# Patient Record
Sex: MALE | Race: WHITE | NOT HISPANIC OR LATINO | Employment: UNEMPLOYED | URBAN - METROPOLITAN AREA
[De-identification: names, ages, dates, MRNs, and addresses within clinical notes are randomized per-mention and may not be internally consistent; named-entity substitution may affect disease eponyms.]

---

## 2021-10-08 ENCOUNTER — HOSPITAL ENCOUNTER (EMERGENCY)
Facility: HOSPITAL | Age: 36
Discharge: HOME | End: 2021-10-08
Attending: EMERGENCY MEDICINE | Admitting: EMERGENCY MEDICINE
Payer: MEDICARE

## 2021-10-08 VITALS
BODY MASS INDEX: 26.32 KG/M2 | TEMPERATURE: 97.8 F | HEART RATE: 88 BPM | DIASTOLIC BLOOD PRESSURE: 88 MMHG | HEIGHT: 71 IN | RESPIRATION RATE: 18 BRPM | OXYGEN SATURATION: 96 % | WEIGHT: 188 LBS | SYSTOLIC BLOOD PRESSURE: 137 MMHG

## 2021-10-08 DIAGNOSIS — L23.7 POISON IVY DERMATITIS: Primary | ICD-10-CM

## 2021-10-08 PROCEDURE — 99282 EMERGENCY DEPT VISIT SF MDM: CPT

## 2021-10-08 PROCEDURE — 99284 EMERGENCY DEPT VISIT MOD MDM: CPT | Performed by: EMERGENCY MEDICINE

## 2021-10-08 RX ORDER — PREDNISONE 10 MG/1
TABLET ORAL
Qty: 35 TABLET | Refills: 0 | Status: SHIPPED | OUTPATIENT
Start: 2021-10-08 | End: 2021-10-23

## 2021-10-08 RX ORDER — DIPHENHYDRAMINE HCL 25 MG
50 TABLET ORAL ONCE
Status: COMPLETED | OUTPATIENT
Start: 2021-10-08 | End: 2021-10-08

## 2021-10-08 RX ORDER — PREDNISONE 20 MG/1
60 TABLET ORAL ONCE
Status: COMPLETED | OUTPATIENT
Start: 2021-10-08 | End: 2021-10-08

## 2021-10-08 RX ORDER — DIPHENHYDRAMINE HCL 25 MG
50 TABLET ORAL EVERY 6 HOURS PRN
Qty: 20 TABLET | Refills: 0 | Status: SHIPPED | OUTPATIENT
Start: 2021-10-08 | End: 2021-12-13 | Stop reason: ALTCHOICE

## 2021-10-08 RX ADMIN — DIPHENHYDRAMINE HYDROCHLORIDE 50 MG: 25 TABLET ORAL at 20:37

## 2021-10-08 RX ADMIN — PREDNISONE 60 MG: 20 TABLET ORAL at 20:36

## 2021-11-03 ENCOUNTER — OFFICE VISIT (OUTPATIENT)
Dept: FAMILY MEDICINE CLINIC | Facility: CLINIC | Age: 36
End: 2021-11-03
Payer: MEDICARE

## 2021-11-03 VITALS
DIASTOLIC BLOOD PRESSURE: 98 MMHG | BODY MASS INDEX: 26.94 KG/M2 | SYSTOLIC BLOOD PRESSURE: 158 MMHG | RESPIRATION RATE: 18 BRPM | HEART RATE: 110 BPM | HEIGHT: 71 IN | WEIGHT: 192.4 LBS | TEMPERATURE: 96 F | OXYGEN SATURATION: 97 %

## 2021-11-03 DIAGNOSIS — F19.90 DRUG USE: Primary | ICD-10-CM

## 2021-11-03 PROBLEM — F11.90 OPIOID USE: Status: ACTIVE | Noted: 2021-11-03

## 2021-11-03 PROCEDURE — T1015 CLINIC SERVICE: HCPCS | Performed by: FAMILY MEDICINE

## 2021-11-03 RX ORDER — BUPRENORPHINE AND NALOXONE 8; 2 MG/1; MG/1
FILM, SOLUBLE BUCCAL; SUBLINGUAL
COMMUNITY
Start: 2021-09-07 | End: 2021-11-05 | Stop reason: SDUPTHER

## 2021-11-03 RX ORDER — MIRTAZAPINE 45 MG/1
TABLET, FILM COATED ORAL
COMMUNITY
Start: 2021-10-14 | End: 2021-11-03 | Stop reason: SDUPTHER

## 2021-11-03 RX ORDER — BUPRENORPHINE HYDROCHLORIDE AND NALOXONE HYDROCHLORIDE DIHYDRATE 2; .5 MG/1; MG/1
TABLET SUBLINGUAL
COMMUNITY
Start: 2021-10-11 | End: 2021-11-05 | Stop reason: ALTCHOICE

## 2021-11-03 RX ORDER — MIRTAZAPINE 45 MG/1
45 TABLET, FILM COATED ORAL
Qty: 30 TABLET | Refills: 0 | Status: SHIPPED | OUTPATIENT
Start: 2021-11-03 | End: 2021-11-05 | Stop reason: ALTCHOICE

## 2021-11-03 RX ORDER — LORAZEPAM 1 MG/1
TABLET ORAL
COMMUNITY
Start: 2021-10-04 | End: 2021-11-05 | Stop reason: SDUPTHER

## 2021-11-03 RX ORDER — NALOXONE HYDROCHLORIDE 4 MG/.1ML
SPRAY NASAL
COMMUNITY
Start: 2021-10-11 | End: 2021-11-05 | Stop reason: SDUPTHER

## 2021-11-04 DIAGNOSIS — F19.90 DRUG USE: Primary | ICD-10-CM

## 2021-11-04 PROCEDURE — 80307 DRUG TEST PRSMV CHEM ANLYZR: CPT | Performed by: FAMILY MEDICINE

## 2021-11-05 ENCOUNTER — OFFICE VISIT (OUTPATIENT)
Dept: FAMILY MEDICINE CLINIC | Facility: CLINIC | Age: 36
End: 2021-11-05
Payer: MEDICARE

## 2021-11-05 VITALS
TEMPERATURE: 97.9 F | HEART RATE: 106 BPM | WEIGHT: 193 LBS | SYSTOLIC BLOOD PRESSURE: 140 MMHG | HEIGHT: 71 IN | DIASTOLIC BLOOD PRESSURE: 92 MMHG | OXYGEN SATURATION: 99 % | BODY MASS INDEX: 27.02 KG/M2 | RESPIRATION RATE: 18 BRPM

## 2021-11-05 DIAGNOSIS — F41.9 ANXIETY: ICD-10-CM

## 2021-11-05 DIAGNOSIS — F11.24 OPIOID DEPENDENCE WITH OPIOID-INDUCED MOOD DISORDER (HCC): Primary | ICD-10-CM

## 2021-11-05 DIAGNOSIS — Z13.220 LIPID SCREENING: ICD-10-CM

## 2021-11-05 DIAGNOSIS — Z11.4 SCREENING FOR HIV WITHOUT PRESENCE OF RISK FACTORS: ICD-10-CM

## 2021-11-05 DIAGNOSIS — Z11.3 ROUTINE SCREENING FOR STI (SEXUALLY TRANSMITTED INFECTION): ICD-10-CM

## 2021-11-05 DIAGNOSIS — Z11.59 ENCOUNTER FOR HEPATITIS C SCREENING TEST FOR LOW RISK PATIENT: ICD-10-CM

## 2021-11-05 PROCEDURE — T1015 CLINIC SERVICE: HCPCS | Performed by: FAMILY MEDICINE

## 2021-11-05 RX ORDER — LORAZEPAM 1 MG/1
1 TABLET ORAL 2 TIMES DAILY
Qty: 10 TABLET | Refills: 0 | Status: SHIPPED | OUTPATIENT
Start: 2021-11-05 | End: 2021-11-12 | Stop reason: ALTCHOICE

## 2021-11-05 RX ORDER — NALOXONE HYDROCHLORIDE 4 MG/.1ML
SPRAY NASAL
Qty: 1 EACH | Refills: 2 | Status: SHIPPED | OUTPATIENT
Start: 2021-11-05 | End: 2022-06-16

## 2021-11-05 RX ORDER — BUPRENORPHINE AND NALOXONE 8; 2 MG/1; MG/1
16 FILM, SOLUBLE BUCCAL; SUBLINGUAL DAILY
Qty: 14 FILM | Refills: 0 | Status: SHIPPED | OUTPATIENT
Start: 2021-11-05 | End: 2021-11-12 | Stop reason: SDUPTHER

## 2021-11-05 RX ORDER — PAROXETINE HYDROCHLORIDE 20 MG/1
20 TABLET, FILM COATED ORAL DAILY
Qty: 30 TABLET | Refills: 0 | Status: SHIPPED | OUTPATIENT
Start: 2021-11-05 | End: 2021-11-29 | Stop reason: SDUPTHER

## 2021-11-06 LAB — FENTANYL+NORFENTANYL PNL UR: NEGATIVE PG/ML

## 2021-11-10 LAB
BUPRENORPHINE UR CFM-MCNC: 664 NG/ML
BUPRENORPHINE UR QL CFM: NORMAL NG/ML
BUPRENORPHINE UR QL CFM: POSITIVE
BUPRENORPHINE+NOR UR QL: POSITIVE
NORBUPRENORPHINE UR CFM-MCNC: >1000 NG/ML
NORBUPRENORPHINE UR QL CFM: POSITIVE

## 2021-11-12 ENCOUNTER — OFFICE VISIT (OUTPATIENT)
Dept: FAMILY MEDICINE CLINIC | Facility: CLINIC | Age: 36
End: 2021-11-12
Payer: MEDICARE

## 2021-11-12 VITALS
HEIGHT: 71 IN | DIASTOLIC BLOOD PRESSURE: 82 MMHG | OXYGEN SATURATION: 98 % | WEIGHT: 192 LBS | HEART RATE: 100 BPM | BODY MASS INDEX: 26.88 KG/M2 | SYSTOLIC BLOOD PRESSURE: 116 MMHG | RESPIRATION RATE: 18 BRPM | TEMPERATURE: 98.2 F

## 2021-11-12 DIAGNOSIS — F41.9 ANXIETY: ICD-10-CM

## 2021-11-12 DIAGNOSIS — F11.24 OPIOID DEPENDENCE WITH OPIOID-INDUCED MOOD DISORDER (HCC): Primary | ICD-10-CM

## 2021-11-12 DIAGNOSIS — Z11.3 SCREENING EXAMINATION FOR STD (SEXUALLY TRANSMITTED DISEASE): ICD-10-CM

## 2021-11-12 PROCEDURE — T1015 CLINIC SERVICE: HCPCS | Performed by: FAMILY MEDICINE

## 2021-11-12 PROCEDURE — 80307 DRUG TEST PRSMV CHEM ANLYZR: CPT | Performed by: FAMILY MEDICINE

## 2021-11-12 RX ORDER — CLONAZEPAM 1 MG/1
1 TABLET, ORALLY DISINTEGRATING ORAL 2 TIMES DAILY
Qty: 28 TABLET | Refills: 0 | Status: SHIPPED | OUTPATIENT
Start: 2021-11-12 | End: 2021-11-29 | Stop reason: SDUPTHER

## 2021-11-12 RX ORDER — BUPRENORPHINE AND NALOXONE 8; 2 MG/1; MG/1
16 FILM, SOLUBLE BUCCAL; SUBLINGUAL DAILY
Qty: 60 FILM | Refills: 0 | Status: SHIPPED | OUTPATIENT
Start: 2021-11-12 | End: 2021-12-13 | Stop reason: SDUPTHER

## 2021-11-15 ENCOUNTER — TELEPHONE (OUTPATIENT)
Dept: LAB | Facility: HOSPITAL | Age: 36
End: 2021-11-15

## 2021-11-15 ENCOUNTER — TELEPHONE (OUTPATIENT)
Dept: FAMILY MEDICINE CLINIC | Facility: CLINIC | Age: 36
End: 2021-11-15

## 2021-11-15 LAB
AMPHETAMINES UR QL SCN: NEGATIVE NG/ML
BARBITURATES UR QL SCN: NEGATIVE NG/ML
BENZODIAZ UR QL: POSITIVE
BZE UR QL: NEGATIVE NG/ML
CANNABINOIDS UR QL SCN: POSITIVE
METHADONE UR QL SCN: NEGATIVE NG/ML
OPIATES UR QL: NEGATIVE NG/ML
PCP UR QL: NEGATIVE NG/ML
PROPOXYPH UR QL SCN: NEGATIVE NG/ML

## 2021-11-18 LAB
AMPHETAMINES UR QL SCN: NEGATIVE NG/ML
BARBITURATES UR QL SCN: NEGATIVE NG/ML
BENZODIAZ UR QL: POSITIVE
BUPRENORPHINE UR CFM-MCNC: 62 NG/ML
BUPRENORPHINE UR QL CFM: NORMAL NG/ML
BUPRENORPHINE UR QL CFM: POSITIVE
BUPRENORPHINE+NOR UR QL: POSITIVE
BZE UR QL: NEGATIVE NG/ML
CANNABINOIDS UR QL SCN: POSITIVE
METHADONE UR QL SCN: NEGATIVE NG/ML
NORBUPRENORPHINE UR CFM-MCNC: 330 NG/ML
NORBUPRENORPHINE UR QL CFM: POSITIVE
OPIATES UR QL: NEGATIVE NG/ML
PCP UR QL: NEGATIVE NG/ML
PROPOXYPH UR QL SCN: NEGATIVE NG/ML

## 2021-11-20 LAB — FENTANYL+NORFENTANYL PNL UR: NEGATIVE PG/ML

## 2021-11-29 ENCOUNTER — OFFICE VISIT (OUTPATIENT)
Dept: FAMILY MEDICINE CLINIC | Facility: CLINIC | Age: 36
End: 2021-11-29
Payer: MEDICARE

## 2021-11-29 VITALS
RESPIRATION RATE: 18 BRPM | WEIGHT: 201 LBS | HEART RATE: 110 BPM | BODY MASS INDEX: 28.14 KG/M2 | DIASTOLIC BLOOD PRESSURE: 70 MMHG | SYSTOLIC BLOOD PRESSURE: 130 MMHG | HEIGHT: 71 IN | OXYGEN SATURATION: 97 % | TEMPERATURE: 97.6 F

## 2021-11-29 DIAGNOSIS — F41.9 ANXIETY: Primary | ICD-10-CM

## 2021-11-29 PROCEDURE — T1015 CLINIC SERVICE: HCPCS | Performed by: FAMILY MEDICINE

## 2021-11-29 RX ORDER — PAROXETINE 30 MG/1
30 TABLET, FILM COATED ORAL DAILY
Qty: 30 TABLET | Refills: 0 | Status: SHIPPED | OUTPATIENT
Start: 2021-11-29 | End: 2021-12-13 | Stop reason: SDUPTHER

## 2021-11-29 RX ORDER — CLONAZEPAM 1 MG/1
TABLET ORAL
Qty: 90 TABLET | Refills: 0 | Status: SHIPPED | OUTPATIENT
Start: 2021-11-29 | End: 2021-12-23 | Stop reason: SDUPTHER

## 2021-11-29 RX ORDER — CLONAZEPAM 1 MG/1
TABLET, ORALLY DISINTEGRATING ORAL
Qty: 90 TABLET | Refills: 0 | Status: SHIPPED | OUTPATIENT
Start: 2021-11-29 | End: 2021-11-29 | Stop reason: CLARIF

## 2021-12-07 ENCOUNTER — TELEPHONE (OUTPATIENT)
Dept: FAMILY MEDICINE CLINIC | Facility: CLINIC | Age: 36
End: 2021-12-07

## 2021-12-13 ENCOUNTER — OFFICE VISIT (OUTPATIENT)
Dept: FAMILY MEDICINE CLINIC | Facility: CLINIC | Age: 36
End: 2021-12-13
Payer: MEDICARE

## 2021-12-13 VITALS
SYSTOLIC BLOOD PRESSURE: 134 MMHG | WEIGHT: 204 LBS | BODY MASS INDEX: 28.56 KG/M2 | HEIGHT: 71 IN | OXYGEN SATURATION: 98 % | RESPIRATION RATE: 18 BRPM | TEMPERATURE: 96.9 F | DIASTOLIC BLOOD PRESSURE: 86 MMHG | HEART RATE: 86 BPM

## 2021-12-13 DIAGNOSIS — F41.9 ANXIETY: ICD-10-CM

## 2021-12-13 DIAGNOSIS — F19.982 DRUG-INDUCED INSOMNIA (HCC): ICD-10-CM

## 2021-12-13 DIAGNOSIS — F11.24 OPIOID DEPENDENCE WITH OPIOID-INDUCED MOOD DISORDER (HCC): Primary | ICD-10-CM

## 2021-12-13 PROCEDURE — 80307 DRUG TEST PRSMV CHEM ANLYZR: CPT | Performed by: FAMILY MEDICINE

## 2021-12-13 PROCEDURE — T1015 CLINIC SERVICE: HCPCS | Performed by: FAMILY MEDICINE

## 2021-12-13 RX ORDER — BUPRENORPHINE AND NALOXONE 8; 2 MG/1; MG/1
16 FILM, SOLUBLE BUCCAL; SUBLINGUAL DAILY
Qty: 60 FILM | Refills: 0 | Status: SHIPPED | OUTPATIENT
Start: 2021-12-13 | End: 2022-01-11 | Stop reason: SDUPTHER

## 2021-12-13 RX ORDER — MIRTAZAPINE 45 MG/1
45 TABLET, FILM COATED ORAL
COMMUNITY
End: 2021-12-13 | Stop reason: SDUPTHER

## 2021-12-13 RX ORDER — MIRTAZAPINE 30 MG/1
30 TABLET, FILM COATED ORAL
Qty: 30 TABLET | Refills: 2 | Status: SHIPPED | OUTPATIENT
Start: 2021-12-13 | End: 2022-01-12 | Stop reason: SDUPTHER

## 2021-12-13 RX ORDER — PAROXETINE 30 MG/1
30 TABLET, FILM COATED ORAL DAILY
Qty: 30 TABLET | Refills: 2 | Status: SHIPPED | OUTPATIENT
Start: 2021-12-13 | End: 2022-01-12 | Stop reason: SDUPTHER

## 2021-12-14 LAB — FENTANYL+NORFENTANYL PNL UR: NEGATIVE PG/ML

## 2021-12-17 LAB
AMPHETAMINES UR QL SCN: NEGATIVE NG/ML
BARBITURATES UR QL SCN: NEGATIVE NG/ML
BENZODIAZ UR QL: NEGATIVE NG/ML
BUPRENORPHINE UR CFM-MCNC: 62 NG/ML
BUPRENORPHINE UR QL CFM: NORMAL NG/ML
BUPRENORPHINE UR QL CFM: POSITIVE
BUPRENORPHINE+NOR UR QL: POSITIVE
BZE UR QL: NEGATIVE NG/ML
CANNABINOIDS UR QL SCN: POSITIVE
METHADONE UR QL SCN: NEGATIVE NG/ML
NORBUPRENORPHINE UR CFM-MCNC: 490 NG/ML
NORBUPRENORPHINE UR QL CFM: POSITIVE
OPIATES UR QL: NEGATIVE NG/ML
PCP UR QL: NEGATIVE NG/ML
PROPOXYPH UR QL SCN: NEGATIVE NG/ML

## 2021-12-20 ENCOUNTER — TELEPHONE (OUTPATIENT)
Dept: FAMILY MEDICINE CLINIC | Facility: CLINIC | Age: 36
End: 2021-12-20

## 2021-12-23 DIAGNOSIS — F41.9 ANXIETY: ICD-10-CM

## 2021-12-23 RX ORDER — CLONAZEPAM 1 MG/1
TABLET ORAL
Qty: 90 TABLET | Refills: 0 | Status: SHIPPED | OUTPATIENT
Start: 2021-12-28 | End: 2022-01-12 | Stop reason: SDUPTHER

## 2022-01-11 ENCOUNTER — TELEPHONE (OUTPATIENT)
Dept: OTHER | Facility: OTHER | Age: 37
End: 2022-01-11

## 2022-01-11 DIAGNOSIS — F11.24 OPIOID DEPENDENCE WITH OPIOID-INDUCED MOOD DISORDER (HCC): ICD-10-CM

## 2022-01-11 RX ORDER — BUPRENORPHINE AND NALOXONE 8; 2 MG/1; MG/1
16 FILM, SOLUBLE BUCCAL; SUBLINGUAL DAILY
Qty: 30 FILM | Refills: 0 | Status: SHIPPED | OUTPATIENT
Start: 2022-01-11 | End: 2022-01-12 | Stop reason: SDUPTHER

## 2022-01-12 DIAGNOSIS — F11.24 OPIOID DEPENDENCE WITH OPIOID-INDUCED MOOD DISORDER (HCC): ICD-10-CM

## 2022-01-12 DIAGNOSIS — F19.982 DRUG-INDUCED INSOMNIA (HCC): ICD-10-CM

## 2022-01-12 DIAGNOSIS — F41.9 ANXIETY: ICD-10-CM

## 2022-01-12 RX ORDER — MIRTAZAPINE 30 MG/1
30 TABLET, FILM COATED ORAL
Qty: 30 TABLET | Refills: 0 | Status: SHIPPED | OUTPATIENT
Start: 2022-01-12 | End: 2022-05-04 | Stop reason: SDUPTHER

## 2022-01-12 RX ORDER — CLONAZEPAM 1 MG/1
TABLET ORAL
Qty: 90 TABLET | Refills: 0 | Status: SHIPPED | OUTPATIENT
Start: 2022-01-12 | End: 2022-04-27 | Stop reason: ALTCHOICE

## 2022-01-12 RX ORDER — PAROXETINE 30 MG/1
30 TABLET, FILM COATED ORAL DAILY
Qty: 30 TABLET | Refills: 0 | Status: SHIPPED | OUTPATIENT
Start: 2022-01-12 | End: 2022-02-09

## 2022-01-12 RX ORDER — BUPRENORPHINE AND NALOXONE 8; 2 MG/1; MG/1
16 FILM, SOLUBLE BUCCAL; SUBLINGUAL DAILY
Qty: 30 FILM | Refills: 0 | Status: SHIPPED | OUTPATIENT
Start: 2022-01-12 | End: 2022-01-27

## 2022-02-05 DIAGNOSIS — F41.9 ANXIETY: ICD-10-CM

## 2022-02-05 NOTE — TELEPHONE ENCOUNTER
Patient called from [place] and is out of his medications  He also wanted Suboxone also but I didn't see it on his list  The patient requested a call back

## 2022-02-07 RX ORDER — CLONAZEPAM 1 MG/1
TABLET ORAL
Qty: 90 TABLET | Refills: 0 | OUTPATIENT
Start: 2022-02-07

## 2022-02-08 NOTE — TELEPHONE ENCOUNTER
Patient has not been seen in nearly 2 months and is an MAT patient  He needs to come in for an appointment for controlled substances or transfer care to another provider if he is going to be in [facility] for an extended period of time

## 2022-03-29 ENCOUNTER — TELEPHONE (OUTPATIENT)
Dept: OTHER | Facility: OTHER | Age: 37
End: 2022-03-29

## 2022-03-29 NOTE — TELEPHONE ENCOUNTER
Pt stated needs to make an appointment for the middle of April stated needs to get an injection of Vivitrol please followup with patient

## 2022-04-20 ENCOUNTER — OFFICE VISIT (OUTPATIENT)
Dept: FAMILY MEDICINE CLINIC | Facility: CLINIC | Age: 37
End: 2022-04-20
Payer: COMMERCIAL

## 2022-04-20 VITALS
DIASTOLIC BLOOD PRESSURE: 88 MMHG | HEART RATE: 90 BPM | BODY MASS INDEX: 30.52 KG/M2 | HEIGHT: 71 IN | TEMPERATURE: 97.5 F | SYSTOLIC BLOOD PRESSURE: 138 MMHG | WEIGHT: 218 LBS | RESPIRATION RATE: 19 BRPM | OXYGEN SATURATION: 97 %

## 2022-04-20 DIAGNOSIS — F11.24 OPIOID DEPENDENCE WITH OPIOID-INDUCED MOOD DISORDER (HCC): Primary | ICD-10-CM

## 2022-04-20 DIAGNOSIS — F41.9 ANXIETY: ICD-10-CM

## 2022-04-20 DIAGNOSIS — Z71.6 ENCOUNTER FOR TOBACCO USE CESSATION COUNSELING: ICD-10-CM

## 2022-04-20 DIAGNOSIS — R56.9 SEIZURE (HCC): ICD-10-CM

## 2022-04-20 PROCEDURE — T1015 CLINIC SERVICE: HCPCS | Performed by: FAMILY MEDICINE

## 2022-04-20 RX ORDER — CHLORDIAZEPOXIDE HYDROCHLORIDE 25 MG/1
CAPSULE, GELATIN COATED ORAL
COMMUNITY
Start: 2022-01-31 | End: 2022-04-27 | Stop reason: ALTCHOICE

## 2022-04-20 RX ORDER — NALOXONE HYDROCHLORIDE 4 MG/.1ML
SPRAY NASAL
Qty: 1 EACH | Refills: 1 | Status: SHIPPED | OUTPATIENT
Start: 2022-04-20 | End: 2022-06-16

## 2022-04-20 RX ORDER — CLONIDINE HYDROCHLORIDE 0.1 MG/1
TABLET ORAL
COMMUNITY
Start: 2022-02-07 | End: 2022-05-04 | Stop reason: SDUPTHER

## 2022-04-20 RX ORDER — BUPRENORPHINE AND NALOXONE 8; 2 MG/1; MG/1
8 FILM, SOLUBLE BUCCAL; SUBLINGUAL 2 TIMES DAILY
Qty: 14 EACH | Refills: 0 | Status: SHIPPED | OUTPATIENT
Start: 2022-04-20 | End: 2022-04-27 | Stop reason: SDUPTHER

## 2022-04-20 NOTE — PROGRESS NOTES
FAMILY MEDICINE OFFICE VISIT  [facility]      NAME: Theodora Interiano  AGE: 31 y o  SEX: male    DATE OF ENCOUNTER: 4/20/2022    Assessment and Plan     Patient is 30 yo M with PMH of opioid use disorder and anxiety presented to office for subaxone refill  Lost follow up for past 4 months  Denies any illicit drug use  Also complaints of increased anxiety with recent stress in life  Reports of an episode of seizure in January while he is in [place]  Will review labs and reinitiate the therapy  Advised the patient to follow up every week and the patient is in agreement with the plan  Referral for neurology in place  Patient will visit ER in case of emergency  1  Opioid dependence with opioid-induced mood disorder ([facility] [address] )  -     Toxicology screen, urine  -     FENTANYL, URINE  -     Suboxone    2  Seizure [facility])  -     Ambulatory Referral to Neurology; Future    3  Anxiety        -     Chlordiazepoxide 25mg    4  Encounter for tobacco use cessation counseling  -     nicotine polacrilex (NICORETTE) 2 mg gum; Chew 1 each (2 mg total) as needed for smoking cessation         Chief Complaint     Chief Complaint   Patient presents with    Medication Refill       History of Present Illness     Patient came to office for suboxone refill  Was on suboxone films 8mg x 2 daily  Patient last follow up because of family situation  Last seen in December, 2021  Denies drug use during this time  Reports of an episode of sizure in January when he is in [place]  Did not loose consciousness during the seizure, no post ictal state  Currently not on seizure medication  Reports of recent use of oxycodone for hip pain  Patient is requesting injectable form of subaxone in future  Medication Refill  Pertinent negatives include no abdominal pain, chest pain, chills, fever, headaches, nausea or vomiting         The following portions of the patient's history were reviewed and updated as appropriate: allergies, current medications, past family history, past medical history, past social history, past surgical history and problem list     Review of Systems     Review of Systems   Constitutional: Negative for appetite change, chills and fever  HENT: Negative for facial swelling and rhinorrhea  Eyes: Negative for visual disturbance  Respiratory: Negative for choking, chest tightness and shortness of breath  Cardiovascular: Negative for chest pain  Gastrointestinal: Negative for abdominal pain, constipation, diarrhea, nausea and vomiting  Neurological: Negative for dizziness, tremors, syncope and headaches  Psychiatric/Behavioral: Positive for sleep disturbance  Negative for suicidal ideas  All other systems reviewed and are negative  Active Problem List     Patient Active Problem List   Diagnosis    Opioid use    Anxiety    Encounter for tobacco use cessation counseling    Opioid dependence with opioid-induced mood disorder ([facility] [address] )    Seizure (HCC)       Objective     /88 (BP Location: Left arm, Patient Position: Sitting, Cuff Size: Standard)   Pulse 90   Temp 97 5 °F (36 4 °C) (Axillary)   Resp 19   Ht 5' 11" (1 803 m)   Wt 98 9 kg (218 lb)   SpO2 97%   BMI 30 40 kg/m²     Physical Exam  Vitals and nursing note reviewed  Exam conducted with a chaperone present  Constitutional:       General: He is not in acute distress  Appearance: Normal appearance  HENT:      Head: Normocephalic and atraumatic  Right Ear: External ear normal       Left Ear: External ear normal       Nose: No congestion or rhinorrhea  Mouth/Throat:      Mouth: Mucous membranes are moist       Pharynx: Oropharynx is clear  Eyes:      Conjunctiva/sclera: Conjunctivae normal       Pupils: Pupils are equal, round, and reactive to light  Cardiovascular:      Rate and Rhythm: Normal rate and regular rhythm  Pulses: Normal pulses  Heart sounds: Normal heart sounds  No murmur heard        Pulmonary: Effort: Pulmonary effort is normal       Breath sounds: Normal breath sounds  No wheezing  Abdominal:      General: Bowel sounds are normal       Palpations: Abdomen is soft  Tenderness: There is no abdominal tenderness  Musculoskeletal:      Cervical back: Neck supple  Right lower leg: No edema  Left lower leg: No edema  Skin:     General: Skin is warm and dry  Capillary Refill: Capillary refill takes less than 2 seconds  Neurological:      General: No focal deficit present  Mental Status: He is alert and oriented to person, place, and time  Mental status is at baseline           Pertinent Laboratory/Diagnostic Studies:          Current Medications     Current Outpatient Medications:     clonazePAM (KlonoPIN) 1 mg tablet, Take 1 tab po qAM and 2 tabs po qPM, Disp: 90 tablet, Rfl: 0    cloNIDine (CATAPRES) 0 1 mg tablet, TAKE 1 TABLET BY MOUTH TWICE DAILY FOR 54 DAYS, Disp: , Rfl:     mirtazapine (REMERON) 30 mg tablet, Take 1 tablet (30 mg total) by mouth daily at bedtime, Disp: 30 tablet, Rfl: 0    Narcan 4 MG/0 1ML nasal spray, Use as directed for opiate overdose, Disp: 1 each, Rfl: 2    PARoxetine (PAXIL) 30 mg tablet, TAKE 1 TABLET(30 MG) BY MOUTH DAILY, Disp: 30 tablet, Rfl: 2    chlordiazePOXIDE (LIBRIUM) 25 mg capsule, , Disp: , Rfl:     nicotine polacrilex (NICORETTE) 2 mg gum, Chew 1 each (2 mg total) as needed for smoking cessation, Disp: 50 each, Rfl: 0    Health Maintenance     Health Maintenance   Topic Date Due    Hepatitis C Screening  Never done    COVID-19 Vaccine (1) Never done    Pneumococcal Vaccine: Pediatrics (0 to 5 Years) and At-Risk Patients (6 to 59 Years) (1 of 2 - PPSV23) Never done    HIV Screening  Never done    Annual Physical  Never done    Influenza Vaccine (1) Never done    Depression Screening  11/03/2022    Depression Follow-up Plan  11/03/2022    BMI: Followup Plan  04/20/2023    BMI: Adult  04/20/2023    DTaP,Tdap,and Td Vaccines (2 - Td or Tdap) 08/10/2031    HIB Vaccine  Aged Out    Hepatitis B Vaccine  Aged Out    IPV Vaccine  Aged Out    Hepatitis A Vaccine  Aged Out    Meningococcal ACWY Vaccine  Aged Out    HPV Vaccine  Aged [employer] History   Administered Date(s) Administered    Tdap 08/10/2021     BMI Counseling: Body mass index is 30 4 kg/m²  The BMI is above normal  Nutrition recommendations include decreasing portion sizes, encouraging healthy choices of fruits and vegetables, decreasing fast food intake, consuming healthier snacks, limiting drinks that contain sugar and reducing intake of cholesterol  Exercise recommendations include moderate physical activity 150 minutes/week, exercising 3-5 times per week and strength training exercises  No pharmacotherapy was ordered  Rationale for BMI follow-up plan is due to patient being overweight or obese  Tobacco Cessation Counseling: Tobacco cessation counseling was provided  The patient is sincerely urged to quit consumption of tobacco  He is ready to quit tobacco  Medication options and side effects of medication discussed  Patient agreed to medication  Nicotine gum was prescribed          Boy Nelson MD   Family Medicine  PGY-1  4/20/2022 1:16 PM

## 2022-04-20 NOTE — TELEPHONE ENCOUNTER
[place] called in requesting the on call to be paged due to the pt's neighbor coming into the pharmacy to report pt is overdosing  She says she saw or heard something and wanted to alert the pharmacy  On call paged via TC

## 2022-04-21 ENCOUNTER — TELEPHONE (OUTPATIENT)
Dept: FAMILY MEDICINE CLINIC | Facility: CLINIC | Age: 37
End: 2022-04-21

## 2022-04-21 NOTE — TELEPHONE ENCOUNTER
Tried to reach patient multiple times yesterday and today  Voicemail not set up  Patient started taking suboxone again yesterday  Was lost to follow up for 4-5 months  There was also a complaint of patient overdosing per on call [employer]  Patient is following up with office next week for MAT therapy  Will continue to reach out and advice against drug use if true  This can also lead to significant withdrawal symptoms

## 2022-04-21 NOTE — TELEPHONE ENCOUNTER
PATIENT PRESENTED HIMSELF TO THE OFFICE TODAY AT 2:40 P  M   LOOKING TO HAVE DR [address]  APPARENTLY YESTERDAY SOMEONE REPORTED TO HIS PHARMACY THAT HE WAS OVERDOSING AT HIS HOME SO THE PHARMACY WONT GIVE HIM HIS MEDICATION UNTIL THEY HEAR FROM THE PRESCRIBING PHYSICIAN    SEE NOTE PLACED BY DR GAYLE ON 4/20/22

## 2022-04-21 NOTE — TELEPHONE ENCOUNTER
Patient is calling back and is at the pharmacy  He said that anyone from the office can call and authorize them to release his medicaiton  He would greatly appreciate a call back asap     He is unable to  his medication

## 2022-04-27 ENCOUNTER — OFFICE VISIT (OUTPATIENT)
Dept: FAMILY MEDICINE CLINIC | Facility: CLINIC | Age: 37
End: 2022-04-27
Payer: COMMERCIAL

## 2022-04-27 VITALS
WEIGHT: 220.2 LBS | DIASTOLIC BLOOD PRESSURE: 110 MMHG | HEIGHT: 71 IN | OXYGEN SATURATION: 99 % | SYSTOLIC BLOOD PRESSURE: 176 MMHG | HEART RATE: 107 BPM | TEMPERATURE: 97.2 F | BODY MASS INDEX: 30.83 KG/M2 | RESPIRATION RATE: 18 BRPM

## 2022-04-27 DIAGNOSIS — F41.9 ANXIETY: ICD-10-CM

## 2022-04-27 DIAGNOSIS — Z71.6 ENCOUNTER FOR TOBACCO USE CESSATION COUNSELING: ICD-10-CM

## 2022-04-27 DIAGNOSIS — F13.239 BENZODIAZEPINE WITHDRAWAL WITH COMPLICATION (HCC): ICD-10-CM

## 2022-04-27 DIAGNOSIS — F11.24 OPIOID DEPENDENCE WITH OPIOID-INDUCED MOOD DISORDER (HCC): Primary | ICD-10-CM

## 2022-04-27 PROCEDURE — T1015 CLINIC SERVICE: HCPCS | Performed by: FAMILY MEDICINE

## 2022-04-27 PROCEDURE — 80307 DRUG TEST PRSMV CHEM ANLYZR: CPT

## 2022-04-27 RX ORDER — HYDROXYZINE HYDROCHLORIDE 25 MG/1
25 TABLET, FILM COATED ORAL EVERY 8 HOURS PRN
Qty: 21 TABLET | Refills: 0 | Status: SHIPPED | OUTPATIENT
Start: 2022-04-27 | End: 2022-06-16

## 2022-04-27 RX ORDER — BUPRENORPHINE AND NALOXONE 8; 2 MG/1; MG/1
8 FILM, SOLUBLE BUCCAL; SUBLINGUAL 2 TIMES DAILY
Qty: 14 EACH | Refills: 0 | Status: SHIPPED | OUTPATIENT
Start: 2022-04-27 | End: 2022-05-04 | Stop reason: DRUGHIGH

## 2022-04-27 RX ORDER — CHLORDIAZEPOXIDE HYDROCHLORIDE 5 MG/1
5 CAPSULE, GELATIN COATED ORAL 2 TIMES DAILY PRN
Qty: 14 CAPSULE | Refills: 0 | Status: SHIPPED | OUTPATIENT
Start: 2022-04-27 | End: 2022-04-28 | Stop reason: SDUPTHER

## 2022-04-27 RX ORDER — PAROXETINE 30 MG/1
30 TABLET, FILM COATED ORAL EVERY MORNING
Qty: 30 TABLET | Refills: 2 | Status: SHIPPED | OUTPATIENT
Start: 2022-04-27 | End: 2022-05-18 | Stop reason: ALTCHOICE

## 2022-04-27 NOTE — PROGRESS NOTES
FAMILY MEDICINE OFFICE VISIT  [facility]      NAME: Omid Fagan  AGE: 31 y o  SEX: male    DATE OF ENCOUNTER: 04/28//2022     Assessment and Plan     Patient is 34yo M with opioid use disorder came to office for follow up visit  His Blood pressure was high during the visit 174/110 mmHg  Repeat blood pressure 160/100 mm hg  Possible benzo withdrawal, considering the xanax use last week  COWS score 8 with mild withdrawal    Will come back in 1 week with lab work  Advised the patient to go to nearby emergency room in case of persistent headache, shortness of breath, chest pain, anxiety attacks  1  Opioid dependence with opioid-induced mood disorder ([facility] [address] )  -     Toxicology screen, urine  -     FENTANYL, URINE  -     Suboxone  -     buprenorphine-naloxone (Suboxone) 8-2 mg; Place 1 Film (8 mg total) under the tongue 2 (two) times a day    2  Anxiety  Comments:  stable  cont Paxil to 30mg  Discontinue clonazepam   PDMP reviewed and showed no concern  Orders:  -     PARoxetine (PAXIL) 30 mg tablet; Take 1 tablet (30 mg total) by mouth every morning  -     hydrOXYzine HCL (ATARAX) 25 mg tablet; Take 1 tablet (25 mg total) by mouth every 8 (eight) hours as needed for anxiety    3  Benzodiazepine withdrawal with complication ([facility] )    4  Encounter for tobacco use cessation counseling          Chief Complaint     Chief Complaint   Patient presents with    Follow-up       History of Present Illness     Patient is a 29-year-old male came to office for follow-up visit for opioid use disorder  Reinitiated Suboxone 8mg x 2  Patient supposed to come with labs today, but he did not  Reports use of off-street Fentanyl with Xanax  Last use 1week ago on last Tuesday  Reports of anxiety attack 4 days ago while playing basket ball  Became light headed, had trouble breathing and fell on to the ground  Did not hurt his head  Denies palpitations during that time     Patient reports of using clonidine previously, stopped using now  Denies yawning, GI upset  The following portions of the patient's history were reviewed and updated as appropriate: allergies, current medications, past family history, past medical history, past social history, past surgical history and problem list     Review of Systems     Review of Systems   Constitutional: Positive for diaphoresis  Negative for activity change, chills and fever  HENT: Negative for congestion, ear discharge, ear pain, rhinorrhea, trouble swallowing and voice change  Eyes: Negative for visual disturbance  Respiratory: Negative for cough, chest tightness, shortness of breath and wheezing  Cardiovascular: Negative for chest pain, palpitations and leg swelling  Gastrointestinal: Negative for abdominal pain, constipation, diarrhea, nausea and vomiting  Genitourinary: Negative for difficulty urinating  Skin: Negative for color change  Neurological: Negative for dizziness, light-headedness and headaches  Psychiatric/Behavioral: Negative for agitation and confusion  All other systems reviewed and are negative  Active Problem List     Patient Active Problem List   Diagnosis    Opioid use    Anxiety    Encounter for tobacco use cessation counseling    Opioid dependence with opioid-induced mood disorder (HCC)    Seizure (HCC)       Objective     BP (!) 176/110 (BP Location: Left arm, Patient Position: Sitting, Cuff Size: Adult)   Pulse (!) 107   Temp (!) 97 2 °F (36 2 °C) (Tympanic)   Resp 18   Ht 5' 11" (1 803 m)   Wt 99 9 kg (220 lb 3 2 oz)   SpO2 99%   BMI 30 71 kg/m²     Physical Exam  Vitals and nursing note reviewed  Exam conducted with a chaperone present  Constitutional:       General: He is not in acute distress  Appearance: Normal appearance  HENT:      Head: Normocephalic and atraumatic  Right Ear: External ear normal       Left Ear: External ear normal       Nose: No congestion or rhinorrhea  Mouth/Throat:      Mouth: Mucous membranes are moist       Pharynx: Oropharynx is clear  Eyes:      Conjunctiva/sclera: Conjunctivae normal       Pupils: Pupils are equal, round, and reactive to light  Cardiovascular:      Rate and Rhythm: Regular rhythm  Tachycardia present  Pulses: Normal pulses  Heart sounds: Normal heart sounds  No murmur heard  Pulmonary:      Effort: Pulmonary effort is normal       Breath sounds: Normal breath sounds  No wheezing  Abdominal:      General: Bowel sounds are normal       Palpations: Abdomen is soft  Tenderness: There is no abdominal tenderness  Musculoskeletal:      Cervical back: Neck supple  Right lower leg: No edema  Left lower leg: No edema  Skin:     General: Skin is warm and dry  Capillary Refill: Capillary refill takes less than 2 seconds  Neurological:      General: No focal deficit present  Mental Status: He is alert and oriented to person, place, and time  Pertinent Laboratory/Diagnostic Studies:            Current Medications     Current Outpatient Medications:     buprenorphine-naloxone (Suboxone) 8-2 mg, Place 1 Film (8 mg total) under the tongue 2 (two) times a day, Disp: 14 each, Rfl: 0    cloNIDine (CATAPRES) 0 1 mg tablet, TAKE 1 TABLET BY MOUTH TWICE DAILY FOR 54 DAYS, Disp: , Rfl:     mirtazapine (REMERON) 30 mg tablet, Take 1 tablet (30 mg total) by mouth daily at bedtime, Disp: 30 tablet, Rfl: 0    naloxone (NARCAN) 4 mg/0 1 mL nasal spray, Administer 1 spray into a nostril   If no response after 2-3 minutes, give another dose in the other nostril using a new spray , Disp: 1 each, Rfl: 1    PARoxetine (PAXIL) 30 mg tablet, Take 1 tablet (30 mg total) by mouth every morning, Disp: 30 tablet, Rfl: 2    chlordiazePOXIDE (LIBRIUM) 10 mg capsule, Take 1 capsule (10 mg total) by mouth 2 (two) times a day as needed for anxiety or withdrawal for up to 7 days, Disp: 14 capsule, Rfl: 0    hydrOXYzine HCL (ATARAX) 25 mg tablet, Take 1 tablet (25 mg total) by mouth every 8 (eight) hours as needed for anxiety, Disp: 21 tablet, Rfl: 0    Narcan 4 MG/0 1ML nasal spray, Use as directed for opiate overdose (Patient not taking: Reported on 4/27/2022 ), Disp: 1 each, Rfl: 2    nicotine polacrilex (NICORETTE) 2 mg gum, Chew 1 each (2 mg total) as needed for smoking cessation (Patient not taking: Reported on 4/27/2022 ), Disp: 50 each, Rfl: 0    Health Maintenance     Health Maintenance   Topic Date Due    Hepatitis C Screening  Never done    COVID-19 Vaccine (1) Never done    HIV Screening  Never done    Annual Physical  Never done    Pneumococcal Vaccine: Pediatrics (0 to 5 Years) and At-Risk Patients (6 to 59 Years) (1 of 2 - PPSV23) 04/27/2023 (Originally 8/22/1991)    Influenza Vaccine (Season Ended) 09/01/2022    Depression Screening  11/03/2022    Depression Follow-up Plan  11/03/2022    BMI: Followup Plan  04/20/2023    BMI: Adult  04/27/2023    DTaP,Tdap,and Td Vaccines (2 - Td or Tdap) 08/10/2031    HIB Vaccine  Aged Out    Hepatitis B Vaccine  Aged Out    IPV Vaccine  Aged Out    Hepatitis A Vaccine  Aged Out    Meningococcal ACWY Vaccine  Aged Out    HPV Vaccine  Aged [employer] History   Administered Date(s) Administered    Tdap 08/10/2021           Mala Jessica MD   Family Medicine  PGY-1  5/2/2022 7:36 PM

## 2022-04-28 DIAGNOSIS — F13.239 BENZODIAZEPINE WITHDRAWAL WITH COMPLICATION (HCC): ICD-10-CM

## 2022-04-28 LAB — FENTANYL+NORFENTANYL PNL UR: NEGATIVE PG/ML

## 2022-04-28 RX ORDER — CHLORDIAZEPOXIDE HYDROCHLORIDE 10 MG/1
10 CAPSULE, GELATIN COATED ORAL 2 TIMES DAILY PRN
Qty: 14 CAPSULE | Refills: 0 | Status: SHIPPED | OUTPATIENT
Start: 2022-04-28 | End: 2022-05-04 | Stop reason: ALTCHOICE

## 2022-05-04 ENCOUNTER — OFFICE VISIT (OUTPATIENT)
Dept: FAMILY MEDICINE CLINIC | Facility: CLINIC | Age: 37
End: 2022-05-04
Payer: COMMERCIAL

## 2022-05-04 VITALS
OXYGEN SATURATION: 98 % | BODY MASS INDEX: 31.75 KG/M2 | RESPIRATION RATE: 16 BRPM | WEIGHT: 221.8 LBS | HEART RATE: 136 BPM | DIASTOLIC BLOOD PRESSURE: 68 MMHG | TEMPERATURE: 96.3 F | SYSTOLIC BLOOD PRESSURE: 138 MMHG | HEIGHT: 70 IN

## 2022-05-04 DIAGNOSIS — F11.24 OPIOID DEPENDENCE WITH OPIOID-INDUCED MOOD DISORDER (HCC): ICD-10-CM

## 2022-05-04 DIAGNOSIS — R07.9 CHEST PAIN, UNSPECIFIED TYPE: Primary | ICD-10-CM

## 2022-05-04 DIAGNOSIS — F41.9 ANXIETY: ICD-10-CM

## 2022-05-04 DIAGNOSIS — F13.230 BENZODIAZEPINE WITHDRAWAL WITHOUT COMPLICATION (HCC): ICD-10-CM

## 2022-05-04 DIAGNOSIS — F19.982 DRUG-INDUCED INSOMNIA (HCC): ICD-10-CM

## 2022-05-04 DIAGNOSIS — Z71.6 ENCOUNTER FOR TOBACCO USE CESSATION COUNSELING: ICD-10-CM

## 2022-05-04 PROCEDURE — T1015 CLINIC SERVICE: HCPCS | Performed by: FAMILY MEDICINE

## 2022-05-04 PROCEDURE — 80307 DRUG TEST PRSMV CHEM ANLYZR: CPT

## 2022-05-04 PROCEDURE — 93005 ELECTROCARDIOGRAM TRACING: CPT | Performed by: FAMILY MEDICINE

## 2022-05-04 RX ORDER — ONDANSETRON 4 MG/1
4 TABLET, FILM COATED ORAL EVERY 8 HOURS PRN
Qty: 20 TABLET | Refills: 0 | Status: SHIPPED | OUTPATIENT
Start: 2022-05-04 | End: 2022-06-16

## 2022-05-04 RX ORDER — MIRTAZAPINE 30 MG/1
30 TABLET, FILM COATED ORAL
Qty: 30 TABLET | Refills: 0 | Status: SHIPPED | OUTPATIENT
Start: 2022-05-04

## 2022-05-04 RX ORDER — LOPERAMIDE HYDROCHLORIDE 2 MG/1
2 CAPSULE ORAL 4 TIMES DAILY PRN
Qty: 30 CAPSULE | Refills: 0 | Status: SHIPPED | OUTPATIENT
Start: 2022-05-04 | End: 2022-06-16

## 2022-05-04 RX ORDER — CLONAZEPAM 0.5 MG/1
0.5 TABLET, ORALLY DISINTEGRATING ORAL 2 TIMES DAILY
Qty: 14 TABLET | Refills: 0 | Status: SHIPPED | OUTPATIENT
Start: 2022-05-04 | End: 2022-05-11 | Stop reason: SDUPTHER

## 2022-05-04 RX ORDER — CLONIDINE HYDROCHLORIDE 0.1 MG/1
0.1 TABLET ORAL EVERY 12 HOURS SCHEDULED
Qty: 28 TABLET | Refills: 0 | Status: SHIPPED | OUTPATIENT
Start: 2022-05-04 | End: 2022-06-16

## 2022-05-04 RX ORDER — BUPRENORPHINE AND NALOXONE 4; 1 MG/1; MG/1
4 FILM, SOLUBLE BUCCAL; SUBLINGUAL 3 TIMES DAILY
Qty: 21 FILM | Refills: 0 | Status: SHIPPED | OUTPATIENT
Start: 2022-05-04 | End: 2022-05-16

## 2022-05-04 NOTE — PROGRESS NOTES
FAMILY MEDICINE OFFICE VISIT  [facility]      NAME: Kody Garcia  AGE: 31 y o  SEX: male    DATE OF ENCOUNTER: 5/4/2022    Assessment and Plan     Patient is 30 yo M with Opioid use disorder, benzodiazepine withdrawal, insomnia came for follow up visit  He is currently on suboxone 8 mg twice a day  Will decreased Suboxone to 4 mg 3 times a day  Discussed the lab reports with patient  Urine toxicology screen is pending  Follow up in 1 week  1  Chest pain, unspecified type  Comments:  POC EKG shows sinus tachy cardia, no evidence of ischemia  Orders:  -     POCT ECG    2  Opioid dependence with opioid-induced mood disorder ([facility] [address] )  -     Toxicology screen, urine  -     buprenorphine-naloxone (Suboxone) 4-1 MG; Place 1 Film (4 mg total) under the tongue 3 (three) times a day for 7 days    3  Anxiety  -     cloNIDine (CATAPRES) 0 1 mg tablet; Take 1 tablet (0 1 mg total) by mouth every 12 (twelve) hours  -     clonazePAM (KlonoPIN) 0 5 MG disintegrating tablet; Take 1 tablet (0 5 mg total) by mouth 2 (two) times a day    4  Drug-induced insomnia (HCC)  -     mirtazapine (REMERON) 30 mg tablet; Take 1 tablet (30 mg total) by mouth daily at bedtime    5  Benzodiazepine withdrawal without complication (HCC)  -     ondansetron (ZOFRAN) 4 mg tablet; Take 1 tablet (4 mg total) by mouth every 8 (eight) hours as needed for nausea or vomiting  -     loperamide (IMODIUM) 2 mg capsule; Take 1 capsule (2 mg total) by mouth 4 (four) times a day as needed for diarrhea  -     Toxicology screen, urine    6  Encounter for tobacco use cessation counseling  -     nicotine polacrilex (NICORETTE) 4 mg gum;  Chew 1 each (4 mg total) as needed for smoking cessation      Chief Complaint     Chief Complaint   Patient presents with    Follow-up     MAT therapy    Anxiety     would like to discuss switching medication       History of Present Illness     Patient is 30 yo M with opioid use disorder came to office for follow up visit  Complaints of substernal chest pain, SOB, palpitations, diaphoretic  Associated with nausea, vomiting  Reports of Diarrhea with suboxone  Patient feels that suboxoe is not helping him with withdrawal symptoms  Also reports feeling depressed and sleeping all day on couch  Denies suicidal ideation  Chest Pain   This is a new problem  The current episode started today  The onset quality is sudden  The problem occurs intermittently  The pain is present in the substernal region  The pain is at a severity of 6/10  The pain is moderate  The quality of the pain is described as pressure  The pain does not radiate  Associated symptoms include diaphoresis, nausea, palpitations, shortness of breath and vomiting  Pertinent negatives include no dizziness, headaches or irregular heartbeat  The following portions of the patient's history were reviewed and updated as appropriate: allergies, current medications, past family history, past medical history, past social history, past surgical history and problem list     Review of Systems     Review of Systems   Constitutional: Positive for diaphoresis  Respiratory: Positive for shortness of breath  Cardiovascular: Positive for chest pain ( center of chest) and palpitations  Gastrointestinal: Positive for nausea and vomiting  Genitourinary: Negative for difficulty urinating  Neurological: Negative for dizziness and headaches  Psychiatric/Behavioral: Positive for agitation and sleep disturbance ( sleeps all day, not sleeping at night )  Negative for confusion  All other systems reviewed and are negative        Active Problem List     Patient Active Problem List   Diagnosis    Opioid use    Anxiety    Encounter for tobacco use cessation counseling    Opioid dependence with opioid-induced mood disorder (HCC)    Seizure (HCC)       Objective     /68   Pulse (!) 136   Temp (!) 96 3 °F (35 7 °C)   Resp 16   Ht 5' 10" (1 778 m)   Wt 101 kg (221 lb 12 8 oz)   SpO2 98%   BMI 31 82 kg/m²     Physical Exam  Vitals and nursing note reviewed  Exam conducted with a chaperone present  Constitutional:       General: He is not in acute distress  Appearance: He is ill-appearing  HENT:      Head: Normocephalic and atraumatic  Nose: No congestion or rhinorrhea  Mouth/Throat:      Mouth: Mucous membranes are moist       Pharynx: Oropharynx is clear  Eyes:      Conjunctiva/sclera: Conjunctivae normal       Pupils: Pupils are equal, round, and reactive to light  Cardiovascular:      Rate and Rhythm: Regular rhythm  Tachycardia present  Pulses: Normal pulses  Heart sounds: Normal heart sounds  No murmur heard  Pulmonary:      Breath sounds: Normal breath sounds  No wheezing  Abdominal:      General: Bowel sounds are normal       Palpations: Abdomen is soft  Tenderness: There is no abdominal tenderness  Musculoskeletal:      Cervical back: Neck supple  Right lower leg: No edema  Left lower leg: No edema  Skin:     General: Skin is dry  Capillary Refill: Capillary refill takes less than 2 seconds  Comments: clammy   Neurological:      Mental Status: He is alert and oriented to person, place, and time  Comments: Anxious,          Pertinent Laboratory/Diagnostic Studies:        Fentanyl Urine - negative on 04/27/2022    Current Medications     Current Outpatient Medications:     cloNIDine (CATAPRES) 0 1 mg tablet, Take 1 tablet (0 1 mg total) by mouth every 12 (twelve) hours, Disp: 28 tablet, Rfl: 0    hydrOXYzine HCL (ATARAX) 25 mg tablet, Take 1 tablet (25 mg total) by mouth every 8 (eight) hours as needed for anxiety, Disp: 21 tablet, Rfl: 0    mirtazapine (REMERON) 30 mg tablet, Take 1 tablet (30 mg total) by mouth daily at bedtime, Disp: 30 tablet, Rfl: 0    naloxone (NARCAN) 4 mg/0 1 mL nasal spray, Administer 1 spray into a nostril   If no response after 2-3 minutes, give another dose in the other nostril using a new spray , Disp: 1 each, Rfl: 1    PARoxetine (PAXIL) 30 mg tablet, Take 1 tablet (30 mg total) by mouth every morning, Disp: 30 tablet, Rfl: 2    buprenorphine-naloxone (Suboxone) 4-1 MG, Place 1 Film (4 mg total) under the tongue 3 (three) times a day for 7 days, Disp: 21 Film, Rfl: 0    clonazePAM (KlonoPIN) 0 5 MG disintegrating tablet, Take 1 tablet (0 5 mg total) by mouth 2 (two) times a day, Disp: 14 tablet, Rfl: 0    loperamide (IMODIUM) 2 mg capsule, Take 1 capsule (2 mg total) by mouth 4 (four) times a day as needed for diarrhea, Disp: 30 capsule, Rfl: 0    Narcan 4 MG/0 1ML nasal spray, Use as directed for opiate overdose (Patient not taking: Reported on 4/27/2022 ), Disp: 1 each, Rfl: 2    nicotine polacrilex (NICORETTE) 4 mg gum, Chew 1 each (4 mg total) as needed for smoking cessation, Disp: 100 each, Rfl: 0    ondansetron (ZOFRAN) 4 mg tablet, Take 1 tablet (4 mg total) by mouth every 8 (eight) hours as needed for nausea or vomiting, Disp: 20 tablet, Rfl: 0    Health Maintenance     Health Maintenance   Topic Date Due    Hepatitis C Screening  Never done    COVID-19 Vaccine (1) Never done    HIV Screening  Never done    Annual Physical  Never done    Depression Screening  11/03/2022    Pneumococcal Vaccine: Pediatrics (0 to 5 Years) and At-Risk Patients (6 to 59 Years) (1 of 2 - PPSV23) 04/27/2023 (Originally 8/22/1993)    Influenza Vaccine (Season Ended) 09/01/2022    Depression Follow-up Plan  11/03/2022    BMI: Followup Plan  04/20/2023    BMI: Adult  05/04/2023    DTaP,Tdap,and Td Vaccines (2 - Td or Tdap) 08/10/2031    HIB Vaccine  Aged Out    Hepatitis B Vaccine  Aged Out    IPV Vaccine  Aged Out    Hepatitis A Vaccine  Aged Out    Meningococcal ACWY Vaccine  Aged Out    HPV Vaccine  Aged [employer] History   Administered Date(s) Administered    Tdap 08/10/2021       Depression Screening and Follow-up Plan: Depression likely due to other medical condition  Will treat underlying condition  Tobacco Cessation Counseling: Tobacco cessation counseling was provided  The patient is sincerely urged to quit consumption of tobacco  He is ready to quit tobacco  Medication options discussed  Patient agreed to medication  Nicotine gum was prescribed          Sowmya Miles MD   Family Medicine  PGY-1  5/4/2022 12:29 PM

## 2022-05-05 LAB
AMPHETAMINES UR QL SCN: NEGATIVE NG/ML
BARBITURATES UR QL SCN: NEGATIVE NG/ML
BENZODIAZ UR QL: NEGATIVE NG/ML
BZE UR QL: NEGATIVE NG/ML
CANNABINOIDS UR QL SCN: POSITIVE
METHADONE UR QL SCN: NEGATIVE NG/ML
OPIATES UR QL: NEGATIVE NG/ML
PCP UR QL: NEGATIVE NG/ML
PROPOXYPH UR QL SCN: NEGATIVE NG/ML

## 2022-05-07 LAB
BUPRENORPHINE UR CFM-MCNC: 122 NG/ML
BUPRENORPHINE UR QL CFM: NORMAL NG/ML
BUPRENORPHINE UR QL CFM: POSITIVE
BUPRENORPHINE+NOR UR QL: POSITIVE
NORBUPRENORPHINE UR CFM-MCNC: 438 NG/ML
NORBUPRENORPHINE UR QL CFM: POSITIVE

## 2022-05-11 DIAGNOSIS — F41.9 ANXIETY: ICD-10-CM

## 2022-05-11 NOTE — TELEPHONE ENCOUNTER
Pt wants to go back to 1mg in the morning and 2 at night  He can't make today's appt due to his work schedule, but he needs his medication  He would like a call back to reschedule his appt

## 2022-05-12 DIAGNOSIS — F11.24 OPIOID DEPENDENCE WITH OPIOID-INDUCED MOOD DISORDER (HCC): ICD-10-CM

## 2022-05-12 LAB
AMPHETAMINES UR QL SCN: NEGATIVE NG/ML
BARBITURATES UR QL SCN: NEGATIVE NG/ML
BENZODIAZ UR QL: NEGATIVE
BZE UR QL: NEGATIVE NG/ML
CANNABINOIDS UR QL SCN: POSITIVE
METHADONE UR QL SCN: NEGATIVE NG/ML
OPIATES UR QL: NEGATIVE NG/ML
PCP UR QL: NEGATIVE NG/ML
PROPOXYPH UR QL SCN: NEGATIVE NG/ML

## 2022-05-12 RX ORDER — CLONAZEPAM 0.5 MG/1
0.5 TABLET, ORALLY DISINTEGRATING ORAL 2 TIMES DAILY
Qty: 14 TABLET | Refills: 0 | Status: SHIPPED | OUTPATIENT
Start: 2022-05-12 | End: 2022-05-18 | Stop reason: SDUPTHER

## 2022-05-16 ENCOUNTER — TELEPHONE (OUTPATIENT)
Dept: FAMILY MEDICINE CLINIC | Facility: CLINIC | Age: 37
End: 2022-05-16

## 2022-05-16 RX ORDER — BUPRENORPHINE AND NALOXONE 4; 1 MG/1; MG/1
FILM, SOLUBLE BUCCAL; SUBLINGUAL
Qty: 21 FILM | Refills: 0 | Status: SHIPPED | OUTPATIENT
Start: 2022-05-16 | End: 2022-05-18 | Stop reason: DRUGHIGH

## 2022-05-16 NOTE — TELEPHONE ENCOUNTER
PATIENT STOPPED IN OFFICE ASKING IF HIS SUBOXONE CAN BE FILLED UNTIL HE GETS IN HERE Wednesday AT 8:30A  M

## 2022-05-18 ENCOUNTER — DOCUMENTATION (OUTPATIENT)
Dept: FAMILY MEDICINE CLINIC | Facility: CLINIC | Age: 37
End: 2022-05-18

## 2022-05-18 ENCOUNTER — OFFICE VISIT (OUTPATIENT)
Dept: FAMILY MEDICINE CLINIC | Facility: CLINIC | Age: 37
End: 2022-05-18
Payer: COMMERCIAL

## 2022-05-18 VITALS
TEMPERATURE: 97.8 F | OXYGEN SATURATION: 97 % | HEART RATE: 97 BPM | WEIGHT: 229 LBS | BODY MASS INDEX: 32.78 KG/M2 | DIASTOLIC BLOOD PRESSURE: 90 MMHG | SYSTOLIC BLOOD PRESSURE: 180 MMHG | HEIGHT: 70 IN | RESPIRATION RATE: 19 BRPM

## 2022-05-18 DIAGNOSIS — F41.9 ANXIETY: ICD-10-CM

## 2022-05-18 DIAGNOSIS — F11.24 OPIOID DEPENDENCE WITH OPIOID-INDUCED MOOD DISORDER (HCC): Primary | ICD-10-CM

## 2022-05-18 PROCEDURE — 80307 DRUG TEST PRSMV CHEM ANLYZR: CPT | Performed by: STUDENT IN AN ORGANIZED HEALTH CARE EDUCATION/TRAINING PROGRAM

## 2022-05-18 PROCEDURE — T1015 CLINIC SERVICE: HCPCS | Performed by: FAMILY MEDICINE

## 2022-05-18 RX ORDER — CLONAZEPAM 2 MG/1
TABLET ORAL
COMMUNITY
Start: 2022-04-14 | End: 2022-05-18 | Stop reason: ALTCHOICE

## 2022-05-18 RX ORDER — BUPRENORPHINE AND NALOXONE 8; 2 MG/1; MG/1
FILM, SOLUBLE BUCCAL; SUBLINGUAL
Qty: 21 FILM | Refills: 0 | Status: SHIPPED | OUTPATIENT
Start: 2022-05-18 | End: 2022-06-01 | Stop reason: SDUPTHER

## 2022-05-18 RX ORDER — CLONAZEPAM 0.5 MG/1
0.5 TABLET, ORALLY DISINTEGRATING ORAL 2 TIMES DAILY
Qty: 14 TABLET | Refills: 0 | Status: SHIPPED | OUTPATIENT
Start: 2022-05-18 | End: 2022-05-26 | Stop reason: SDUPTHER

## 2022-05-18 RX ORDER — ESCITALOPRAM OXALATE 10 MG/1
10 TABLET ORAL DAILY
Qty: 30 TABLET | Refills: 2 | Status: SHIPPED | OUTPATIENT
Start: 2022-05-18 | End: 2022-06-16

## 2022-05-18 NOTE — PROGRESS NOTES
Assessment/Plan/Follow up information       Diagnosis ICD-10-CM Associated Orders   1  Opioid dependence with opioid-induced mood disorder (HCC)  F11 24 buprenorphine-naloxone (Suboxone) 8-2 mg   2  Anxiety  F41 9 escitalopram (Lexapro) 10 mg tablet     clonazePAM (KlonoPIN) 0 5 MG disintegrating tablet      Patient was seen examined alongside attending physician  We had a lengthy discussion with patient regarding multiple negative urine screens for benzodiazepines  Advised if his next drug screen is negative for benzos we will have to discontinue no longer refill as benzodiazepine script  Additionally we will switch him to 8/2 mg of Suboxone as he had difficulty sustain the 4 mg scripts  Additionally as his anxiety seems increased he is willing to switch Paxil to Lexapro to see if he gets better symptomatic control  Patient will follow-up in 2 weeks      Patient in agreement with the plan, all questions and concerns were answered/addressed  Advised to contact me or the office with any concerns or questions  In the event of an emergency, and unable to contact a provider they are to go to the emergency room  Subjective    HPI:  This is a 79-year-old male presents the office today for medication check  He is currently on 0 5 mg of Klonopin b i d  As well as 4 mg Suboxone q i d   Patient states he is doing [employer]  However feels like his dosing is not adequate  He continues to endorse severe anxiety and is requesting uptitration of his Klonopin  In regards to Suboxone states he feels okay however the 4 mg films are very hard to ascertain secondary pharmacy shortage is and he is requesting if he can be given 8 mg films that he will cut in half  Additionally states he is unable to make weekly appointments and wants to know if we can extend appointments to monthly      Of note:  Appears last couple of drug screens were negative for Klonopin/benzos and positive for Suboxone with appropriate confirmation      Review of Systems   Constitutional: Negative for activity change, appetite change, chills, fatigue and fever  HENT: Negative for congestion, dental problem, drooling, ear discharge, ear pain, facial swelling, postnasal drip, rhinorrhea and sinus pain  Eyes: Negative for photophobia, pain, discharge and itching  Respiratory: Negative for apnea, cough, chest tightness and shortness of breath  Cardiovascular: Negative for chest pain and leg swelling  Gastrointestinal: Negative for abdominal distention, abdominal pain, anal bleeding, constipation, diarrhea and nausea  Endocrine: Negative for cold intolerance, heat intolerance and polydipsia  Genitourinary: Negative for difficulty urinating  Musculoskeletal: Negative for arthralgias, gait problem, joint swelling and myalgias  Skin: Negative for color change and pallor  Allergic/Immunologic: Negative for immunocompromised state  Neurological: Negative for dizziness, seizures, facial asymmetry, weakness, light-headedness, numbness and headaches  Psychiatric/Behavioral: Positive for agitation  Negative for behavioral problems, confusion, decreased concentration and dysphoric mood  All other systems reviewed and are negative  Objective    Vitals:    05/18/22 0800   BP: (!) 180/90   Pulse: 97   Resp: 19   Temp: 97 8 °F (36 6 °C)   SpO2: 97%         Physical Exam  Constitutional:       Appearance: He is well-developed  HENT:      Head: Normocephalic  Eyes:      Pupils: Pupils are equal, round, and reactive to light  Cardiovascular:      Rate and Rhythm: Normal rate and regular rhythm  Pulmonary:      Effort: Pulmonary effort is normal       Breath sounds: Normal breath sounds  Abdominal:      General: Bowel sounds are normal       Palpations: Abdomen is soft  Musculoskeletal:         General: Normal range of motion  Cervical back: Normal range of motion and neck supple  Skin:     General: Skin is warm  Psychiatric:         Attention and Perception: Attention and perception normal          Mood and Affect: Affect normal  Mood is anxious  Speech: Speech normal             Portions of the record may have been created with voice recognition software  Occasional wrong word or "sound a like" substitutions may have occurred due to the inherent limitations of voice recognition software  Read the chart carefully and recognize, using context, where substitutions have occurred  Contact me with any questions         Bar Kang MD 05/18/22

## 2022-05-18 NOTE — PROGRESS NOTES
PER DR Gloria Richardson PATIENT NEEDS TO CONTINUE HIS PAXIL UNTIL 5/28/22 AT WHICH TIME WE CAN SWITCH HIM TO THE ESCITALOPRAM 10 MG TAB    THE INSURANCE NOW NEEDS 30 DAYS IN BETWEEN DIFFERENT MEDS IN THE SAME CLASS

## 2022-05-26 ENCOUNTER — TELEPHONE (OUTPATIENT)
Dept: OTHER | Facility: OTHER | Age: 37
End: 2022-05-26

## 2022-05-26 DIAGNOSIS — F41.9 ANXIETY: ICD-10-CM

## 2022-05-26 RX ORDER — CLONAZEPAM 0.5 MG/1
0.5 TABLET, ORALLY DISINTEGRATING ORAL 2 TIMES DAILY
Qty: 14 TABLET | Refills: 0 | Status: SHIPPED | OUTPATIENT
Start: 2022-05-26 | End: 2022-06-01 | Stop reason: ALTCHOICE

## 2022-05-26 NOTE — TELEPHONE ENCOUNTER
Will refill for 1 week but will have to discuss the dose change at his upcoming appointment  Please advise him to take as prescribed

## 2022-05-26 NOTE — TELEPHONE ENCOUNTER
Pt states he needs a new Rx of Clonazepam sent to the pharm  Pt is completely out  Also, pt states he needs 1 mg 2x a day, the  5mg is not enough

## 2022-06-01 ENCOUNTER — OFFICE VISIT (OUTPATIENT)
Dept: FAMILY MEDICINE CLINIC | Facility: CLINIC | Age: 37
End: 2022-06-01
Payer: COMMERCIAL

## 2022-06-01 VITALS
HEART RATE: 84 BPM | SYSTOLIC BLOOD PRESSURE: 118 MMHG | DIASTOLIC BLOOD PRESSURE: 76 MMHG | TEMPERATURE: 97.6 F | WEIGHT: 235.6 LBS | OXYGEN SATURATION: 97 % | RESPIRATION RATE: 16 BRPM | HEIGHT: 70 IN | BODY MASS INDEX: 33.73 KG/M2

## 2022-06-01 DIAGNOSIS — F41.9 ANXIETY: ICD-10-CM

## 2022-06-01 DIAGNOSIS — Z91.89 AT HIGH RISK FOR CARDIOVASCULAR DISEASE: ICD-10-CM

## 2022-06-01 DIAGNOSIS — F11.24 OPIOID DEPENDENCE WITH OPIOID-INDUCED MOOD DISORDER (HCC): ICD-10-CM

## 2022-06-01 DIAGNOSIS — R05.9 COUGH: ICD-10-CM

## 2022-06-01 DIAGNOSIS — Z87.891 SMOKING HISTORY: ICD-10-CM

## 2022-06-01 DIAGNOSIS — F11.90 OPIOID USE: Primary | ICD-10-CM

## 2022-06-01 PROCEDURE — 80307 DRUG TEST PRSMV CHEM ANLYZR: CPT

## 2022-06-01 PROCEDURE — T1015 CLINIC SERVICE: HCPCS | Performed by: FAMILY MEDICINE

## 2022-06-01 RX ORDER — CLONAZEPAM 0.5 MG/1
0.5 TABLET ORAL 2 TIMES DAILY
Qty: 60 TABLET | Refills: 0 | Status: SHIPPED | OUTPATIENT
Start: 2022-06-01 | End: 2022-06-16

## 2022-06-01 RX ORDER — CLONAZEPAM 2 MG/1
TABLET ORAL
COMMUNITY
Start: 2022-05-25 | End: 2022-06-01 | Stop reason: ALTCHOICE

## 2022-06-01 RX ORDER — BUPRENORPHINE AND NALOXONE 8; 2 MG/1; MG/1
FILM, SOLUBLE BUCCAL; SUBLINGUAL
Qty: 45 FILM | Refills: 0 | Status: SHIPPED | OUTPATIENT
Start: 2022-06-01 | End: 2022-06-16

## 2022-06-01 NOTE — PROGRESS NOTES
Assessment/Plan:  Patient came to the office today for suboxone f/u   Script for suboxone sent for 4 wks and will be seen in 4 wks at the office  Also today he is c/o productive cough with positive chest exam at bedside  CXR and CBC sent  F/u with results and manage accordingly  No problem-specific Assessment & Plan notes found for this encounter  Diagnoses and all orders for this visit:    Opioid use  Comments:  stable  per plan  f/u to recheck in 4 wks  suboxone for 4 wks sent at same dose 8 & 4 mg    Orders:  -     Suboxone  -     Toxicology screen, urine    Anxiety  Comments:  re asses with next visit  per GAD7 today; severe anxiety  Orders:  -     clonazePAM (KlonoPIN) 0 5 mg tablet; Take 1 tablet (0 5 mg total) by mouth 2 (two) times a day    Cough  Comments:  new onset  productive  active smoker  f/u with cxr and cbc  Orders:  -     Comprehensive metabolic panel; Future  -     Lipid panel; Future  -     TSH, 3rd generation with Free T4 reflex; Future  -     RPR; Future  -     Chlamydia/GC amplified DNA by PCR; Future  -     XR chest pa & lateral; Future  -     CBC and differential; Future    Smoking history  Comments:  trying to quite  discuss during next visit  Orders:  -     Comprehensive metabolic panel; Future  -     Lipid panel; Future  -     TSH, 3rd generation with Free T4 reflex; Future  -     RPR; Future  -     Chlamydia/GC amplified DNA by PCR; Future  -     XR chest pa & lateral; Future    At high risk for cardiovascular disease  Comments:  sedentary lifestyle,smoking and opioid use disorder  f/u during annual on next visit  Orders:  -     Comprehensive metabolic panel; Future  -     Lipid panel; Future  -     TSH, 3rd generation with Free T4 reflex; Future  -     RPR; Future  -     Chlamydia/GC amplified DNA by PCR; Future  -     XR chest pa & lateral; Future    Opioid dependence with opioid-induced mood disorder (HCC)  Comments:  stable  per plan  f/u to recheck in 4 wks  suboxone for 4 wks sent at same dose 8 & 4 mg  Orders:  -     Toxicology screen, urine  -     Suboxone  -     buprenorphine-naloxone (Suboxone) 8-2 mg; 1 SL film in the morning and 1/2 SL film in the evening (total daily dose 12mg buprenorphine)    Other orders  -     Discontinue: clonazePAM (KlonoPIN) 2 mg tablet          PHQ-2/9 Depression Screening              Subjective:      Patient ID: Mickey Gonzales is a 31 y o  male  30 Y/O M with a PMH of opioid use disorder and anxiety currently on soboxone presented to the office for f/u and to checkup  He was seen biweekly for the past 2 months  Patient have been compliant with his suboxone/clonazwepam management  No contract violations  UDS consistent with meds use only  PDMP reviewed today    Patient continues to complain of anxiety, per GAYATRI-7 he scored severe anxiety 17-17   Patient was started on Lexapro 2 weeks ago  To be checked up during the next visit and to modify management accordingly    He is complaining of productive cough that started few days ago, productive of phlegm to mucoid sputum, intermittent, exacerbated with exercise and improves after coughing   No associated fever, chills, sweating, palpitations, SOB, chest pain or tightness  He continues to smoke 1 pack of sigerretes a day and works in a [address] which puts him at a high risk for COPD   Anxiety  Presents for follow-up visit  Symptoms include confusion, decreased concentration, excessive worry, irritability, muscle tension and nervous/anxious behavior  Patient reports no chest pain, depressed mood, dizziness, dry mouth, feeling of choking, hyperventilation, insomnia, palpitations, panic, restlessness, shortness of breath or suicidal ideas  Symptoms occur most days  The severity of symptoms is moderate  The quality of sleep is fair  Nighttime awakenings: occasional      There is no history of asthma  Compliance with medications is %  Cough  This is a new problem   The current episode started in the past 7 days  The problem has been unchanged  The cough is productive of sputum  Pertinent negatives include no chest pain, chills, fever, headaches, postnasal drip, rhinorrhea, shortness of breath or wheezing  The symptoms are aggravated by exercise and dust  Risk factors for lung disease include occupational exposure and smoking/tobacco exposure  He has tried nothing for the symptoms  His past medical history is significant for COPD  There is no history of asthma or bronchiectasis  The following portions of the patient's history were reviewed and updated as appropriate: allergies, past medical history and problem list     Review of Systems   Constitutional: Positive for irritability  Negative for activity change, appetite change, chills, diaphoresis, fatigue and fever  HENT: Negative for congestion, postnasal drip and rhinorrhea  Respiratory: Positive for cough  Negative for apnea, choking, chest tightness, shortness of breath and wheezing  Cardiovascular: Negative for chest pain, palpitations and leg swelling  Endocrine: Negative for polydipsia and polyuria  Genitourinary: Negative for dysuria and flank pain  Neurological: Negative for dizziness and headaches  Psychiatric/Behavioral: Positive for confusion and decreased concentration  Negative for suicidal ideas  The patient is nervous/anxious  The patient does not have insomnia  Objective:    /76   Pulse 84   Temp 97 6 °F (36 4 °C)   Resp 16   Ht 5' 10" (1 778 m)   Wt 107 kg (235 lb 9 6 oz)   SpO2 97%   BMI 33 81 kg/m²      Physical Exam  Constitutional:       General: He is not in acute distress  Appearance: Normal appearance  He is not toxic-appearing  HENT:      Head: Normocephalic and atraumatic  Nose: Nose normal  No congestion or rhinorrhea  Cardiovascular:      Rate and Rhythm: Normal rate and regular rhythm  Pulses: Normal pulses             Radial pulses are 2+ on the right side and 2+ on the left side  Heart sounds: Normal heart sounds, S1 normal and S2 normal  No murmur heard  Pulmonary:      Effort: Pulmonary effort is normal  No tachypnea, accessory muscle usage or prolonged expiration  Breath sounds: Normal air entry  No stridor, decreased air movement or transmitted upper airway sounds  Examination of the right-middle field reveals wheezing  Examination of the right-lower field reveals wheezing  Examination of the left-lower field reveals wheezing  Wheezing and rales (diffuse) present  No decreased breath sounds  Musculoskeletal:         General: Normal range of motion  Cervical back: Normal range of motion  Right lower leg: No edema  Left lower leg: No edema  Skin:     General: Skin is warm  Capillary Refill: Capillary refill takes less than 2 seconds  Neurological:      General: No focal deficit present  Mental Status: He is alert and oriented to person, place, and time  Mental status is at baseline

## 2022-06-08 LAB
AMPHETAMINES UR QL SCN: NEGATIVE NG/ML
BARBITURATES UR QL SCN: NEGATIVE NG/ML
BENZODIAZ UR QL: POSITIVE
BUPRENORPHINE UR CFM-MCNC: 40 NG/ML
BUPRENORPHINE UR QL CFM: NORMAL NG/ML
BUPRENORPHINE UR QL CFM: POSITIVE
BUPRENORPHINE+NOR UR QL: POSITIVE
BZE UR QL: NEGATIVE NG/ML
CANNABINOIDS UR QL SCN: POSITIVE
METHADONE UR QL SCN: NEGATIVE NG/ML
NORBUPRENORPHINE UR CFM-MCNC: 76 NG/ML
NORBUPRENORPHINE UR QL CFM: POSITIVE
OPIATES UR QL: NEGATIVE NG/ML
PCP UR QL: NEGATIVE NG/ML
PROPOXYPH UR QL SCN: NEGATIVE NG/ML

## 2022-06-13 ENCOUNTER — HOSPITAL ENCOUNTER (INPATIENT)
Facility: HOSPITAL | Age: 37
LOS: 3 days | Discharge: HOME | DRG: 897 | End: 2022-06-16
Attending: EMERGENCY MEDICINE | Admitting: EMERGENCY MEDICINE
Payer: COMMERCIAL

## 2022-06-13 ENCOUNTER — HOSPITAL ENCOUNTER (EMERGENCY)
Facility: HOSPITAL | Age: 37
Discharge: ANOTHER HOSPITAL | End: 2022-06-13
Attending: EMERGENCY MEDICINE
Payer: COMMERCIAL

## 2022-06-13 VITALS
SYSTOLIC BLOOD PRESSURE: 156 MMHG | TEMPERATURE: 97.2 F | DIASTOLIC BLOOD PRESSURE: 106 MMHG | RESPIRATION RATE: 16 BRPM | WEIGHT: 228.4 LBS | OXYGEN SATURATION: 97 % | HEART RATE: 85 BPM | BODY MASS INDEX: 32.77 KG/M2

## 2022-06-13 DIAGNOSIS — F11.24 OPIOID DEPENDENCE WITH OPIOID-INDUCED MOOD DISORDER (HCC): ICD-10-CM

## 2022-06-13 DIAGNOSIS — F43.21 GRIEF: ICD-10-CM

## 2022-06-13 DIAGNOSIS — F11.20 OPIOID USE DISORDER, MODERATE, ON MAINTENANCE THERAPY, DEPENDENCE (HCC): ICD-10-CM

## 2022-06-13 DIAGNOSIS — F10.20 ALCOHOL USE DISORDER, SEVERE, DEPENDENCE (HCC): Primary | ICD-10-CM

## 2022-06-13 DIAGNOSIS — R10.9 ABDOMINAL PAIN: ICD-10-CM

## 2022-06-13 DIAGNOSIS — F10.239 ALCOHOL WITHDRAWAL (HCC): ICD-10-CM

## 2022-06-13 DIAGNOSIS — F10.239 ALCOHOL WITHDRAWAL (HCC): Primary | ICD-10-CM

## 2022-06-13 PROBLEM — R10.13 EPIGASTRIC PAIN: Status: ACTIVE | Noted: 2022-06-13

## 2022-06-13 PROBLEM — F10.939 ALCOHOL WITHDRAWAL SYNDROME WITH COMPLICATION (HCC): Status: ACTIVE | Noted: 2022-06-13

## 2022-06-13 PROBLEM — R10.13 EPIGASTRIC PAIN: Status: RESOLVED | Noted: 2022-06-13 | Resolved: 2022-06-13

## 2022-06-13 PROBLEM — F17.200 TOBACCO USE DISORDER: Status: ACTIVE | Noted: 2022-06-13

## 2022-06-13 PROBLEM — F43.10 PTSD (POST-TRAUMATIC STRESS DISORDER): Status: ACTIVE | Noted: 2022-06-13

## 2022-06-13 PROBLEM — R07.89 MID STERNAL CHEST PAIN: Status: ACTIVE | Noted: 2022-06-13

## 2022-06-13 PROBLEM — R74.01 TRANSAMINITIS: Status: ACTIVE | Noted: 2022-06-13

## 2022-06-13 LAB
ALBUMIN SERPL BCP-MCNC: 4.3 G/DL (ref 3.5–5)
ALP SERPL-CCNC: 187 U/L (ref 46–116)
ALT SERPL W P-5'-P-CCNC: 124 U/L (ref 12–78)
AMPHETAMINES SERPL QL SCN: NEGATIVE
ANION GAP SERPL CALCULATED.3IONS-SCNC: 12 MMOL/L (ref 4–13)
AST SERPL W P-5'-P-CCNC: 164 U/L (ref 5–45)
BARBITURATES UR QL: POSITIVE
BASOPHILS # BLD AUTO: 0.06 THOUSANDS/ΜL (ref 0–0.1)
BASOPHILS NFR BLD AUTO: 1 % (ref 0–1)
BENZODIAZ UR QL: NEGATIVE
BILIRUB SERPL-MCNC: 1 MG/DL (ref 0.2–1)
BUN SERPL-MCNC: 18 MG/DL (ref 5–25)
CALCIUM SERPL-MCNC: 8.4 MG/DL (ref 8.3–10.1)
CARDIAC TROPONIN I PNL SERPL HS: <2 NG/L
CHLORIDE SERPL-SCNC: 100 MMOL/L (ref 100–108)
CO2 SERPL-SCNC: 26 MMOL/L (ref 21–32)
COCAINE UR QL: NEGATIVE
CREAT SERPL-MCNC: 0.95 MG/DL (ref 0.6–1.3)
EOSINOPHIL # BLD AUTO: 0.01 THOUSAND/ΜL (ref 0–0.61)
EOSINOPHIL NFR BLD AUTO: 0 % (ref 0–6)
ERYTHROCYTE [DISTWIDTH] IN BLOOD BY AUTOMATED COUNT: 12.8 % (ref 11.6–15.1)
ETHANOL SERPL-MCNC: 128 MG/DL (ref 0–3)
GFR SERPL CREATININE-BSD FRML MDRD: 104 ML/MIN/1.73SQ M
GLUCOSE SERPL-MCNC: 111 MG/DL (ref 65–140)
HCT VFR BLD AUTO: 47.7 % (ref 36.5–49.3)
HGB BLD-MCNC: 17 G/DL (ref 12–17)
IMM GRANULOCYTES # BLD AUTO: 0.02 THOUSAND/UL (ref 0–0.2)
IMM GRANULOCYTES NFR BLD AUTO: 0 % (ref 0–2)
LIPASE SERPL-CCNC: 80 U/L (ref 73–393)
LYMPHOCYTES # BLD AUTO: 1.53 THOUSANDS/ΜL (ref 0.6–4.47)
LYMPHOCYTES NFR BLD AUTO: 20 % (ref 14–44)
MAGNESIUM SERPL-MCNC: 2.1 MG/DL (ref 1.6–2.6)
MCH RBC QN AUTO: 29.8 PG (ref 26.8–34.3)
MCHC RBC AUTO-ENTMCNC: 35.6 G/DL (ref 31.4–37.4)
MCV RBC AUTO: 84 FL (ref 82–98)
METHADONE UR QL: NEGATIVE
MONOCYTES # BLD AUTO: 0.95 THOUSAND/ΜL (ref 0.17–1.22)
MONOCYTES NFR BLD AUTO: 13 % (ref 4–12)
NEUTROPHILS # BLD AUTO: 4.96 THOUSANDS/ΜL (ref 1.85–7.62)
NEUTS SEG NFR BLD AUTO: 66 % (ref 43–75)
NRBC BLD AUTO-RTO: 0 /100 WBCS
OPIATES UR QL SCN: POSITIVE
OXYCODONE+OXYMORPHONE UR QL SCN: NEGATIVE
PCP UR QL: NEGATIVE
PHOSPHATE SERPL-MCNC: 3.5 MG/DL (ref 2.7–4.5)
PLATELET # BLD AUTO: 278 THOUSANDS/UL (ref 149–390)
PMV BLD AUTO: 11.3 FL (ref 8.9–12.7)
POTASSIUM SERPL-SCNC: 3.8 MMOL/L (ref 3.5–5.3)
PROT SERPL-MCNC: 7.6 G/DL (ref 6.4–8.2)
RBC # BLD AUTO: 5.7 MILLION/UL (ref 3.88–5.62)
SODIUM SERPL-SCNC: 138 MMOL/L (ref 136–145)
THC UR QL: POSITIVE
WBC # BLD AUTO: 7.53 THOUSAND/UL (ref 4.31–10.16)

## 2022-06-13 PROCEDURE — 99285 EMERGENCY DEPT VISIT HI MDM: CPT | Performed by: EMERGENCY MEDICINE

## 2022-06-13 PROCEDURE — HZ2ZZZZ DETOXIFICATION SERVICES FOR SUBSTANCE ABUSE TREATMENT: ICD-10-PCS | Performed by: EMERGENCY MEDICINE

## 2022-06-13 PROCEDURE — 96375 TX/PRO/DX INJ NEW DRUG ADDON: CPT

## 2022-06-13 PROCEDURE — 82077 ASSAY SPEC XCP UR&BREATH IA: CPT | Performed by: EMERGENCY MEDICINE

## 2022-06-13 PROCEDURE — 85025 COMPLETE CBC W/AUTO DIFF WBC: CPT | Performed by: EMERGENCY MEDICINE

## 2022-06-13 PROCEDURE — 99223 1ST HOSP IP/OBS HIGH 75: CPT | Performed by: NURSE PRACTITIONER

## 2022-06-13 PROCEDURE — 99285 EMERGENCY DEPT VISIT HI MDM: CPT

## 2022-06-13 PROCEDURE — 96376 TX/PRO/DX INJ SAME DRUG ADON: CPT

## 2022-06-13 PROCEDURE — 80307 DRUG TEST PRSMV CHEM ANLYZR: CPT | Performed by: EMERGENCY MEDICINE

## 2022-06-13 PROCEDURE — 83690 ASSAY OF LIPASE: CPT | Performed by: EMERGENCY MEDICINE

## 2022-06-13 PROCEDURE — 84100 ASSAY OF PHOSPHORUS: CPT | Performed by: EMERGENCY MEDICINE

## 2022-06-13 PROCEDURE — 96361 HYDRATE IV INFUSION ADD-ON: CPT

## 2022-06-13 PROCEDURE — 36415 COLL VENOUS BLD VENIPUNCTURE: CPT | Performed by: EMERGENCY MEDICINE

## 2022-06-13 PROCEDURE — 83735 ASSAY OF MAGNESIUM: CPT | Performed by: EMERGENCY MEDICINE

## 2022-06-13 PROCEDURE — 84484 ASSAY OF TROPONIN QUANT: CPT | Performed by: NURSE PRACTITIONER

## 2022-06-13 PROCEDURE — 80053 COMPREHEN METABOLIC PANEL: CPT | Performed by: EMERGENCY MEDICINE

## 2022-06-13 PROCEDURE — 93010 ELECTROCARDIOGRAM REPORT: CPT | Performed by: NURSE PRACTITIONER

## 2022-06-13 PROCEDURE — 93005 ELECTROCARDIOGRAM TRACING: CPT

## 2022-06-13 PROCEDURE — 96374 THER/PROPH/DIAG INJ IV PUSH: CPT

## 2022-06-13 RX ORDER — LANOLIN ALCOHOL/MO/W.PET/CERES
100 CREAM (GRAM) TOPICAL DAILY
Status: CANCELLED | OUTPATIENT
Start: 2022-06-14

## 2022-06-13 RX ORDER — LORAZEPAM 2 MG/ML
2 INJECTION INTRAMUSCULAR ONCE
Status: COMPLETED | OUTPATIENT
Start: 2022-06-13 | End: 2022-06-13

## 2022-06-13 RX ORDER — PHENOBARBITAL SODIUM 130 MG/ML
130 INJECTION INTRAMUSCULAR ONCE
Status: COMPLETED | OUTPATIENT
Start: 2022-06-13 | End: 2022-06-13

## 2022-06-13 RX ORDER — ESCITALOPRAM OXALATE 10 MG/1
10 TABLET ORAL DAILY
Status: DISCONTINUED | OUTPATIENT
Start: 2022-06-14 | End: 2022-06-16 | Stop reason: HOSPADM

## 2022-06-13 RX ORDER — ONDANSETRON 2 MG/ML
4 INJECTION INTRAMUSCULAR; INTRAVENOUS ONCE
Status: COMPLETED | OUTPATIENT
Start: 2022-06-13 | End: 2022-06-13

## 2022-06-13 RX ORDER — HYDROMORPHONE HCL/PF 1 MG/ML
1 SYRINGE (ML) INJECTION ONCE
Status: COMPLETED | OUTPATIENT
Start: 2022-06-13 | End: 2022-06-13

## 2022-06-13 RX ORDER — SODIUM CHLORIDE 9 MG/ML
100 INJECTION, SOLUTION INTRAVENOUS CONTINUOUS
Status: CANCELLED | OUTPATIENT
Start: 2022-06-13

## 2022-06-13 RX ORDER — FOLIC ACID 1 MG/1
1 TABLET ORAL DAILY
Status: CANCELLED | OUTPATIENT
Start: 2022-06-14

## 2022-06-13 RX ORDER — BUPRENORPHINE AND NALOXONE 8; 2 MG/1; MG/1
8 FILM, SOLUBLE BUCCAL; SUBLINGUAL DAILY
Status: CANCELLED | OUTPATIENT
Start: 2022-06-14

## 2022-06-13 RX ORDER — ENOXAPARIN SODIUM 100 MG/ML
40 INJECTION SUBCUTANEOUS DAILY
Status: DISCONTINUED | OUTPATIENT
Start: 2022-06-14 | End: 2022-06-16 | Stop reason: HOSPADM

## 2022-06-13 RX ORDER — ACETAMINOPHEN 325 MG/1
650 TABLET ORAL EVERY 6 HOURS PRN
Status: DISCONTINUED | OUTPATIENT
Start: 2022-06-13 | End: 2022-06-16 | Stop reason: HOSPADM

## 2022-06-13 RX ORDER — MIRTAZAPINE 30 MG/1
30 TABLET, FILM COATED ORAL
Status: DISCONTINUED | OUTPATIENT
Start: 2022-06-13 | End: 2022-06-16 | Stop reason: HOSPADM

## 2022-06-13 RX ORDER — ACETAMINOPHEN 325 MG/1
650 TABLET ORAL EVERY 6 HOURS PRN
Status: CANCELLED | OUTPATIENT
Start: 2022-06-13

## 2022-06-13 RX ORDER — FOLIC ACID 1 MG/1
1 TABLET ORAL DAILY
Status: DISCONTINUED | OUTPATIENT
Start: 2022-06-14 | End: 2022-06-16 | Stop reason: HOSPADM

## 2022-06-13 RX ORDER — LANOLIN ALCOHOL/MO/W.PET/CERES
100 CREAM (GRAM) TOPICAL DAILY
Status: DISCONTINUED | OUTPATIENT
Start: 2022-06-14 | End: 2022-06-14

## 2022-06-13 RX ORDER — ENOXAPARIN SODIUM 100 MG/ML
40 INJECTION SUBCUTANEOUS DAILY
Status: CANCELLED | OUTPATIENT
Start: 2022-06-14

## 2022-06-13 RX ORDER — BUPRENORPHINE AND NALOXONE 2; .5 MG/1; MG/1
4 FILM, SOLUBLE BUCCAL; SUBLINGUAL
Status: CANCELLED | OUTPATIENT
Start: 2022-06-14

## 2022-06-13 RX ORDER — DIAZEPAM 5 MG/ML
10 INJECTION, SOLUTION INTRAMUSCULAR; INTRAVENOUS ONCE
Status: DISCONTINUED | OUTPATIENT
Start: 2022-06-13 | End: 2022-06-14

## 2022-06-13 RX ORDER — SODIUM CHLORIDE 9 MG/ML
100 INJECTION, SOLUTION INTRAVENOUS CONTINUOUS
Status: DISCONTINUED | OUTPATIENT
Start: 2022-06-13 | End: 2022-06-14

## 2022-06-13 RX ADMIN — SODIUM CHLORIDE 1000 ML: 0.9 INJECTION, SOLUTION INTRAVENOUS at 16:04

## 2022-06-13 RX ADMIN — ACETAMINOPHEN 650 MG: 325 TABLET ORAL at 22:29

## 2022-06-13 RX ADMIN — PHENOBARBITAL SODIUM 130 MG: 130 INJECTION INTRAMUSCULAR; INTRAVENOUS at 23:52

## 2022-06-13 RX ADMIN — ONDANSETRON 4 MG: 2 INJECTION INTRAMUSCULAR; INTRAVENOUS at 19:38

## 2022-06-13 RX ADMIN — HYDROMORPHONE HYDROCHLORIDE 1 MG: 1 INJECTION, SOLUTION INTRAMUSCULAR; INTRAVENOUS; SUBCUTANEOUS at 17:28

## 2022-06-13 RX ADMIN — LORAZEPAM 2 MG: 2 INJECTION INTRAMUSCULAR; INTRAVENOUS at 18:49

## 2022-06-13 RX ADMIN — ONDANSETRON HYDROCHLORIDE 4 MG: 2 INJECTION, SOLUTION INTRAVENOUS at 20:53

## 2022-06-13 RX ADMIN — PHENOBARBITAL SODIUM 130 MG: 130 INJECTION INTRAMUSCULAR; INTRAVENOUS at 23:10

## 2022-06-13 RX ADMIN — PHENOBARBITAL SODIUM 130 MG: 130 INJECTION INTRAMUSCULAR; INTRAVENOUS at 16:01

## 2022-06-13 RX ADMIN — ONDANSETRON 4 MG: 2 INJECTION INTRAMUSCULAR; INTRAVENOUS at 16:00

## 2022-06-13 RX ADMIN — HYDROMORPHONE HYDROCHLORIDE 1 MG: 1 INJECTION, SOLUTION INTRAMUSCULAR; INTRAVENOUS; SUBCUTANEOUS at 20:37

## 2022-06-13 RX ADMIN — LORAZEPAM 2 MG: 2 INJECTION, SOLUTION INTRAMUSCULAR; INTRAVENOUS at 20:35

## 2022-06-13 RX ADMIN — MIRTAZAPINE 30 MG: 30 TABLET, FILM COATED ORAL at 23:10

## 2022-06-13 RX ADMIN — Medication 650 MG: at 22:24

## 2022-06-13 RX ADMIN — SODIUM CHLORIDE 100 ML/HR: 0.9 INJECTION, SOLUTION INTRAVENOUS at 22:24

## 2022-06-13 RX ADMIN — SODIUM CHLORIDE 1000 ML: 0.9 INJECTION, SOLUTION INTRAVENOUS at 20:39

## 2022-06-13 RX ADMIN — MULTIPLE VITAMINS W/ MINERALS TAB 1 TABLET: TAB at 20:35

## 2022-06-13 RX ADMIN — PHENOBARBITAL SODIUM 130 MG: 130 INJECTION INTRAMUSCULAR; INTRAVENOUS at 16:41

## 2022-06-13 NOTE — ED PROVIDER NOTES
EMERGENCY DEPARTMENT ENCOUNTER NOTE    This note has been generated using a voice recognition software  There may be typographic, grammatic, or word substitution errors that have escaped editorial review  Emergency Department Note- Diego Cox 31 y o  male MRN: [id number]    Unit/Bed#: RM10 Encounter: [id number]  ? CHIEF COMPLAINT  Chief Complaint   Patient presents with    Withdrawal - Alcohol     Patient reports alcohol use since release from detox in 2021 aprx 40 shots a day  "I drink until I pass out, wake up and drink until I pass out again" Started to cut back 5 days ago and had seizure yesterday  Patient states he wants to go back to rehab for detox  C/o chest pain/nausea/abdominal pain  HPI  Diego Cox is a 31 y o  male with PMH of depression, prior alcohol use, opioid dependence in remission on suboxone, presenting with alcohol use relapse and alcohol withdrawal   Patient reports that his mother  3 weeks ago and since then, he has been drinking heavily, more than half a gal of whiskey per day  Last drink was 9:00 a m  Last night  He did have a seizure yesterday  Today, he feels very shaky, has had difficulty with nausea and upper abdominal pain  No blood in emesis  He feels shaky and sweaty  He has not had any hallucinations  No chest pain or shortness of breath  He is interested in rehab  REVIEW OF SYSTEMS    Constitutional: denies fevers, chills  Feels very shaky  Visual/Eyes:  Reports having trouble with vision, feeling very dizzy this morning  HENT: no rhinorrhea, no sore throat  Cardiac: no chest pain  Respiratory: no shortness of breath, no cough  GI:  Nausea, vomiting, abdominal pain, no diarrhea  Heme/Onc: no easy bruising  Endocrine: no diabetes  Neuro: no focal weakness or numbness  Denies headache      Ten systems reviewed and negative unless otherwise noted in HPI and above    PAST MEDICAL HISTORY  Past Medical History:   Diagnosis Date    Chronic pain     Depression     Pelvic fracture (HCC)     PTSD (post-traumatic stress disorder)     Substance abuse ([facility] [address] )        SURGICAL HISTORY  Past Surgical History:   Procedure Laterality Date    BONY PELVIS SURGERY         FAMILY HISTORY  History reviewed  No pertinent family history  CURRENT MEDICATIONS  No current facility-administered medications on file prior to encounter  Current Outpatient Medications on File Prior to Encounter   Medication Sig    buprenorphine-naloxone (Suboxone) 8-2 mg 1 SL film in the morning and 1/2 SL film in the evening (total daily dose 12mg buprenorphine)    clonazePAM (KlonoPIN) 0 5 mg tablet Take 1 tablet (0 5 mg total) by mouth 2 (two) times a day (Patient not taking: Reported on 6/13/2022)    cloNIDine (CATAPRES) 0 1 mg tablet Take 1 tablet (0 1 mg total) by mouth every 12 (twelve) hours (Patient not taking: Reported on 6/13/2022)    escitalopram (Lexapro) 10 mg tablet Take 1 tablet (10 mg total) by mouth in the morning  (Patient not taking: Reported on 6/13/2022)    hydrOXYzine HCL (ATARAX) 25 mg tablet Take 1 tablet (25 mg total) by mouth every 8 (eight) hours as needed for anxiety (Patient not taking: Reported on 6/13/2022)    loperamide (IMODIUM) 2 mg capsule Take 1 capsule (2 mg total) by mouth 4 (four) times a day as needed for diarrhea (Patient not taking: Reported on 6/13/2022)    mirtazapine (REMERON) 30 mg tablet Take 1 tablet (30 mg total) by mouth daily at bedtime (Patient not taking: Reported on 6/13/2022)    naloxone (NARCAN) 4 mg/0 1 mL nasal spray Administer 1 spray into a nostril  If no response after 2-3 minutes, give another dose in the other nostril using a new spray   (Patient not taking: Reported on 6/13/2022)    Narcan 4 MG/0 1ML nasal spray Use as directed for opiate overdose (Patient not taking: No sig reported)    nicotine polacrilex (NICORETTE) 4 mg gum Chew 1 each (4 mg total) as needed for smoking cessation (Patient not taking: Reported on 6/13/2022)    ondansetron (ZOFRAN) 4 mg tablet Take 1 tablet (4 mg total) by mouth every 8 (eight) hours as needed for nausea or vomiting (Patient not taking: Reported on 6/13/2022)       ALLERGIES  Allergies   Allergen Reactions    Penicillin V Other (See Comments)     unknown       SOCIAL HISTORY  Social History     Socioeconomic History    Marital status: Single     Spouse name: None    Number of children: None    Years of education: None    Highest education level: None   Occupational History    None   Tobacco Use    Smoking status: Current Every Day Smoker     Packs/day: 1 00     Types: Cigarettes    Smokeless tobacco: Never Used   Vaping Use    Vaping Use: Every day   Substance and Sexual Activity    Alcohol use: Yes     Comment: 40 shots a day    Drug use: Not Currently     Types: Methamphetamines, Marijuana     Comment: Xavi Tapia current use    Sexual activity: Not Currently   Other Topics Concern    None   Social History Narrative    None     Social Determinants of Health     Financial Resource Strain: Not on file   Food Insecurity: Not on file   Transportation Needs: Not on file   Physical Activity: Not on file   Stress: Not on file   Social Connections: Not on file   Intimate Partner Violence: Not on file   Housing Stability: Not on file       PHYSICAL EXAM    BP (!) 176/111 (BP Location: Right arm)   Pulse 93   Temp (!) 97 2 °F (36 2 °C) (Temporal)   Resp 20   Wt 104 kg (228 lb 6 3 oz)   SpO2 97%   BMI 32 77 kg/m²   Vital signs and nursing notes reviewed    CONSTITUTIONAL: male appearing stated age resting in bed, tremors, diaphoretic  HEENT: atraumatic, normocephalic  Sclera anicteric, conjunctiva are not injected  Moist oral mucosa  CARDIOVASCULAR/CHEST: RRR, no M/R/G  2+ radial pulses  PULMONARY:  Mildly tachypneic, Breath sounds are equal and clear to auscultation  ABDOMEN: non-distended  BS present, normoactive  Tender to palpation upper abdomen    No rebound or guarding  Negative Mccoy's  No tenderness of McBurney's point  MSK: moves all extremities, no deformities, no peripheral edema, no calf asymmetry  NEURO: Awake, alert, and oriented x 3  Face symmetric  Moves all extremities spontaneously  Tremulous  No focal neurologic deficits  SKIN: Warm, appears well-perfused  MENTAL STATUS:  Tremulous, tearful      ? LABS AND TESTS    Results Reviewed     Procedure Component Value Units Date/Time    Rapid drug screen, urine [[id number]]  (Abnormal) Collected: 06/13/22 1829    Lab Status: Final result Specimen: Urine, Clean Catch Updated: 06/13/22 1934     Amph/Meth UR Negative     Barbiturate Ur Positive     Benzodiazepine Urine Negative     Cocaine Urine Negative     Methadone Urine Negative     Opiate Urine Positive     PCP Ur Negative     THC Urine Positive     Oxycodone Urine Negative    Narrative:      Presumptive report  If requested, specimen will be sent to reference lab for confirmation  FOR MEDICAL PURPOSES ONLY  IF CONFIRMATION NEEDED PLEASE CONTACT THE LAB WITHIN 5 DAYS      Drug Screen Cutoff Levels:  AMPHETAMINE/METHAMPHETAMINES  1000 ng/mL  BARBITURATES     200 ng/mL  BENZODIAZEPINES     200 ng/mL  COCAINE      300 ng/mL  METHADONE      300 ng/mL  OPIATES      300 ng/mL  PHENCYCLIDINE     25 ng/mL  THC       50 ng/mL  OXYCODONE      100 ng/mL    Comprehensive metabolic panel [[id number]]  (Abnormal) Collected: 06/13/22 1559    Lab Status: Final result Specimen: Blood from Arm, Right Updated: 06/13/22 1625     Sodium 138 mmol/L      Potassium 3 8 mmol/L      Chloride 100 mmol/L      CO2 26 mmol/L      ANION GAP 12 mmol/L      BUN 18 mg/dL      Creatinine 0 95 mg/dL      Glucose 111 mg/dL      Calcium 8 4 mg/dL       U/L       U/L      Alkaline Phosphatase 187 U/L      Total Protein 7 6 g/dL      Albumin 4 3 g/dL      Total Bilirubin 1 00 mg/dL      eGFR 104 ml/min/1 73sq m     Narrative:      [place] guidelines for Chronic Kidney Disease (CKD):     Stage 1 with normal or high GFR (GFR > 90 mL/min/1 73 square meters)    Stage 2 Mild CKD (GFR = 60-89 mL/min/1 73 square meters)    Stage 3A Moderate CKD (GFR = 45-59 mL/min/1 73 square meters)    Stage 3B Moderate CKD (GFR = 30-44 mL/min/1 73 square meters)    Stage 4 Severe CKD (GFR = 15-29 mL/min/1 73 square meters)    Stage 5 End Stage CKD (GFR <15 mL/min/1 73 square meters)  Note: GFR calculation is accurate only with a steady state creatinine    Magnesium [[id number]]  (Normal) Collected: 06/13/22 1559    Lab Status: Final result Specimen: Blood from Arm, Right Updated: 06/13/22 1625     Magnesium 2 1 mg/dL     Phosphorus [[id number]]  (Normal) Collected: 06/13/22 1559    Lab Status: Final result Specimen: Blood from Arm, Right Updated: 06/13/22 1625     Phosphorus 3 5 mg/dL     Lipase [[id number]]  (Normal) Collected: 06/13/22 1559    Lab Status: Final result Specimen: Blood from Arm, Right Updated: 06/13/22 1625     Lipase 80 u/L     Ethanol [[id number]]  (Abnormal) Collected: 06/13/22 1559    Lab Status: Final result Specimen: Blood from Arm, Right Updated: 06/13/22 1621     Ethanol Lvl 128 mg/dL     CBC and differential [[id number]]  (Abnormal) Collected: 06/13/22 1559    Lab Status: Final result Specimen: Blood from Arm, Right Updated: 06/13/22 1608     WBC 7 53 Thousand/uL      RBC 5 70 Million/uL      Hemoglobin 17 0 g/dL      Hematocrit 47 7 %      MCV 84 fL      MCH 29 8 pg      MCHC 35 6 g/dL      RDW 12 8 %      MPV 11 3 fL      Platelets 508 Thousands/uL      nRBC 0 /100 WBCs      Neutrophils Relative 66 %      Immat GRANS % 0 %      Lymphocytes Relative 20 %      Monocytes Relative 13 %      Eosinophils Relative 0 %      Basophils Relative 1 %      Neutrophils Absolute 4 96 Thousands/µL      Immature Grans Absolute 0 02 Thousand/uL      Lymphocytes Absolute 1 53 Thousands/µL      Monocytes Absolute 0 95 Thousand/µL      Eosinophils Absolute 0 01 Thousand/µL Basophils Absolute 0 06 Thousands/µL           No orders to display       ED COURSE & MEDICAL DECISION MAKING  ECG 12 Lead Documentation Only    Date/Time: 6/13/2022 3:40 PM  Performed by: Ander Jessica MD  Authorized by: Ander Jessica MD     Comments:      Normal sinus rhythm, VR 91, , QRS 86, , normal axis, no ST/T wave changes to suggest ischemia, no STEMI  Medications   sodium chloride 0 9 % bolus 1,000 mL (0 mL Intravenous Stopped 6/13/22 1704)   PHENobarbital 130 mg/mL injection 130 mg (130 mg Intravenous Given 6/13/22 1601)   ondansetron (ZOFRAN) injection 4 mg (4 mg Intravenous Given 6/13/22 1600)   PHENobarbital 130 mg/mL injection 130 mg (130 mg Intravenous Given 6/13/22 1641)   HYDROmorphone (DILAUDID) injection 1 mg (1 mg Intravenous Given 6/13/22 1728)   LORazepam (ATIVAN) injection 2 mg (2 mg Intravenous Given 6/13/22 1849)   ondansetron (ZOFRAN) injection 4 mg (4 mg Intravenous Given 6/13/22 1938)   LORazepam (ATIVAN) injection 2 mg (2 mg Intravenous Given 6/13/22 2035)   sodium chloride 0 9 % bolus 1,000 mL (1,000 mL Intravenous New Bag 6/13/22 2039)   multivitamin-minerals (CENTRUM) tablet 1 tablet (1 tablet Oral Given 6/13/22 2035)   HYDROmorphone (DILAUDID) injection 1 mg (1 mg Intravenous Given 6/13/22 2037)   ondansetron (ZOFRAN) injection 4 mg (4 mg Intravenous Given 6/13/22 213)     79-year-old male presenting with alcohol withdrawal in setting of alcohol use relapse, as well as with abdominal pain  Vital signs reviewed, afebrile, hypertensive, not tachycardic or hypoxic  Concerning alcohol withdrawal complicated by seizure yesterday, patient will require inpatient detox  Concerning abdominal pain, differential diagnosis includes withdrawal, gastritis, pancreatitis, peptic ulcer, alcoholic hepatitis, versus another etiology of symptoms  Labs obtained  Will obtain EKG  Patient is in agreement with being admitted to [address] detox unit      Brooke Elaine is kindly accepting patient for admission  Labs are as above  Patient has intermittent abdominal discomfort and nausea  He is responding to treatments as above  Patient transferred to detox unit  He remains hypertensive, likely due to withdrawal   No headache, no chest pain, no shortness of breath to suggest hypertensive emergency  MDM  Number of Diagnoses or Management Options  Abdominal pain: new and requires workup  Alcohol withdrawal ([facility] ): new and requires workup  Grief: new and does not require workup     Amount and/or Complexity of Data Reviewed  Clinical lab tests: ordered and reviewed  Tests in the medicine section of CPT®: ordered and reviewed  Review and summarize past medical records: yes  Discuss the patient with other providers: yes (Medical toxicology)  Independent visualization of images, tracings, or specimens: yes    Risk of Complications, Morbidity, and/or Mortality  Presenting problems: high  Diagnostic procedures: high  Management options: high    Patient Progress  Patient progress: improved      CLINICAL IMPRESSION  Final diagnoses:   Alcohol withdrawal ([facility] )   Abdominal pain   Grief       DISPOSITION  Time reflects when diagnosis was documented in both MDM as applicable and the Disposition within this note     Time User Action Codes Description Comment    6/13/2022  3:56 PM Reynaldo Dean Add [F10 239] Alcohol withdrawal ([facility] )     6/13/2022  3:56 PM Shari Part [R10 9] Abdominal pain     6/13/2022  3:56 PM Reynaldo Slater Handsome Grief       ED Disposition     ED Disposition   Transfer to Another Facility-In Network    Condition   --    Date/Time   Mon Jun 13, 2022  3:56 PM    Comment   Akhil Alvarez should be transferred out to [facility] SANKET SINGH MD Documentation    Suman Ulloa Most Recent Value   Patient Condition The patient has been stabilized such that within reasonable medical probability, no material deterioration of the patient condition or the condition of the unborn child(deana) is likely to result from the transfer   Reason for Transfer Level of Care needed not available at this facility   Benefits of Transfer Specialized equipment and/or services available at the receiving facility (Include comment)________________________   Risks of Transfer Potential for delay in receiving treatment, Potential deterioration of medical condition, Loss of IV, Increased discomfort during transfer   Accepting Physician Dr Jorge Salas Name [facility] MD Dr Laurel Cabot      RN Documentation    [address] [facility] [address] Name, [address] [place] [address] Room 514   Report Given to [facility] RN      Follow-up Information    None             Tasha Roger MD  06/15/22 5299

## 2022-06-13 NOTE — EMTALA/ACUTE CARE TRANSFER
[facility] EMERGENCY DEPARTMENT  [address] [place] [id number]  Dept: [phone]      EMTALA TRANSFER CONSENT    NAME Isaías Moreno                                         1985                              MRN [id number]    I have been informed of my rights regarding examination, treatment, and transfer   by Dr Ander Jessica MD    Benefits: Specialized equipment and/or services available at the receiving facility (Include comment)_____ inpatient detox ______    Risks: Potential for delay in receiving treatment, Potential deterioration of medical condition, Loss of IV, Increased discomfort during transfer      Consent for Transfer:  I acknowledge that my medical condition has been evaluated and explained to me by the emergency department physician or other qualified medical person and/or my attending physician, who has recommended that I be transferred to the service of  Accepting Physician: Dr Erwin Phlegm at [address], [address] : [place]  The above potential benefits of such transfer, the potential risks associated with such transfer, and the probable risks of not being transferred have been explained to me, and I fully understand them  The doctor has explained that, in my case, the benefits of transfer outweigh the risks  I agree to be transferred  I authorize the performance of emergency medical procedures and treatments upon me in both transit and upon arrival at the receiving facility  Additionally, I authorize the release of any and all medical records to the receiving facility and request they be transported with me, if possible  I understand that the safest mode of transportation during a medical emergency is an ambulance and that the Hospital advocates the use of this mode of transport   Risks of traveling to the receiving facility by car, including absence of medical control, life sustaining equipment, such as oxygen, and medical personnel has been explained to me and I fully understand them  (SIM CORRECT BOX BELOW)  [  ]  I consent to the stated transfer and to be transported by ambulance/helicopter  [  ]  I consent to the stated transfer, but refuse transportation by ambulance and accept full responsibility for my transportation by car  I understand the risks of non-ambulance transfers and I exonerate the Hospital and its staff from any deterioration in my condition that results from this refusal     X___________________________________________    DATE  22  TIME________  Signature of patient or legally responsible individual signing on patient behalf           RELATIONSHIP TO PATIENT_________________________          Provider Certification    NAME [place]                                         1985                              MRN [id number]    A medical screening exam was performed on the above named patient  Based on the examination:    Condition Necessitating Transfer The primary encounter diagnosis was Alcohol withdrawal ([facility] [address] )  Diagnoses of Abdominal pain and Grief were also pertinent to this visit      Patient Condition: The patient has been stabilized such that within reasonable medical probability, no material deterioration of the patient condition or the condition of the unborn child(deana) is likely to result from the transfer    Reason for Transfer: Level of Care needed not available at this facility    Transfer Requirements: [address]   · Space available and qualified personnel available for treatment as acknowledged by    · Agreed to accept transfer and to provide appropriate medical treatment as acknowledged by       Dr Hermila Cordova  · Appropriate medical records of the examination and treatment of the patient are provided at the time of transfer   [address] _______  · Transfer will be performed by qualified personnel from    and appropriate transfer equipment as required, including the use of necessary and appropriate life support measures  Provider Certification: I have examined the patient and explained the following risks and benefits of being transferred/refusing transfer to the patient/family:         Based on these reasonable risks and benefits to the patient and/or the unborn child(deana), and based upon the information available at the time of the patients examination, I certify that the medical benefits reasonably to be expected from the provision of appropriate medical treatments at another medical facility outweigh the increasing risks, if any, to the individuals medical condition, and in the case of labor to the unborn child, from effecting the transfer      X____________________________________________ DATE 06/13/22        TIME_______      ORIGINAL - SEND TO MEDICAL RECORDS   COPY - SEND WITH PATIENT DURING TRANSFER

## 2022-06-14 ENCOUNTER — APPOINTMENT (INPATIENT)
Dept: CT IMAGING | Facility: HOSPITAL | Age: 37
DRG: 897 | End: 2022-06-14
Payer: COMMERCIAL

## 2022-06-14 PROBLEM — R26.81 UNSTEADY GAIT: Status: ACTIVE | Noted: 2022-06-14

## 2022-06-14 PROBLEM — F13.20 BENZODIAZEPINE DEPENDENCE (HCC): Status: ACTIVE | Noted: 2022-06-14

## 2022-06-14 LAB
ALBUMIN SERPL BCP-MCNC: 4 G/DL (ref 3–5.2)
ALP SERPL-CCNC: 152 U/L (ref 43–122)
ALT SERPL W P-5'-P-CCNC: 117 U/L
ANION GAP SERPL CALCULATED.3IONS-SCNC: 8 MMOL/L (ref 5–14)
AST SERPL W P-5'-P-CCNC: 237 U/L (ref 17–59)
ATRIAL RATE: 91 BPM
BILIRUB SERPL-MCNC: 1.23 MG/DL
BUN SERPL-MCNC: 21 MG/DL (ref 5–25)
CALCIUM SERPL-MCNC: 7.9 MG/DL (ref 8.4–10.2)
CHLORIDE SERPL-SCNC: 102 MMOL/L (ref 97–108)
CO2 SERPL-SCNC: 24 MMOL/L (ref 22–30)
CREAT SERPL-MCNC: 0.91 MG/DL (ref 0.7–1.5)
ERYTHROCYTE [DISTWIDTH] IN BLOOD BY AUTOMATED COUNT: 12.7 % (ref 11.6–15.1)
GFR SERPL CREATININE-BSD FRML MDRD: 109 ML/MIN/1.73SQ M
GLUCOSE SERPL-MCNC: 91 MG/DL (ref 70–99)
HAV IGM SER QL: NORMAL
HBV CORE IGM SER QL: NORMAL
HBV SURFACE AG SER QL: NORMAL
HCT VFR BLD AUTO: 45.8 % (ref 36.5–49.3)
HCV AB SER QL: NORMAL
HGB BLD-MCNC: 15.8 G/DL (ref 12–17)
HIV 1+2 AB+HIV1 P24 AG SERPL QL IA: NORMAL
HIV1 P24 AG SER QL: NORMAL
MAGNESIUM SERPL-MCNC: 2 MG/DL (ref 1.6–2.3)
MCH RBC QN AUTO: 30.6 PG (ref 26.8–34.3)
MCHC RBC AUTO-ENTMCNC: 34.5 G/DL (ref 31.4–37.4)
MCV RBC AUTO: 89 FL (ref 82–98)
P AXIS: 53 DEGREES
PLATELET # BLD AUTO: 207 THOUSANDS/UL (ref 149–390)
PMV BLD AUTO: 11.7 FL (ref 8.9–12.7)
POTASSIUM SERPL-SCNC: 3.8 MMOL/L (ref 3.6–5)
PR INTERVAL: 138 MS
PROT SERPL-MCNC: 6.4 G/DL (ref 5.9–8.4)
QRS AXIS: 41 DEGREES
QRSD INTERVAL: 86 MS
QT INTERVAL: 370 MS
QTC INTERVAL: 455 MS
RBC # BLD AUTO: 5.17 MILLION/UL (ref 3.88–5.62)
SODIUM SERPL-SCNC: 134 MMOL/L (ref 137–147)
T WAVE AXIS: 39 DEGREES
VENTRICULAR RATE: 91 BPM
WBC # BLD AUTO: 8.38 THOUSAND/UL (ref 4.31–10.16)

## 2022-06-14 PROCEDURE — 80053 COMPREHEN METABOLIC PANEL: CPT | Performed by: NURSE PRACTITIONER

## 2022-06-14 PROCEDURE — 85027 COMPLETE CBC AUTOMATED: CPT | Performed by: NURSE PRACTITIONER

## 2022-06-14 PROCEDURE — G1004 CDSM NDSC: HCPCS

## 2022-06-14 PROCEDURE — 70450 CT HEAD/BRAIN W/O DYE: CPT

## 2022-06-14 PROCEDURE — 80074 ACUTE HEPATITIS PANEL: CPT | Performed by: NURSE PRACTITIONER

## 2022-06-14 PROCEDURE — 93010 ELECTROCARDIOGRAM REPORT: CPT | Performed by: INTERNAL MEDICINE

## 2022-06-14 PROCEDURE — 87806 HIV AG W/HIV1&2 ANTB W/OPTIC: CPT | Performed by: NURSE PRACTITIONER

## 2022-06-14 PROCEDURE — 83735 ASSAY OF MAGNESIUM: CPT | Performed by: NURSE PRACTITIONER

## 2022-06-14 PROCEDURE — 99232 SBSQ HOSP IP/OBS MODERATE 35: CPT | Performed by: EMERGENCY MEDICINE

## 2022-06-14 RX ORDER — PHENOBARBITAL SODIUM 130 MG/ML
130 INJECTION INTRAMUSCULAR ONCE
Status: COMPLETED | OUTPATIENT
Start: 2022-06-14 | End: 2022-06-14

## 2022-06-14 RX ORDER — LOPERAMIDE HYDROCHLORIDE 2 MG/1
4 CAPSULE ORAL 4 TIMES DAILY PRN
Status: DISCONTINUED | OUTPATIENT
Start: 2022-06-14 | End: 2022-06-16 | Stop reason: HOSPADM

## 2022-06-14 RX ORDER — CLONAZEPAM 0.5 MG/1
0.5 TABLET ORAL 2 TIMES DAILY
Status: DISCONTINUED | OUTPATIENT
Start: 2022-06-14 | End: 2022-06-15

## 2022-06-14 RX ORDER — BUPRENORPHINE AND NALOXONE 2; .5 MG/1; MG/1
4 FILM, SOLUBLE BUCCAL; SUBLINGUAL
Status: DISCONTINUED | OUTPATIENT
Start: 2022-06-14 | End: 2022-06-16 | Stop reason: HOSPADM

## 2022-06-14 RX ORDER — BUPRENORPHINE AND NALOXONE 8; 2 MG/1; MG/1
8 FILM, SOLUBLE BUCCAL; SUBLINGUAL DAILY
Status: DISCONTINUED | OUTPATIENT
Start: 2022-06-14 | End: 2022-06-16 | Stop reason: HOSPADM

## 2022-06-14 RX ORDER — PHENOBARBITAL SODIUM 65 MG/ML
65 INJECTION INTRAMUSCULAR ONCE
Status: COMPLETED | OUTPATIENT
Start: 2022-06-14 | End: 2022-06-14

## 2022-06-14 RX ADMIN — ESCITALOPRAM OXALATE 10 MG: 10 TABLET ORAL at 08:11

## 2022-06-14 RX ADMIN — FOLIC ACID 1 MG: 1 TABLET ORAL at 08:11

## 2022-06-14 RX ADMIN — Medication 650 MG: at 10:57

## 2022-06-14 RX ADMIN — BUPRENORPHINE AND NALOXONE 8 MG: 8; 2 FILM BUCCAL; SUBLINGUAL at 08:11

## 2022-06-14 RX ADMIN — ENOXAPARIN SODIUM 40 MG: 100 INJECTION SUBCUTANEOUS at 08:11

## 2022-06-14 RX ADMIN — THIAMINE HYDROCHLORIDE 500 MG: 100 INJECTION, SOLUTION INTRAMUSCULAR; INTRAVENOUS at 18:11

## 2022-06-14 RX ADMIN — PHENOBARBITAL SODIUM 130 MG: 130 INJECTION INTRAMUSCULAR; INTRAVENOUS at 16:02

## 2022-06-14 RX ADMIN — CLONAZEPAM 0.5 MG: 0.5 TABLET ORAL at 23:17

## 2022-06-14 RX ADMIN — THIAMINE HCL TAB 100 MG 100 MG: 100 TAB at 08:11

## 2022-06-14 RX ADMIN — LOPERAMIDE HYDROCHLORIDE 4 MG: 2 CAPSULE ORAL at 16:02

## 2022-06-14 RX ADMIN — SODIUM CHLORIDE 100 ML/HR: 0.9 INJECTION, SOLUTION INTRAVENOUS at 18:13

## 2022-06-14 RX ADMIN — PHENOBARBITAL SODIUM 130 MG: 130 INJECTION INTRAMUSCULAR; INTRAVENOUS at 00:30

## 2022-06-14 RX ADMIN — PHENOBARBITAL SODIUM 130 MG: 130 INJECTION INTRAMUSCULAR; INTRAVENOUS at 07:44

## 2022-06-14 RX ADMIN — SODIUM CHLORIDE 100 ML/HR: 0.9 INJECTION, SOLUTION INTRAVENOUS at 07:46

## 2022-06-14 RX ADMIN — BUPRENORPHINE AND NALOXONE 4 MG: 2; .5 FILM BUCCAL; SUBLINGUAL at 16:39

## 2022-06-14 RX ADMIN — MULTIPLE VITAMINS W/ MINERALS TAB 1 TABLET: TAB ORAL at 08:11

## 2022-06-14 RX ADMIN — PHENOBARBITAL SODIUM 65 MG: 65 INJECTION INTRAMUSCULAR; INTRAVENOUS at 03:29

## 2022-06-14 RX ADMIN — MIRTAZAPINE 30 MG: 30 TABLET, FILM COATED ORAL at 22:22

## 2022-06-14 NOTE — NURSING NOTE
Patient educated multiple times on calling for assistance when needing to get OOB  Patient continues to not call appropriately and is turning his bed alarm off, ambulating without assistance to the bathroom  PACHAMP aware

## 2022-06-14 NOTE — PLAN OF CARE
Problem: Potential for Falls  Goal: Patient will remain free of falls  Description: INTERVENTIONS:  - Educate patient/family on patient safety including physical limitations  - Instruct patient to call for assistance with activity   - Consult OT/PT to assist with strengthening/mobility   - Keep Call bell within reach  - Keep bed low and locked with side rails adjusted as appropriate  - Keep care items and personal belongings within reach  - Initiate and maintain comfort rounds  - Make Fall Risk Sign visible to staff  - Offer Toileting every 2 Hours, in advance of need  - Apply yellow socks and bracelet for high fall risk patients  - Consider moving patient to room near nurses station  Outcome: Progressing     Problem: SUBSTANCE USE/ABUSE  Goal: By discharge, will develop insight into their chemical dependency and sustain motivation to continue in recovery  Description: INTERVENTIONS:  - Attends all daily group sessions and scheduled AA groups  - Actively practices coping skills through participation in the therapeutic community and adherence to program rules  - Reviews and completes assignments from individual treatment plan  - Assist patient development of understanding of their personal cycle of addiction and relapse triggers  Outcome: Progressing  Goal: By discharge, patient will have ongoing treatment plan addressing chemical dependency  Description: INTERVENTIONS:  - Assist patient with resources and/or appointments for ongoing recovery based living  Outcome: Progressing

## 2022-06-14 NOTE — PROCEDURES
EKG  Date/Time: 6/13/22 10:56PM  Rate: 96 b p m   FL interval: 136 ms   QRS: 86 ms  QT/QTc: 384/472 ms   Normal sinus rhythm, Normal ECG  Comparison to 5/4/22 ECG

## 2022-06-14 NOTE — H&P
HISTORY & PHYSICAL EXAM  DEPARTMENT OF MEDICAL TOXICOLOGY  LEVEL 4 MEDICAL DETOX UNIT  Claire Cortez 31 y o  male MRN: [id number]  Unit/Bed#: 5T DETOX 514-01 Encounter: [id number]      Reason for Admission/Principal Problem: Ethanol withdrawal, Ethanol use disorder  Admitting Provider: KHANG Chavez  Attending Provider: Hussein Casanova DO   6/13/2022 10:02 PM        * Alcohol withdrawal syndrome with complication ([facility] [address] )  Assessment & Plan  · Currently exhibiting symptoms of alcohol withdrawal including tremor, nausea, vomiting, anxiety, diaphoresis, hypertension  · Upon admission Ethanol 128 mg/dL   · Last reported consumption 6/12/2022 evening   · Initiate SEWS protocol for tx of alcohol withdrawal with phenobarbital, received 260mg in ED PTA   · Continuous telemetry and pulse oximetry    Alcohol use disorder, severe, dependence (HCC)  Assessment & Plan  · Longstanding history of alcohol withdrawal   · Pertinent hx of alcohol withdrawal seizure  · Average consumption of half gallon whiskey daily   · Initiate IVFs, thiamine, folic acid, MV supplementation   · Consult case management for dispo planning     Seizure [facility])  Assessment & Plan  · Reports seizure yesterday witnessed by neighbor with head strike  · Admits to history of seizure due to ETOH withdrawal, last reported seizure in January     · Has been evaluated by neurology, not placed on AEDs  · Initiate seizure precautions   · NC/AT on exam, no focal deficits, AAOx3, will obtain CT head     Opioid use disorder, moderate, on maintenance therapy, dependence (HCC)  Assessment & Plan  · History of opioid use disorder and polysubstance use   · Denies hx of IVDU  · Currently on MAT buprenorphine/naloxone 12mg daily, PDMP verified    · Will continue buprenorphine 12/3mg daily   · Obtain HIV and acute hepatitis panel     PTSD (post-traumatic stress disorder)  Assessment & Plan  · Pertinent hx of PTSD and depression  · On home regimen of Lexapro 10mg QD, mirtazepine 30mg QHS, and Klonipin 0 5mg BID   · Hold klonipin and continue lexapro and mirtazepine   · Encourage follow up with PCP and psychiatry     Benzodiazepine dependence ([facility] [address] )  Assessment & Plan  · On klonipin 0 5mg BID for management of anxiety   · Per review of EHR has hx of taking more than prescribed  Has been recommended to discontinue due to coadministration with Suboxone   · Will hold klonipin   · Encourage follow up with outpatient PCP and psychiatry     Transaminitis  Assessment & Plan  · Elevated AST, ALT, alk phos appreciated on CMP   · Likely 2/2 chronic ETOH consumption   · Initiate IVF  · Repeat CMP in AM and monitor LFTs     Tobacco use disorder  Assessment & Plan  · Daily tobacco smoker   · Offered NRT   · Encourage cessation          VTE Prophylaxis: Enoxaparin (Lovenox)  / sequential compression device   Code Status: level 1 discussed with pt       Anticipated Length of Stay:  Patient will be admitted on an Inpatient basis with an anticipated length of stay of  2  midnights  Justification for Hospital Stay: medical management of alcohol withdrawal     For any questions or concerns, please Tiger Text the advanced practitioner in the role of [facility]-DETOX-AP On Call      This patient qualifies for Level IV medically managed intensive inpatient services under the criteria set by the American Society of Addiction Medicine, including dimensions 1-3  The patient is in withdrawal (or is intoxicated with high risk of withdrawal), with severe and unstable medical and/or psychiatric (dual diagnosis) problems, requiring requires 24-hour medical and nursing care and the full resources of a [address] patient to medical detox unit and continue supportive care and stabilization of acute ethanol withdrawal per medical toxicology/detox treatment pathway   Monitor ethanol withdrawal severity via the Severity of Ethanol Withdrawal Scale (SEWS) Q4 hours and then hourly if/when SEWS > 6  Treat withdrawal per pathway and reassess Q30-60 minutes  Mild SEWS Score 1-6  Administer medications* (IV or PO; PO preferred):   If initial SEWS score: diazepam 10mg PO/IV x 1 AND phenobarbital 65 mg PO/IV x 1   If repeat SEWS score 1-6: phenobarbital 65 mg PO/IV q1 hour x 5 doses maximum   Reassessment:    SEWS q1 hour after each dose until SEWS 0 x 2 hours   VS q1 hours (until SEWS 0, then q4 hours)   Notify provider for bedside evaluation if 5-dose maximum is reached, RASS of -3 to -5, or SEWS score escalates to moderate or severe  Moderate SEWS Score 7-12  Administer medications* (IV):   If initial SEWS score: diazepam 10mg IV x 1 AND phenobarbital 260 mg IV x 1   If repeat SEWS score 7-12 or score escalated from mild: phenobarbital 130 mg IV q30 minutes x 5 doses maximum   Reassessment:   SEWS q30 minutes after each dose until SEWS < 7 (then hourly until SEWS 0 x 2 hours)   VS q30 minutes until SEWS < 7 (then hourly until SEWS 0, then q4 hours)   Notify provider for bedside evaluation if 5-dose maximum is reached, RASS of -3 to -5, or SEWS score escalates to severe  Severe SEWS Score ? 13  Administer medications* (IV):   If initial SEWS score: Diazepam 10 mg IV x 1 AND phenobarbital 650 mg IV piggyback x 1 over 15-30 minutes   If repeat SEWS score ? 13 or score escalated from mild or moderate: phenobarbital 130 mg IV q30 minutes x 5 doses maximum   Reassessment:   SEWS q30 minutes after each dose until SEWS < 7 (then hourly until SEWS 0 x 2 hours)    VS q30 minutes until SEWS < 7 (then hourly until SEWS 0, then q4 hours)   Notify provider for bedside evaluation if 5-dose maximum is reached or RASS of -3 to -5   *Hold medications and notify provider if CNS depression, respirations < 10/min, or RASS of -3 to -5           Medications to be administered adjunctively if more than 2 grams of phenobarbital is needed for stabilization of withdrawal; require attending approval     Dexmedetomidine infusion 0 1-1mcg/kg/hr IV infusion, titratable to reduced agitation (Goal: RASS -2)   Ketamine   o Acute agitated delirium: 1-2 mg/kg IV or 4-5 mg/kg IM  o Refractory withdrawal: 0 1-1mg/kg/hr IV infusion, titratable to reduced agitation (Goal: RASS -2)    Further evaluation, screening and treatment:  Evaluate complete metabolic panel, transaminases, INR, and lipase  Assess hepatic ultrasound for any sign of alcoholic liver disease or cirrhosis, and ultimately refer for further hepatic evaluation and care as/if indicated  Additional medications for ethanol associated malnutrition: Thiamine 100 mg IV daily, increase to 500 mg TID for signs/symptoms of Wernicke's Encephalopathy or Wernicke Korsakoff Syndrome   Folic acid 1 mg IV daily   Multivitamin PO daily      Will offer first monthly injection of Naltrexone 380 mg IM, once patient is stabilized, as it has been shown to assist in decreasing cravings for ethanol  Evaluate and treat for coexisting substance use, such as opioids and nicotine  Discuss risk factors for infectious disease, such as history of intravenous drug abuse, and offer hepatitis and HIV screening if indicated  Case management consultation to assist with coordination of subsequent treatment after discharge  Hx and PE limited by: not limited, patient alert and cooperative     HPI: Omid Fagan is a 31y o  year old male with PMH of depression, PTSD, ETOH use disorder, opioid use disorder on MAT, and tobacco use disorder who initially presented to [place] ED requesting assistance with alcohol detoxification  He reports he was recently in a detox in December 2021 but continued to drink alcohol  Since the loss of his mother 3 weeks ago his consumption has increased substantially to half a gallon of whiskey daily    He also noticed he began to isolate from family, peers, and began to take his lexapro and mirtazepine less often  He admits to avolition but denies any SI/HI  He reports yesterday evening he had a seizure that was witnessed by his neighbor who reports he struck his head against the ground  These collective events have caused him much fear and motivated him to seek help in returning to detox  He has a pertinent history of poly substance use and opioid use disorder on MAT and is currently taking Suboxone 12/3mg daily total   He reports a period of time between December and April where he was lost to care due to family issues  Preferred alcoholic beverage(s): whiskey  Quantity and frequency of alcohol intake: half gallon daily   Use of any ethanol substitutes (toxic alcohols): no  Date/Time of last alcohol intake: 6/12/22 evening   Current signs and symptoms of ethanol withdrawal: anxiety, tremor, diaphoresis, nausea, vomiting and headache    SEWS Total Score: 10 (6/14/2022 12:20 AM)      Ethanol Withdrawal History  Previous ethanol withdrawal? yes  Prior inpatient treatment for ethanol withdrawal? no  Prior outpatient treatment for ethanol withdrawal? no  History of seizures with prior ethanol withdrawal? yes  Prior treatment with naltrexone (Vivitrol)? no  Current treatment with naltrexone (Vivitrol)? no  Other current treatment for ethanol use disorder? no  Co-existing substance use? yes    Review of PDMP: no     Social History     Substance and Sexual Activity   Alcohol Use Yes    Comment: 40 shots a day     Social History     Substance and Sexual Activity   Drug Use Not Currently    Types: Methamphetamines, Marijuana    Comment: Maggie Das current use     Social History     Tobacco Use   Smoking Status Current Every Day Smoker    Packs/day: 1 00    Types: Cigarettes   Smokeless Tobacco Never Used       Review of Systems   Constitutional: Positive for diaphoresis  Negative for chills and fever  Eyes: Negative for visual disturbance     Respiratory: Negative for cough, chest tightness, shortness of breath and wheezing  Cardiovascular: Positive for chest pain (sternal)  Negative for palpitations and leg swelling  Gastrointestinal: Positive for nausea and vomiting  Negative for abdominal distention, abdominal pain, constipation and diarrhea  Musculoskeletal: Negative for myalgias  Neurological: Positive for tremors and headaches  Negative for weakness and light-headedness  Psychiatric/Behavioral: Negative for hallucinations  The patient is nervous/anxious  Historical Information   Past Medical History:   Diagnosis Date    Chronic pain     Depression     Pelvic fracture (HCC)     PTSD (post-traumatic stress disorder)     Substance abuse ([facility] [address] )      Past Surgical History:   Procedure Laterality Date    BONY PELVIS SURGERY       No family history on file  Social History   Marital Status: Single   Occupation: [employer]  Patient Pre-hospital Living Situation: lives in apartment   Patient Pre-hospital Level of Mobility: independent  Patient Pre-hospital Diet Restrictions: none    Allergies   Allergen Reactions    Penicillin V Other (See Comments)     unknown       Prior to Admission medications    Medication Sig Start Date End Date Taking? Authorizing Provider   buprenorphine-naloxone (Suboxone) 8-2 mg 1 SL film in the morning and 1/2 SL film in the evening (total daily dose 12mg buprenorphine) 6/1/22  Yes Shakila Quinn DO   clonazePAM (KlonoPIN) 0 5 mg tablet Take 1 tablet (0 5 mg total) by mouth 2 (two) times a day 6/1/22  Yes Shakila Quinn DO   cloNIDine (CATAPRES) 0 1 mg tablet Take 1 tablet (0 1 mg total) by mouth every 12 (twelve) hours 5/4/22  Yes Sowmya Miles MD   mirtazapine (REMERON) 30 mg tablet Take 1 tablet (30 mg total) by mouth daily at bedtime 5/4/22  Yes Sowmya Miles MD   escitalopram (Lexapro) 10 mg tablet Take 1 tablet (10 mg total) by mouth in the morning    Patient not taking: No sig reported 5/18/22   Shakila Quinn DO   hydrOXYzine HCL (ATARAX) 25 mg tablet Take 1 tablet (25 mg total) by mouth every 8 (eight) hours as needed for anxiety  Patient not taking: No sig reported 4/27/22   Case Antony DO   loperamide (IMODIUM) 2 mg capsule Take 1 capsule (2 mg total) by mouth 4 (four) times a day as needed for diarrhea  Patient not taking: No sig reported 5/4/22   Lori Mcdonald MD   naloxone (NARCAN) 4 mg/0 1 mL nasal spray Administer 1 spray into a nostril  If no response after 2-3 minutes, give another dose in the other nostril using a new spray    Patient not taking: No sig reported 4/20/22   Case Antony DO   Narcan 4 MG/0 1ML nasal spray Use as directed for opiate overdose  Patient not taking: No sig reported 11/5/21   Case Antony DO   nicotine polacrilex (NICORETTE) 4 mg gum Chew 1 each (4 mg total) as needed for smoking cessation  Patient not taking: No sig reported 5/4/22   Lori Mcdonald MD   ondansetron (ZOFRAN) 4 mg tablet Take 1 tablet (4 mg total) by mouth every 8 (eight) hours as needed for nausea or vomiting  Patient not taking: No sig reported 5/4/22   Lori Mcdonald MD       Current Facility-Administered Medications   Medication Dose Route Frequency    acetaminophen (TYLENOL) tablet 650 mg  650 mg Oral Q6H PRN    diazepam (VALIUM) injection 10 mg  10 mg Intravenous Once    enoxaparin (LOVENOX) subcutaneous injection 40 mg  40 mg Subcutaneous Daily    escitalopram (LEXAPRO) tablet 10 mg  10 mg Oral Daily    folic acid (FOLVITE) tablet 1 mg  1 mg Oral Daily    mirtazapine (REMERON) tablet 30 mg  30 mg Oral HS    multivitamin-minerals (CENTRUM) tablet 1 tablet  1 tablet Oral Daily    sodium chloride 0 9 % infusion  100 mL/hr Intravenous Continuous    thiamine tablet 100 mg  100 mg Oral Daily       Continuous Infusions:sodium chloride, 100 mL/hr, Last Rate: 100 mL/hr (06/13/22 2224)             Objective     No intake or output data in the 24 hours ending 06/14/22 0125    Invasive Devices: Peripheral IV 06/13/22 Right Antecubital (Active)   Site Assessment WDL; Clean;Dry; Intact 06/13/22 1540   Dressing Type Transparent 06/13/22 1540   Line Status Blood return noted 06/13/22 1540   Dressing Status Clean;Dry; Intact 06/13/22 1540       Vitals   Vitals:    06/13/22 2215 06/13/22 2300 06/13/22 2340 06/14/22 0020   BP: (!) 172/115 (!) 191/117 165/95 157/85   TempSrc: Temporal Temporal  Temporal   Pulse: 94 87 84 94   Resp: 18 18 18 18   Patient Position - Orthostatic VS: Lying Lying Lying Lying   Temp: 98 8 °F (37 1 °C) 98 2 °F (36 8 °C)  99 °F (37 2 °C)       Physical Exam  Vitals reviewed  Constitutional:       Appearance: He is diaphoretic  He is not toxic-appearing  HENT:      Head: Normocephalic and atraumatic  Nose: Nose normal       Mouth/Throat:      Mouth: Mucous membranes are moist       Pharynx: Oropharynx is clear  Eyes:      Extraocular Movements: Extraocular movements intact  Right eye: Nystagmus present  Left eye: Nystagmus present  Conjunctiva/sclera: Conjunctivae normal       Pupils: Pupils are equal, round, and reactive to light  Cardiovascular:      Rate and Rhythm: Normal rate and regular rhythm  Pulses: Normal pulses  Heart sounds: Normal heart sounds  Pulmonary:      Effort: Pulmonary effort is normal  No respiratory distress  Breath sounds: Normal breath sounds  Chest:      Chest wall: Tenderness (mid sternal ) present  Abdominal:      General: Abdomen is flat  Bowel sounds are normal  There is no distension  Palpations: Abdomen is soft  Musculoskeletal:         General: No swelling  Normal range of motion  Cervical back: Normal range of motion  Right lower leg: No edema  Left lower leg: No edema  Skin:     General: Skin is warm  Neurological:      General: No focal deficit present  Mental Status: He is alert and oriented to person, place, and time  Motor: Tremor (BL upper extremity ) present  Coordination: Coordination is intact  Finger-Nose-Finger Test and Heel to Lenward Brando Test normal    Psychiatric:         Mood and Affect: Mood is anxious  Affect is tearful  Behavior: Behavior is cooperative  Data:    EKG, Pathology, and Other Studies: I have personally reviewed pertinent reports      EK NSR     Lab Results:  CBC ETOH     Lab Results   Component Value Date    WBC 7 53 2022    RBC 5 70 (H) 2022    HGB 17 0 2022    HCT 47 7 2022    MCV 84 2022    MCH 29 8 2022    MCHC 35 6 2022    RDW 12 8 2022     2022    MPV 11 3 2022      No results found for: LACTICACID   CMP UA         Component Value Date/Time    K 3 8 2022 1559     2022 1559    CO2 26 2022 1559    BUN 18 2022 1559    CREATININE 0 95 2022 1559         Component Value Date/Time    CALCIUM 8 4 2022 1559    ALKPHOS 187 (H) 2022 1559     (H) 2022 1559     (H) 2022 1559      No results found for: CLARITYU, COLORU, SPECGRAV, PHUR, GLUCOSEU, KETONESU, BLOODU, PROTEIN UA, NITRITE, BILIRUBINUR, UROBILINOGEN, LEUKOCYTESUR, WBCUA, RBCUA, HYALINE, BACTERIA, EPIS     Liver Function Test: ASA     Lab Results   Component Value Date    TBILI 1 00 2022    ALKPHOS 187 (H) 2022     (H) 2022     (H) 2022    TP 7 6 2022    ALB 4 3 2022      No results found for: SALICYLATE   Troponin APAP     No results found for: TROPONINI   No results found for: ACTMNPHEN   VBG HCG     No results found for: PHVEN, MON4DHZ, PO2VEN, NNI2TUZ, BEVEN, L1VBBLTLD, P8ZXHCU   No results found for: HCGQUANT   ABG Urine Drug Screen     No results found for: PHART, PVQ8LMG, PO2ART, INW6GQV, BEART, E1SXHDIBE, O2HGB, SOURC, MALLY, VTAC, ACRATE, INSPIREDAIR, PEEP   Lab Results   Component Value Date    AMPMETHUR Negative 2022    BARBTUR Positive (A) 2022    BDZUR Negative 06/13/2022    COCAINEUR Negative 06/13/2022    METHADONEUR Negative 06/13/2022    OPIATEUR Positive (A) 06/13/2022    PCPUR Negative 06/13/2022    THCUR Positive (A) 06/13/2022    OXYCODONEUR Negative 06/13/2022      Lactate INR     No results found for: LACTICACID   No results found for: INR   PTT Protime     No results found for: PTT     No results found for: PROTIME           Imaging Studies: I have personally reviewed pertinent reports  Counseling / Coordination of Care  Total floor / unit time spent today 35 minutes  Greater than 50% of total time was spent with the patient and / or family counseling and / or coordination of care  Minutes of critical care time NA  -Critical care time was exclusive of separately billable procedures and teaching time    -Critical care was necessary to treat or prevent imminent or life-threatening deterioration of the following condition: CNS failure/compromise, toxidrome (ethanol withdrawal),     -Critical care time was spent personally by me on the following activities as well as the above as per the course and rest of chart: obtaining history from patient/surrogate, development of a treatment plan, discussions with referring provider(s), evaluation of patient's response to the treatment, examination of the patient, performing treatments and interventions, re-evaluation of the patient's condition, review of old charts, ordering/interpreting laboratory studies, ordering/interpreting of radiographic studies  ** Please Note: This note has been constructed using a voice recognition system   **

## 2022-06-14 NOTE — ASSESSMENT & PLAN NOTE
· Pertinent hx of PTSD and depression  · On home regimen of Lexapro 10mg QD, mirtazepine 30mg QHS, and Klonipin 0 5mg BID   · Hold klonipin and continue lexapro and mirtazepine   · Encourage follow up with PCP and psychiatry

## 2022-06-14 NOTE — NURSING NOTE
Patient noted to have his wallet with cash in his pocket, states he has "about $2600 dollars"  Declining to have it counted or stored with security at this time

## 2022-06-14 NOTE — ASSESSMENT & PLAN NOTE
· Reports seizure yesterday witnessed by neighbor with head strike  · Admits to history of seizure due to ETOH withdrawal, last reported seizure in January     · Has been evaluated by neurology, not placed on AEDs  · Initiate seizure precautions   · NC/AT on exam, no focal deficits, AAOx3  · CT head no acute intracranial abnormality

## 2022-06-14 NOTE — ASSESSMENT & PLAN NOTE
· Elevated AST, ALT, alk phos appreciated on CMP   · Likely 2/2 chronic ETOH consumption   · Initiate IVF  · ALT and alk phos improved today  AST slightly elevated, potentially related to muscle breakdown from recent seizure  Creatinine is stable    · CMP improving  · Continue to monitor

## 2022-06-14 NOTE — ASSESSMENT & PLAN NOTE
· History of opioid use disorder and polysubstance use   · Denies hx of IVDU  · Currently on MAT buprenorphine/naloxone 12mg daily, PDMP verified    · Will continue buprenorphine 12/3mg daily   · Follow-up HIV and acute hepatitis panel:   GABRIELA

## 2022-06-14 NOTE — PROGRESS NOTES
PROGRESS NOTE  DEPARTMENT OF MEDICAL TOXICOLOGY  LEVEL 4 MEDICAL DETOX UNIT  Nga Holly 31 y o  male MRN: [id number]  Unit/Bed#: 5T DETOX 674-42 Encounter: [id number]      Reason for Admission/Principal Problem: ethanol withdrawal seizure   Rounding Provider: Derrill Curling, DO  Attending Provider: Derrill Curling, DO   6/13/2022 10:02 PM           Unsteady gait  Assessment & Plan  Likely related to alcohol withdrawal that started at home  Also could have neuropathy related to chronic alcohol use  Wernicke's less likely as he is not encephalopathic  Will initiate fall precautions with bed alarm and consult physical therapy  Benzodiazepine dependence ([facility] )  Assessment & Plan  · On klonipin 0 5mg BID for management of anxiety   · Per review of EHR has hx of taking more than prescribed  Has been recommended to discontinue due to coadministration with Suboxone   · Will hold klonipin   · Encourage follow up with outpatient PCP and psychiatry     Transaminitis  Assessment & Plan  · Elevated AST, ALT, alk phos appreciated on CMP   · Likely 2/2 chronic ETOH consumption   · Initiate IVF  · ALT and alk phos improved today  AST slightly elevated, potentially related to muscle breakdown from recent seizure  Creatinine is stable    · Repeat CMP in AM    Tobacco use disorder  Assessment & Plan  · Daily tobacco smoker   · Offered NRT   · Encourage cessation     PTSD (post-traumatic stress disorder)  Assessment & Plan  · Pertinent hx of PTSD and depression  · On home regimen of Lexapro 10mg QD, mirtazepine 30mg QHS, and Klonipin 0 5mg BID   · Hold klonipin and continue lexapro and mirtazepine   · Encourage follow up with PCP and psychiatry     Alcohol use disorder, severe, dependence ([facility] )  Assessment & Plan  · Longstanding history of alcohol withdrawal   · Pertinent hx of alcohol withdrawal seizure  · Average consumption of half gallon whiskey daily   · Initiate IVFs, thiamine, folic acid, MV supplementation · Consult case management for dispo planning     Opioid use disorder, moderate, on maintenance therapy, dependence (HCC)  Assessment & Plan  · History of opioid use disorder and polysubstance use   · Denies hx of IVDU  · Currently on MAT buprenorphine/naloxone 12mg daily, PDMP verified    · Will continue buprenorphine 12/3mg daily   · Follow-up HIV and acute hepatitis panel     Seizure [facility])  Assessment & Plan  · Reports seizure yesterday witnessed by neighbor with head strike  · Admits to history of seizure due to ETOH withdrawal, last reported seizure in January  · Has been evaluated by neurology, not placed on AEDs  · Initiate seizure precautions   · NC/AT on exam, no focal deficits, AAOx3  · Follow-up CT head regarding reported head strike    * Alcohol withdrawal syndrome with complication ([facility] )  Assessment & Plan  · Continue SEWS protocol for tx of alcohol withdrawal with phenobarbital, received 260mg in ED PTA   · Continuous telemetry and pulse oximetry  · Additional dose of phenobarbital 650 mg IV now  · Counseled patient regarding his benzodiazepine use  He does feel that he needs it for anxiety and is prescribed by his family doctor  However, it is being held at this point while being treated with phenobarbital and he understands this  VTE Pharmacologic Prophylaxis:   Pharmacologic: Enoxaparin (Lovenox)  Mechanical VTE Prophylaxis in Place: yes    Code Status: Level 1 - Full Code    Patient Centered Rounds: I have performed bedside rounds with nursing staff today  Time Spent for Care: 45 minutes  More than 50% of total time spent on counseling and coordination of care as described above      Minutes of critical care time 39  -Critical care time was exclusive of separately billable procedures and teaching time    -Critical care was necessary to treat or prevent imminent or life-threatening deterioration of the following condition: toxidrome, withdrawal, CNS failure/compromise and hepatic failure  -Critical care time was spent personally by me on the following activities as well as the above as per the course and rest of chart: obtaining history from patient/surrogate, review of old charts, development of a treatment plan, discussions with referring provider(s) and/or consultants, examination of the patient, performing treatments and interventions, evaluation of patient's response to the treatment, re-evaluation of the patient's condition, ordering/interpreting laboratory studies, ordering/interpreting of radiographic studies  Current Length of Stay: 1 day(s)    Current Patient Status: Inpatient     Certification Statement: The patient will continue to require additional inpatient hospital stay due to Continued stabilization of severe ethanol withdrawal with ambulatory dysfunction  Discharge Plan:  Pending physical therapy, case management and continued stabilization withdrawal process  Likely to be in the hospital 2 more nights  Subjective:   Patient feels anxious and sweaty with continued withdrawal symptoms      Objective:     Clinical Opiate Withdrawal Scale  Pulse: 82    SEWS Total Score: 11 (6/14/2022 11:00 AM)        Last 24 Hours Medication List:   Current Facility-Administered Medications   Medication Dose Route Frequency Provider Last Rate    acetaminophen  650 mg Oral Q6H PRN Jennifer [place], CRNP      buprenorphine-naloxone  4 mg Sublingual After [employer], CRNP      buprenorphine-naloxone  8 mg Sublingual Daily Jennifer [place], CRNP      diazepam  10 mg Intravenous Once [employer] Nappe, DO      enoxaparin  40 mg Subcutaneous Daily Jennifer [place], CRNP      escitalopram  10 mg Oral Daily Jennifer [place], CRNP      folic acid  1 mg Oral Daily Jennifer [place], CRNP      loperamide  4 mg Oral 4x Daily PRN Romelia Ferreira PA-C      mirtazapine  30 mg Oral HS Rhea Rutledge, CRNP      multivitamin-minerals  1 tablet Oral Daily Stefany [place], CRNP      sodium chloride  100 mL/hr Intravenous Continuous Stefany [place], CRNP 100 mL/hr (22 0746)    thiamine  100 mg Oral Daily KHANG Stevens           Vitals:   Temp (24hrs), Av 2 °F (36 8 °C), Min:97 2 °F (36 2 °C), Max:99 °F (37 2 °C)    Temp:  [97 2 °F (36 2 °C)-99 °F (37 2 °C)] 97 9 °F (36 6 °C)  HR:  [73-94] 82  Resp:  [12-22] 18  BP: (134-191)/() 161/101  SpO2:  [92 %-98 %] 98 %  Body mass index is 31 55 kg/m²  Input and Output Summary (last 24 hours): Intake/Output Summary (Last 24 hours) at 2022 1230  Last data filed at 2022 1100  Gross per 24 hour   Intake 240 ml   Output 450 ml   Net -210 ml       Physical Exam:   Physical Exam  Vitals and nursing note reviewed  HENT:      Head: Normocephalic and atraumatic  Nose: Nose normal       Mouth/Throat:      Mouth: Mucous membranes are moist    Eyes:      Extraocular Movements: Extraocular movements intact  Pupils: Pupils are equal, round, and reactive to light  Cardiovascular:      Rate and Rhythm: Normal rate  Pulses: Normal pulses  Pulmonary:      Effort: Pulmonary effort is normal    Abdominal:      General: Abdomen is flat  Tenderness: There is no abdominal tenderness  Musculoskeletal:         General: Normal range of motion  Cervical back: Normal range of motion  Skin:     General: Skin is warm and dry  Neurological:      General: No focal deficit present  Mental Status: He is alert  Motor: Tremor present  No abnormal muscle tone  Gait: Gait abnormal    Psychiatric:         Attention and Perception: Attention normal          Mood and Affect: Mood is anxious           Speech: Speech normal          Cognition and Memory: Cognition normal          Additional Data:     Labs:   Results from last 7 days   Lab Units 22  0454 22  1559   WBC Thousand/uL 8 38 7 53   HEMOGLOBIN g/dL 15 8 17 0   HEMATOCRIT % 45 8 47 7   PLATELETS Thousands/uL 207 278   NEUTROS PCT %  --  66   LYMPHS PCT %  --  20   MONOS PCT %  --  13*   EOS PCT %  --  0      Results from last 7 days   Lab Units 06/14/22  0454   SODIUM mmol/L 134*   POTASSIUM mmol/L 3 8   CHLORIDE mmol/L 102   CO2 mmol/L 24   BUN mg/dL 21   CREATININE mg/dL 0 91   ANION GAP mmol/L 8   CALCIUM mg/dL 7 9*   ALBUMIN g/dL 4 0   TOTAL BILIRUBIN mg/dL 1 23   ALK PHOS U/L 152*   ALT U/L 117*   AST U/L 237*   GLUCOSE RANDOM mg/dL 91                              * I Have Reviewed All Lab Data Listed Above  * Additional Pertinent Lab Tests Reviewed: [place] [facility] Admission Reviewed      Imaging Studies: I have personally reviewed pertinent reports  Today, Patient Was Seen By: Nilda Smith DO    ** Please Note: Dictation voice to text software may have been used in the creation of this document   **

## 2022-06-14 NOTE — ASSESSMENT & PLAN NOTE
· Longstanding history of alcohol withdrawal   · Pertinent hx of alcohol withdrawal seizure  · Average consumption of half gallon whiskey daily   · Initiate IVFs, thiamine, folic acid, MV supplementation   · Consult case management for dispo planning

## 2022-06-14 NOTE — DISCHARGE INSTR - OTHER ORDERS
DRUG AND ALCOHOL RESOURCES  [facility]    If you have health insurance, including medical assistance, there should be a phone number on your insurance card that you can call to find out how to access services  The card may say, For Juanito Velásquez or For Drug and Alcohol Services or For Substance Abuse Services call the number provided  Or call [place] Get Help Now at [address] [facility] Drug and Alcohol  Drug & Alcohol Program  [address]  [phone]  Contact: Radha Orta  Confidential free help, from public health agencies, to find substance use treatment and information  [phone]    12 step Meetings     [place] [address] [address] AA meetings  Serving [address] in  [facility]    Call [address] At:  [phone]    About co nz    [address]: [address] [facility] Crisis Intervention  [phone] or [address]:  [address] , [address] on [address] [facility] ([facility]) HELPLINE: [phone]/Website: www [facility] org  *Substance Abuse and [address] [facility]) [facility], which is a confidential, free, 24-hour-a-day, 365-day-a-year, information service for individuals and family members facing mental health and/or substance use disorders  This service provides referrals to local treatment facilities, support groups, and community-based organizations  Callers can also order free publications and other information    Call [phone]/Website: www [facility] gov  *[facility] 2-1-1: This is a toll free, confidential, 24-hour-a-day service which connects you to a community  in your area who can help you find services and resources that are available to you locally and provide critical services that can improve and save lives    Call: 211  /Website: [email or web address]

## 2022-06-14 NOTE — UTILIZATION REVIEW
Initial Clinical Review    Pt initially presented to [address] [place] [address] ED  Pt was transferred by EMS to [address] [place] [address] for its Level IV medically managed intensive inpatient detox unit, not available at [address] [place] [address]  Admission: Date/Time/Statement:   Admission Orders (From admission, onward)     Ordered        06/13/22 2210  Inpatient Admission  Once                      Orders Placed This Encounter   Procedures    Inpatient Admission     Standing Status:   Standing     Number of Occurrences:   1     Order Specific Question:   Level of Care     Answer:   Med Surg [16]     Order Specific Question:   Estimated length of stay     Answer:   More than 2 Midnights     Order Specific Question:   Certification     Answer:   I certify that inpatient services are medically necessary for this patient for a duration of greater than two midnights  See H&P and MD Progress Notes for additional information about the patient's course of treatment  Initial Presentation: 31 y o  male who initially presented to [address] [place] [address], was then transferred by EMS to [address] [place] [address] as a direct admission to medical detox  Inpatient admission for evaluation and treatment of alcohol withdrawal syndrome  PMHx: Depression, PTSD, AUD, Opioid use disorder on MAT, tobacco use  Presented w/ request for detox from alcohol  Serum ETOH: 128  Received 2 mg IV Dilaudid, 4 mg IV Ativan, 260 mg IV Phenobarbital in ED  Reports half gallon of whiskey daily, last drink on 6/12 evening  Has prior inpatient & outpatient treatment for withdrawal from opioids but not from alcohol  Reports hx of withdrawal seizures  On exam, anxiety, tremors, diaphoretic, b/l nystagmus, tearful  Endorses nausea, headache  SEWS 14  Plan: SEWS monitoring w/ phenobarbital management, thiamine/folic acid supplement, IVF, telemetry, continuous pulse ox, continue home meds, trend labs, replete electrolytes as needed         Date: 06/14/22       Day 2: Pt reports anxiety and ongoing sweats and withdrawal symptoms  On exam, tremor, abnormal gait, anxious  SEWS 11  Plan: continue SEWS monitoring w/ phenobarbital management, thiamine/folic acid supplement, telemetry, continuous pulse ox, continue home meds, trend labs, replete electrolytes as needed  Wt Readings from Last 1 Encounters:   06/13/22 103 kg (226 lb 3 2 oz)     Vital Signs:   Date/Time Temp Pulse Resp BP MAP (mmHg) SpO2 O2 Device   06/14/22 1100 97 9 °F (36 6 °C) 82 18 161/101 Abnormal  121 98 % None (Room air)   06/14/22 0700 97 5 °F (36 4 °C) 81 18 134/68 90 98 % None (Room air)   06/14/22 0530 -- 76 18 -- -- 97 % None (Room air)   06/14/22 0320 98 6 °F (37 °C) 83 18 154/102 Abnormal  -- 98 % None (Room air)   06/14/22 0200 -- 84 18 -- -- 96 % None (Room air)   06/14/22 0100 -- 86 18 -- -- 96 % None (Room air)   06/14/22 0020 99 °F (37 2 °C) 94 18 157/85 -- 97 % None (Room air)   06/13/22 2340 -- 84 18 165/95 -- 92 % None (Room air)   06/13/22 2300 98 2 °F (36 8 °C) 87 18 191/117 Abnormal  141 94 % None (Room air)   06/13/22 2215 98 8 °F (37 1 °C) 94 18 172/115 Abnormal  134 98 % None (Room air)         06/14/22 1100 06/14/22 0830 06/14/22 0735 06/14/22 0530 06/14/22 0430   Severity of Ethanol Withdrawal Scale (SEWS)   ANXIETY: Do you feel that something bad is about to happen to you right now? 3  -BH 0  -BH 3  -BH 0  -RG 0  -RG   NAUSEA and DRY HEAVES or VOMITING? 3  -BH 0  -BH 3  -BH 0  -RG 0  -RG   SWEATING: (includes moist palms, sweating now)? Score 0 or 2 0  -BH 0  -BH 2  -BH 0  -RG 0  -RG   TREMOR: with arms extended eyes closed? 2  -BH 0  -BH 2  -BH 0  -RG 0  -RG   AGITATION: Fidgety, restless, pacing?  3  -BH 0  -BH 0  -BH 0  -RG 0  -RG   DISORIENTATION: 0  -BH 0  -BH 0  -BH 0  -RG 0  -RG   HALLUCINATIONS: 0  -BH 0  -BH 0  -BH 0  -RG 0  -RG   VITAL SIGNS: ANY (Pulse >018, Diastolic BP >11, Temp >98 7) 0  -BH 0  -BH 0  -BH 0  -RG 0  -RG   SEWS Total Score 11  -BH 0  -BH 10  -BH 0  -RG 0  -RG   Almanzar Agitation Sedation Scale (RASS) 0  -BH -1  -BH 0  -BH 0  -RG 0  -RG    06/14/22 0320 06/14/22 0200 06/14/22 0100 06/14/22 0020 06/13/22 2340   ANXIETY: Do you feel that something bad is about to happen to you right now? 0  -RG 0  -RG 0  -RG 3  -RG 3  -RG   NAUSEA and DRY HEAVES or VOMITING? 0  -RG 0  -RG 0  -RG 0  -RG 0  -RG   SWEATING: (includes moist palms, sweating now)? Score 0 or 2 0  -RG 0  -RG 0  -RG 2  -RG 2  -RG   TREMOR: with arms extended eyes closed? 0  -RG 0  -RG 0  -RG 2  -RG 2  -RG   AGITATION: Fidgety, restless, pacing? 3  -RG 0  -RG 0  -RG 0  -RG 0  -RG   DISORIENTATION: 0  -RG 0  -RG 0  -RG 0  -RG 0  -RG   HALLUCINATIONS: 0  -RG 0  -RG 0  -RG 0  -RG 0  -RG   VITAL SIGNS: ANY (Pulse >910, Diastolic BP >48, Temp >60 3) 3  -RG 0  -RG 0  -RG 3  -RG 3  -RG   SEWS Total Score 6  -RG 0  -RG 0  -RG 10  -RG 10  -RG   Almanzar Agitation Sedation Scale (RASS) 0  -RG 0  -RG 0  -RG 0  -RG --    06/13/22 2300 06/13/22 2217      ANXIETY: Do you feel that something bad is about to happen to you right now? 3  -RG 3  -RG      NAUSEA and DRY HEAVES or VOMITING? 0  -RG 3  -RG      SWEATING: (includes moist palms, sweating now)? Score 0 or 2 0  -RG 0  -RG      TREMOR: with arms extended eyes closed? 2  -RG 2  -RG      AGITATION: Fidgety, restless, pacing? 3  -RG 3  -RG      DISORIENTATION: 0  -RG 0  -RG      HALLUCINATIONS: 0  -RG 0  -RG      VITAL SIGNS: ANY (Pulse >289, Diastolic BP >38, Temp >18 5) 3  -RG 3  -RG      SEWS Total Score 11  -RG 14  -RG      Almanzar Agitation Sedation Scale (RASS) +1  -RG +1  -RG            Pertinent Labs/Diagnostic Test Results:   6/13 - EKG:  NSR    CT head wo contrast   Final Result by Yvonna Veronica, MD (06/14 1215)      No acute intracranial abnormality                    Workstation performed: [id number]           Results from last 7 days   Lab Units 06/14/22  0454 06/13/22  1559   WBC Thousand/uL 8 38 7 53   HEMOGLOBIN g/dL 15 8 17 0   HEMATOCRIT % 45 8 47 7   PLATELETS Thousands/uL 207 278   NEUTROS ABS Thousands/µL  --  4 96         Results from last 7 days   Lab Units 06/14/22  0454 06/13/22  1559   SODIUM mmol/L 134* 138   POTASSIUM mmol/L 3 8 3 8   CHLORIDE mmol/L 102 100   CO2 mmol/L 24 26   ANION GAP mmol/L 8 12   BUN mg/dL 21 18   CREATININE mg/dL 0 91 0 95   EGFR ml/min/1 73sq m 109 104   CALCIUM mg/dL 7 9* 8 4   MAGNESIUM mg/dL 2 0 2 1   PHOSPHORUS mg/dL  --  3 5     Results from last 7 days   Lab Units 06/14/22  0454 06/13/22  1559   AST U/L 237* 164*   ALT U/L 117* 124*   ALK PHOS U/L 152* 187*   TOTAL PROTEIN g/dL 6 4 7 6   ALBUMIN g/dL 4 0 4 3   TOTAL BILIRUBIN mg/dL 1 23 1 00     Results from last 7 days   Lab Units 06/14/22  0454 06/13/22  1559   GLUCOSE RANDOM mg/dL 91 111     Results from last 7 days   Lab Units 06/13/22  2223   HS TNI 0HR ng/L <2     Results from last 7 days   Lab Units 06/13/22  1559   LIPASE u/L 80     Results from last 7 days   Lab Units 06/13/22  1829   AMPH/METH  Negative   BARBITURATE UR  Positive*   BENZODIAZEPINE UR  Negative   COCAINE UR  Negative   METHADONE URINE  Negative   OPIATE UR  Positive*   PCP UR  Negative   THC UR  Positive*     Results from last 7 days   Lab Units 06/13/22  1559   ETHANOL LVL mg/dL 128*         Past Medical History:   Diagnosis Date    Chronic pain     Depression     Pelvic fracture (HCC)     PTSD (post-traumatic stress disorder)     Substance abuse ([place] [address] )      Present on Admission:   Opioid use disorder, moderate, on maintenance therapy, dependence (HCC)   Alcohol use disorder, severe, dependence (HCC)   Alcohol withdrawal syndrome with complication (HCC)   Seizure ([place] [address] )   PTSD (post-traumatic stress disorder)   (Resolved) Epigastric pain   Tobacco use disorder   Transaminitis   Benzodiazepine dependence ([place] [address] )      Admitting Diagnosis: Alcohol withdrawal ([place] [address] ) [F10 239]  Age/Sex: 31 y o  male  Admission Orders:  Regular Diet  SCDs  Fall & Seizure Precautions    SEWS monitoring  Telemetry & Continuous Pulse Ox  Scheduled Medications:  buprenorphine-naloxone, 4 mg, Sublingual, After Dinner  buprenorphine-naloxone, 8 mg, Sublingual, Daily  diazepam, 10 mg, Intravenous, Once  enoxaparin, 40 mg, Subcutaneous, Daily  escitalopram, 10 mg, Oral, Daily  folic acid, 1 mg, Oral, Daily  mirtazapine, 30 mg, Oral, HS  multivitamin-minerals, 1 tablet, Oral, Daily  PHENobarbital, 650 mg, Intravenous, Once  thiamine, 100 mg, Oral, Daily    Continuous IV Infusions:  sodium chloride, 100 mL/hr, Intravenous, Continuous    PRN Meds:  acetaminophen, 650 mg, Oral, Q6H PRN; 6/13 x1  phenobarbital, 650 mg, Intravenous; 6/13 x1, 6/14 x1  phenobarbital, 130 mg, Intravenous; 6/13 x2, 6/14 x2  phenobarbital, 65 mg, Intravenous; 614 x1      IP CONSULT TO CASE MANAGEMENT    Network Utilization Review Department  ATTENTION: Please call with any questions or concerns to [phone] and carefully listen to the prompts so that you are directed to the right person  All voicemails are confidential   Pritesh Benites all requests for admission clinical reviews, approved or denied determinations and any other requests to dedicated fax number below belonging to the campus where the patient is receiving treatment   List of dedicated fax numbers for the Facilities:  [address] DENIALS (Administrative/Medical Necessity) [phone]   [address] (Maternity/NICU/Pediatrics) [phone]   [address]  [phone]   Damion Little 50 [address] [facility] [place] [facility] [address] [address] [address] [facility] [phone]   [place] [address] [phone] Parent(s)

## 2022-06-14 NOTE — PROGRESS NOTES
06/14/22 0826   Referral Data   Referral Source Other (Comment)  (EMS)   Referral Reason Drug/Alcohol [address]   Readmission Root Cause   30 Day Readmission No   Patient Information   Mental Status Alert   Primary Caregiver Self   Support System Immediate family   Legal Information   Legal Issues pt denies   Health Care Proxy Appointed No   Activities of Daily Living Prior to Admission   Functional Status Independent   Assistive Device No device needed   Living Arrangement House   Ambulation Independent   Access to Firearms   Access to Firearms No  (pt denies)   Income Information   Income Source Employed   [employer]   Means of Transport to Appts: Drives Self     Pt is a 31year old single male who was admitted to the detox unit for alcohol withdrawal  Pt had presented at [facility] ED via EMS after experiencing a seizure at home  Pt's name, date of birth, home address, and telephone number were verified  Pt was informed of case management role and the purpose of the completion of intake with case management  Pt reports he had been drinking about  5 gallon of whiskey daily since leaving detox 12/21  Pt reports his alcohol increased after the passing of his mother about two weeks ago  Pt reports use 6/13/2022 of  25 gallon whiskey and first use at age 14  Pt reports occasional methamphetamine use and states last use within the last year and first use at age 27  Pt has a hx of heroin use IV, last use 10 years ago and first use at age 26  Pt reports ongoing suboxone MAT to address this issue  Pt reports daily prescribed kolonpin use of  25 BID  Pt denied any abuse of this medication  Pt reports occasional marijuana use, last 2 weeks ago and first use at age 15  Pt reports a hx of crack cocaine use with first use at age 17 and last use 2016  Pt reports daily nicotine use of 1 PPD cigarette smoking    Pt reports longest abstinence from alcohol 5 months  Pt reports previous inpatient treatment, last 12/2021 in [place], previous outpatient, current only suboxone MAT, and previous 12 step meeting attendance  Pt reports current withdrawal symptoms tremors, chills,and nausea  Pt had a recent withdrawal seizure, pt reports hx of dt's, and hx of withdrawal hallucinations  Pt denied family hx of substance abuse  AUDIT: no score  PAWSS: 8  UDS: +nannette (given in ED), Opiates (given in ED), and THC  Ethanol in ED: 128    Pt reports a mental health diagnosis of PTSD and anxiety which he currently receives treatment from PCP  Pt reports one previous mental health inpatient, last this past December  Pt denied current SI or HI, denied AH/VH, denied hx of abuse or trauma, pt denied access to firearms, denied family hx of mental health needs  Pt denied current medical issues  Pt reports PCP/MAT/mental health provider as [facility] [employer], SAL signed  Cm contacted this office at [phone] with no response  A message was left requesting a return call  Pt reports current medical insurance as [employer], SAL signed,a nd preferred pharmacy [facility]  Pt denied current legal issues  Pt reports he is employed working [employer]  Pt reports education of some college  Pt states he has a car and a license  Pt reports no  service and no religous or cultural preferences at this time  Pt reports he resides alone in a home  Pt stated he did not want any contact with a supportive contact at this time  Pt states he can return to this home and will need transportation home  Pt and Cm completed relapse prevention plan  Pt and Cm signed plan and pt received a copy of this plan  Pt identified the passing of his mother and isolation at home as triggers  Pt struggled to identify coping skills and demonstrated some lack of understanding of coping skills  Pt states he is uncertain what he would like for aftercare    Pt would like to meet with a therapist at his PCP office and a message was left with this office requesting a return call  Pt's clinical presentation indicates that pt has struggled to maintain any abstinence and will usually start using another substance when he stops one  Pt's goals for detox are to successfully complete medical withdrawal and develop a discharge plan that addresses pt's need for addiction education and development of coping skills  Pt presents in the contemplation stage of change

## 2022-06-14 NOTE — CASE MANAGEMENT
Cm received a return call from [facility] and informed of pt's admission  Pt has an appointment with this office for 6/29/2022  Office was asked about therapist for pt  Wojciech informed to place amb referral and email Celine Meier about referral  Both of these tasks were completed

## 2022-06-15 LAB
ALBUMIN SERPL BCP-MCNC: 4.2 G/DL (ref 3–5.2)
ALP SERPL-CCNC: 221 U/L (ref 43–122)
ALT SERPL W P-5'-P-CCNC: 133 U/L
ANION GAP SERPL CALCULATED.3IONS-SCNC: 7 MMOL/L (ref 5–14)
AST SERPL W P-5'-P-CCNC: 193 U/L (ref 17–59)
BILIRUB SERPL-MCNC: 0.97 MG/DL
BUN SERPL-MCNC: 16 MG/DL (ref 5–25)
CALCIUM SERPL-MCNC: 8.4 MG/DL (ref 8.4–10.2)
CHLORIDE SERPL-SCNC: 98 MMOL/L (ref 97–108)
CO2 SERPL-SCNC: 32 MMOL/L (ref 22–30)
CREAT SERPL-MCNC: 0.88 MG/DL (ref 0.7–1.5)
ERYTHROCYTE [DISTWIDTH] IN BLOOD BY AUTOMATED COUNT: 13.1 % (ref 11.6–15.1)
GFR SERPL CREATININE-BSD FRML MDRD: 112 ML/MIN/1.73SQ M
GLUCOSE SERPL-MCNC: 86 MG/DL (ref 70–99)
HCT VFR BLD AUTO: 47.6 % (ref 36.5–49.3)
HGB BLD-MCNC: 15.7 G/DL (ref 12–17)
MCH RBC QN AUTO: 29.9 PG (ref 26.8–34.3)
MCHC RBC AUTO-ENTMCNC: 33 G/DL (ref 31.4–37.4)
MCV RBC AUTO: 91 FL (ref 82–98)
PLATELET # BLD AUTO: 192 THOUSANDS/UL (ref 149–390)
PMV BLD AUTO: 11.7 FL (ref 8.9–12.7)
POTASSIUM SERPL-SCNC: 4.1 MMOL/L (ref 3.6–5)
PROT SERPL-MCNC: 6.9 G/DL (ref 5.9–8.4)
RBC # BLD AUTO: 5.25 MILLION/UL (ref 3.88–5.62)
SODIUM SERPL-SCNC: 137 MMOL/L (ref 137–147)
WBC # BLD AUTO: 5.9 THOUSAND/UL (ref 4.31–10.16)

## 2022-06-15 PROCEDURE — 99232 SBSQ HOSP IP/OBS MODERATE 35: CPT | Performed by: EMERGENCY MEDICINE

## 2022-06-15 PROCEDURE — 80053 COMPREHEN METABOLIC PANEL: CPT | Performed by: EMERGENCY MEDICINE

## 2022-06-15 PROCEDURE — 85027 COMPLETE CBC AUTOMATED: CPT | Performed by: EMERGENCY MEDICINE

## 2022-06-15 RX ORDER — HYDROXYZINE HYDROCHLORIDE 25 MG/1
25 TABLET, FILM COATED ORAL EVERY 6 HOURS PRN
Status: DISCONTINUED | OUTPATIENT
Start: 2022-06-15 | End: 2022-06-16 | Stop reason: HOSPADM

## 2022-06-15 RX ORDER — FAMOTIDINE 20 MG/1
20 TABLET, FILM COATED ORAL 2 TIMES DAILY
Status: DISCONTINUED | OUTPATIENT
Start: 2022-06-15 | End: 2022-06-16 | Stop reason: HOSPADM

## 2022-06-15 RX ORDER — FAMOTIDINE 10 MG/ML
20 INJECTION, SOLUTION INTRAVENOUS EVERY 12 HOURS SCHEDULED
Status: DISCONTINUED | OUTPATIENT
Start: 2022-06-15 | End: 2022-06-15

## 2022-06-15 RX ORDER — PANTOPRAZOLE SODIUM 40 MG/1
40 TABLET, DELAYED RELEASE ORAL
Status: DISCONTINUED | OUTPATIENT
Start: 2022-06-15 | End: 2022-06-16 | Stop reason: HOSPADM

## 2022-06-15 RX ORDER — DIAZEPAM 5 MG/1
5 TABLET ORAL ONCE
Status: COMPLETED | OUTPATIENT
Start: 2022-06-15 | End: 2022-06-15

## 2022-06-15 RX ORDER — LANOLIN ALCOHOL/MO/W.PET/CERES
500 CREAM (GRAM) TOPICAL DAILY
Status: DISCONTINUED | OUTPATIENT
Start: 2022-06-15 | End: 2022-06-16 | Stop reason: HOSPADM

## 2022-06-15 RX ORDER — NICOTINE 21 MG/24HR
21 PATCH, TRANSDERMAL 24 HOURS TRANSDERMAL DAILY
Status: DISCONTINUED | OUTPATIENT
Start: 2022-06-16 | End: 2022-06-15

## 2022-06-15 RX ORDER — PANTOPRAZOLE SODIUM 40 MG/1
40 INJECTION, POWDER, FOR SOLUTION INTRAVENOUS
Status: DISCONTINUED | OUTPATIENT
Start: 2022-06-15 | End: 2022-06-15

## 2022-06-15 RX ORDER — NICOTINE 21 MG/24HR
21 PATCH, TRANSDERMAL 24 HOURS TRANSDERMAL DAILY
Status: DISCONTINUED | OUTPATIENT
Start: 2022-06-15 | End: 2022-06-16 | Stop reason: HOSPADM

## 2022-06-15 RX ADMIN — HYDROXYZINE HYDROCHLORIDE 25 MG: 25 TABLET, FILM COATED ORAL at 21:44

## 2022-06-15 RX ADMIN — ACETAMINOPHEN 650 MG: 325 TABLET ORAL at 22:01

## 2022-06-15 RX ADMIN — MULTIPLE VITAMINS W/ MINERALS TAB 1 TABLET: TAB ORAL at 08:48

## 2022-06-15 RX ADMIN — BUPRENORPHINE AND NALOXONE 4 MG: 2; .5 FILM BUCCAL; SUBLINGUAL at 17:02

## 2022-06-15 RX ADMIN — THIAMINE HYDROCHLORIDE 500 MG: 100 INJECTION, SOLUTION INTRAMUSCULAR; INTRAVENOUS at 00:50

## 2022-06-15 RX ADMIN — CLONAZEPAM 0.5 MG: 0.5 TABLET ORAL at 08:48

## 2022-06-15 RX ADMIN — PANTOPRAZOLE SODIUM 40 MG: 40 TABLET, DELAYED RELEASE ORAL at 15:30

## 2022-06-15 RX ADMIN — ACETAMINOPHEN 650 MG: 325 TABLET ORAL at 08:51

## 2022-06-15 RX ADMIN — BUPRENORPHINE AND NALOXONE 8 MG: 8; 2 FILM BUCCAL; SUBLINGUAL at 08:48

## 2022-06-15 RX ADMIN — MIRTAZAPINE 30 MG: 30 TABLET, FILM COATED ORAL at 21:17

## 2022-06-15 RX ADMIN — NICOTINE 21 MG: 21 PATCH, EXTENDED RELEASE TRANSDERMAL at 19:38

## 2022-06-15 RX ADMIN — THIAMINE HCL TAB 100 MG 500 MG: 100 TAB at 08:47

## 2022-06-15 RX ADMIN — ESCITALOPRAM OXALATE 10 MG: 10 TABLET ORAL at 08:48

## 2022-06-15 RX ADMIN — FAMOTIDINE 20 MG: 20 TABLET ORAL at 15:30

## 2022-06-15 RX ADMIN — LOPERAMIDE HYDROCHLORIDE 4 MG: 2 CAPSULE ORAL at 11:18

## 2022-06-15 RX ADMIN — HYDROXYZINE HYDROCHLORIDE 25 MG: 25 TABLET, FILM COATED ORAL at 15:39

## 2022-06-15 RX ADMIN — DIAZEPAM 5 MG: 5 TABLET ORAL at 11:18

## 2022-06-15 RX ADMIN — LOPERAMIDE HYDROCHLORIDE 4 MG: 2 CAPSULE ORAL at 08:51

## 2022-06-15 RX ADMIN — FOLIC ACID 1 MG: 1 TABLET ORAL at 08:48

## 2022-06-15 NOTE — ASSESSMENT & PLAN NOTE
· Patient complains of epigastric burning/discomfort  · No abdominal tenderness  · CMP, lipase remained within normal limits  · Pepcid and Protonix started  · Serial abdominal exams, continue to monitor  · If symptoms continue/worsen, will consider right upper quadrant ultrasound

## 2022-06-15 NOTE — UTILIZATION REVIEW
Continued Stay Review    Date: 06/15/22                          Current Patient Class: IP  Current Level of Care: Med/Surg    HPI:36 y o  male initially admitted on 6/13 for alcohol withdrawal syndrome  Assessment/Plan: Pt reports tremor and mild epigastric burning along with loose stool  On exam, clammy, tremor, anxious  Plan: continue high-dose thiamine & folic acid supplements, PT eval, Trend labs, replete electrolytes as needed; IVF, continue lexapro and mirtazapine, continue Suboxone  Vital Signs:   Date/Time Temp Pulse Resp BP MAP (mmHg) SpO2 O2 Device   06/15/22 1120 97 6 °F (36 4 °C) 77 16 153/99 -- 99 % --   06/15/22 0710 97 6 °F (36 4 °C) 66 16 146/85 -- 97 % --   06/14/22 1938 98 2 °F (36 8 °C) 80 16 145/78 100 98 % None (Room air)   06/14/22 1540 97 5 °F (36 4 °C) 75 18 169/106 Abnormal  130 98 % None (Room air)   06/14/22 1100 97 9 °F (36 6 °C) 82 18 161/101 Abnormal  121 98 % None (Room air)        06/14/22 1940 06/14/22 1800 06/14/22 1700 06/14/22 1552 06/14/22 1400   Severity of Ethanol Withdrawal Scale (SEWS)   ANXIETY: Do you feel that something bad is about to happen to you right now? 0  -WL 0  -BH 0  -BH 3  -BH 0  -BH   NAUSEA and DRY HEAVES or VOMITING? 0  -WL 0  -BH 0  -BH 3  -BH 0  -BH   SWEATING: (includes moist palms, sweating now)? Score 0 or 2 0  -WL 0  -BH 0  -BH 0  -BH 0  -BH   TREMOR: with arms extended eyes closed? 0  -WL 0  -BH 0  -BH 2  -BH 0  -BH   AGITATION: Fidgety, restless, pacing?  0  -WL 0  -BH 0  -BH 3  -BH 0  -BH   DISORIENTATION: 0  -WL 0  -BH 0  -BH 0  -BH 0  -BH   HALLUCINATIONS: 0  -WL 0  -BH 0  -BH 0  -BH 0  -BH   VITAL SIGNS: ANY (Pulse >801, Diastolic BP >17, Temp >20 9) 0  -WL 0  -BH 0  -BH 0  -BH 0  -BH   SEWS Total Score 0  -WL 0  -BH 0  -BH 11  -BH 0  -BH       Pertinent Labs/Diagnostic Results:   Results from last 7 days   Lab Units 06/15/22  0519 06/14/22  0454 06/13/22  1559   WBC Thousand/uL 5 90 8 38 7 53   HEMOGLOBIN g/dL 15 7 15 8 17 0 HEMATOCRIT % 47 6 45 8 47 7   PLATELETS Thousands/uL 192 207 278   NEUTROS ABS Thousands/µL  --   --  4 96     Results from last 7 days   Lab Units 06/15/22  0519 06/14/22  0454 06/13/22  1559   SODIUM mmol/L 137 134* 138   POTASSIUM mmol/L 4 1 3 8 3 8   CHLORIDE mmol/L 98 102 100   CO2 mmol/L 32* 24 26   ANION GAP mmol/L 7 8 12   BUN mg/dL 16 21 18   CREATININE mg/dL 0 88 0 91 0 95   EGFR ml/min/1 73sq m 112 109 104   CALCIUM mg/dL 8 4 7 9* 8 4   MAGNESIUM mg/dL  --  2 0 2 1   PHOSPHORUS mg/dL  --   --  3 5     Results from last 7 days   Lab Units 06/15/22  0519 06/14/22  0454 06/13/22  1559   AST U/L 193* 237* 164*   ALT U/L 133* 117* 124*   ALK PHOS U/L 221* 152* 187*   TOTAL PROTEIN g/dL 6 9 6 4 7 6   ALBUMIN g/dL 4 2 4 0 4 3   TOTAL BILIRUBIN mg/dL 0 97 1 23 1 00     Results from last 7 days   Lab Units 06/15/22  0519 06/14/22  0454 06/13/22  1559   GLUCOSE RANDOM mg/dL 86 91 111     Results from last 7 days   Lab Units 06/13/22  2223   HS TNI 0HR ng/L <2     Results from last 7 days   Lab Units 06/14/22  0454   HEP B S AG  Non-reactive   HEP C AB  Non-reactive   HEP B C IGM  Non-reactive     Results from last 7 days   Lab Units 06/13/22  1559   LIPASE u/L 80     Results from last 7 days   Lab Units 06/13/22  1829   AMPH/METH  Negative   BARBITURATE UR  Positive*   BENZODIAZEPINE UR  Negative   COCAINE UR  Negative   METHADONE URINE  Negative   OPIATE UR  Positive*   PCP UR  Negative   THC UR  Positive*     Results from last 7 days   Lab Units 06/13/22  1559   ETHANOL LVL mg/dL 128*       Medications:   Scheduled Medications:  buprenorphine-naloxone, 4 mg, Sublingual, After Dinner  buprenorphine-naloxone, 8 mg, Sublingual, Daily  enoxaparin, 40 mg, Subcutaneous, Daily  escitalopram, 10 mg, Oral, Daily  folic acid, 1 mg, Oral, Daily  mirtazapine, 30 mg, Oral, HS  multivitamin-minerals, 1 tablet, Oral, Daily  thiamine, 500 mg, Oral, Daily    Continuous IV Infusions:    sodium chloride 0 9 %, 100 mL/hr, Intravenous, Continuous; Start: 06/13/22 2215 End: 06/14/22 2305    PRN Meds:  acetaminophen, 650 mg, Oral, Q6H PRN; 6/15 x1  clonazePAM, 0 5 mg, Oral, 6/14 x1, 6/15 x1  diazepam, 5 mg, Oral; 6/15 x1  loperamide, 4 mg, Oral, 4x Daily PRN; 6/14 x1, 6/15 x2  phenobarbital, 130 mg, Intravenous; 6/14 x1      Discharge Plan: D    Network Utilization Review Department  ATTENTION: Please call with any questions or concerns to [phone] and carefully listen to the prompts so that you are directed to the right person  All voicemails are confidential   Ruma Sesay all requests for admission clinical reviews, approved or denied determinations and any other requests to dedicated fax number below belonging to the campus where the patient is receiving treatment   List of dedicated fax numbers for the Facilities:  [address] DENIALS (Administrative/Medical Necessity) [phone]   [address] (Maternity/NICU/Pediatrics) [phone]   [address] [place] [address]  [address] [facility] [place] [address] [address] [place] [address] Fiona [address]ra [address] [facility] [phone]

## 2022-06-15 NOTE — CASE MANAGEMENT
Cm received a message from [facility], therapist at [facility], indicating pt had been scheduled for a therapy intake appointment on 6/23/2022 at 1pm

## 2022-06-15 NOTE — PROGRESS NOTES
PROGRESS NOTE  DEPARTMENT OF MEDICAL TOXICOLOGY  LEVEL 4 MEDICAL DETOX UNIT  Shelyb Johsnon 31 y o  male MRN: [id number]  Unit/Bed#: 5T DETOX 514-01 Encounter: [id number]      Reason for Admission/Principal Problem:  ETOH withdrawal, opioid use disorder, benzo use disorder, gait instability    Rounding Provider: Wandy Rios MD  Attending Provider: Nilda Smith,    6/13/2022 10:02 PM           Unsteady gait  Assessment & Plan  · Likely related to alcohol withdrawal that started at home  Also could have neuropathy related to chronic alcohol use  Wernicke's less likely as he is not encephalopathic  Will initiate fall precautions with bed alarm and consult physical therapy  · On high-dose thiamine  · Appreciate PT recommendations    Benzodiazepine dependence ([facility] [address] )  Assessment & Plan  · Continue SEWS   · Stop benzodiazepines  · Encourage follow up with outpatient PCP and psychiatry     Transaminitis  Assessment & Plan  · Elevated AST, ALT, alk phos appreciated on CMP   · Likely 2/2 chronic ETOH consumption   · Initiate IVF  · ALT and alk phos improved today  AST slightly elevated, potentially related to muscle breakdown from recent seizure  Creatinine is stable    · CMP improving  · Continue to monitor    Tobacco use disorder  Assessment & Plan  · Daily tobacco smoker   · Offered NRT   · Encourage cessation     Epigastric pain  Assessment & Plan  · Patient complains of epigastric burning/discomfort  · No abdominal tenderness  · CMP, lipase remained within normal limits  · Pepcid and Protonix started  · Serial abdominal exams, continue to monitor  · If symptoms continue/worsen, will consider right upper quadrant ultrasound    PTSD (post-traumatic stress disorder)  Assessment & Plan  · Pertinent hx of PTSD and depression  · On home regimen of Lexapro 10mg QD, mirtazepine 30mg QHS, and Klonipin 0 5mg BID   · Hold klonipin and continue lexapro and mirtazepine   · Encourage follow up with PCP and psychiatry Alcohol use disorder, severe, dependence ([facility] [address] )  Assessment & Plan  · Longstanding history of alcohol withdrawal   · Pertinent hx of alcohol withdrawal seizure  · Average consumption of half gallon whiskey daily   · Initiate IVFs, thiamine, folic acid, MV supplementation   · Consult case management for dispo planning     Opioid use disorder, moderate, on maintenance therapy, dependence (HCC)  Assessment & Plan  · History of opioid use disorder and polysubstance use   · Denies hx of IVDU  · Currently on MAT buprenorphine/naloxone 12mg daily, PDMP verified    · Will continue buprenorphine 12/3mg daily   · Follow-up HIV and acute hepatitis panel:  WNL    Seizure ([facility] [address] )  Assessment & Plan  · Reports seizure yesterday witnessed by neighbor with head strike  · Admits to history of seizure due to ETOH withdrawal, last reported seizure in January  · Has been evaluated by neurology, not placed on AEDs  · Initiate seizure precautions   · NC/AT on exam, no focal deficits, AAOx3  · CT head no acute intracranial abnormality     * Alcohol withdrawal syndrome with complication (HCC)  Assessment & Plan  · Continue SEWS            VTE Pharmacologic Prophylaxis:   Pharmacologic: Enoxaparin (Lovenox)  Mechanical VTE Prophylaxis in Place: no    Code Status: Level 1 - Full Code    Patient Centered Rounds: I have performed bedside rounds with nursing staff today  Discussions with Specialists or Other Care Team Provider: no     Education and Discussions with Family / Patient: no    Time Spent for Care: 30 minutes  More than 50% of total time spent on counseling and coordination of care as described above      Current Length of Stay: 2 day(s)    Current Patient Status: Inpatient     Certification Statement: The patient will continue to require additional inpatient hospital stay due to gait dysfunction, ETOH w/d      Discharge Plan: d/c home when ambulatory dysfunction has resolved        Subjective:   Patient complains of very mild tremor and mild epigastric burning along with occasional loose stools  Patient has been able to ambulate carefully to the bathroom  Denies chest pain, shortness of breath, urinary symptoms  Objective:     Clinical Opiate Withdrawal Scale  Pulse: 77    SEWS Total Score: 0 (2022  7:40 PM)        Last 24 Hours Medication List:   Current Facility-Administered Medications   Medication Dose Route Frequency Provider Last Rate    acetaminophen  650 mg Oral Q6H PRN KHANG Swan      buprenorphine-naloxone  4 mg Sublingual After [employer], KHANG      buprenorphine-naloxone  8 mg Sublingual Daily KHANG Swan      enoxaparin  40 mg Subcutaneous Daily KHANG Swan      escitalopram  10 mg Oral Daily KHANG Swan      famotidine  20 mg Intravenous Q12H 66 Luciano Bingham MD      folic acid  1 mg Oral Daily Rhea Rutledge, CRNP      loperamide  4 mg Oral 4x Daily PRN Romelia Ferreira PA-C      mirtazapine  30 mg Oral HS KHANG Swan      multivitamin-minerals  1 tablet Oral Daily KHANG Swan      pantoprazole  40 mg Intravenous Q24H [address] Froylan Chowdhury MD      thiamine  500 mg Oral Daily Rhea RutledgeKHANG marinelli           Vitals:   Temp (24hrs), Av 7 °F (36 5 °C), Min:97 5 °F (36 4 °C), Max:98 2 °F (36 8 °C)    Temp:  [97 5 °F (36 4 °C)-98 2 °F (36 8 °C)] 97 6 °F (36 4 °C)  HR:  [66-80] 77  Resp:  [16-18] 16  BP: (145-169)/() 153/99  SpO2:  [97 %-99 %] 99 %  Body mass index is 31 55 kg/m²  Input and Output Summary (last 24 hours): Intake/Output Summary (Last 24 hours) at 6/15/2022 1403  Last data filed at 6/15/2022 0710  Gross per 24 hour   Intake 240 ml   Output --   Net 240 ml       Physical Exam:   Physical Exam  Vitals and nursing note reviewed  Constitutional:       General: He is not in acute distress  Appearance: He is well-developed  He is not toxic-appearing or diaphoretic     HENT:      Head: Normocephalic and atraumatic  Right Ear: External ear normal       Left Ear: External ear normal       Nose: Nose normal       Mouth/Throat:      Mouth: Mucous membranes are moist    Eyes:      General:         Right eye: No discharge  Left eye: No discharge  Extraocular Movements: Extraocular movements intact  Conjunctiva/sclera: Conjunctivae normal       Pupils: Pupils are equal, round, and reactive to light  Cardiovascular:      Rate and Rhythm: Normal rate and regular rhythm  Heart sounds: Normal heart sounds  No friction rub  No gallop  Pulmonary:      Effort: Pulmonary effort is normal  No respiratory distress  Breath sounds: Normal breath sounds  No stridor  No wheezing or rales  Chest:      Chest wall: No tenderness  Abdominal:      General: Bowel sounds are normal       Palpations: Abdomen is soft  Tenderness: There is no abdominal tenderness  Musculoskeletal:         General: No tenderness or deformity  Normal range of motion  Cervical back: Normal range of motion and neck supple  Right lower leg: No edema  Left lower leg: No edema  Comments: Slightly clammy to touch   Skin:     General: Skin is warm and dry  Capillary Refill: Capillary refill takes less than 2 seconds  Neurological:      Mental Status: He is alert and oriented to person, place, and time  Comments: Very fine tremor   Psychiatric:         Mood and Affect: Mood is anxious  Additional Data:     Labs:   Results from last 7 days   Lab Units 06/15/22  0519 06/14/22  0454 06/13/22  1559   WBC Thousand/uL 5 90   < > 7 53   HEMOGLOBIN g/dL 15 7   < > 17 0   HEMATOCRIT % 47 6   < > 47 7   PLATELETS Thousands/uL 192   < > 278   NEUTROS PCT %  --   --  66   LYMPHS PCT %  --   --  20   MONOS PCT %  --   --  13*   EOS PCT %  --   --  0    < > = values in this interval not displayed        Results from last 7 days   Lab Units 06/15/22  0519   SODIUM mmol/L 137 POTASSIUM mmol/L 4 1   CHLORIDE mmol/L 98   CO2 mmol/L 32*   BUN mg/dL 16   CREATININE mg/dL 0 88   ANION GAP mmol/L 7   CALCIUM mg/dL 8 4   ALBUMIN g/dL 4 2   TOTAL BILIRUBIN mg/dL 0 97   ALK PHOS U/L 221*   ALT U/L 133*   AST U/L 193*   GLUCOSE RANDOM mg/dL 86                              * I Have Reviewed All Lab Data Listed Above  * Additional Pertinent Lab Tests Reviewed: Jerri [address] Admission Reviewed      Imaging Studies: I have personally reviewed pertinent reports  Recent Cultures (last 7 days): Today, Patient Was Seen By: Swati Neves MD    ** Please Note: Dictation voice to text software may have been used in the creation of this document   **

## 2022-06-16 VITALS
HEIGHT: 71 IN | TEMPERATURE: 98.1 F | BODY MASS INDEX: 31.67 KG/M2 | DIASTOLIC BLOOD PRESSURE: 95 MMHG | SYSTOLIC BLOOD PRESSURE: 160 MMHG | HEART RATE: 90 BPM | OXYGEN SATURATION: 95 % | WEIGHT: 226.2 LBS | RESPIRATION RATE: 18 BRPM

## 2022-06-16 PROBLEM — F10.239 ALCOHOL WITHDRAWAL SYNDROME WITH COMPLICATION (HCC): Status: RESOLVED | Noted: 2022-06-13 | Resolved: 2022-06-16

## 2022-06-16 PROBLEM — F10.939 ALCOHOL WITHDRAWAL SYNDROME WITH COMPLICATION (HCC): Status: RESOLVED | Noted: 2022-06-13 | Resolved: 2022-06-16

## 2022-06-16 LAB
ALBUMIN SERPL BCP-MCNC: 4.1 G/DL (ref 3–5.2)
ALP SERPL-CCNC: 227 U/L (ref 43–122)
ALT SERPL W P-5'-P-CCNC: 113 U/L
ANION GAP SERPL CALCULATED.3IONS-SCNC: 5 MMOL/L (ref 5–14)
AST SERPL W P-5'-P-CCNC: 110 U/L (ref 17–59)
BILIRUB SERPL-MCNC: 0.51 MG/DL
BUN SERPL-MCNC: 17 MG/DL (ref 5–25)
CALCIUM SERPL-MCNC: 8.5 MG/DL (ref 8.4–10.2)
CHLORIDE SERPL-SCNC: 98 MMOL/L (ref 97–108)
CO2 SERPL-SCNC: 32 MMOL/L (ref 22–30)
CREAT SERPL-MCNC: 0.84 MG/DL (ref 0.7–1.5)
GFR SERPL CREATININE-BSD FRML MDRD: 114 ML/MIN/1.73SQ M
GLUCOSE SERPL-MCNC: 108 MG/DL (ref 70–99)
POTASSIUM SERPL-SCNC: 4.5 MMOL/L (ref 3.6–5)
PROT SERPL-MCNC: 6.7 G/DL (ref 5.9–8.4)
SODIUM SERPL-SCNC: 135 MMOL/L (ref 137–147)

## 2022-06-16 PROCEDURE — 80053 COMPREHEN METABOLIC PANEL: CPT

## 2022-06-16 PROCEDURE — 97163 PT EVAL HIGH COMPLEX 45 MIN: CPT

## 2022-06-16 PROCEDURE — 99239 HOSP IP/OBS DSCHRG MGMT >30: CPT | Performed by: EMERGENCY MEDICINE

## 2022-06-16 RX ORDER — IBUPROFEN 600 MG/1
600 TABLET ORAL EVERY 6 HOURS PRN
Status: DISCONTINUED | OUTPATIENT
Start: 2022-06-16 | End: 2022-06-16

## 2022-06-16 RX ORDER — LANOLIN ALCOHOL/MO/W.PET/CERES
100 CREAM (GRAM) TOPICAL DAILY
Qty: 30 TABLET | Refills: 0 | Status: SHIPPED | OUTPATIENT
Start: 2022-06-17 | End: 2022-07-17

## 2022-06-16 RX ORDER — BUPRENORPHINE AND NALOXONE 8; 2 MG/1; MG/1
8 FILM, SOLUBLE BUCCAL; SUBLINGUAL DAILY
Qty: 14 FILM | Refills: 0 | Status: SHIPPED | OUTPATIENT
Start: 2022-06-17 | End: 2022-07-01

## 2022-06-16 RX ORDER — IBUPROFEN 600 MG/1
600 TABLET ORAL EVERY 6 HOURS PRN
Status: DISCONTINUED | OUTPATIENT
Start: 2022-06-16 | End: 2022-06-16 | Stop reason: HOSPADM

## 2022-06-16 RX ORDER — CLONIDINE HYDROCHLORIDE 0.1 MG/1
0.2 TABLET ORAL EVERY 12 HOURS SCHEDULED
Status: DISCONTINUED | OUTPATIENT
Start: 2022-06-16 | End: 2022-06-16 | Stop reason: HOSPADM

## 2022-06-16 RX ORDER — BUPRENORPHINE AND NALOXONE 4; 1 MG/1; MG/1
4 FILM, SOLUBLE BUCCAL; SUBLINGUAL
Qty: 14 FILM | Refills: 0 | Status: SHIPPED | OUTPATIENT
Start: 2022-06-16 | End: 2022-06-30

## 2022-06-16 RX ADMIN — NICOTINE 21 MG: 21 PATCH, EXTENDED RELEASE TRANSDERMAL at 09:05

## 2022-06-16 RX ADMIN — THIAMINE HCL TAB 100 MG 500 MG: 100 TAB at 09:04

## 2022-06-16 RX ADMIN — FOLIC ACID 1 MG: 1 TABLET ORAL at 09:05

## 2022-06-16 RX ADMIN — ESCITALOPRAM OXALATE 10 MG: 10 TABLET ORAL at 09:05

## 2022-06-16 RX ADMIN — MULTIPLE VITAMINS W/ MINERALS TAB 1 TABLET: TAB ORAL at 09:05

## 2022-06-16 RX ADMIN — LOPERAMIDE HYDROCHLORIDE 4 MG: 2 CAPSULE ORAL at 10:57

## 2022-06-16 RX ADMIN — PANTOPRAZOLE SODIUM 40 MG: 40 TABLET, DELAYED RELEASE ORAL at 05:12

## 2022-06-16 RX ADMIN — ACETAMINOPHEN 650 MG: 325 TABLET ORAL at 05:38

## 2022-06-16 RX ADMIN — BUPRENORPHINE AND NALOXONE 8 MG: 8; 2 FILM BUCCAL; SUBLINGUAL at 09:10

## 2022-06-16 RX ADMIN — FAMOTIDINE 20 MG: 20 TABLET ORAL at 09:04

## 2022-06-16 RX ADMIN — CLONIDINE HYDROCHLORIDE 0.2 MG: 0.1 TABLET ORAL at 11:30

## 2022-06-16 NOTE — DISCHARGE SUMMARY
[facility]  Discharge- [facility] Stalling 1985, 31 y o  male MRN: [id number]  Unit/Bed#: 5T DETOX 135-35 Encounter: [id number]  Primary Care Provider: Filemon Goldsmith DO   Date and time admitted to hospital: 6/13/2022 10:02 PM  MEDICAL DETOX UNIT, LEVEL 4  Department of Medical Toxicology  Reason for Admission/Principal Problem: alcohol withdrawal, alcohol use disorder  Admitting provider: Mark Corbin PA-C  [address] [place] [address],    6/13/2022 10:02 PM       Discharging Physician / Practitioner: Mark Corbin PA-C  PCP: Filemon Goldsmith DO  Admission Date:   Admission Orders (From admission, onward)     Ordered        06/13/22 2210  Inpatient Admission  Once                      Discharge Date: 06/16/22    Medical Problems             Resolved Problems  Date Reviewed: 12/13/2021          Resolved    * (Principal) Alcohol withdrawal syndrome with complication ([facility] ) 9/63/4663     Resolved by  Mark Corbin PA-C                Unsteady gait  Assessment & Plan  · Likely related to alcohol withdrawal that started at home  Also could have neuropathy related to chronic alcohol use  Wernicke's less likely as he is not encephalopathic  Monitored on fall precautions with bed alarm and consulted physical therapy  · On high-dose thiamine  · Appreciate PT recommendations    Benzodiazepine dependence ([facility] )  Assessment & Plan  · Continue SEWS   · Stop benzodiazepines  · Encourage follow up with outpatient PCP and psychiatry     Transaminitis  Assessment & Plan  · Elevated AST, ALT, alk phos appreciated on CMP   · Likely 2/2 chronic ETOH consumption   · Received IVF  · ALT and alk phos improved  AST slightly elevated, potentially related to muscle breakdown from recent seizure  Creatinine is stable    · CMP improving  · Continue to monitor    Tobacco use disorder  Assessment & Plan  · Daily tobacco smoker   · Offered NRT   · Encourage cessation     Epigastric pain  Assessment & Plan  · Patient complains of epigastric burning/discomfort  · No abdominal tenderness  · CMP, lipase remained within normal limits  · Pepcid and Protonix started  · Serial abdominal exams, continued monitoring without acute worsening of sx      PTSD (post-traumatic stress disorder)  Assessment & Plan  · Pertinent hx of PTSD and depression  · On home regimen of Lexapro 10mg QD, mirtazepine 30mg QHS, and Klonipin 0 5mg BID   · Hold klonipin and continue lexapro and mirtazepine   · Encourage follow up with PCP and psychiatry     Alcohol use disorder, severe, dependence ([facility] )  Assessment & Plan  · Longstanding history of alcohol withdrawal   · Pertinent hx of alcohol withdrawal seizure  · Average consumption of half gallon whiskey daily   · Received IVFs, thiamine, folic acid, MV supplementation   · Consult case management for dispo planning; patient desires outpatient resources at this time    Opioid use disorder, moderate, on maintenance therapy, dependence ([facility] )  Assessment & Plan  · History of opioid use disorder and polysubstance use   · Denies hx of IVDU  · Currently on MAT buprenorphine/naloxone 12mg daily, PDMP verified    · Will continue buprenorphine 8/4mg daily   · Follow-up HIV and acute hepatitis panel:  WNL    Seizure ([facility] )  Assessment & Plan  · Reports seizure yesterday witnessed by neighbor with head strike  · Admits to history of seizure due to ETOH withdrawal, last reported seizure in January     · Has been evaluated by neurology, not placed on AEDs  · Monitored on seizure precautions  W/o event  · NC/AT on exam, no focal deficits, AAOx3  · CT head no acute intracranial abnormality     * Alcohol withdrawal syndrome with complication (HCC)-resolved as of 6/16/2022  Assessment & Plan  · SEWS protocol discontinued with resolution of alcohol withdrawal sx  · Patient received 2275 mg of phenobarbital total without complication, last dose was 6/14/22 at 1609  · Monitored off SEWS for >24 hours; No evidence of further alcohol withdrawal at this time      Consultations During Hospital Stay:  · Case management  · PT    Procedures Performed:   · None     Significant Findings / Test Results:   · Transaminitis - improving  · Rapid HIV and acute hepatitis panel WNL    Incidental Findings:   · None      Test Results Pending at Discharge (will require follow up): · None      Outpatient Tests / Follow up Requested:  · Recommend OP follow up with PCP and OP psychiatry for PTSD    Complications:  none    Reason for Admission: alcohol withdrawal, alcohol use disorder    Hospital Course:     Winnie Santiago is a 31 y o  male patient with a PMH of AUD with withdrawal sz, OUD on maintenance Suboxone, prescription benzodiazepine dependence, PTSD, and TAD who originally presented to the hospital on 6/13/2022 due to alcohol withdrawal  Patient [facility] ED requesting assistance with alcohol detoxification  He was transferred to the [facility] medical detox unit and admitted under [facility] protocol  He received a total of 2275 mg of phenobarbital without complication  Patient's Klonopin was also held during admission due to benzodiazepine dependence with high potential for abuse and cessation was encouraged with recommendation for continued OP f/u with PCP and OP psychiatry  He was noted to have transaminitis on initial labs, likely 2/2 chroniuc alcohol use, which improved over the course of admission with alcohol abstinence  Case management was consulted for assistance with rehab resources and pt was discharged with OP follow up with a therapist at [address] [place] [address]  Please see above list of diagnoses and related plan for additional information  Condition at Discharge: good     Discharge Day Visit / Exam:     Subjective:  Patient denies chest pain, shortness of breath, nausea, vomiting, abdominal pain, gait ataxia, urinary symptoms, changes in stool  Has been able to tolerate p o   Well and use the bathroom independently  Vitals: Blood Pressure: (!) 159/106 (06/15/22 1939)  Pulse: 73 (06/15/22 1939)  Temperature: 97 7 °F (36 5 °C) (06/15/22 1506)  Temp Source: Temporal (06/15/22 1506)  Respirations: 18 (06/15/22 1939)  Height: 5' 11" (180 3 cm) (06/13/22 2215)  Weight - Scale: 103 kg (226 lb 3 2 oz) (06/13/22 2215)  SpO2: 98 % (06/15/22 1939)  Exam:   Physical Exam  Vitals and nursing note reviewed  Constitutional:       General: He is not in acute distress  Appearance: Normal appearance  He is well-developed  He is not ill-appearing, toxic-appearing or diaphoretic  HENT:      Head: Normocephalic and atraumatic  Right Ear: External ear normal       Left Ear: External ear normal       Nose: Nose normal  No congestion or rhinorrhea  Mouth/Throat:      Mouth: Mucous membranes are moist    Eyes:      General:         Right eye: No discharge  Left eye: No discharge  Extraocular Movements: Extraocular movements intact  Conjunctiva/sclera: Conjunctivae normal       Pupils: Pupils are equal, round, and reactive to light  Cardiovascular:      Rate and Rhythm: Normal rate and regular rhythm  Heart sounds: Normal heart sounds  No friction rub  No gallop  Pulmonary:      Effort: Pulmonary effort is normal  No respiratory distress  Breath sounds: Normal breath sounds  No stridor  No wheezing or rales  Chest:      Chest wall: No tenderness  Abdominal:      General: Bowel sounds are normal       Palpations: Abdomen is soft  Tenderness: There is no abdominal tenderness  Musculoskeletal:         General: No tenderness or deformity  Normal range of motion  Cervical back: Normal range of motion and neck supple  Right lower leg: No edema  Left lower leg: No edema  Skin:     General: Skin is warm and dry  Capillary Refill: Capillary refill takes less than 2 seconds     Neurological:      Mental Status: He is alert and oriented to person, place, and time  Motor: No tremor  Gait: Gait normal          Discussion with Family: I have discussed this case with the patient and answered all questions to the best of my ability  Discharge instructions/Information to patient and family:   See after visit summary for information provided to patient and family  Provisions for Follow-Up Care:  See after visit summary for information related to follow-up care and any pertinent home health orders  Disposition:     Home    For Discharges to [facility] SNF:   · Not Applicable to this Patient - Not Applicable to this Patient    Planned Readmission: n/a     Discharge Statement:  I spent 35 minutes discharging the patient  This time was spent on the day of discharge  I had direct contact with the patient on the day of discharge  Greater than 50% of the total time was spent examining patient, answering all patient questions, arranging and discussing plan of care with patient as well as directly providing post-discharge instructions  Additional time then spent on discharge activities  Discharge Medications:  See after visit summary for reconciled discharge medications provided to patient and family        ** Please Note: This note has been constructed using a voice recognition system **

## 2022-06-16 NOTE — ASSESSMENT & PLAN NOTE
· Patient complains of epigastric burning/discomfort  · No abdominal tenderness  · CMP, lipase remained within normal limits  · Pepcid and Protonix started  · Patient without abdominal pain  · Follow-up with GI as needed

## 2022-06-16 NOTE — PHYSICAL THERAPY NOTE
Physical Therapy Evaluation    Patient's Name: Omid Fagan    Admitting Diagnosis  Alcohol withdrawal ([place] [address] ) [F10 239]    Problem List  Patient Active Problem List   Diagnosis    Opioid use    Anxiety    Encounter for tobacco use cessation counseling    Opioid dependence with opioid-induced mood disorder ([place] [address] )    Seizure ([place] [address] )    Opioid use disorder, moderate, on maintenance therapy, dependence (HCC)    Alcohol use disorder, severe, dependence (HCC)    PTSD (post-traumatic stress disorder)    Epigastric pain    Tobacco use disorder    Transaminitis    Benzodiazepine dependence ([place] [address] )    Unsteady gait       Past Medical History  Past Medical History:   Diagnosis Date    Chronic pain     Depression     Pelvic fracture ([place] [address] )     PTSD (post-traumatic stress disorder)     Substance abuse ([place] [address] )        Past Surgical History  Past Surgical History:   Procedure Laterality Date    BONY PELVIS SURGERY         Recent Imaging  CT head wo contrast   Final Result by Amy Medina MD (06/14 1215)      No acute intracranial abnormality                    Workstation performed: [id number]             Recent Vital Signs  Vitals:    06/15/22 1120 06/15/22 1506 06/15/22 1939 06/16/22 0700   BP: 153/99 (!) 176/109 (!) 159/106 106/68   BP Location: Left arm Right arm Right arm Left arm   Pulse: 77 93 73 62   Resp: 16 16 18 18   Temp: 97 6 °F (36 4 °C) 97 7 °F (36 5 °C)  (!) 97 3 °F (36 3 °C)   TempSrc: Temporal Temporal  Temporal   SpO2: 99% 99% 98% 98%   Weight:       Height:            06/16/22 0900   PT Last Visit   PT Visit Date 06/16/22   Note Type   Note type Evaluation   Pain Assessment   Pain Assessment Tool 0-10   Pain Score 8   Pain Location/Orientation Orientation: Right;Location: Hip   Restrictions/Precautions   Weight Bearing Precautions Per Order No   Other Precautions Pain   Prior Function   Lives With Alone   Receives Help From Family;Friend(s)   ADL Assistance Independent   IADLs Independent   Vocational Full time employment   General   Family/Caregiver Present No   Cognition   Overall Cognitive Status WFL   Arousal/Participation Alert   Orientation Level Oriented X4   Memory Within functional limits   Following Commands Follows all commands and directions without difficulty   RLE Assessment   RLE Assessment   (3+/5 at hip; 5/5 otherwise)   LLE Assessment   LLE Assessment   (5/5)   Coordination   Movements are Fluid and Coordinated 0   Coordination and Movement Description antalgic gait pattern with decreased R stance   Sensation X   Light Touch   RLE Light Touch Impaired   LLE Light Touch Grossly intact   Bed Mobility   Supine to Sit 7  Independent   Additional items Increased time required   Sit to Supine 7  Independent   Additional items Increased time required   Transfers   Sit to Stand 7  Independent   Additional items Increased time required   Stand to Sit 7  Independent   Additional items Increased time required   Additional Comments no AD   Ambulation/Elevation   Gait pattern Step through pattern;Decreased toe off;Decreased heel strike;Decreased foot clearance; Antalgic;Decreased R stance   Gait Assistance 7  Independent   Assistive Device None   Distance 100ft   Balance   Static Sitting Normal   Dynamic Sitting Normal   Static Standing Fair +   Dynamic Standing Fair   Ambulatory Fair   Endurance Deficit   Endurance Deficit Yes   Endurance Deficit Description limited due to hip pain   Activity Tolerance   Activity Tolerance Patient limited by pain   Medical Staff Made Aware spoke to CM   Nurse Made Aware spoke to RN   Assessment   Prognosis Good   Problem List Decreased strength;Decreased endurance; Impaired balance;Decreased mobility; Decreased coordination;Pain; Impaired sensation   Barriers to Discharge Inaccessible home environment;Decreased caregiver support   Goals   Patient Goals to have less hip pain   Recommendation   PT Discharge Recommendation Home with outpatient rehabilitation   AM-PAC Basic Mobility Inpatient   Turning in Bed Without Bedrails 4   Lying on Back to Sitting on Edge of Flat Bed 4   Moving Bed to Chair 4   Standing Up From Chair 4   Walk in Room 4   Climb 3-5 Stairs 4   Basic Mobility Inpatient Raw Score 24   Basic Mobility Standardized Score 57 68   Highest Level Of Mobility   JH-HLM Goal 8: Walk 250 feet or more   JH-HLM Achieved 8: Walk 250 feet ot more   End of Consult   Patient Position at End of Consult Seated edge of bed; All needs within reach         ASSESSMENT                                                                                                                     Colletta Faster is a 31 y o  male admitted to [facility] on 6/13/2022 for Alcohol withdrawal syndrome with complication ([facility] )  Pt  has a past medical history of Chronic pain, Depression, Pelvic fracture ([facility] [address] ), PTSD (post-traumatic stress disorder), and Substance abuse ([facility] [address] )    PT was consulted and pt was seen on 6/16/2022 for mobility assessment and d/c planning  Pt presents supine in bed alert and agreeable to therapy  He is reporting 8/10 pain in the R hip which is chronic although worse now than normal  Antalgic gait pattern with decreased R stance and decreased toe off and heel strike  Impairments limiting pt at this time include impaired balance, decreased endurance, decreased coordination, pain, decreased activity tolerance, decreased sensation, and decreased strength  Pt is currently functioning at a independent level for bed mobility, independent level for transfers, independent level for ambulation with no assistive device  The patient's AM-[facility] Basic Mobility Inpatient Short Form Raw Score is 24  A Raw score of greater than 16 suggests the patient may benefit from discharge to home  Please also refer to the recommendation of the Physical Therapist for safe discharge planning      Recommendations                                                                                                               DME: None    Discharge Disposition:  Home with Outpatient Physical Therapy       Carmen Goodrich PT, DPT

## 2022-06-16 NOTE — ASSESSMENT & PLAN NOTE
· Reports seizure yesterday witnessed by neighbor with head strike  · Admits to history of seizure due to ETOH withdrawal, last reported seizure in January  · Has been evaluated by neurology, not placed on AEDs  · Monitored on seizure precautions  W/o event  · NC/AT on exam, no focal deficits, AAOx3  · CT head no acute intracranial abnormality   · Recommend cessation of use of benzodiazepines and ETOH to avoid further ROSITA induced withdrawal seizures

## 2022-06-16 NOTE — ASSESSMENT & PLAN NOTE
· Longstanding history of alcohol withdrawal   · Pertinent hx of alcohol withdrawal seizure  · Average consumption of half gallon whiskey daily   · Received IVFs, thiamine, folic acid, MV supplementation   · Consult case management for dispo planning; patient desires outpatient resources at this time

## 2022-06-16 NOTE — ASSESSMENT & PLAN NOTE
· Likely related to alcohol withdrawal that started at home  Also could have neuropathy related to chronic alcohol use  Wernicke's less likely as he is not encephalopathic  Monitored on fall precautions with bed alarm and consulted physical therapy    · Discharged with thiamine  · Appreciate PT recommendations:  Patient is stable for independent living and discharge

## 2022-06-16 NOTE — PLAN OF CARE
Problem: Potential for Falls  Goal: Patient will remain free of falls  Description: INTERVENTIONS:  - Educate patient/family on patient safety including physical limitations  - Instruct patient to call for assistance with activity   - Consult OT/PT to assist with strengthening/mobility   - Keep Call bell within reach  - Keep bed low and locked with side rails adjusted as appropriate  - Keep care items and personal belongings within reach  - Initiate and maintain comfort rounds  - Make Fall Risk Sign visible to staff  - Apply yellow socks and bracelet for high fall risk patients  - Consider moving patient to room near nurses station  Outcome: Progressing     Problem: SUBSTANCE USE/ABUSE  Goal: By discharge, will develop insight into their chemical dependency and sustain motivation to continue in recovery  Description: INTERVENTIONS:  - Attends all daily group sessions and scheduled AA groups  - Actively practices coping skills through participation in the therapeutic community and adherence to program rules  - Reviews and completes assignments from individual treatment plan  - Assist patient development of understanding of their personal cycle of addiction and relapse triggers  Outcome: Progressing  Goal: By discharge, patient will have ongoing treatment plan addressing chemical dependency  Description: INTERVENTIONS:  - Assist patient with resources and/or appointments for ongoing recovery based living  Outcome: Progressing

## 2022-06-16 NOTE — ASSESSMENT & PLAN NOTE
· Patient has completed SEWS   · Stop benzodiazepines  · Encourage follow up with outpatient PCP and psychiatry

## 2022-06-16 NOTE — NURSING NOTE
Patient already had belongings  AVS reviewed and patient verbalized understanding  BENNY patrick called for patient

## 2022-06-16 NOTE — CASE MANAGEMENT
Case Management Discharge Planning Note    Patient name Gib Stalling  Location 5T DETOX 514/5T DETOX 46* MRN [id number]  : 1985 Date 2022       Current Admission Date: 2022  Current Admission Diagnosis:Opioid use disorder, moderate, on maintenance therapy, dependence ([facility] )   Patient Active Problem List    Diagnosis Date Noted    [facility]ine dependence ([facility] ) 2022    Unsteady gait 2022    Opioid use disorder, moderate, on maintenance therapy, dependence ([facility] ) 2022    Alcohol use disorder, severe, dependence ([facility] ) 2022    PTSD (post-traumatic stress disorder) 2022    Epigastric pain 2022    Tobacco use disorder 2022    Transaminitis 2022    Anxiety 2022    Encounter for tobacco use cessation counseling 2022    Opioid dependence with opioid-induced mood disorder ([facility] ) 2022    Seizure ([facility] ) 2022    Opioid use 2021      LOS (days): 3  Geometric Mean LOS (GMLOS) (days):   Days to GMLOS:     OBJECTIVE:  Risk of Unplanned Readmission Score: 9 84         Current admission status: Inpatient   Preferred Pharmacy:   [facility]  [place], [address] [address]  Phone: [phone] Fax: [address], [address] [place] [address]  09 [facility] [address]  Phone: [phone] Fax: [phone]    Primary Care Provider: Filemon Goldsmith DO    Primary Insurance: Grisel Piedra  Secondary Insurance: Debbie Mariscal MA MILES    DISCHARGE DETAILS: Cm met with pt  Pt states he has no resources for transport home and requesting assistance  Pt signed [employer] release form and provided home address of [place] [address] as address for Lyft  Pt in agreement with attending two scheduled appointments, PCP and therapist  Pt will be discharged home today      Discharge planning discussed with[de-identified] patient  Freedom of Choice: Yes                   Contacts  Patient Contacts: [facility] [place]  Relationship to Patient[de-identified] Treatment Provider  Reason/Outcome: Continuity of Care, Discharge Planning              Other Referral/Resources/Interventions Provided:  Referrals Provided[de-identified] Support Group, Therapist, IOP, Crisis Hotline (inpatient substance use treatment)    Would you like to participate in our [address] service program?  : No - Declined          Transport at Discharge : Automobile  Dispatcher Contacted: Yes  Number/Name of Dispatcher: Choco Benites by [place] and Unit #):  [employer]                          Family notified[de-identified] no SAL signed

## 2022-06-16 NOTE — ASSESSMENT & PLAN NOTE
· History of opioid use disorder and polysubstance use   · Denies hx of IVDU  · Currently on MAT buprenorphine/naloxone 12mg daily, PDMP verified    · Will continue buprenorphine 8/4mg daily   · Follow-up HIV and acute hepatitis panel:   GABRIELA

## 2022-06-17 NOTE — UTILIZATION REVIEW
Notification of Discharge   This is a Notification of Discharge from our facility [address]  Please be advised that this patient has been discharge from our facility  Below you will find the admission and discharge date and time including the patients disposition  UTILIZATION REVIEW CONTACT:  Adriana Davidson MA  Utilization   Network Utilization Review Department  Phone: [phone] x carefully listen to the prompts  All voicemails are confidential   Email: [email or web address]     PHYSICIAN ADVISORY SERVICES:  FOR OEPR-OH-APKN REVIEW - MEDICAL NECESSITY DENIAL  Phone: [phone]  Fax: [phone]  Email: [email or web address]     PRESENTATION DATE: 6/13/2022 10:02 PM  OBERVATION ADMISSION DATE:   INPATIENT ADMISSION DATE: 6/13/22 10:02 PM   DISCHARGE DATE: 6/16/2022 11:47 AM  DISPOSITION: Home/Self Care Home/Self Care      IMPORTANT INFORMATION:  Send all requests for admission clinical reviews, approved or denied determinations and any other requests to dedicated fax number below belonging to the campus where the patient is receiving treatment   List of dedicated fax numbers:  [address] DENIALS (Administrative/Medical Necessity) [phone]   [address] (Maternity/NICU/Pediatrics) [phone]   [place] [phone]   130 [facility] [phone]   [address] [place] [address] [phone]   [address] [phone]   [facility]  [phone]   [address] [place] [address], [facility]  2401 [facility] [address] [facility] [phone]

## 2022-07-13 ENCOUNTER — OFFICE VISIT (OUTPATIENT)
Dept: FAMILY MEDICINE CLINIC | Facility: CLINIC | Age: 37
End: 2022-07-13

## 2022-07-13 ENCOUNTER — OFFICE VISIT (OUTPATIENT)
Dept: FAMILY MEDICINE CLINIC | Facility: CLINIC | Age: 37
End: 2022-07-13
Payer: COMMERCIAL

## 2022-07-13 VITALS
BODY MASS INDEX: 32.07 KG/M2 | WEIGHT: 224 LBS | SYSTOLIC BLOOD PRESSURE: 138 MMHG | DIASTOLIC BLOOD PRESSURE: 86 MMHG | RESPIRATION RATE: 18 BRPM | HEIGHT: 70 IN | HEART RATE: 105 BPM | TEMPERATURE: 98.4 F | OXYGEN SATURATION: 97 %

## 2022-07-13 DIAGNOSIS — F11.24 OPIOID DEPENDENCE WITH OPIOID-INDUCED MOOD DISORDER (HCC): ICD-10-CM

## 2022-07-13 DIAGNOSIS — F11.90 OPIOID USE: ICD-10-CM

## 2022-07-13 DIAGNOSIS — F41.9 ANXIETY: Primary | ICD-10-CM

## 2022-07-13 DIAGNOSIS — F11.24 OPIOID DEPENDENCE WITH OPIOID-INDUCED MOOD DISORDER (HCC): Primary | ICD-10-CM

## 2022-07-13 PROCEDURE — T1015 CLINIC SERVICE: HCPCS | Performed by: FAMILY MEDICINE

## 2022-07-13 PROCEDURE — 80307 DRUG TEST PRSMV CHEM ANLYZR: CPT | Performed by: FAMILY MEDICINE

## 2022-07-13 RX ORDER — BUPRENORPHINE AND NALOXONE 8; 2 MG/1; MG/1
8 FILM, SOLUBLE BUCCAL; SUBLINGUAL DAILY
COMMUNITY
End: 2022-07-13 | Stop reason: SDUPTHER

## 2022-07-13 RX ORDER — ESCITALOPRAM OXALATE 10 MG/1
10 TABLET ORAL DAILY
COMMUNITY
Start: 2022-07-07 | End: 2022-08-24 | Stop reason: SDUPTHER

## 2022-07-13 RX ORDER — BUPRENORPHINE AND NALOXONE 8; 2 MG/1; MG/1
16 FILM, SOLUBLE BUCCAL; SUBLINGUAL DAILY
Qty: 28 FILM | Refills: 0 | Status: SHIPPED | OUTPATIENT
Start: 2022-07-13 | End: 2022-08-24 | Stop reason: SDUPTHER

## 2022-07-13 RX ORDER — CLONAZEPAM 0.5 MG/1
0.5 TABLET ORAL 3 TIMES DAILY
COMMUNITY
Start: 2022-07-07 | End: 2022-07-13 | Stop reason: SDUPTHER

## 2022-07-13 RX ORDER — QUETIAPINE FUMARATE 50 MG/1
50 TABLET, FILM COATED ORAL EVERY EVENING
COMMUNITY
Start: 2022-07-07 | End: 2022-09-21 | Stop reason: ALTCHOICE

## 2022-07-13 RX ORDER — CLONAZEPAM 1 MG/1
1 TABLET ORAL 2 TIMES DAILY
Qty: 60 TABLET | Refills: 0 | Status: SHIPPED | OUTPATIENT
Start: 2022-07-13 | End: 2022-07-19 | Stop reason: SDUPTHER

## 2022-07-13 NOTE — PROGRESS NOTES
D: LCSW met with Alyssa Martel for an individual intake and to discuss services  LCSW went over intake paperwork and completed PHQ and BPRS  Alyssa Martel was ambivalent of services  He did process the recent death of his mother and expressed that he does not have healthy coping skills  He discussed his family relationships and what he does for work  Attempted to process drug and alcohol use; however, Alyssa Martel denied any drug or alcohol use despite recent records of detox  Monitored mental status and he denied SI, HI, or SIB  A: Alyssa Martel was alert and oriented x3  His affect was broad range and mood was congruent  He was preoccupied with his finances during this visit and would switch conversation to be about how much money he makes  He thoughts were scattered and fantasy  He had hypermotor excitation throughout the entire session  He appeared superficially engaged  He did not seem open to authentically engage in therapy at this time  Due to presentation, he appears to be low risk danger to self or others  Follow up in 2 weeks

## 2022-07-13 NOTE — PROGRESS NOTES
Assessment/Plan:       Problem List Items Addressed This Visit        Nervous and Auditory    Opioid dependence with opioid-induced mood disorder (HCC)    Relevant Medications    escitalopram (LEXAPRO) 10 mg tablet    QUEtiapine (SEROquel) 50 mg tablet    buprenorphine-naloxone (Suboxone) 8-2 mg       Other    Opioid use    Relevant Orders    Suboxone    Toxicology screen, urine    Anxiety - Primary    Relevant Medications    clonazePAM (KlonoPIN) 1 mg tablet            53-year-old male presents to office with opiate dependence presents for MAT therapy  Currently doing well on the 8-2 film  Will continue patient on Suboxone 8-2 mg  PDMP checked no issues  Lexapro will be increased from 10 mg to 20 mg due to symptomatology  Patient has been on 10 mg for the past 8 weeks with minimal improvement with his anxiety  Additionally Klonopin will be increased from pressure 0 5 mg t i d  To 1 mg b i d  Patient counseled on lifestyle modification including alcohol use  I'll follow-up with patient in 4 weeks  Subjective:      Patient ID: Cora Sargent is a 31 y o  male with opiod use disorder presents for MAT therapy  Patient denies any recent use  Patient denies any withdrawal symptoms or drug dreams  Patient is eating well  Currently on Suboxone 8 x 2 film  Patient is he in well but indicates that he has 1 big meal a day  Patient is sleeping well as well  Otherwise patient still believes that his anxieties easily triggered when he is at work with all his responsibilities  Currently on Klonopin 0 5 mg t i d  Preferred to be on a 1 mg dose in the morning and the afternoon  Patient denies bowel and bladder incontinence  Patient denies chest pain, shortness a breath, fevers, chills, night sweats  Symptoms of anxiety pain complicate shortness of breath at work currently stable      The following portions of the patient's history were reviewed and updated as appropriate: allergies, current medications, past family history, past medical history, past social history, past surgical history and problem list     Review of Systems   Constitutional: Negative for chills and fever  HENT: Negative for ear pain and sore throat  Eyes: Negative for pain and visual disturbance  Respiratory: Negative for cough and shortness of breath  Cardiovascular: Negative for chest pain and palpitations  Gastrointestinal: Negative for abdominal pain and vomiting  Genitourinary: Negative for dysuria and hematuria  Musculoskeletal: Negative for arthralgias and back pain  Skin: Negative for color change and rash  Neurological: Negative for seizures and syncope  Psychiatric/Behavioral: Positive for agitation  Negative for decreased concentration  All other systems reviewed and are negative  Objective:    /86 (BP Location: Left arm, Patient Position: Sitting, Cuff Size: Adult)   Pulse 105   Temp 98 4 °F (36 9 °C) (Tympanic)   Resp 18   Ht 5' 10" (1 778 m)   Wt 102 kg (224 lb)   SpO2 97%   BMI 32 14 kg/m²        Physical Exam  Constitutional:       Appearance: Normal appearance  HENT:      Head: Normocephalic and atraumatic  Right Ear: Tympanic membrane and ear canal normal       Left Ear: Tympanic membrane and ear canal normal       Nose: Nose normal       Mouth/Throat:      Mouth: Mucous membranes are moist    Eyes:      Pupils: Pupils are equal, round, and reactive to light  Cardiovascular:      Rate and Rhythm: Normal rate and regular rhythm  Pulses: Normal pulses  Heart sounds: Normal heart sounds  Pulmonary:      Effort: Pulmonary effort is normal       Breath sounds: Normal breath sounds  Abdominal:      General: Abdomen is flat  Musculoskeletal:         General: No swelling, tenderness, deformity or signs of injury  Normal range of motion  Skin:     General: Skin is warm  Capillary Refill: Capillary refill takes less than 2 seconds     Neurological:      General: No focal deficit present  Mental Status: He is alert and oriented to person, place, and time  Cranial Nerves: No cranial nerve deficit  Motor: No weakness

## 2022-07-19 ENCOUNTER — TELEPHONE (OUTPATIENT)
Dept: FAMILY MEDICINE CLINIC | Facility: CLINIC | Age: 37
End: 2022-07-19

## 2022-07-19 ENCOUNTER — DOCUMENTATION (OUTPATIENT)
Dept: FAMILY MEDICINE CLINIC | Facility: CLINIC | Age: 37
End: 2022-07-19

## 2022-07-19 ENCOUNTER — APPOINTMENT (EMERGENCY)
Dept: CT IMAGING | Facility: HOSPITAL | Age: 37
End: 2022-07-19
Payer: COMMERCIAL

## 2022-07-19 ENCOUNTER — APPOINTMENT (EMERGENCY)
Dept: RADIOLOGY | Facility: HOSPITAL | Age: 37
End: 2022-07-19
Payer: COMMERCIAL

## 2022-07-19 ENCOUNTER — OFFICE VISIT (OUTPATIENT)
Dept: FAMILY MEDICINE CLINIC | Facility: CLINIC | Age: 37
End: 2022-07-19
Payer: COMMERCIAL

## 2022-07-19 ENCOUNTER — TELEPHONE (OUTPATIENT)
Dept: OTHER | Facility: OTHER | Age: 37
End: 2022-07-19

## 2022-07-19 ENCOUNTER — HOSPITAL ENCOUNTER (EMERGENCY)
Facility: HOSPITAL | Age: 37
Discharge: HOME | End: 2022-07-19
Attending: EMERGENCY MEDICINE | Admitting: EMERGENCY MEDICINE
Payer: COMMERCIAL

## 2022-07-19 ENCOUNTER — LAB REQUISITION (OUTPATIENT)
Dept: LAB | Facility: HOSPITAL | Age: 37
End: 2022-07-19

## 2022-07-19 VITALS
WEIGHT: 230 LBS | HEART RATE: 82 BPM | TEMPERATURE: 98.2 F | HEIGHT: 70 IN | BODY MASS INDEX: 32.93 KG/M2 | RESPIRATION RATE: 18 BRPM | DIASTOLIC BLOOD PRESSURE: 73 MMHG | SYSTOLIC BLOOD PRESSURE: 115 MMHG | OXYGEN SATURATION: 97 %

## 2022-07-19 VITALS
WEIGHT: 214 LBS | BODY MASS INDEX: 30.64 KG/M2 | DIASTOLIC BLOOD PRESSURE: 94 MMHG | HEIGHT: 70 IN | RESPIRATION RATE: 16 BRPM | SYSTOLIC BLOOD PRESSURE: 134 MMHG | HEART RATE: 108 BPM | TEMPERATURE: 96.3 F | OXYGEN SATURATION: 96 %

## 2022-07-19 DIAGNOSIS — N17.9 AKI (ACUTE KIDNEY INJURY) (HCC): Primary | ICD-10-CM

## 2022-07-19 DIAGNOSIS — Z02.83 ENCOUNTER FOR BLOOD-ALCOHOL AND BLOOD-DRUG TEST: ICD-10-CM

## 2022-07-19 DIAGNOSIS — F11.24 OPIOID DEPENDENCE WITH OPIOID-INDUCED MOOD DISORDER (HCC): ICD-10-CM

## 2022-07-19 DIAGNOSIS — R07.89 CHEST WALL PAIN: ICD-10-CM

## 2022-07-19 DIAGNOSIS — M25.559 HIP PAIN: ICD-10-CM

## 2022-07-19 DIAGNOSIS — F41.9 ANXIETY: ICD-10-CM
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
## 2022-07-19 DIAGNOSIS — Z11.3 ROUTINE SCREENING FOR STI (SEXUALLY TRANSMITTED INFECTION): ICD-10-CM
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
## 2022-07-19 LAB
ABO GROUP BLD: NORMAL
ABO GROUP BLD: NORMAL
ALBUMIN SERPL BCP-MCNC: 4.8 G/DL (ref 3.5–5)
ALP SERPL-CCNC: 164 U/L (ref 46–116)
ALT SERPL W P-5'-P-CCNC: 53 U/L (ref 12–78)
AMORPH URATE CRY URNS QL MICRO: ABNORMAL /HPF
AMPHETAMINES SERPL QL SCN: POSITIVE
ANION GAP SERPL CALCULATED.3IONS-SCNC: 12 MMOL/L (ref 4–13)
ANION GAP SERPL CALCULATED.3IONS-SCNC: 13 MMOL/L (ref 4–13)
APTT PPP: 33 SECONDS (ref 23–37)
AST SERPL W P-5'-P-CCNC: 21 U/L (ref 5–45)
ATRIAL RATE: 99 BPM
BACTERIA UR QL AUTO: ABNORMAL /HPF
BARBITURATES UR QL: NEGATIVE
BASOPHILS # BLD AUTO: 0.04 THOUSANDS/ΜL (ref 0–0.1)
BASOPHILS NFR BLD AUTO: 1 % (ref 0–1)
BENZODIAZ UR QL: POSITIVE
BILIRUB DIRECT SERPL-MCNC: 0.13 MG/DL (ref 0–0.2)
BILIRUB SERPL-MCNC: 0.6 MG/DL (ref 0.2–1)
BILIRUB UR QL STRIP: ABNORMAL
BLD GP AB SCN SERPL QL: NEGATIVE
BUN SERPL-MCNC: 23 MG/DL (ref 5–25)
BUN SERPL-MCNC: 24 MG/DL (ref 5–25)
CALCIUM SERPL-MCNC: 8.6 MG/DL (ref 8.3–10.1)
CALCIUM SERPL-MCNC: 9.6 MG/DL (ref 8.3–10.1)
CARDIAC TROPONIN I PNL SERPL HS: 3 NG/L
CHLORIDE SERPL-SCNC: 105 MMOL/L (ref 96–108)
CHLORIDE SERPL-SCNC: 106 MMOL/L (ref 96–108)
CLARITY UR: ABNORMAL
CO2 SERPL-SCNC: 25 MMOL/L (ref 21–32)
CO2 SERPL-SCNC: 25 MMOL/L (ref 21–32)
COCAINE UR QL: POSITIVE
COLOR UR: YELLOW
CREAT SERPL-MCNC: 1.42 MG/DL (ref 0.6–1.3)
CREAT SERPL-MCNC: 1.54 MG/DL (ref 0.6–1.3)
EOSINOPHIL # BLD AUTO: 0.03 THOUSAND/ΜL (ref 0–0.61)
EOSINOPHIL NFR BLD AUTO: 0 % (ref 0–6)
ERYTHROCYTE [DISTWIDTH] IN BLOOD BY AUTOMATED COUNT: 13 % (ref 11.6–15.1)
ETHANOL SERPL-MCNC: <3 MG/DL (ref 0–3)
GFR SERPL CREATININE-BSD FRML MDRD: 58 ML/MIN/1.73SQ M
GFR SERPL CREATININE-BSD FRML MDRD: 63 ML/MIN/1.73SQ M
GLUCOSE SERPL-MCNC: 81 MG/DL (ref 65–140)
GLUCOSE SERPL-MCNC: 90 MG/DL (ref 65–140)
GLUCOSE UR STRIP-MCNC: NEGATIVE MG/DL
HCT VFR BLD AUTO: 47.1 % (ref 36.5–49.3)
HGB BLD-MCNC: 15.2 G/DL (ref 12–17)
HGB UR QL STRIP.AUTO: NEGATIVE
HYALINE CASTS #/AREA URNS LPF: ABNORMAL /LPF
IMM GRANULOCYTES # BLD AUTO: 0.02 THOUSAND/UL (ref 0–0.2)
IMM GRANULOCYTES NFR BLD AUTO: 0 % (ref 0–2)
INR PPP: 1.02 (ref 0.84–1.19)
KETONES UR STRIP-MCNC: ABNORMAL MG/DL
LEUKOCYTE ESTERASE UR QL STRIP: NEGATIVE
LYMPHOCYTES # BLD AUTO: 1.73 THOUSANDS/ΜL (ref 0.6–4.47)
LYMPHOCYTES NFR BLD AUTO: 21 % (ref 14–44)
MCH RBC QN AUTO: 29.2 PG (ref 26.8–34.3)
MCHC RBC AUTO-ENTMCNC: 32.3 G/DL (ref 31.4–37.4)
MCV RBC AUTO: 90 FL (ref 82–98)
METHADONE UR QL: NEGATIVE
MONOCYTES # BLD AUTO: 1 THOUSAND/ΜL (ref 0.17–1.22)
MONOCYTES NFR BLD AUTO: 12 % (ref 4–12)
MUCOUS THREADS UR QL AUTO: ABNORMAL
NEUTROPHILS # BLD AUTO: 5.37 THOUSANDS/ΜL (ref 1.85–7.62)
NEUTS SEG NFR BLD AUTO: 66 % (ref 43–75)
NITRITE UR QL STRIP: NEGATIVE
NON-SQ EPI CELLS URNS QL MICRO: ABNORMAL /HPF
NRBC BLD AUTO-RTO: 0 /100 WBCS
OPIATES UR QL SCN: NEGATIVE
OXYCODONE+OXYMORPHONE UR QL SCN: NEGATIVE
P AXIS: 47 DEGREES
PCP UR QL: NEGATIVE
PH UR STRIP.AUTO: 5.5 [PH]
PLATELET # BLD AUTO: 255 THOUSANDS/UL (ref 149–390)
PMV BLD AUTO: 11 FL (ref 8.9–12.7)
POTASSIUM SERPL-SCNC: 3.9 MMOL/L (ref 3.5–5.3)
POTASSIUM SERPL-SCNC: 3.9 MMOL/L (ref 3.5–5.3)
PR INTERVAL: 136 MS
PROT SERPL-MCNC: 8.3 G/DL (ref 6.4–8.4)
PROT UR STRIP-MCNC: ABNORMAL MG/DL
PROTHROMBIN TIME: 13.3 SECONDS (ref 11.6–14.5)
QRS AXIS: 40 DEGREES
QRSD INTERVAL: 86 MS
QT INTERVAL: 360 MS
QTC INTERVAL: 462 MS
RBC # BLD AUTO: 5.21 MILLION/UL (ref 3.88–5.62)
RBC #/AREA URNS AUTO: ABNORMAL /HPF
RH BLD: NEGATIVE
RH BLD: NEGATIVE
SODIUM SERPL-SCNC: 143 MMOL/L (ref 135–147)
SODIUM SERPL-SCNC: 143 MMOL/L (ref 135–147)
SP GR UR STRIP.AUTO: >=1.03 (ref 1–1.03)
SPECIMEN EXPIRATION DATE: NORMAL
T WAVE AXIS: 42 DEGREES
THC UR QL: POSITIVE
UROBILINOGEN UR QL STRIP.AUTO: 0.2 E.U./DL
VENTRICULAR RATE: 99 BPM
WBC # BLD AUTO: 8.19 THOUSAND/UL (ref 4.31–10.16)
WBC #/AREA URNS AUTO: ABNORMAL /HPF

## 2022-07-19 PROCEDURE — 99285 EMERGENCY DEPT VISIT HI MDM: CPT | Performed by: EMERGENCY MEDICINE

## 2022-07-19 PROCEDURE — 93005 ELECTROCARDIOGRAM TRACING: CPT

## 2022-07-19 PROCEDURE — 85025 COMPLETE CBC W/AUTO DIFF WBC: CPT | Performed by: EMERGENCY MEDICINE

## 2022-07-19 PROCEDURE — 80076 HEPATIC FUNCTION PANEL: CPT | Performed by: EMERGENCY MEDICINE

## 2022-07-19 PROCEDURE — 71045 X-RAY EXAM CHEST 1 VIEW: CPT

## 2022-07-19 PROCEDURE — 82077 ASSAY SPEC XCP UR&BREATH IA: CPT | Performed by: EMERGENCY MEDICINE

## 2022-07-19 PROCEDURE — 85610 PROTHROMBIN TIME: CPT | Performed by: EMERGENCY MEDICINE

## 2022-07-19 PROCEDURE — 71260 CT THORAX DX C+: CPT

## 2022-07-19 PROCEDURE — 81001 URINALYSIS AUTO W/SCOPE: CPT | Performed by: EMERGENCY MEDICINE

## 2022-07-19 PROCEDURE — 84484 ASSAY OF TROPONIN QUANT: CPT | Performed by: EMERGENCY MEDICINE

## 2022-07-19 PROCEDURE — 80048 BASIC METABOLIC PNL TOTAL CA: CPT | Performed by: EMERGENCY MEDICINE

## 2022-07-19 PROCEDURE — 86900 BLOOD TYPING SEROLOGIC ABO: CPT | Performed by: EMERGENCY MEDICINE

## 2022-07-19 PROCEDURE — 99285 EMERGENCY DEPT VISIT HI MDM: CPT

## 2022-07-19 PROCEDURE — 70450 CT HEAD/BRAIN W/O DYE: CPT

## 2022-07-19 PROCEDURE — 93010 ELECTROCARDIOGRAM REPORT: CPT | Performed by: INTERNAL MEDICINE

## 2022-07-19 PROCEDURE — 99213 OFFICE O/P EST LOW 20 MIN: CPT | Performed by: FAMILY MEDICINE

## 2022-07-19 PROCEDURE — 86901 BLOOD TYPING SEROLOGIC RH(D): CPT | Performed by: EMERGENCY MEDICINE

## 2022-07-19 PROCEDURE — 72125 CT NECK SPINE W/O DYE: CPT

## 2022-07-19 PROCEDURE — 86850 RBC ANTIBODY SCREEN: CPT | Performed by: EMERGENCY MEDICINE

## 2022-07-19 PROCEDURE — 36415 COLL VENOUS BLD VENIPUNCTURE: CPT | Performed by: EMERGENCY MEDICINE

## 2022-07-19 PROCEDURE — 85730 THROMBOPLASTIN TIME PARTIAL: CPT | Performed by: EMERGENCY MEDICINE

## 2022-07-19 PROCEDURE — 96360 HYDRATION IV INFUSION INIT: CPT

## 2022-07-19 PROCEDURE — 80307 DRUG TEST PRSMV CHEM ANLYZR: CPT | Performed by: EMERGENCY MEDICINE

## 2022-07-19 PROCEDURE — 74177 CT ABD & PELVIS W/CONTRAST: CPT

## 2022-07-19 RX ORDER — METHOCARBAMOL 500 MG/1
500 TABLET, FILM COATED ORAL 2 TIMES DAILY
Qty: 20 TABLET | Refills: 0 | Status: SHIPPED | OUTPATIENT
Start: 2022-07-19 | End: 2022-09-21 | Stop reason: ALTCHOICE

## 2022-07-19 RX ORDER — NAPROXEN 500 MG/1
500 TABLET ORAL 2 TIMES DAILY WITH MEALS
Qty: 14 TABLET | Refills: 0 | Status: SHIPPED | OUTPATIENT
Start: 2022-07-19

## 2022-07-19 RX ORDER — METHOCARBAMOL 500 MG/1
500 TABLET, FILM COATED ORAL ONCE
Status: DISCONTINUED | OUTPATIENT
Start: 2022-07-19 | End: 2022-07-19 | Stop reason: HOSPADM

## 2022-07-19 RX ORDER — KETOROLAC TROMETHAMINE 30 MG/ML
15 INJECTION, SOLUTION INTRAMUSCULAR; INTRAVENOUS ONCE
Status: DISCONTINUED | OUTPATIENT
Start: 2022-07-19 | End: 2022-07-19

## 2022-07-19 RX ORDER — ACETAMINOPHEN 325 MG/1
975 TABLET ORAL ONCE
Status: DISCONTINUED | OUTPATIENT
Start: 2022-07-19 | End: 2022-07-19 | Stop reason: HOSPADM

## 2022-07-19 RX ORDER — CLONAZEPAM 1 MG/1
0.5 TABLET ORAL 2 TIMES DAILY
Qty: 30 TABLET | Refills: 0 | Status: SHIPPED | OUTPATIENT
Start: 2022-07-19

## 2022-07-19 RX ORDER — CLONAZEPAM 1 MG/1
0.5 TABLET ORAL 2 TIMES DAILY
Qty: 30 TABLET | Refills: 0 | Status: SHIPPED | OUTPATIENT
Start: 2022-07-19 | End: 2022-07-19 | Stop reason: SDUPTHER

## 2022-07-19 RX ADMIN — SODIUM CHLORIDE 2000 ML: 0.9 INJECTION, SOLUTION INTRAVENOUS at 04:16

## 2022-07-19 RX ADMIN — IOHEXOL 70 ML: 350 INJECTION, SOLUTION INTRAVENOUS at 04:18

## 2022-07-19 NOTE — TRAUMA DOCUMENTATION
Pt taken to CT once IV insertion and blood draw collection completed and Xray completed  RN accompanied pt to CT

## 2022-07-19 NOTE — TRAUMA DOCUMENTATION
Pt requesting opioid pain medication with attending physician  Physician ordered tylenol, robaxin, and toradol for pain management, however pt refused them and stated "they don't do anything, can you just give me at least 4mg of dilaudid?"  Pt persistent about opioid medication  Pt's demeanor escalated when opioid pain medication not ordered and began ripping off cardiac monitor  Pt stated that he sees MAT therapy and he does not have any more of his medication through them and doesn't want to "get sick" in the morning  Physician explained to pt that he should call MAT therapy to discuss concerns and pt stated "they only prescribe pain medications on Wednesdays " Pt also previously told the provider that he "likes to have a good time", therefore he takes various medications/drugs to do so
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
## 2022-07-19 NOTE — DISCHARGE INSTRUCTIONS
Follow-up with the primary care provider for further care, if symptoms worsen please return to the emergency department for re-evaluation     your kidney function was elevated at today's visit please have your primary care provider recheck your kidney function had a later visit, drink plenty of fluids
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
## 2022-07-20 LAB
AMPHETAMINES UR QL SCN: NEGATIVE NG/ML
BARBITURATES UR QL SCN: POSITIVE
BENZODIAZ UR QL: NEGATIVE
BUPRENORPHINE UR CFM-MCNC: 1192 NG/ML
BUPRENORPHINE UR QL CFM: NORMAL NG/ML
BUPRENORPHINE UR QL CFM: POSITIVE
BUPRENORPHINE+NOR UR QL: POSITIVE
BZE UR QL: NEGATIVE NG/ML
CANNABINOIDS UR QL SCN: POSITIVE
METHADONE UR QL SCN: NEGATIVE NG/ML
NORBUPRENORPHINE UR CFM-MCNC: 1880 NG/ML
NORBUPRENORPHINE UR QL CFM: POSITIVE
OPIATES UR QL: NEGATIVE NG/ML
PCP UR QL: NEGATIVE NG/ML
PROPOXYPH UR QL SCN: NEGATIVE NG/ML
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
## 2022-08-12 ENCOUNTER — DOCUMENTATION (OUTPATIENT)
Dept: FAMILY MEDICINE CLINIC | Facility: CLINIC | Age: 37
End: 2022-08-12

## 2022-08-12 NOTE — PROGRESS NOTES
SPOKE TO [address] [phone]  EXT  403 PATIENT IS BEING DISCHARGED ON 8/15/22  SHE WILL FAX RECORDS OVER AS TO HOW MANY SUBOXONE AND WHAT STRENGTH HE LEFT THERE WITH    OFFICE VISIT SET UP HERE FOR 8/17/22

## 2022-08-17 ENCOUNTER — OFFICE VISIT (OUTPATIENT)
Dept: FAMILY MEDICINE CLINIC | Facility: CLINIC | Age: 37
End: 2022-08-17
Payer: COMMERCIAL

## 2022-08-17 VITALS
BODY MASS INDEX: 32.01 KG/M2 | OXYGEN SATURATION: 99 % | TEMPERATURE: 96.4 F | SYSTOLIC BLOOD PRESSURE: 140 MMHG | RESPIRATION RATE: 16 BRPM | HEIGHT: 70 IN | HEART RATE: 117 BPM | DIASTOLIC BLOOD PRESSURE: 84 MMHG | WEIGHT: 223.6 LBS

## 2022-08-17 DIAGNOSIS — F11.24 OPIOID DEPENDENCE WITH OPIOID-INDUCED MOOD DISORDER (HCC): Primary | ICD-10-CM

## 2022-08-17 PROCEDURE — 99213 OFFICE O/P EST LOW 20 MIN: CPT | Performed by: FAMILY MEDICINE

## 2022-08-17 RX ORDER — QUETIAPINE FUMARATE 200 MG/1
TABLET, FILM COATED ORAL
COMMUNITY
Start: 2022-08-10 | End: 2022-09-07 | Stop reason: SDUPTHER

## 2022-08-17 RX ORDER — ONDANSETRON 8 MG/1
TABLET, ORALLY DISINTEGRATING ORAL
COMMUNITY
Start: 2022-07-28 | End: 2022-09-21 | Stop reason: ALTCHOICE

## 2022-08-17 RX ORDER — QUETIAPINE FUMARATE 100 MG/1
TABLET, FILM COATED ORAL
COMMUNITY
Start: 2022-08-05 | End: 2022-09-21 | Stop reason: ALTCHOICE

## 2022-08-17 RX ORDER — TOPIRAMATE 50 MG/1
50 TABLET, FILM COATED ORAL 2 TIMES DAILY
COMMUNITY
End: 2022-09-21 | Stop reason: ALTCHOICE

## 2022-08-17 RX ORDER — OMEPRAZOLE 40 MG/1
CAPSULE, DELAYED RELEASE ORAL
COMMUNITY
Start: 2022-07-28 | End: 2022-09-21 | Stop reason: ALTCHOICE

## 2022-08-17 RX ORDER — DIVALPROEX SODIUM 500 MG/1
TABLET, DELAYED RELEASE ORAL
COMMUNITY
Start: 2022-08-10

## 2022-08-17 RX ORDER — AMOXICILLIN 500 MG/1
CAPSULE ORAL
COMMUNITY
Start: 2022-08-06 | End: 2022-09-21 | Stop reason: ALTCHOICE

## 2022-08-17 RX ORDER — CLONIDINE HYDROCHLORIDE 0.1 MG/1
TABLET ORAL
COMMUNITY
Start: 2022-08-03
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
## 2022-08-24 ENCOUNTER — OFFICE VISIT (OUTPATIENT)
Dept: FAMILY MEDICINE CLINIC | Facility: CLINIC | Age: 37
End: 2022-08-24
Payer: COMMERCIAL

## 2022-08-24 VITALS
OXYGEN SATURATION: 98 % | SYSTOLIC BLOOD PRESSURE: 134 MMHG | WEIGHT: 225.8 LBS | RESPIRATION RATE: 16 BRPM | DIASTOLIC BLOOD PRESSURE: 86 MMHG | HEIGHT: 70 IN | BODY MASS INDEX: 32.33 KG/M2 | TEMPERATURE: 96.9 F | HEART RATE: 81 BPM

## 2022-08-24 DIAGNOSIS — F11.24 OPIOID DEPENDENCE WITH OPIOID-INDUCED MOOD DISORDER (HCC): ICD-10-CM

## 2022-08-24 DIAGNOSIS — F41.9 ANXIETY: ICD-10-CM

## 2022-08-24 DIAGNOSIS — F11.90 OPIOID USE: ICD-10-CM

## 2022-08-24 DIAGNOSIS — F11.24 OPIOID DEPENDENCE WITH OPIOID-INDUCED MOOD DISORDER (HCC): Primary | ICD-10-CM

## 2022-08-24 PROCEDURE — T1015 CLINIC SERVICE: HCPCS | Performed by: FAMILY MEDICINE

## 2022-08-24 PROCEDURE — 80307 DRUG TEST PRSMV CHEM ANLYZR: CPT | Performed by: FAMILY MEDICINE

## 2022-08-24 RX ORDER — BUPRENORPHINE AND NALOXONE 8; 2 MG/1; MG/1
16 FILM, SOLUBLE BUCCAL; SUBLINGUAL DAILY
Qty: 28 FILM | Refills: 0 | Status: SHIPPED | OUTPATIENT
Start: 2022-08-24 | End: 2022-09-07

## 2022-08-24 RX ORDER — NALOXONE HYDROCHLORIDE 4 MG/.1ML
SPRAY NASAL
Qty: 1 EACH | Refills: 1 | Status: SHIPPED | OUTPATIENT
Start: 2022-08-24

## 2022-08-24 RX ORDER — ESCITALOPRAM OXALATE 10 MG/1
10 TABLET ORAL DAILY
Qty: 30 TABLET | Refills: 1 | Status: SHIPPED | OUTPATIENT
Start: 2022-08-24

## 2022-08-24 NOTE — PROGRESS NOTES
Assessment/Plan:     Problem List Items Addressed This Visit        Nervous and Auditory    Opioid dependence with opioid-induced mood disorder ([facility] [address] ) - Primary    Relevant Medications    escitalopram (LEXAPRO) 10 mg tablet    Other Relevant Orders    Toxicology screen, urine    Suboxone       Other    Opioid use    Anxiety    Relevant Medications    escitalopram (LEXAPRO) 10 mg tablet            Patient presents for a MAT therapy  Patient will be increased to Suboxone 8 x 2 from 2 times a day  Patient was initially on this dose before going to rehab  Otherwise patient on well as no acute complaints  Patient counseled on drug use  Subjective:      Patient ID: Sonja Gaona is a 30 y o  male with past medical history of opiate use disorder presents to the clinic for follow-up visit  Patient was recently seen at [facility] for substance use  Patient indicates that he has been improve or symptomatic standpoint  I saw him last week but we did not have any records from rehab  Patient subsequently rescheduled  Patient is following up with [facility] in about 7-10 days  Patient here for mat therapy  Patient currently on 8-2 film and 4-1 films daily  Patient tolerating medication well but states would like increase  Patient denies drug dreams  Patient denies withdrawal symptoms  Currently not using any recreational drugs  Has been clean for the past month otherwise  The following portions of the patient's history were reviewed and updated as appropriate: allergies, current medications, past family history, past medical history, past social history, past surgical history and problem list     Review of Systems   Constitutional: Negative for chills and fever  HENT: Negative for ear pain and sore throat  Eyes: Negative for pain and visual disturbance  Respiratory: Negative for cough and shortness of breath  Cardiovascular: Negative for chest pain and palpitations  Gastrointestinal: Negative for abdominal pain and vomiting  Genitourinary: Negative for dysuria and hematuria  Musculoskeletal: Negative for arthralgias and back pain  Skin: Negative for color change and rash  Neurological: Negative for seizures and syncope  All other systems reviewed and are negative  Objective:    /86   Pulse 81   Temp (!) 96 9 °F (36 1 °C)   Resp 16   Ht 5' 10" (1 778 m)   Wt 102 kg (225 lb 12 8 oz)   SpO2 98%   BMI 32 40 kg/m²        Physical Exam  Constitutional:       Appearance: Normal appearance  HENT:      Head: Normocephalic and atraumatic  Nose: Nose normal       Mouth/Throat:      Mouth: Mucous membranes are moist    Eyes:      Pupils: Pupils are equal, round, and reactive to light  Cardiovascular:      Rate and Rhythm: Normal rate and regular rhythm  Pulses: Normal pulses  Heart sounds: Normal heart sounds  Pulmonary:      Effort: Pulmonary effort is normal       Breath sounds: Normal breath sounds  Abdominal:      General: Abdomen is flat  Musculoskeletal:         General: No swelling or tenderness  Normal range of motion  Skin:     General: Skin is warm  Capillary Refill: Capillary refill takes less than 2 seconds  Coloration: Skin is not jaundiced or pale  Neurological:      General: No focal deficit present  Mental Status: He is alert and oriented to person, place, and time  Cranial Nerves: No cranial nerve deficit  Sensory: No sensory deficit     Psychiatric:         Mood and Affect: Mood normal          Behavior: Behavior normal

## 2022-08-30 LAB
AMPHETAMINES UR QL SCN: NEGATIVE NG/ML
BARBITURATES UR QL SCN: POSITIVE
BENZODIAZ UR QL: NEGATIVE NG/ML
BUPRENORPHINE UR CFM-MCNC: 118 NG/ML
BUPRENORPHINE UR QL CFM: NORMAL NG/ML
BUPRENORPHINE UR QL CFM: POSITIVE
BUPRENORPHINE+NOR UR QL: POSITIVE
BZE UR QL: NEGATIVE NG/ML
CANNABINOIDS UR QL SCN: POSITIVE
METHADONE UR QL SCN: NEGATIVE NG/ML
NORBUPRENORPHINE UR CFM-MCNC: 578 NG/ML
NORBUPRENORPHINE UR QL CFM: POSITIVE
OPIATES UR QL: NEGATIVE NG/ML
PCP UR QL: NEGATIVE NG/ML
PROPOXYPH UR QL SCN: NEGATIVE NG/ML

## 2022-09-21 ENCOUNTER — OFFICE VISIT (OUTPATIENT)
Dept: FAMILY MEDICINE CLINIC | Facility: CLINIC | Age: 37
End: 2022-09-21
Payer: COMMERCIAL

## 2022-09-21 VITALS
HEART RATE: 91 BPM | SYSTOLIC BLOOD PRESSURE: 112 MMHG | DIASTOLIC BLOOD PRESSURE: 70 MMHG | BODY MASS INDEX: 33.47 KG/M2 | WEIGHT: 233.8 LBS | OXYGEN SATURATION: 97 % | HEIGHT: 70 IN | TEMPERATURE: 96.7 F | RESPIRATION RATE: 20 BRPM

## 2022-09-21 DIAGNOSIS — F43.10 PTSD (POST-TRAUMATIC STRESS DISORDER): ICD-10-CM

## 2022-09-21 DIAGNOSIS — F11.24 OPIOID DEPENDENCE WITH OPIOID-INDUCED MOOD DISORDER (HCC): Primary | ICD-10-CM

## 2022-09-21 DIAGNOSIS — F41.9 ANXIETY: ICD-10-CM

## 2022-09-21 DIAGNOSIS — F11.24 OPIOID DEPENDENCE WITH OPIOID-INDUCED MOOD DISORDER (HCC): ICD-10-CM

## 2022-09-21 PROCEDURE — T1015 CLINIC SERVICE: HCPCS | Performed by: FAMILY MEDICINE

## 2022-09-21 PROCEDURE — 80307 DRUG TEST PRSMV CHEM ANLYZR: CPT | Performed by: FAMILY MEDICINE

## 2022-09-21 RX ORDER — BUPRENORPHINE AND NALOXONE 8; 2 MG/1; MG/1
16 FILM, SOLUBLE BUCCAL; SUBLINGUAL DAILY
Qty: 28 FILM | Refills: 0 | Status: SHIPPED | OUTPATIENT
Start: 2022-09-21 | End: 2022-09-21

## 2022-09-21 RX ORDER — BUPRENORPHINE AND NALOXONE 8; 2 MG/1; MG/1
FILM, SOLUBLE BUCCAL; SUBLINGUAL
Qty: 28 FILM | Refills: 0 | Status: SHIPPED | OUTPATIENT
Start: 2022-09-21 | End: 2022-10-05 | Stop reason: SDUPTHER

## 2022-09-21 NOTE — TELEPHONE ENCOUNTER
Patient called stating that the pharmacy did not receive the script for suboxone  I called and confirmed this with the pharmacy  Can you please resend the script for patient  Thank you
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
## 2022-09-27 LAB
AMPHETAMINES UR QL SCN: NEGATIVE NG/ML
BARBITURATES UR QL SCN: NEGATIVE NG/ML
BENZODIAZ UR QL: NEGATIVE NG/ML
BUPRENORPHINE UR CFM-MCNC: 288 NG/ML
BUPRENORPHINE UR QL CFM: NORMAL NG/ML
BUPRENORPHINE UR QL CFM: POSITIVE
BUPRENORPHINE+NOR UR QL: POSITIVE
BZE UR QL: NEGATIVE NG/ML
CANNABINOIDS UR QL SCN: POSITIVE
METHADONE UR QL SCN: NEGATIVE NG/ML
NORBUPRENORPHINE UR CFM-MCNC: 1088 NG/ML
NORBUPRENORPHINE UR QL CFM: POSITIVE
OPIATES UR QL: NEGATIVE NG/ML
PCP UR QL: NEGATIVE NG/ML
PROPOXYPH UR QL SCN: NEGATIVE NG/ML

## 2022-10-05 DIAGNOSIS — F11.24 OPIOID DEPENDENCE WITH OPIOID-INDUCED MOOD DISORDER (HCC): ICD-10-CM

## 2022-10-05 RX ORDER — BUPRENORPHINE AND NALOXONE 8; 2 MG/1; MG/1
8 FILM, SOLUBLE BUCCAL; SUBLINGUAL DAILY
Qty: 28 FILM | Refills: 0 | OUTPATIENT
Start: 2022-10-05

## 2022-10-05 RX ORDER — BUPRENORPHINE AND NALOXONE 8; 2 MG/1; MG/1
16 FILM, SOLUBLE BUCCAL; SUBLINGUAL DAILY
Qty: 28 FILM | Refills: 0 | Status: SHIPPED | OUTPATIENT
Start: 2022-10-05 | End: 2022-10-18 | Stop reason: SDUPTHER

## 2022-10-05 NOTE — TELEPHONE ENCOUNTER
My mistake  The refill request said 1 film daily which should last him 4 weeks however he's actually on 2 films daily  I sent another 28 films and he should plan to follow-up on 10/18   Thanks

## 2022-10-18 ENCOUNTER — OFFICE VISIT (OUTPATIENT)
Dept: FAMILY MEDICINE CLINIC | Facility: CLINIC | Age: 37
End: 2022-10-18
Payer: COMMERCIAL

## 2022-10-18 VITALS
RESPIRATION RATE: 20 BRPM | DIASTOLIC BLOOD PRESSURE: 88 MMHG | SYSTOLIC BLOOD PRESSURE: 142 MMHG | HEIGHT: 70 IN | HEART RATE: 69 BPM | BODY MASS INDEX: 33.56 KG/M2 | OXYGEN SATURATION: 95 % | TEMPERATURE: 97.3 F | WEIGHT: 234.4 LBS

## 2022-10-18 DIAGNOSIS — F41.9 ANXIETY: ICD-10-CM

## 2022-10-18 DIAGNOSIS — F11.24 OPIOID DEPENDENCE WITH OPIOID-INDUCED MOOD DISORDER (HCC): Primary | ICD-10-CM

## 2022-10-18 PROCEDURE — 80307 DRUG TEST PRSMV CHEM ANLYZR: CPT | Performed by: FAMILY MEDICINE

## 2022-10-18 PROCEDURE — T1015 CLINIC SERVICE: HCPCS | Performed by: FAMILY MEDICINE

## 2022-10-18 PROCEDURE — 99213 OFFICE O/P EST LOW 20 MIN: CPT | Performed by: FAMILY MEDICINE

## 2022-10-18 RX ORDER — BUSPIRONE HYDROCHLORIDE 10 MG/1
10 TABLET ORAL 3 TIMES DAILY
Qty: 90 TABLET | Refills: 0 | Status: SHIPPED | OUTPATIENT
Start: 2022-10-18 | End: 2022-11-17

## 2022-10-18 RX ORDER — BUPRENORPHINE AND NALOXONE 8; 2 MG/1; MG/1
16 FILM, SOLUBLE BUCCAL; SUBLINGUAL DAILY
Qty: 60 FILM | Refills: 0 | Status: SHIPPED | OUTPATIENT
Start: 2022-10-18 | End: 2022-11-17

## 2022-10-18 NOTE — PROGRESS NOTES
Assessment/Plan:     Problem List Items Addressed This Visit        Nervous and Auditory    Opioid dependence with opioid-induced mood disorder (HCC) - Primary    Relevant Medications    busPIRone (BUSPAR) 10 mg tablet    Other Relevant Orders    Toxicology screen, urine    Suboxone       Other    Anxiety    Relevant Medications    busPIRone (BUSPAR) 10 mg tablet            PDMP reviewed by Dr Nadege Johnson  Suboxone will be refilled  8 x 2 from 2 times a day  Patient is otherwise stable and in no acute distress  Patient should continue to follow with therapy  Due to patient's anxiety like symptoms I am going to prescribe Buspar 10 mg 3 times a day as needed  I have also given patient information on [facility] psychiatry walk in [address] 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  Patient indicates that he is anxious is a result  Wants to get back on Klonopin but due to past issues with alcohol and substance use I do not think this is in his best interest   Patient has failed trials with Lexapro and Paxil in the past   I will initiate buspar specific for anxiety  Patient continues to follow with therapy  The following portions of the patient's history were reviewed and updated as appropriate: allergies, current medications, past family history, past medical history, past social history, past surgical history and problem list     Review of Systems   Constitutional: Negative for chills and fever  HENT: Negative for ear pain and sore throat  Eyes: Negative for pain and visual disturbance  Respiratory: Negative for cough and shortness of breath  Cardiovascular: Negative for chest pain and palpitations  Gastrointestinal: Negative for abdominal pain and vomiting  Genitourinary: Negative for dysuria and hematuria  Musculoskeletal: Negative for arthralgias and back pain  Skin: Negative for color change and rash  Neurological: Negative for seizures and syncope  Psychiatric/Behavioral: The patient is nervous/anxious  All other systems reviewed and are negative  Objective:    /88   Pulse 69   Temp (!) 97 3 °F (36 3 °C)   Resp 20   Ht 5' 10" (1 778 m)   Wt 106 kg (234 lb 6 4 oz)   SpO2 95%   BMI 33 63 kg/m²        Physical Exam  Constitutional:       Appearance: Normal appearance  HENT:      Head: Normocephalic and atraumatic  Cardiovascular:      Rate and Rhythm: Normal rate and regular rhythm  Pulses: Normal pulses  Heart sounds: Normal heart sounds  Pulmonary:      Effort: Pulmonary effort is normal       Breath sounds: Normal breath sounds  Abdominal:      General: Abdomen is flat  There is no distension  Palpations: Abdomen is soft  There is no mass  Tenderness: There is no abdominal tenderness  Hernia: No hernia is present  Musculoskeletal:         General: Normal range of motion  Skin:     General: Skin is warm and dry  Capillary Refill: Capillary refill takes less than 2 seconds  Neurological:      General: No focal deficit present  Mental Status: He is alert and oriented to person, place, and time  Cranial Nerves: No cranial nerve deficit  Sensory: No sensory deficit  Motor: No weakness        Coordination: Coordination normal    Psychiatric:         Mood and Affect: Mood normal          Behavior: Behavior normal

## 2022-10-27 RX ORDER — BUPRENORPHINE AND NALOXONE 8; 2 MG/1; MG/1
16 FILM, SOLUBLE BUCCAL; SUBLINGUAL DAILY
Qty: 28 FILM | Refills: 0 | Status: CANCELLED | OUTPATIENT
Start: 2022-10-27

## 2022-10-31 LAB
BUPRENORPHINE UR CFM-MCNC: 90 NG/ML
BUPRENORPHINE UR QL CFM: NORMAL NG/ML
BUPRENORPHINE UR QL CFM: POSITIVE
BUPRENORPHINE+NOR UR QL: POSITIVE
NORBUPRENORPHINE UR CFM-MCNC: 262 NG/ML
NORBUPRENORPHINE UR QL CFM: POSITIVE

## 2022-11-02 DIAGNOSIS — F43.10 PTSD (POST-TRAUMATIC STRESS DISORDER): ICD-10-CM

## 2022-11-02 DIAGNOSIS — F41.9 ANXIETY: ICD-10-CM

## 2022-11-03 DIAGNOSIS — F43.10 PTSD (POST-TRAUMATIC STRESS DISORDER): ICD-10-CM

## 2022-11-03 DIAGNOSIS — F41.9 ANXIETY: ICD-10-CM

## 2022-11-03 RX ORDER — QUETIAPINE FUMARATE 200 MG/1
TABLET, FILM COATED ORAL
Qty: 30 TABLET | Refills: 1 | Status: ON HOLD | OUTPATIENT
Start: 2022-11-03

## 2022-11-04 RX ORDER — QUETIAPINE FUMARATE 200 MG/1
200 TABLET, FILM COATED ORAL
Qty: 30 TABLET | Refills: 1 | OUTPATIENT
Start: 2022-11-04

## 2022-11-13 ENCOUNTER — HOSPITAL ENCOUNTER (EMERGENCY)
Facility: HOSPITAL | Age: 37
Discharge: STILL ADMITTED OR ADMINISTRATIVE | End: 2022-11-13
Attending: EMERGENCY MEDICINE

## 2022-11-13 ENCOUNTER — HOSPITAL ENCOUNTER (INPATIENT)
Facility: HOSPITAL | Age: 37
LOS: 1 days | Discharge: HOME | End: 2022-11-14
Attending: EMERGENCY MEDICINE | Admitting: EMERGENCY MEDICINE

## 2022-11-13 ENCOUNTER — APPOINTMENT (INPATIENT)
Dept: CT IMAGING | Facility: HOSPITAL | Age: 37
End: 2022-11-13

## 2022-11-13 VITALS
OXYGEN SATURATION: 97 % | BODY MASS INDEX: 33.5 KG/M2 | HEART RATE: 90 BPM | RESPIRATION RATE: 20 BRPM | SYSTOLIC BLOOD PRESSURE: 150 MMHG | DIASTOLIC BLOOD PRESSURE: 104 MMHG | TEMPERATURE: 97.8 F | WEIGHT: 233.47 LBS

## 2022-11-13 DIAGNOSIS — F10.932 ALCOHOL WITHDRAWAL SYNDROME WITH PERCEPTUAL DISTURBANCE (HCC): Primary | ICD-10-CM

## 2022-11-13 DIAGNOSIS — F10.20 ALCOHOL USE DISORDER, SEVERE, DEPENDENCE (HCC): Primary | ICD-10-CM

## 2022-11-13 PROBLEM — E87.29 ALCOHOLIC KETOACIDOSIS: Status: ACTIVE | Noted: 2022-11-13

## 2022-11-13 LAB
ALBUMIN SERPL BCP-MCNC: 4.2 G/DL (ref 3.5–5)
ALBUMIN SERPL BCP-MCNC: 4.6 G/DL (ref 3.5–5)
ALP SERPL-CCNC: 187 U/L (ref 43–122)
ALP SERPL-CCNC: 257 U/L (ref 46–116)
ALT SERPL W P-5'-P-CCNC: 171 U/L
ALT SERPL W P-5'-P-CCNC: 220 U/L (ref 12–78)
ANION GAP SERPL CALCULATED.3IONS-SCNC: 10 MMOL/L (ref 5–14)
ANION GAP SERPL CALCULATED.3IONS-SCNC: 14 MMOL/L (ref 4–13)
APAP SERPL-MCNC: <2 UG/ML (ref 10–20)
AST SERPL W P-5'-P-CCNC: 67 U/L (ref 17–59)
BASOPHILS # BLD AUTO: 0.05 THOUSANDS/ÂΜL (ref 0–0.1)
BASOPHILS NFR BLD AUTO: 0 % (ref 0–1)
BETA-HYDROXYBUTYRATE: 0.1 MMOL/L
BILIRUB SERPL-MCNC: 0.66 MG/DL (ref 0.2–1)
BILIRUB SERPL-MCNC: 0.68 MG/DL (ref 0.2–1)
BUN SERPL-MCNC: 16 MG/DL (ref 5–25)
BUN SERPL-MCNC: 18 MG/DL (ref 5–25)
CALCIUM SERPL-MCNC: 9 MG/DL (ref 8.4–10.2)
CALCIUM SERPL-MCNC: 9.9 MG/DL (ref 8.3–10.1)
CHLORIDE SERPL-SCNC: 105 MMOL/L (ref 96–108)
CHLORIDE SERPL-SCNC: 98 MMOL/L (ref 96–108)
CK SERPL-CCNC: 66 U/L (ref 55–170)
CO2 SERPL-SCNC: 25 MMOL/L (ref 21–32)
CO2 SERPL-SCNC: 27 MMOL/L (ref 21–32)
CREAT SERPL-MCNC: 0.75 MG/DL (ref 0.7–1.5)
CREAT SERPL-MCNC: 1.04 MG/DL (ref 0.6–1.3)
EOSINOPHIL # BLD AUTO: 0.16 THOUSAND/ÂΜL (ref 0–0.61)
EOSINOPHIL NFR BLD AUTO: 1 % (ref 0–6)
ERYTHROCYTE [DISTWIDTH] IN BLOOD BY AUTOMATED COUNT: 13.4 % (ref 11.6–15.1)
ETHANOL SERPL-MCNC: <3 MG/DL (ref 0–3)
GFR SERPL CREATININE-BSD FRML MDRD: 118 ML/MIN/1.73SQ M
GFR SERPL CREATININE-BSD FRML MDRD: 92 ML/MIN/1.73SQ M
GLUCOSE SERPL-MCNC: 106 MG/DL (ref 70–99)
GLUCOSE SERPL-MCNC: 120 MG/DL (ref 65–140)
HCT VFR BLD AUTO: 52 % (ref 36.5–49.3)
HGB BLD-MCNC: 17.1 G/DL (ref 12–17)
IMM GRANULOCYTES # BLD AUTO: 0.05 THOUSAND/UL (ref 0–0.2)
IMM GRANULOCYTES NFR BLD AUTO: 0 % (ref 0–2)
LACTATE SERPL-SCNC: 3.1 MMOL/L (ref 0.5–2)
LIPASE SERPL-CCNC: 26 U/L (ref 23–300)
LYMPHOCYTES # BLD AUTO: 1.38 THOUSANDS/ÂΜL (ref 0.6–4.47)
LYMPHOCYTES NFR BLD AUTO: 11 % (ref 14–44)
MAGNESIUM SERPL-MCNC: 2 MG/DL (ref 1.6–2.6)
MAGNESIUM SERPL-MCNC: 2.1 MG/DL (ref 1.6–2.3)
MCH RBC QN AUTO: 29.9 PG (ref 26.8–34.3)
MCHC RBC AUTO-ENTMCNC: 32.9 G/DL (ref 31.4–37.4)
MCV RBC AUTO: 91 FL (ref 82–98)
MONOCYTES # BLD AUTO: 0.73 THOUSAND/ÂΜL (ref 0.17–1.22)
MONOCYTES NFR BLD AUTO: 6 % (ref 4–12)
NEUTROPHILS # BLD AUTO: 9.9 THOUSANDS/ÂΜL (ref 1.85–7.62)
NEUTS SEG NFR BLD AUTO: 82 % (ref 43–75)
NRBC BLD AUTO-RTO: 0 /100 WBCS
PHOSPHATE SERPL-MCNC: 2.9 MG/DL (ref 2.7–4.5)
PLATELET # BLD AUTO: 236 THOUSANDS/UL (ref 149–390)
PMV BLD AUTO: 11.2 FL (ref 8.9–12.7)
POTASSIUM SERPL-SCNC: 3.7 MMOL/L (ref 3.5–5.3)
POTASSIUM SERPL-SCNC: 4 MMOL/L (ref 3.5–5.3)
PROT SERPL-MCNC: 7.2 G/DL (ref 6.4–8.4)
PROT SERPL-MCNC: 8.5 G/DL (ref 6.4–8.4)
RBC # BLD AUTO: 5.71 MILLION/UL (ref 3.88–5.62)
SALICYLATES SERPL-MCNC: 4 MG/DL (ref 3–20)
SODIUM SERPL-SCNC: 139 MMOL/L (ref 135–147)
SODIUM SERPL-SCNC: 140 MMOL/L (ref 135–147)
WBC # BLD AUTO: 12.27 THOUSAND/UL (ref 4.31–10.16)

## 2022-11-13 PROCEDURE — HZ2ZZZZ DETOXIFICATION SERVICES FOR SUBSTANCE ABUSE TREATMENT: ICD-10-PCS | Performed by: EMERGENCY MEDICINE

## 2022-11-13 RX ORDER — ACETAMINOPHEN 325 MG/1
650 TABLET ORAL EVERY 6 HOURS PRN
Status: DISCONTINUED | OUTPATIENT
Start: 2022-11-13 | End: 2022-11-14 | Stop reason: HOSPADM

## 2022-11-13 RX ORDER — DIAZEPAM 5 MG/ML
10 INJECTION, SOLUTION INTRAMUSCULAR; INTRAVENOUS ONCE
Status: COMPLETED | OUTPATIENT
Start: 2022-11-13 | End: 2022-11-13

## 2022-11-13 RX ORDER — SODIUM CHLORIDE 9 MG/ML
125 INJECTION, SOLUTION INTRAVENOUS CONTINUOUS
Status: DISCONTINUED | OUTPATIENT
Start: 2022-11-13 | End: 2022-11-14

## 2022-11-13 RX ORDER — LORAZEPAM 2 MG/ML
2 INJECTION INTRAMUSCULAR EVERY 4 HOURS PRN
Status: DISCONTINUED | OUTPATIENT
Start: 2022-11-13 | End: 2022-11-14 | Stop reason: HOSPADM

## 2022-11-13 RX ORDER — SODIUM CHLORIDE 9 MG/ML
125 INJECTION, SOLUTION INTRAVENOUS CONTINUOUS
Status: DISCONTINUED | OUTPATIENT
Start: 2022-11-13 | End: 2022-11-13

## 2022-11-13 RX ORDER — QUETIAPINE FUMARATE 100 MG/1
200 TABLET, FILM COATED ORAL
Status: DISCONTINUED | OUTPATIENT
Start: 2022-11-13 | End: 2022-11-14 | Stop reason: HOSPADM

## 2022-11-13 RX ORDER — BUPRENORPHINE AND NALOXONE 8; 2 MG/1; MG/1
8 FILM, SOLUBLE BUCCAL; SUBLINGUAL 2 TIMES DAILY
Status: DISCONTINUED | OUTPATIENT
Start: 2022-11-13 | End: 2022-11-14 | Stop reason: HOSPADM

## 2022-11-13 RX ORDER — KETOROLAC TROMETHAMINE 30 MG/ML
30 INJECTION, SOLUTION INTRAMUSCULAR; INTRAVENOUS ONCE
Status: COMPLETED | OUTPATIENT
Start: 2022-11-13 | End: 2022-11-13

## 2022-11-13 RX ORDER — BUSPIRONE HYDROCHLORIDE 10 MG/1
10 TABLET ORAL 3 TIMES DAILY
Status: DISCONTINUED | OUTPATIENT
Start: 2022-11-13 | End: 2022-11-14 | Stop reason: HOSPADM

## 2022-11-13 RX ORDER — DEXTROSE AND SODIUM CHLORIDE 5; .9 G/100ML; G/100ML
125 INJECTION, SOLUTION INTRAVENOUS CONTINUOUS
Status: DISCONTINUED | OUTPATIENT
Start: 2022-11-13 | End: 2022-11-13 | Stop reason: HOSPADM

## 2022-11-13 RX ORDER — ONDANSETRON 2 MG/ML
4 INJECTION INTRAMUSCULAR; INTRAVENOUS ONCE
Status: COMPLETED | OUTPATIENT
Start: 2022-11-13 | End: 2022-11-13

## 2022-11-13 RX ORDER — ONDANSETRON 2 MG/ML
4 INJECTION INTRAMUSCULAR; INTRAVENOUS EVERY 6 HOURS PRN
Status: DISCONTINUED | OUTPATIENT
Start: 2022-11-13 | End: 2022-11-14 | Stop reason: HOSPADM

## 2022-11-13 RX ORDER — PHENOBARBITAL SODIUM 130 MG/ML
130 INJECTION INTRAMUSCULAR ONCE
Status: COMPLETED | OUTPATIENT
Start: 2022-11-13 | End: 2022-11-13

## 2022-11-13 RX ORDER — ENOXAPARIN SODIUM 100 MG/ML
40 INJECTION SUBCUTANEOUS DAILY
Status: DISCONTINUED | OUTPATIENT
Start: 2022-11-13 | End: 2022-11-14 | Stop reason: HOSPADM

## 2022-11-13 RX ORDER — GABAPENTIN 100 MG/1
100 CAPSULE ORAL 3 TIMES DAILY
Status: DISCONTINUED | OUTPATIENT
Start: 2022-11-13 | End: 2022-11-14 | Stop reason: HOSPADM

## 2022-11-13 RX ORDER — DEXTROSE AND SODIUM CHLORIDE 5; .9 G/100ML; G/100ML
100 INJECTION, SOLUTION INTRAVENOUS CONTINUOUS
Status: DISCONTINUED | OUTPATIENT
Start: 2022-11-13 | End: 2022-11-13

## 2022-11-13 RX ORDER — TRAZODONE HYDROCHLORIDE 50 MG/1
50 TABLET ORAL
Status: DISCONTINUED | OUTPATIENT
Start: 2022-11-13 | End: 2022-11-14 | Stop reason: HOSPADM

## 2022-11-13 RX ORDER — LANOLIN ALCOHOL/MO/W.PET/CERES
6 CREAM (GRAM) TOPICAL
Status: DISCONTINUED | OUTPATIENT
Start: 2022-11-13 | End: 2022-11-14 | Stop reason: HOSPADM

## 2022-11-13 RX ORDER — MAGNESIUM HYDROXIDE/ALUMINUM HYDROXICE/SIMETHICONE 120; 1200; 1200 MG/30ML; MG/30ML; MG/30ML
30 SUSPENSION ORAL EVERY 6 HOURS PRN
Status: DISCONTINUED | OUTPATIENT
Start: 2022-11-13 | End: 2022-11-14 | Stop reason: HOSPADM

## 2022-11-13 RX ORDER — DIAZEPAM 5 MG/ML
5 INJECTION, SOLUTION INTRAMUSCULAR; INTRAVENOUS ONCE
Status: COMPLETED | OUTPATIENT
Start: 2022-11-13 | End: 2022-11-13

## 2022-11-13 RX ORDER — DEXMEDETOMIDINE HYDROCHLORIDE 4 UG/ML
.1-1 INJECTION, SOLUTION INTRAVENOUS
Status: DISCONTINUED | OUTPATIENT
Start: 2022-11-13 | End: 2022-11-14

## 2022-11-13 RX ADMIN — DEXTROSE AND SODIUM CHLORIDE 100 ML/HR: 5; .9 INJECTION, SOLUTION INTRAVENOUS at 11:16

## 2022-11-13 RX ADMIN — GABAPENTIN 100 MG: 100 CAPSULE ORAL at 20:08

## 2022-11-13 RX ADMIN — KETOROLAC TROMETHAMINE 30 MG: 30 INJECTION, SOLUTION INTRAMUSCULAR at 08:44

## 2022-11-13 RX ADMIN — ONDANSETRON 4 MG: 2 INJECTION INTRAMUSCULAR; INTRAVENOUS at 09:39

## 2022-11-13 RX ADMIN — QUETIAPINE FUMARATE 200 MG: 100 TABLET ORAL at 21:19

## 2022-11-13 RX ADMIN — SODIUM CHLORIDE 100 ML/HR: 0.9 INJECTION, SOLUTION INTRAVENOUS at 13:19

## 2022-11-13 RX ADMIN — PHENOBARBITAL SODIUM 130 MG: 130 INJECTION INTRAMUSCULAR at 17:17

## 2022-11-13 RX ADMIN — BUSPIRONE HYDROCHLORIDE 10 MG: 10 TABLET ORAL at 20:07

## 2022-11-13 RX ADMIN — DIAZEPAM 10 MG: 5 INJECTION INTRAMUSCULAR; INTRAVENOUS at 07:57

## 2022-11-13 RX ADMIN — IOHEXOL 100 ML: 350 INJECTION, SOLUTION INTRAVENOUS at 13:19

## 2022-11-13 RX ADMIN — DIAZEPAM 10 MG: 5 INJECTION INTRAMUSCULAR; INTRAVENOUS at 11:16

## 2022-11-13 RX ADMIN — SODIUM CHLORIDE 1000 ML: 0.9 INJECTION, SOLUTION INTRAVENOUS at 08:07

## 2022-11-13 RX ADMIN — BUPRENORPHINE AND NALOXONE 8 MG: 8; 2 FILM BUCCAL; SUBLINGUAL at 20:07

## 2022-11-13 RX ADMIN — DEXMEDETOMIDINE HYDROCHLORIDE 0.3 MCG/KG/HR: 100 INJECTION, SOLUTION INTRAVENOUS at 17:03

## 2022-11-13 RX ADMIN — MELATONIN TAB 3 MG 6 MG: 3 TAB at 21:19

## 2022-11-13 RX ADMIN — Medication 650 MG: at 12:11

## 2022-11-13 RX ADMIN — DEXMEDETOMIDINE HYDROCHLORIDE 0.3 MCG/KG/HR: 100 INJECTION, SOLUTION INTRAVENOUS at 23:48

## 2022-11-13 RX ADMIN — DIAZEPAM 10 MG: 5 INJECTION INTRAMUSCULAR; INTRAVENOUS at 08:43

## 2022-11-13 RX ADMIN — THIAMINE HYDROCHLORIDE 500 MG: 100 INJECTION, SOLUTION INTRAMUSCULAR; INTRAVENOUS at 14:50

## 2022-11-13 RX ADMIN — ONDANSETRON 4 MG: 2 INJECTION INTRAMUSCULAR; INTRAVENOUS at 17:17

## 2022-11-13 RX ADMIN — THIAMINE HYDROCHLORIDE 500 MG: 100 INJECTION, SOLUTION INTRAMUSCULAR; INTRAVENOUS at 21:20

## 2022-11-13 RX ADMIN — ACETAMINOPHEN 650 MG: 325 TABLET ORAL at 13:03

## 2022-11-13 RX ADMIN — ONDANSETRON 4 MG: 2 INJECTION INTRAMUSCULAR; INTRAVENOUS at 07:57

## 2022-11-13 RX ADMIN — SODIUM CHLORIDE 100 ML/HR: 0.9 INJECTION, SOLUTION INTRAVENOUS at 21:19

## 2022-11-13 RX ADMIN — FOLIC ACID 1 MG: 5 INJECTION, SOLUTION INTRAMUSCULAR; INTRAVENOUS; SUBCUTANEOUS at 13:20

## 2022-11-13 RX ADMIN — Medication 650 MG: at 11:17

## 2022-11-13 RX ADMIN — DEXTROSE AND SODIUM CHLORIDE 125 ML/HR: 5; .9 INJECTION, SOLUTION INTRAVENOUS at 09:08

## 2022-11-13 RX ADMIN — DIAZEPAM 5 MG: 5 INJECTION INTRAMUSCULAR; INTRAVENOUS at 09:39

## 2022-11-13 RX ADMIN — PHENOBARBITAL SODIUM 130 MG: 130 INJECTION INTRAMUSCULAR at 13:25

## 2022-11-13 RX ADMIN — SODIUM CHLORIDE 125 ML/HR: 0.9 INJECTION, SOLUTION INTRAVENOUS at 08:08

## 2022-11-13 RX ADMIN — ONDANSETRON 4 MG: 2 INJECTION INTRAMUSCULAR; INTRAVENOUS at 08:43

## 2022-11-13 RX ADMIN — DIAZEPAM 10 MG: 5 INJECTION INTRAMUSCULAR; INTRAVENOUS at 16:47

## 2022-11-13 RX ADMIN — THIAMINE HYDROCHLORIDE 100 MG: 100 INJECTION, SOLUTION INTRAMUSCULAR; INTRAVENOUS at 09:39

## 2022-11-13 NOTE — ASSESSMENT & PLAN NOTE
· Last reported drink 11/11/22  · Serum alcohol <10 in the ED  · Received Valium 25 mg, Zofran 8 mg  in the ED PTA  · Initiate SEWS protocol for medical management of alcohol withdrawal  · Current alcohol withdrawal signs/symptoms include nausea, vomiting, tachycardia, hypertension, diaphoresis,  and tremors   · SEWS score of upon admission  16   · Administer 650 mg of phenobarbital + Valium 10 mg   · Continue monitoring under protocol and administer phenobarbital as indicated  · Continuous pulse ox and telemetry monitoring

## 2022-11-13 NOTE — ASSESSMENT & PLAN NOTE
· Patient endorses epigastric abdominal pain with associated symptoms of nausea and vomiting in the setting of chronic alcohol use   · Pain 8/10 does not radiate   · On examination epigastric tenderness, hepatomegaly   · Plan:  · Initiate IVF  · Lipase pending  · STAT CT A/P

## 2022-11-13 NOTE — ASSESSMENT & PLAN NOTE
· Patient endorses a h/o chronic anxiety and depression  · Home medications include Buspar 10 mg TID, Seroquel 200mg HS   · Denies SI/HI/thoughts of self harm  · Continue home medications  · Recommend OP f/u with PCP/OP Psychiatry

## 2022-11-13 NOTE — ASSESSMENT & PLAN NOTE
· History of opioid use disorder and polysubstance use   · Denies hx of IVDU  · PDMP reviewed:  · Currently on Maintenance dosing of buprenorphine/naloxone 8/2mg BID  · Last filled on 10/18/22 for 30 days   · Continue maintenance dosing  · Follow up with outpatient MAT upon discharge

## 2022-11-13 NOTE — H&P
HISTORY & PHYSICAL EXAM  DEPARTMENT OF MEDICAL TOXICOLOGY  LEVEL 4 MEDICAL DETOX UNIT  Netta Kaur 30 y o  male MRN: [id number]  Unit/Bed#: 5T DETOX 515-01 Encounter: [id number]      Reason for Admission/Principal Problem: Ethanol withdrawal, Ethanol use disorder  Admitting Provider: Car Lopez PA-C  Attending Provider: Scottie Rosario*   11/13/2022 10:56 AM        * Alcohol withdrawal syndrome with complication [facility])  Assessment & Plan  · Last reported drink 11/11/22  · Serum alcohol <10 in the ED  · Received Valium 25 mg, Zofran 8 mg  in the ED PTA  · Initiate SEWS protocol for medical management of alcohol withdrawal  · Current alcohol withdrawal signs/symptoms include nausea, vomiting, tachycardia, hypertension, diaphoresis,  and tremors   · SEWS score of upon admission  16   · Administer 650 mg of phenobarbital + Valium 10 mg   · Continue monitoring under protocol and administer phenobarbital as indicated  · Continuous pulse ox and telemetry monitoring      Alcohol use disorder, severe, dependence ([facility] [address] )  Assessment & Plan  Pt with a h/o chronic heavy alcohol use   Reports to recent three week binge in which he was consuming 20-30 shots of whiskey (Fireball) daily   Previous admission to the [address] [place] [address] Detox Unit 6/14/22- 6/16/22  Recent inpatient drug and alcohol rehab August 2022 through [facility]   Reports H/o withdrawal seizures  Withdrawal management as above  Initiate IVFs  Initiate high dose thiamine due to ataxia and + nystagmus on examination  Folic acid and multivitamin supplementation   Not a candidate for naltrexone currently of MAT buprenorphine   Consult case management for after care planning interested in outpatient services     Alcoholic ketoacidosis  Assessment & Plan  · CMP on admission revealed anion gap 14, CO2 27, Cl 98   · Likely 2/2 AKA   · Initiate IVF hydration with D5 NS  · Continue monitoring CMP for closure of anion gap and adjust IVFs accordingly  · Encourage alcohol cessation      Transaminitis  Assessment & Plan  · Elevated LFTs on admission:   · Likely 2/2 chronic alcohol use/alcoholic liver disease  · Pt denies any RUQ   · Initiate IVFs  · Continue monitoring CMP  · Encourage alcohol cessation      Epigastric pain  Assessment & Plan  · Patient endorses epigastric abdominal pain with associated symptoms of nausea and vomiting in the setting of chronic alcohol use   · Pain 8/10 does not radiate   · On examination epigastric tenderness, hepatomegaly   · Plan:  · Initiate IVF  · Lipase pending  · STAT CT A/P     Opioid dependence with opioid-induced mood disorder ([facility] [address] )  Assessment & Plan  · History of opioid use disorder and polysubstance use   · Denies hx of IVDU  · PDMP reviewed:  · Currently on Maintenance dosing of buprenorphine/naloxone 8/2mg BID  · Last filled on 10/18/22 for 30 days   · Continue maintenance dosing  · Follow up with outpatient MAT upon discharge       Anxiety  Assessment & Plan  · Patient endorses a h/o chronic anxiety and depression  · Home medications include Buspar 10 mg TID, Seroquel 200mg HS   · Denies SI/HI/thoughts of self harm  · Continue home medications  · Recommend OP f/u with PCP/OP Psychiatry               VTE Prophylaxis: Enoxaparin (Lovenox)  / sequential compression device   Code Status: Full Code per patient       Anticipated Length of Stay:  Patient will be admitted on an Inpatient basis with an anticipated length of stay of  2 midnights  Justification for Hospital Stay: alcohol withdrawal     For any questions or concerns, please Tiger Text the advanced practitioner in the role of [facility]-DETOX-AP On Call      This patient qualifies for Level IV medically managed intensive inpatient services under the criteria set by the American Society of Addiction Medicine, including dimensions 1-3   The patient is in withdrawal (or is intoxicated with high risk of withdrawal), with severe and unstable medical and/or psychiatric (dual diagnosis) problems, requiring requires 24-hour medical and nursing care and the full resources of a [address] patient to medical detox unit and continue supportive care and stabilization of acute ethanol withdrawal per medical toxicology/detox treatment pathway  Monitor ethanol withdrawal severity via the Severity of Ethanol Withdrawal Scale (SEWS) Q4 hours and then hourly if/when SEWS > 6  Treat withdrawal per pathway and reassess Q30-60 minutes  Mild SEWS Score 1-6  Administer medications* (IV or PO; PO preferred):  • If initial SEWS score: diazepam 10mg PO/IV x 1 AND phenobarbital 65 mg PO/IV x 1  • If repeat SEWS score 1-6: phenobarbital 65 mg PO/IV q1 hour x 5 doses maximum   Reassessment:   • SEWS q1 hour after each dose until SEWS 0 x 2 hours  • VS q1 hours (until SEWS 0, then q4 hours)  • Notify provider for bedside evaluation if 5-dose maximum is reached, RASS of -3 to -5, or SEWS score escalates to moderate or severe  Moderate SEWS Score 7-12  Administer medications* (IV):  • If initial SEWS score: diazepam 10mg IV x 1 AND phenobarbital 260 mg IV x 1  • If repeat SEWS score 7-12 or score escalated from mild: phenobarbital 130 mg IV q30 minutes x 5 doses maximum   Reassessment:  • SEWS q30 minutes after each dose until SEWS < 7 (then hourly until SEWS 0 x 2 hours)  • VS q30 minutes until SEWS < 7 (then hourly until SEWS 0, then q4 hours)  • Notify provider for bedside evaluation if 5-dose maximum is reached, RASS of -3 to -5, or SEWS score escalates to severe  Severe SEWS Score ? 13  Administer medications* (IV):  • If initial SEWS score: Diazepam 10 mg IV x 1 AND phenobarbital 650 mg IV piggyback x 1 over 15-30 minutes  • If repeat SEWS score ?  13 or score escalated from mild or moderate: phenobarbital 130 mg IV q30 minutes x 5 doses maximum   Reassessment:  • SEWS q30 minutes after each dose until SEWS < 7 (then hourly until SEWS 0 x 2 hours)   • VS q30 minutes until SEWS < 7 (then hourly until SEWS 0, then q4 hours)  • Notify provider for bedside evaluation if 5-dose maximum is reached or RASS of -3 to -5   *Hold medications and notify provider if CNS depression, respirations < 10/min, or RASS of -3 to -5  Medications to be administered adjunctively if more than 2 grams of phenobarbital is needed for stabilization of withdrawal; require attending approval    • Dexmedetomidine infusion 0 1-1mcg/kg/hr IV infusion, titratable to reduced agitation (Goal: RASS -2)  • Ketamine   o Acute agitated delirium: 1-2 mg/kg IV or 4-5 mg/kg IM  o Refractory withdrawal: 0 1-1mg/kg/hr IV infusion, titratable to reduced agitation (Goal: RASS -2)    Further evaluation, screening and treatment:  Evaluate complete metabolic panel, transaminases, INR, and lipase  Assess hepatic ultrasound for any sign of alcoholic liver disease or cirrhosis, and ultimately refer for further hepatic evaluation and care as/if indicated  Additional medications for ethanol associated malnutrition: Thiamine 100 mg IV daily, increase to 500 mg TID for signs/symptoms of Wernicke's Encephalopathy or Wernicke Korsakoff Syndrome   Folic acid 1 mg IV daily   Multivitamin PO daily      Will offer first monthly injection of Naltrexone 380 mg IM, once patient is stabilized, as it has been shown to assist in decreasing cravings for ethanol  Evaluate and treat for coexisting substance use, such as opioids and nicotine  Discuss risk factors for infectious disease, such as history of intravenous drug abuse, and offer hepatitis and HIV screening if indicated  Case management consultation to assist with coordination of subsequent treatment after discharge  Hx and PE limited by: none     HPI: John Marie is a 30y o  year old male who presents with acute alcohol withdrawal  Patient has a past medical history significant for PTSD, Anxiety, AUD, OUD on maintenance  Patient originally reported to [facility] ED requesting alcohol detoxification  He reported that he had relapsed and has been binge drinking for the past three weeks  Patient presented with severe withdrawal symptoms of nausea with vomiting, tachycardia, diaphoresis, and tremors  His withdrawal was found to be complicated by AKA, history of withdrawal seizures, and history of delirium tremens  Patient was transferred to [facility] for further management of withdrawal symptoms  He had a previous inpatient detox admission at [address] [place] [address] on 6/14/22  Patient also reports that he recently completed a two month inpatient drug and alcohol rehab program at [facility] two months ago  Upon admission patient denies physical complaints of headache, chest pain, or shortness of breath  He does reports intractable vomiting x two days with epigastric abdominal pain, denies radiation to back  Patient reports that he has been unable to eat for the last week due to this abdominal pain  Preferred alcoholic beverage(s): whiskey  Quantity and frequency of alcohol intake: 20-30 shots daily x three weeks   Use of any ethanol substitutes (toxic alcohols): no  Date/Time of last alcohol intake: 11/11/22   Current signs and symptoms of ethanol withdrawal: anxiety, tremor, diaphoresis, tachycardia, hypertension, nausea and vomiting    SEWS Total Score: 16 (11/13/2022 11:00 AM)      Ethanol Withdrawal History  Previous ethanol withdrawal? yes  Prior inpatient treatment for ethanol withdrawal? yes  Prior outpatient treatment for ethanol withdrawal? yes  History of seizures with prior ethanol withdrawal? yes  Prior treatment with naltrexone (Vivitrol)? no  Current treatment with naltrexone (Vivitrol)? no  Other current treatment for ethanol use disorder?  no  Co-existing substance use? no    Review of PDMP: yes     Social History     Substance and Sexual Activity   Alcohol Use Yes    Comment: 20-30 shots per day     Social History     Substance and Sexual Activity   Drug Use Not Currently   • Types: Methamphetamines, Marijuana, Cocaine    Comment: Merari Hernandez current use     Social History     Tobacco Use   Smoking Status Current Every Day Smoker   • Packs/day: 1 00   • Types: Cigarettes   Smokeless Tobacco Never Used       Review of Systems   Constitutional: Positive for appetite change and diaphoresis  Respiratory: Negative for shortness of breath  Cardiovascular: Negative for chest pain  Gastrointestinal: Positive for abdominal pain, nausea and vomiting  Negative for diarrhea  Neurological: Positive for tremors  Negative for headaches  Psychiatric/Behavioral: The patient is nervous/anxious  Historical Information   Past Medical History:   Diagnosis Date   • Chronic pain    • Depression    • Pelvic fracture (HCC)    • PTSD (post-traumatic stress disorder)    • Substance abuse ([facility] [address] )      Past Surgical History:   Procedure Laterality Date   • BONY PELVIS SURGERY       No family history on file  Social History   Marital Status: Single   Occupation: [employer]    Patient Pre-hospital Living Situation: Lives alone, in apartment  Patient Pre-hospital Level of Mobility: Fully Independent  Patient Pre-hospital Diet Restrictions: None     Allergies   Allergen Reactions   • Penicillin V Other (See Comments)     unknown   • Penicillins Hives       Prior to Admission medications    Medication Sig Start Date End Date Taking?  Authorizing Provider   buprenorphine-naloxone (Suboxone) 8-2 mg Place 2 Film (16 mg total) under the tongue daily 10/18/22 11/17/22  Katrinka Old, DO   busPIRone (BUSPAR) 10 mg tablet Take 1 tablet (10 mg total) by mouth 3 (three) times a day 10/18/22 11/17/22  Jesus Imus, DO   clonazePAM (KlonoPIN) 1 mg tablet Take 0 5 tablets (0 5 mg total) by mouth 2 (two) times a day 7/19/22   Vinod Isaac MD   cloNIDine (CATAPRES) 0 1 mg tablet  8/3/22   Historical Provider, MD   divalproex sodium (DEPAKOTE) 500 mg EC tablet 8/10/22   Historical Provider, MD   escitalopram (LEXAPRO) 10 mg tablet Take 1 tablet (10 mg total) by mouth daily  Patient not taking: Reported on 10/18/2022 8/24/22   Jesus Johnson DO   naloxone [facility]) 4 mg/0 1 mL nasal spray Administer 1 spray into a nostril  If no response after 2-3 minutes, give another dose in the other nostril using a new spray    Patient not taking: Reported on 10/18/2022 8/24/22   Ousmane Old, DO   naproxen (NAPROSYN) 500 mg tablet Take 1 tablet (500 mg total) by mouth 2 (two) times a day with meals 7/19/22   Kayleen Davies, DO   QUEtiapine (SEROquel) 200 mg tablet take 1 tablet by mouth at bedtime 11/3/22   Staceynila Old, DO       Current Facility-Administered Medications   Medication Dose Route Frequency   • acetaminophen (TYLENOL) tablet 650 mg  650 mg Oral Q6H PRN   • acetaminophen (TYLENOL) tablet 650 mg  650 mg Oral Q6H PRN   • aluminum-magnesium hydroxide-simethicone (MYLANTA) oral suspension 30 mL  30 mL Oral Q6H PRN   • buprenorphine-naloxone (Suboxone) film 8 mg  8 mg Sublingual BID   • dextrose 5 % and sodium chloride 0 9 % infusion  100 mL/hr Intravenous Continuous   • enoxaparin (LOVENOX) subcutaneous injection 40 mg  40 mg Subcutaneous Daily   • folic acid 1 mg in sodium chloride 0 9 % 50 mL IVPB  1 mg Intravenous Daily   • multivitamin-minerals (CENTRUM) tablet 1 tablet  1 tablet Oral Daily   • ondansetron (ZOFRAN) injection 4 mg  4 mg Intravenous Q6H PRN   • phenobarbital in sodium chloride 0 9% 650 mg  650 mg Intravenous Once   • thiamine (VITAMIN B1) 500 mg in sodium chloride 0 9 % 50 mL IVPB  500 mg Intravenous Q8H [address]   • traZODone (DESYREL) tablet 50 mg  50 mg Oral HS PRN       Continuous Infusions:dextrose 5 % and sodium chloride 0 9 %, 100 mL/hr, Last Rate: 100 mL/hr (11/13/22 1116)             Objective     No intake or output data in the 24 hours ending 11/13/22 1136    Invasive Devices:   Peripheral IV 11/13/22 Left Antecubital (Active)       Peripheral IV 11/13/22 Distal;Dorsal (posterior); Right Forearm (Active)       Vitals   There were no vitals filed for this visit  Physical Exam  Constitutional:       General: He is in acute distress  Appearance: He is diaphoretic  Eyes:      Extraocular Movements:      Right eye: Nystagmus present  Left eye: Nystagmus present  Cardiovascular:      Rate and Rhythm: Tachycardia present  Heart sounds: No murmur heard  Pulmonary:      Breath sounds: Normal breath sounds  Abdominal:      Tenderness: There is no abdominal tenderness  Neurological:      Motor: Tremor present  Psychiatric:         Mood and Affect: Mood is anxious  Data:    EKG, Pathology, and Other Studies: I have personally reviewed pertinent reports  EKG:     Lab Results:  CBC ETOH     Lab Results   Component Value Date    WBC 12 27 (H) 11/13/2022    RBC 5 71 (H) 11/13/2022    HGB 17 1 (H) 11/13/2022    HCT 52 0 (H) 11/13/2022    MCV 91 11/13/2022    MCH 29 9 11/13/2022    MCHC 32 9 11/13/2022    RDW 13 4 11/13/2022     11/13/2022    MPV 11 2 11/13/2022      Lab Results   Component Value Date    LACTICACID 3 1 (HH) 11/13/2022      CMP UA         Component Value Date/Time    K 3 7 11/13/2022 0800    CL 98 11/13/2022 0800    CO2 27 11/13/2022 0800    BUN 18 11/13/2022 0800    CREATININE 1 04 11/13/2022 0800         Component Value Date/Time    CALCIUM 9 9 11/13/2022 0800    ALKPHOS 257 (H) 11/13/2022 0800    AST  11/13/2022 0800      Comment:      No result  Specimen collection should occur prior to Sulfasalazine administration due to the potential for falsely depressed results        (H) 11/13/2022 0800      Lab Results   Component Value Date    CLARITYU Cloudy 07/19/2022    COLORU Yellow 07/19/2022    SPECGRAV >=1 030 07/19/2022    PHUR 5 5 07/19/2022    GLUCOSEU Negative 07/19/2022    KETONESU Trace (A) 07/19/2022    BLOODU Negative 07/19/2022    PROTEIN UA Trace (A) 07/19/2022    NITRITE Negative 07/19/2022 BILIRUBINUR Interference- unable to analyze (A) 07/19/2022    UROBILINOGEN 0 2 07/19/2022    LEUKOCYTESUR Negative 07/19/2022    WBCUA 2-4 07/19/2022    RBCUA None Seen 07/19/2022    HYALINE 10-20 (A) 07/19/2022    BACTERIA None Seen 07/19/2022    EPIS Occasional 07/19/2022        Liver Function Test: ASA     Lab Results   Component Value Date    TBILI 0 68 11/13/2022    BILIDIR 0 13 07/19/2022    ALKPHOS 257 (H) 11/13/2022    AST  11/13/2022      Comment:      No result  Specimen collection should occur prior to Sulfasalazine administration due to the potential for falsely depressed results   (H) 11/13/2022    TP 8 5 (H) 11/13/2022    ALB 4 6 11/13/2022      Lab Results   Component Value Date    SALICYLATE 4 48/99/4951      Troponin APAP     No results found for: TROPONINI   Lab Results   Component Value Date    ACTMNPHEN <2 0 (L) 11/13/2022      VBG HCG     No results found for: PHVEN, EAN0BAJ, PO2VEN, STB9HMC, BEVEN, Q0WYCNFNU, B5DFNPI   No results found for: HCGQUANT   ABG Urine Drug Screen     No results found for: PHART, OIW6UAN, PO2ART, YQP2RGS, BEART, B5DUHLAIT, O2HGB, SOURC, MALLY, VTAC, ACRATE, INSPIREDAIR, PEEP   Lab Results   Component Value Date    AMPMETHUR Positive (A) 07/19/2022    BARBTUR Negative 07/19/2022    BDZUR Positive (A) 07/19/2022    COCAINEUR Positive (A) 07/19/2022    METHADONEUR Negative 07/19/2022    OPIATEUR Negative 07/19/2022    PCPUR Negative 07/19/2022    THCUR Positive (A) 07/19/2022    OXYCODONEUR Negative 07/19/2022      Lactate INR     Lab Results   Component Value Date    LACTICACID 3 1 ([place]) 11/13/2022      Lab Results   Component Value Date    INR 1 02 07/19/2022      PTT Protime     Lab Results   Component Value Date/Time    PTT 33 07/19/2022 03:36 AM        Lab Results   Component Value Date/Time    PROTIME 13 3 07/19/2022 03:36 AM              Imaging Studies: I have personally reviewed pertinent reports          Counseling / Coordination of Care  Total floor / unit time spent today 30 minutes  Greater than 50% of total time was spent with the patient and / or family counseling and / or coordination of care  Minutes of critical care time 28  -Critical care time was exclusive of separately billable procedures and teaching time    -Critical care was necessary to treat or prevent imminent or life-threatening deterioration of the following condition: CNS failure/compromise, toxidrome (ethanol withdrawal),  withdrawal  -Critical care time was spent personally by me on the following activities as well as the above as per the course and rest of chart: obtaining history from patient/surrogate, development of a treatment plan, discussions with referring provider(s), evaluation of patient's response to the treatment, examination of the patient, performing treatments and interventions, re-evaluation of the patient's condition, review of old charts, ordering/interpreting laboratory studies, ordering/interpreting of radiographic studies  ** Please Note: This note has been constructed using a voice recognition system   **

## 2022-11-13 NOTE — ED NOTES
Callbell and bedside table within reach  Bed in low position  Siderails up  HOB elevated  Lights dimmed  Water provided  Patient resting at this time        Donn Aguilar, [place]  11/13/22 7790

## 2022-11-13 NOTE — ASSESSMENT & PLAN NOTE
· Last reported drink 11/11/22  · Serum alcohol <10 in the ED  · Received Valium 25 mg, Zofran 8 mg  in the ED PTA  · Received 1560mg phenobarbital and was placed on Precedex infusion overnight due to agitation and disruptive behavior  Infusion discontinued at 6AM 11/14     · Not exhibiting evidence of withdrawal at this time

## 2022-11-13 NOTE — ASSESSMENT & PLAN NOTE
· CMP on admission revealed anion gap 14, CO2 27, Cl 98   · Likely 2/2 AKA   · Initiate IVF hydration with D5 NS  · Continue monitoring CMP for closure of anion gap and adjust IVFs accordingly  · Encourage alcohol cessation

## 2022-11-13 NOTE — DISCHARGE INSTR - OTHER ORDERS
DRUG AND ALCOHOL RESOURCES  [facility]    If you have health insurance, including medical assistance, there should be a phone number on your insurance card that you can call to find out how to access services  The card may say, “For [address] or “For Drug and Alcohol Services” or “For Substance Abuse Services” call the number provided  Or call [place] Get Help Now at [facility] Drug and Alcohol  Drug & Alcohol Program  [address]  [phone]  Contact: Radha Orta  Confidential free help, from public health agencies, to find substance use treatment and information  [phone]    12 step Meetings     [place] [address] [address] AA meetings  Serving [address] in  [facility]    Call [address] At:  [phone]    About co     [address]: [address] [facility] Crisis Intervention  [phone] or [address]:  [address] , [address] on [address] [facility] HELPLINE: [phone]/Website: www [facility] org  *Substance Abuse and [address] [facility], which is a confidential, free, 24-hour-a-day, 365-day-a-year, information service for individuals and family members facing mental health and/or substance use disorders  This service provides referrals to local treatment facilities, support groups, and community-based organizations  Callers can also order free publications and other information    Call 3-570.948.8210/Website: www [facility] gov  *[employer] 2-1-1: This is a toll free, confidential, 24-hour-a-day service which connects you to a community  in your area who can help you find services and resources that are available to you locally and provide critical services that can improve and save lives  Call: 211  /Website: https://[employer] net/    [address]  [place]  [place]   [address]     [address] 3rd 735 [facility], [address]    1 [facility]  [address]  Child and [facility]    [facility]     [address] 2nd [address] [facility]  [address]  [address]     [address] [facility], [address]  [facility]  [phone]  [facility]   The [facility]  [facility], 17[address] [facility]  [phone]    For private insurance provider patients refer to the number on their care for a list of behavioral health providers in  Network  Crisis intervention services    Provides intervention on a emergency basis via telephone and mobile      [employer] Crisis: [address] [address] Crisis Services: [phone]

## 2022-11-13 NOTE — QUICK NOTE
MEDICAL DETOX UNIT, LEVEL 4  Department of Medical Toxicology       Reason for Admission/Principal Problem: alcohol withdrawal  Reassessing Provider: Jeanie Smith PA-C  11/13/2022 10:56 AM    11/13/22  TIME    Patient is currently on SEWS protocol  I have reevaluated the patient  Per nursing patient requesting discharge after receiving 35 mg of Valium and 1430 mg of phenobarbital today alone  Patient is agitated upon approach, demanding "Valium" and medication to " knock him the f**k out"  Patient states he wants scheduled medication while he is awake because he started to "feel uncomfortable"  Provider explained to patient that he currently is on SEWS protocol and we will continue to provide him withdrawal medication  Patient is not cooperative with provider's reassurance  Continues to curse at staff stating " that phenobarbital does nothing, I need ativan like every other place"  Patient attempting to get out of bed with an unsteady gait, requesting discharge  Provider explained the risks of leaving against medical advice while patient is still exhibiting active withdrawal symptoms and unsteady gait  Security called for walk through due to increased agitation towards staff  BP (!) 160/101   Pulse 90   Temp 97 6 °F (36 4 °C) (Temporal)   Resp 20   Ht 5' 10" (1 778 m)   Wt 106 kg (233 lb)   SpO2 96%   BMI 33 43 kg/m²       SEWS Total Score: 13 (11/13/2022  5:06 PM)      Clinical Opiate Withdrawal Scale  Pulse: 90        Discussed patient status with attending? yes    Plan: Continue with SEWS protocol, will administer 10 mg of valium  Continue phenobarbital dosing, due to patient's agitation and inability to be re-directed as he is a fall risk will initiate precedex infusion  Will continue to closely monitor patient and update level of care to SD2

## 2022-11-13 NOTE — PROGRESS NOTES
11/13/22 1219   Referral Data   Referral Source Patient   Referral Name Self   Referral Reason Drug/Alcohol [facility] [place]   Readmission Root Cause   30 Day Readmission No   Patient Information   Mental Status Alert   Primary Caregiver Self   Accompanied by/Relationship Self   Support System Immediate family   Legal Information   Legal Issues Pt denies   Activities of Daily Living Prior to Admission   Functional Status Independent   Assistive Device No device needed   Living Arrangement House;Lives alone   Ambulation Independent   Access to Firearms   Access to Firearms No  (pt denies)   Income [address] [employer]   Means of Transport to Appts: Drives Self       Pt is a 31year old single male who was admitted to the detox unit for alcohol withdrawal  Pt was previously admitted to this unit in June 2022  Pt's name, date of birth, home address, and telephone number were verified  Pt was informed of case management role and the purpose of the completion of intake with case management  Pt reports he maintained about 96 days of sobreity after his last admission to rehab in August 2022; pt reports that he relapsed approximately 3 weeks ago and has been drinking 20-30 shots of fireball whiskey daily since  Pt reports last use 11/11/2022 of 30-40 shots whiskey and first use at age 14  Pt reports occasional methamphetamine use and states last use within the last year and first use at age 27  Pt has a hx of heroin use IV, last use 10 years ago and first use at age 26  Pt reports ongoing suboxone MAT to address this issue; pt reports that his PCP at [facility] manages his MAT  Pt reports daily prescribed kolonpin use of  25 BID  Pt denied any abuse of this medication  Pt reports occasional marijuana use, last 1 week ago and first use at age 15    Pt reports a hx of crack cocaine use with first use at age 17 and last use 2016  Pt reports daily nicotine use of 1 PPD cigarette smoking  Pt reports longest abstinence from alcohol 5 months  Pt reports previous inpatient treatment, last 8/2022 at [facility], previous outpatient, current suboxone MAT, and previous 12 step meeting attendance  Pt reports current withdrawal symptoms tremors, headache, gi issues  Pt reports last withdrawal seizure around June 2022, pt reports hx of dt's, and hx of withdrawal hallucinations  Pt denied family hx of substance abuse  AUDIT: 39  PAWSS: 5   UDS: Not completed   Ethanol in ED: Not completed     Pt reports a mental health diagnosis of PTSD and anxiety which he currently receives treatment from PCP  Pt reports one previous mental health inpatient, last December 2021  Pt denied current SI or HI, denied AH/VH, denied hx of abuse or trauma, pt denied access to firearms, denied family hx of mental health needs  Pt denied current medical issues  Pt reports PCP as [facility], declined to sign SAL, states that he has an appointment already scheduled  Pt's AVS has upcoming appt listed on 11/16/2022 at 8am  Pt reports current medical insurance as [employer], SAL signed and preferred pharmacy as [facility]  Pt denied current legal issues  Pt reports he is employed working [employer]  Pt reports education of some college  Pt states he has a car and a license  Pt reports no  service and no religous or cultural preferences at this time  Pt reports he resides alone in a home  Pt stated he did not want any contact with a supportive contact at this time  Pt states he can return to this home and will need transportation home  Pt and Cm completed relapse prevention plan  Pt and Cm signed plan and pt received a copy of this plan   Pt struggled to identify triggers stating that he has been working a lot and stopped attending [employer] about a month ago; he reported that up until that time he had been "doing well"  Pt stated that he would like to return to that program upon discharge and signed SAL  Pt states that although he has family for support, his social network is comprised of many individuals who drink and use drugs which he states makes it difficult to maintain sobriety  Pt struggled to identify coping skills and demonstrated some lack of understanding of coping skills  Pt's clinical presentation indicates that pt has struggled to maintain abstinence and will usually start using another substance when he stops one  Pt's goals for detox are to successfully complete medical withdrawal and develop a discharge plan that addresses pt's need for addiction education and development of coping skills  Pt presents in the contemplation stage of change

## 2022-11-13 NOTE — ASSESSMENT & PLAN NOTE
Pt with a h/o chronic heavy alcohol use   Reports to recent three week binge in which he was consuming 20-30 shots of whiskey (Fireball) daily   Previous admission to the [address] [place] [address] Detox Unit 6/14/22- 6/16/22  Recent inpatient drug and alcohol rehab August 2022 through [facility]   Reports H/o withdrawal seizures  Withdrawal management as above  Initiate IVFs  Initiate high dose thiamine due to ataxia and + nystagmus on examination  Folic acid and multivitamin supplementation   Not a candidate for naltrexone currently of MAT buprenorphine   Consult case management for after care planning interested in outpatient services

## 2022-11-13 NOTE — ED PROVIDER NOTES
History  Chief Complaint   Patient presents with   • Drug / Alcohol Assessment     Pt here for alcohol withdrawal  Normally has 2-[30 fireball shots/day  Last drink was 2 days ago     Patient presents for evaluation acute alcohol withdrawal   He is having blurred vision, intractable nausea with vomiting, tachycardia, diaphoresis and tremulousness  He has had alcohol withdrawal previously, including DTs with seizures  His last drink was approximately 2 days ago  He has been drinking 20-30 shots of Fireball daily for the past 3 weeks  His last episode of alcohol withdrawal was approximately 4 or 5 months ago  He has been admitted to our detox unit and has been to rehab  He is currently receiving Suboxone treatment for opioid dependence  He was recently started on buspirone for anxiety symptoms  He has been able to continue taking the Suboxone because it is a sublingual film but he has been vomiting the buspirone  He denies any recent seizures, trauma or illness  Denies HA, neck or back pain, CP, SOB, abdominal pain  12 system ROS o/w negative  History provided by:  Patient and medical records  Alcohol Intoxication  Similar prior episodes: yes    Severity:  Moderate  Duration:  1 day  Timing:  Constant  Progression:  Worsening  Chronicity:  Recurrent  Suspected agents:  Alcohol  Associated symptoms: nausea and vomiting    Associated symptoms: no abdominal pain, no blackouts, no bladder incontinence, no bowel incontinence, no confusion, no hallucinations, no headaches, no loss of consciousness, no palpitations, no seizures, no shortness of breath, no suicidal ideation and no weakness    Risk factors: addiction treatment and withdrawal syndrome    Risk factors: no recent illness and no recent infection        Prior to Admission Medications   Prescriptions Last Dose Informant Patient Reported? Taking?    QUEtiapine (SEROquel) 200 mg tablet   No Yes   Sig: take 1 tablet by mouth at bedtime buprenorphine-naloxone (Suboxone) 8-2 mg   No Yes   Sig: Place 2 Film (16 mg total) under the tongue daily   busPIRone (BUSPAR) 10 mg tablet   No Yes   Sig: Take 1 tablet (10 mg total) by mouth 3 (three) times a day   cloNIDine (CATAPRES) 0 1 mg tablet   Yes No   Patient not taking: Reported on 10/18/2022   clonazePAM (KlonoPIN) 1 mg tablet   No Yes   Sig: Take 0 5 tablets (0 5 mg total) by mouth 2 (two) times a day   divalproex sodium (DEPAKOTE) 500 mg EC tablet   Yes No   Patient not taking: Reported on 10/18/2022   escitalopram (LEXAPRO) 10 mg tablet   No No   Sig: Take 1 tablet (10 mg total) by mouth daily   Patient not taking: Reported on 10/18/2022   naloxone (NARCAN) 4 mg/0 1 mL nasal spray   No No   Sig: Administer 1 spray into a nostril  If no response after 2-3 minutes, give another dose in the other nostril using a new spray  Patient not taking: Reported on 10/18/2022   naproxen (NAPROSYN) 500 mg tablet   No No   Sig: Take 1 tablet (500 mg total) by mouth 2 (two) times a day with meals      Facility-Administered Medications: None       Past Medical History:   Diagnosis Date   • Chronic pain    • Depression    • Pelvic fracture (HCC)    • PTSD (post-traumatic stress disorder)    • Substance abuse ([facility] [address] )        Past Surgical History:   Procedure Laterality Date   • BONY PELVIS SURGERY         History reviewed  No pertinent family history  I have reviewed and agree with the history as documented      E-Cigarette/Vaping   • E-Cigarette Use Former User    • Comments THC, medical       E-Cigarette/Vaping Substances   • Nicotine No    • THC No    • CBD No    • Flavoring No    • Other No    • Unknown No      Social History     Tobacco Use   • Smoking status: Current Every Day Smoker     Packs/day: 1 00     Types: Cigarettes   • Smokeless tobacco: Never Used   Vaping Use   • Vaping Use: Former   Substance Use Topics   • Alcohol use: Yes     Comment: 20-30 shots per day   • Drug use: Not Currently     Types: Methamphetamines, Marijuana, Cocaine     Comment: Kwesi Junior current use       Review of Systems   Constitutional: Positive for diaphoresis  Negative for chills and fever  HENT: Negative for rhinorrhea, sore throat and trouble swallowing  Eyes: Positive for visual disturbance  Respiratory: Negative for cough, chest tightness, shortness of breath and wheezing  Cardiovascular: Negative for chest pain, palpitations and leg swelling  Gastrointestinal: Positive for nausea and vomiting  Negative for abdominal distention, abdominal pain, bowel incontinence, constipation and diarrhea  Genitourinary: Negative for bladder incontinence, difficulty urinating, dysuria, flank pain, frequency, hematuria and urgency  Musculoskeletal: Negative for arthralgias, back pain, myalgias, neck pain and neck stiffness  Skin: Negative for pallor, rash and wound  Neurological: Positive for tremors  Negative for dizziness, seizures, loss of consciousness, syncope, speech difficulty, weakness, light-headedness and headaches  Hematological: Negative for adenopathy  Psychiatric/Behavioral: Negative for confusion, hallucinations and suicidal ideas  The patient is not nervous/anxious  All other systems reviewed and are negative  Physical Exam  Physical Exam  Vitals reviewed  Constitutional:       General: He is in acute distress  Appearance: He is well-developed  He is ill-appearing and diaphoretic  He is not toxic-appearing  HENT:      Head: Normocephalic and atraumatic  Right Ear: External ear normal       Left Ear: External ear normal       Nose: Nose normal  No rhinorrhea  Mouth/Throat:      Mouth: Mucous membranes are moist       Pharynx: Oropharynx is clear  No oropharyngeal exudate or posterior oropharyngeal erythema  Eyes:      General: No scleral icterus  Extraocular Movements: Extraocular movements intact        Conjunctiva/sclera: Conjunctivae normal       Pupils: Pupils are equal, round, and reactive to light  Comments: (+) nystagmus   Neck:      Comments: No midline cervical TTP  Cardiovascular:      Rate and Rhythm: Regular rhythm  Tachycardia present  Heart sounds: Normal heart sounds  No murmur heard  Pulmonary:      Effort: Pulmonary effort is normal  No respiratory distress  Breath sounds: Normal breath sounds  Abdominal:      General: Bowel sounds are normal       Palpations: Abdomen is soft  Tenderness: There is no abdominal tenderness  There is no right CVA tenderness or left CVA tenderness  Musculoskeletal:         General: No signs of injury  Normal range of motion  Cervical back: Normal range of motion and neck supple  No rigidity  Right lower leg: No edema  Left lower leg: No edema  Skin:     General: Skin is warm  Coloration: Skin is not jaundiced  Neurological:      General: No focal deficit present  Mental Status: He is oriented to person, place, and time  Cranial Nerves: No cranial nerve deficit  Sensory: No sensory deficit  Motor: No weakness  Coordination: Coordination normal       Gait: Gait normal       Comments: Appears intoxicated   Psychiatric:         Mood and Affect: Mood normal          Behavior: Behavior normal          Thought Content:  Thought content normal          Vital Signs  ED Triage Vitals [11/13/22 0800]   Temperature Pulse Respirations Blood Pressure SpO2   97 8 °F (36 6 °C) 102 20 (!) 197/116 99 %      Temp Source Heart Rate Source Patient Position - Orthostatic VS BP Location FiO2 (%)   Temporal Monitor Sitting Right arm --      Pain Score       6           Vitals:    11/13/22 0845 11/13/22 0900 11/13/22 0916 11/13/22 0930   BP: (!) 197/98 155/98 141/83 (!) 150/104   Pulse: 100 88 94 90   Patient Position - Orthostatic VS: Lying Lying  Lying         Visual Acuity  Visual Acuity    Flowsheet Row Most Recent Value   L Pupil Size (mm) 3   R Pupil Size (mm) 3          ED Medications  Medications   thiamine (VITAMIN B1) 100 mg in sodium chloride 0 9 % 50 mL IVPB (100 mg Intravenous New Bag 11/13/22 0939)   dextrose 5 % and sodium chloride 0 9 % infusion (125 mL/hr Intravenous New Bag 11/13/22 0908)   ondansetron (ZOFRAN) injection 4 mg (4 mg Intravenous Given 11/13/22 0757)   diazepam (VALIUM) injection 10 mg (10 mg Intravenous Given 11/13/22 0757)   sodium chloride 0 9 % bolus 1,000 mL (0 mL Intravenous Stopped 11/13/22 0905)   ketorolac (TORADOL) injection 30 mg (30 mg Intravenous Given 11/13/22 0844)   diazepam (VALIUM) injection 10 mg (10 mg Intravenous Given 11/13/22 0843)   ondansetron (ZOFRAN) injection 4 mg (4 mg Intravenous Given 11/13/22 0843)   diazepam (VALIUM) injection 5 mg (5 mg Intravenous Given 11/13/22 0939)   ondansetron (ZOFRAN) injection 4 mg (4 mg Intravenous Given 11/13/22 0939)       Diagnostic Studies  Results Reviewed     Procedure Component Value Units Date/Time    Beta Hydroxybutyrate [[id number]]  (Normal) Collected: 11/13/22 0913    Lab Status: Final result Specimen: Blood from Arm, Left Updated: 11/13/22 0936     BETA-HYDROXYBUTYRATE 0 1 mmol/L     Ethanol [[id number]]  (Normal) Collected: 11/13/22 0800    Lab Status: Final result Specimen: Blood from Arm, Left Updated: 11/13/22 0835     Ethanol Lvl <3 mg/dL     Rapid drug screen, urine [[id number]]     Lab Status: No result Specimen: Urine     Lactic acid [[id number]]  (Abnormal) Collected: 11/13/22 0800    Lab Status: Final result Specimen: Blood from Arm, Left Updated: 11/13/22 0828     LACTIC ACID 3 1 mmol/L     Narrative:      Result may be elevated if tourniquet was used during collection      Lactic acid 2 Hours [[id number]]     Lab Status: No result Specimen: Blood     Comprehensive metabolic panel [[id number]]  (Abnormal) Collected: 11/13/22 0800    Lab Status: Final result Specimen: Blood from Arm, Left Updated: 11/13/22 0823     Sodium 139 mmol/L      Potassium 3 7 mmol/L      Chloride 98 mmol/L CO2 27 mmol/L      ANION GAP 14 mmol/L      BUN 18 mg/dL      Creatinine 1 04 mg/dL      Glucose 120 mg/dL      Calcium 9 9 mg/dL      AST --      U/L      Alkaline Phosphatase 257 U/L      Total Protein 8 5 g/dL      Albumin 4 6 g/dL      Total Bilirubin 0 68 mg/dL      eGFR 92 ml/min/1 73sq m     Narrative:      [place] guidelines for Chronic Kidney Disease (CKD):   •  Stage 1 with normal or high GFR (GFR > 90 mL/min/1 73 square meters)  •  Stage 2 Mild CKD (GFR = 60-89 mL/min/1 73 square meters)  •  Stage 3A Moderate CKD (GFR = 45-59 mL/min/1 73 square meters)  •  Stage 3B Moderate CKD (GFR = 30-44 mL/min/1 73 square meters)  •  Stage 4 Severe CKD (GFR = 15-29 mL/min/1 73 square meters)  •  Stage 5 End Stage CKD (GFR <15 mL/min/1 73 square meters)  Note: GFR calculation is accurate only with a steady state creatinine    Acetaminophen level-If concentration is detectable, please discuss with medical  on call   [[id number]]  (Abnormal) Collected: 11/13/22 0800    Lab Status: Final result Specimen: Blood from Arm, Left Updated: 11/13/22 0823     Acetaminophen Level <2 0 ug/mL     Magnesium [[id number]]  (Normal) Collected: 11/13/22 0800    Lab Status: Final result Specimen: Blood from Arm, Left Updated: 11/13/22 0820     Magnesium 2 0 mg/dL     Phosphorus [[id number]]  (Normal) Collected: 11/13/22 0800    Lab Status: Final result Specimen: Blood from Arm, Left Updated: 11/13/22 0820     Phosphorus 2 9 mg/dL     Salicylate level [[id number]]  (Normal) Collected: 11/13/22 0800    Lab Status: Final result Specimen: Blood from Arm, Left Updated: 00/03/81 9662     Salicylate Lvl 4 mg/dL     CBC and differential [[id number]]  (Abnormal) Collected: 11/13/22 0800    Lab Status: Final result Specimen: Blood from Arm, Left Updated: 11/13/22 0806     WBC 12 27 Thousand/uL      RBC 5 71 Million/uL      Hemoglobin 17 1 g/dL      Hematocrit 52 0 %      MCV 91 fL      MCH 29 9 pg      MCHC 32 9 g/dL      RDW 13 4 %      MPV 11 2 fL      Platelets 056 Thousands/uL      nRBC 0 /100 WBCs      Neutrophils Relative 82 %      Immat GRANS % 0 %      Lymphocytes Relative 11 %      Monocytes Relative 6 %      Eosinophils Relative 1 %      Basophils Relative 0 %      Neutrophils Absolute 9 90 Thousands/µL      Immature Grans Absolute 0 05 Thousand/uL      Lymphocytes Absolute 1 38 Thousands/µL      Monocytes Absolute 0 73 Thousand/µL      Eosinophils Absolute 0 16 Thousand/µL      Basophils Absolute 0 05 Thousands/µL                  No orders to display              Procedures  ECG 12 Lead Documentation Only    Date/Time: 11/13/2022 8:05 AM  Performed by: Cesilia Leone DO  Authorized by: Cesilia Leone DO     Indications / Diagnosis:  Withdrawal  ECG reviewed by me, the ED Provider: yes    Patient location:  ED  Interpretation:     Interpretation: non-specific    Quality:     Tracing quality:  Limited by artifact  Rate:     ECG rate:  101    ECG rate assessment: tachycardic    Rhythm:     Rhythm: sinus tachycardia    Ectopy:     Ectopy: none    Conduction:     Conduction: normal    ST segments:     ST segments:  Normal  T waves:     T waves: normal    CriticalCare Time  Performed by: Cesilia Leone DO  Authorized by:  Cesilia Leone DO     Critical care provider statement:     Critical care time (minutes):  30    Critical care time was exclusive of:  Separately billable procedures and treating other patients and teaching time    Critical care was necessary to treat or prevent imminent or life-threatening deterioration of the following conditions:  Toxidrome    Critical care was time spent personally by me on the following activities:  Obtaining history from patient or surrogate, development of treatment plan with patient or surrogate, discussions with consultants, evaluation of patient's response to treatment, examination of patient, ordering and performing treatments and interventions, ordering and review of laboratory studies, ordering and review of radiographic studies, re-evaluation of patient's condition and review of old charts    I assumed direction of critical care for this patient from another provider in my specialty: no               ED Course  ED Course as of 11/13/22 0950   Sun Nov 13, 2022   0818 Valium and Zofran given - diaphoresis improved, tachycardia mildly improved, still tremulous  3254 LACTIC ACID(!!): 3 1  Patient is actively withdrawing from alcohol  No clinical evidence of sepsis  1448 Patient now c/o VOGEL  Will give Toradol    2382 Patient accepted to [address] [place] [address] Detox  Awaiting transportation  SBIRT 22yo+    Flowsheet Row Most Recent Value   SBIRT (25 yo +)    In order to provide better care to our patients, we are screening all of our patients for alcohol and drug use  Would it be okay to ask you these screening questions? Yes Filed at: 11/13/2022 0816   Initial Alcohol Screen: US AUDIT-C     1  How often do you have a drink containing alcohol? 6 Filed at: 11/13/2022 0816   2  How many drinks containing alcohol do you have on a typical day you are drinking? 6 Filed at: 11/13/2022 0816   3a  Male UNDER 65: How often do you have five or more drinks on one occasion? 6 Filed at: 11/13/2022 0816   Audit-C Score 18 Filed at: 11/13/2022 3864   Full Alcohol Screen: US AUDIT    4  How often during the last year have you found that you were not able to stop drinking once you had started? 4 Filed at: 11/13/2022 0816   5  How often during past year have you failed to do what was normally expected of you because of drinking? 2 Filed at: 11/13/2022 0816   6  How often in past year have you needed a first drink in the morning to get yourself going after a heavy drinking session? 4 Filed at: 11/13/2022 0816   7  How often in past year have you had feeling of guilt or remorse after drinking? 4 Filed at: 11/13/2022 0816   8   How often in past year have you been unable to remember what happened night before because you had been drinking? 3 Filed at: 11/13/2022 0816   9  Have you or someone else been injured as a result of your drinking? 0 Filed at: 11/13/2022 0816   10  Has a relative, friend, doctor or other health worker been concerned about your drinking and suggested you cut down? 4 Filed at: 11/13/2022 0816   AUDIT Total Score 39 Filed at: 11/13/2022 8199   VIOLA: How many times in the past year have you    Used an illegal drug or used a prescription medication for non-medical reasons? Never Filed at: 11/13/2022 0816                    MDM  Number of Diagnoses or Management Options  Diagnosis management comments: DDx:  Acute EtOH withdrawal w/o evidence of injury  A/P: Will check metabolic workup, aggressively treat symptoms and admitted to detox unit  Amount and/or Complexity of Data Reviewed  Clinical lab tests: ordered and reviewed  Tests in the medicine section of CPT®: reviewed  Review and summarize past medical records: yes        Disposition  Final diagnoses:   Alcohol withdrawal syndrome with perceptual disturbance ([facility] [address] )     Time reflects when diagnosis was documented in both MDM as applicable and the Disposition within this note     Time User Action Codes Description Comment    11/13/2022  8:38 AM Issa Solares Add [S04 155] Alcohol withdrawal syndrome with perceptual disturbance [facility])       ED Disposition     ED Disposition   Transfer to Another Facility-In Network    Condition   --    Date/Time   Sun Nov 13, 2022  8:38 AM    Comment   Zee Marmolejo should be transferred out to Detox  Follow-up Information    None         Patient's Medications   Discharge Prescriptions    No medications on file       No discharge procedures on file      PDMP Review       Value Time User    PDMP Reviewed  Yes 10/18/2022  1:37 PM Christiano Hawkins DO          ED Provider  Electronically Signed by           Cesilia Leone DO  11/13/22 9571

## 2022-11-13 NOTE — ASSESSMENT & PLAN NOTE
· CMP on admission revealed anion gap 14, CO2 27, Cl 98   · Responded to IVF, gap closure on CMP repeat this morning

## 2022-11-13 NOTE — ASSESSMENT & PLAN NOTE
· Patient endorses epigastric abdominal pain with associated symptoms of nausea and vomiting in the setting of chronic alcohol use   · Pain improved this morning and has tolerated PO since admission      · Lipase WNL and CT A/P no intra-abdominal findings

## 2022-11-13 NOTE — ASSESSMENT & PLAN NOTE
· Elevated LFTs on admission:   · Likely 2/2 chronic alcohol use/alcoholic liver disease  · Pt denies any RUQ   · Initiate IVFs  · Continue monitoring CMP  · Encourage alcohol cessation

## 2022-11-14 ENCOUNTER — TRANSITIONAL CARE MANAGEMENT (OUTPATIENT)
Dept: FAMILY MEDICINE CLINIC | Facility: CLINIC | Age: 37
End: 2022-11-14

## 2022-11-14 VITALS
DIASTOLIC BLOOD PRESSURE: 103 MMHG | BODY MASS INDEX: 33.36 KG/M2 | HEART RATE: 62 BPM | OXYGEN SATURATION: 95 % | WEIGHT: 233 LBS | RESPIRATION RATE: 16 BRPM | SYSTOLIC BLOOD PRESSURE: 157 MMHG | HEIGHT: 70 IN | TEMPERATURE: 97.6 F

## 2022-11-14 PROBLEM — F10.939 ALCOHOL WITHDRAWAL SYNDROME WITH COMPLICATION (HCC): Status: RESOLVED | Noted: 2022-06-13 | Resolved: 2022-11-14

## 2022-11-14 PROBLEM — R10.13 EPIGASTRIC PAIN: Status: RESOLVED | Noted: 2022-06-13 | Resolved: 2022-11-14

## 2022-11-14 PROBLEM — E87.29 ALCOHOLIC KETOACIDOSIS: Status: RESOLVED | Noted: 2022-11-13 | Resolved: 2022-11-14

## 2022-11-14 LAB
ALBUMIN SERPL BCP-MCNC: 3.2 G/DL (ref 3.5–5)
ALP SERPL-CCNC: 130 U/L (ref 43–122)
ALT SERPL W P-5'-P-CCNC: 113 U/L
ANION GAP SERPL CALCULATED.3IONS-SCNC: 7 MMOL/L (ref 5–14)
AST SERPL W P-5'-P-CCNC: 50 U/L (ref 17–59)
BILIRUB SERPL-MCNC: 0.28 MG/DL (ref 0.2–1)
BUN SERPL-MCNC: 17 MG/DL (ref 5–25)
CALCIUM ALBUM COR SERPL-MCNC: 8.6 MG/DL (ref 8.3–10.1)
CALCIUM SERPL-MCNC: 8 MG/DL (ref 8.4–10.2)
CHLORIDE SERPL-SCNC: 107 MMOL/L (ref 96–108)
CO2 SERPL-SCNC: 23 MMOL/L (ref 21–32)
CREAT SERPL-MCNC: 0.85 MG/DL (ref 0.7–1.5)
GFR SERPL CREATININE-BSD FRML MDRD: 112 ML/MIN/1.73SQ M
GLUCOSE SERPL-MCNC: 96 MG/DL (ref 70–99)
LACTATE SERPL-SCNC: 0.6 MMOL/L (ref 0.5–2)
MAGNESIUM SERPL-MCNC: 2.1 MG/DL (ref 1.6–2.3)
POTASSIUM SERPL-SCNC: 3.5 MMOL/L (ref 3.5–5.3)
PROT SERPL-MCNC: 5.5 G/DL (ref 6.4–8.4)
SODIUM SERPL-SCNC: 137 MMOL/L (ref 135–147)

## 2022-11-14 RX ORDER — CLONIDINE HYDROCHLORIDE 0.1 MG/1
0.1 TABLET ORAL EVERY 12 HOURS SCHEDULED
Status: DISCONTINUED | OUTPATIENT
Start: 2022-11-14 | End: 2022-11-14 | Stop reason: HOSPADM

## 2022-11-14 RX ADMIN — ACETAMINOPHEN 650 MG: 325 TABLET ORAL at 05:50

## 2022-11-14 RX ADMIN — THIAMINE HYDROCHLORIDE 500 MG: 100 INJECTION, SOLUTION INTRAMUSCULAR; INTRAVENOUS at 05:02

## 2022-11-14 RX ADMIN — BUPRENORPHINE AND NALOXONE 8 MG: 8; 2 FILM BUCCAL; SUBLINGUAL at 08:15

## 2022-11-14 RX ADMIN — GABAPENTIN 100 MG: 100 CAPSULE ORAL at 08:16

## 2022-11-14 RX ADMIN — BUSPIRONE HYDROCHLORIDE 10 MG: 10 TABLET ORAL at 08:16

## 2022-11-14 NOTE — CASE MANAGEMENT
CM received a return call from [facility] outpatient  CM informed pt had not been a pt at this location in the past and had only received inpatient treatment in the past year

## 2022-11-14 NOTE — UTILIZATION REVIEW
NOTIFICATION OF INPATIENT ADMISSION   AUTHORIZATION REQUEST   SERVICING FACILITY:   [address] [place] [address]  [address] , [facility], [address] [place] [address]  Tax ID: [id number]  NPI: [id number] ATTENDING PROVIDER:  Attending Name and NPI#: Sheryl Cortes [[id number]]  Address: [address] , [address]  Phone: [phone]     ADMISSION INFORMATION:  Place of Service: Inpatient [address] [facility]  [address]  Place of Service Code: 21  Inpatient Admission Date/Time: 11/13/22 10:56 AM  Discharge Date/Time: 11/14/2022 10:12 AM  Admitting Diagnosis Code/Description:  Alcohol withdrawal syndrome with perceptual disturbance ([facility] [address] ) [F10 932]     UTILIZATION REVIEW CONTACT:  Moy Puri, Utilization   Network Utilization Review Department  Phone: [phone]  Fax [phone]  Email: [email or web address]  Contact for approvals/pending authorizations, clinical reviews, and discharge  PHYSICIAN ADVISORY SERVICES:  Medical Necessity Denial & [facility] Review  Phone: [phone]  Fax: [phone]  Email: [email or web address]

## 2022-11-14 NOTE — UTILIZATION REVIEW
Initial Clinical Review    Pt initially presented to [facility] ED  Pt was transferred by EMS to [address] [place] [address] for its Level IV medically managed intensive inpatient detox unit, not available at [address] [place] [address]  Admission: Date/Time/Statement:   Admission Orders (From admission, onward)     Ordered        11/13/22 1057  Inpatient Admission  Once                      Orders Placed This Encounter   Procedures   • Inpatient Admission     Standing Status:   Standing     Number of Occurrences:   1     Order Specific Question:   Level of Care     Answer:   Med Surg [16]     Order Specific Question:   Estimated length of stay     Answer:   More than 2 Midnights     Order Specific Question:   Certification     Answer:   I certify that inpatient services are medically necessary for this patient for a duration of greater than two midnights  See H&P and MD Progress Notes for additional information about the patient's course of treatment  Initial Presentation: 30 y o  male who initially presented to [facility], was then transferred by EMS to [address] [place] [address] as a direct admission to medical detox  Inpatient admission for evaluation and treatment of alcohol withdrawal syndrome  PMHx: chronic pain, psychiatric disorder, substance use  Presented w/ request for detox from alcohol  Reports 20-30 shots of whiskey daily, last drink on 11/11  Has prior inpatient & outpatient treatment for withdrawal  Reports hx of withdrawal seizures  On exam, anxiety, tremors, b/l nystagmus, diaphoresis, tachycardic, HTN  Endorses n/v  SEWS 16  Plan: SEWS monitoring w/ phenobarbital management, high-dose IV thiamine/folic acid supplement, IVF, telemetry, continuous pulse ox, continue home meds, trend labs, replete electrolytes as needed  Date: 11/14/22       Day 2: Pt required precedex gtt overnight, became hypotensive and rate decreased   Pt became agitated while being evaluated by provider overnight and removed both his IV sites  Pt alternating between wanting to leave and wanting to stay for treatment  SEWS 0  Plan: continue SEWS monitoring w/ phenobarbital management, thiamine/folic acid supplement, telemetry, continuous pulse ox, continue home meds, trend labs, replete electrolytes as needed, supportive care, continue to encourage treatment for alcohol withdrawal           Wt Readings from Last 1 Encounters:   11/13/22 106 kg (233 lb)     Vital Signs:   Date/Time Temp Pulse Resp BP MAP (mmHg) SpO2 O2 Device   11/14/22 0918 -- -- -- 157/103 Abnormal  -- -- --   11/14/22 0908 -- -- -- 179/101 Abnormal  -- -- --   11/14/22 0500 97 6 °F (36 4 °C) 62 16 96/56 69 95 % None (Room air)   11/13/22 2155 -- 60 -- 125/80 -- 95 % None (Room air)   11/13/22 2125 97 7 °F (36 5 °C) 62 16 80/46 Abnormal  -- 94 % None (Room air)   11/13/22 1900 97 6 °F (36 4 °C) 64 16 108/50 -- 95 % None (Room air)   11/13/22 1835 -- 75 18 102/70 -- 97 % None (Room air)   11/13/22 1820 -- 70 18 105/56 -- 95 % None (Room air)   11/13/22 1800 -- 74 18 130/78 -- 95 % None (Room air)   11/13/22 1745 -- 77 18 135/80 -- 97 % None (Room air)   11/13/22 1730 -- 87 18 138/88 -- 94 % None (Room air)   11/13/22 1720 -- 85 18 140/91 -- 95 % None (Room air)   11/13/22 1703 -- 90 -- 160/101 Abnormal  -- 96 % None (Room air)   11/13/22 1400 -- 79 20 149/93 -- 95 % None (Room air)   11/13/22 1300 -- 87 20 161/95 -- 96 % None (Room air)   11/13/22 1200 -- 92 20 162/107 Abnormal  -- 96 % None (Room air)   11/13/22 1058 -- -- -- -- -- 95 % None (Room air)   11/13/22 1057 97 6 °F (36 4 °C) 93 20 156/110 Abnormal  -- 95 % None (Room air)     Row Name 11/14/22 0300 11/13/22 2300 11/13/22 1920 11/13/22 1900 11/13/22 1706   Severity of Ethanol Withdrawal Scale (SEWS)   ANXIETY: Do you feel that something bad is about to happen to you right now?   0  -NR 0  -NR -- 0  -NR 3  -HC   NAUSEA and DRY HEAVES or VOMITING? 0  -NR 0  -NR -- 0  -NR 0  -HC   SWEATING: (includes moist palms, sweating now)? Score 0 or 2 0  -NR 0  -NR -- 0  -NR 2  -HC   TREMOR: with arms extended eyes closed? 0  -NR 0  -NR -- 0  -NR 2  -HC   AGITATION: Fidgety, restless, pacing? 0  -NR 0  -NR -- 0  -NR 3  -HC   DISORIENTATION: 0  -NR 0  -NR -- 0  -NR 0  -HC   HALLUCINATIONS: 0  -NR 0  -NR -- 0  -NR 0  -HC   VITAL SIGNS: ANY (Pulse >423, Diastolic BP >15, Temp >18 9) 0  -NR 0  -NR -- 0  -NR 3  -HC   SEWS Total Score 0  -NR 0  -NR -- 0  -NR 13  -HC   Almanzar Agitation Sedation Scale (RASS)   Almanzar Agitation Sedation Scale (RASS) -- -- -1  -NR -- --   Row Name 11/13/22 1500 11/13/22 1400 11/13/22 1330 11/13/22 1300 11/13/22 1200   Severity of Ethanol Withdrawal Scale (SEWS)   ANXIETY: Do you feel that something bad is about to happen to you right now? 0  -TN 0  -TN 0  -TN 3  -TN 3  -TN   NAUSEA and DRY HEAVES or VOMITING? 0  -TN 0  -TN 0  -TN 0  -TN 3  -TN   SWEATING: (includes moist palms, sweating now)? Score 0 or 2 0  -TN 0  -TN 0  -TN 0  -TN 2  -TN   TREMOR: with arms extended eyes closed? 0  -TN 0  -TN 0  -TN 2  -TN 2  -TN   AGITATION: Fidgety, restless, pacing? 0  -TN 0  -TN 0  -TN 3  -TN 3  -TN   DISORIENTATION: 0  -TN 0  -TN 0  -TN 0  -TN 0  -TN   HALLUCINATIONS: 0  -TN 0  -TN 0  -TN 0  -TN 0  -TN   VITAL SIGNS: ANY (Pulse >561, Diastolic BP >14, Temp >76 2) 0  -TN 0  -TN 0  -TN 0  -TN 3  -TN   SEWS Total Score 0  -TN 0  -TN 0  -TN 8  -TN 16  -TN   Almanzar Agitation Sedation Scale (RASS)   Al[place]zar Agitation Sedation Scale (RASS) 0  -TN -1  -TN -1  -TN +1  -TN +1  -TN   Row Name 11/13/22 1100       Severity of Ethanol Withdrawal Scale (SEWS)   ANXIETY: Do you feel that something bad is about to happen to you right now? 3  -TN       NAUSEA and DRY HEAVES or VOMITING? 3  -TN       SWEATING: (includes moist palms, sweating now)? Score 0 or 2 2  -TN       TREMOR: with arms extended eyes closed? 2  -TN       AGITATION: Fidgety, restless, pacing?  3  -TN       DISORIENTATION: 0  -TN HALLUCINATIONS: 0  -TN       VITAL SIGNS: ANY (Pulse >306, Diastolic BP >63, Temp >63 3) 3  -TN       SEWS Total Score 16  -TN             Pertinent Labs/Diagnostic Test Results:   CT chest abdomen pelvis w contrast   Final Result by Arsen Vaughn MD (11/13 1436)      No acute intrathoracic, intra-abdominal or intrapelvic abnormality      Several 2 mm pulmonary nodules  Based on current Fleischner Society 2017 Guidelines on incidental pulmonary nodule, no routine follow-up is needed if the patient is low risk  If the patient is high risk, optional follow-up chest CT at 12 months can be    considered             Workstation performed: [id number]               Results from last 7 days   Lab Units 11/13/22  0800   WBC Thousand/uL 12 27*   HEMOGLOBIN g/dL 17 1*   HEMATOCRIT % 52 0*   PLATELETS Thousands/uL 236   NEUTROS ABS Thousands/µL 9 90*         Results from last 7 days   Lab Units 11/14/22  0513 11/13/22  1140 11/13/22  0800   SODIUM mmol/L 137 140 139   POTASSIUM mmol/L 3 5 4 0 3 7   CHLORIDE mmol/L 107 105 98   CO2 mmol/L 23 25 27   ANION GAP mmol/L 7 10 14*   BUN mg/dL 17 16 18   CREATININE mg/dL 0 85 0 75 1 04   EGFR ml/min/1 73sq m 112 118 92   CALCIUM mg/dL 8 0* 9 0 9 9   MAGNESIUM mg/dL 2 1 2 1 2 0   PHOSPHORUS mg/dL  --   --  2 9     Results from last 7 days   Lab Units 11/14/22  0513 11/13/22  1140 11/13/22  0800   AST U/L 50 67*  --    ALT U/L 113* 171* 220*   ALK PHOS U/L 130* 187* 257*   TOTAL PROTEIN g/dL 5 5* 7 2 8 5*   ALBUMIN g/dL 3 2* 4 2 4 6   TOTAL BILIRUBIN mg/dL 0 28 0 66 0 68         Results from last 7 days   Lab Units 11/14/22  0513 11/13/22  1140 11/13/22  0800   GLUCOSE RANDOM mg/dL 96 106* 120     BETA-HYDROXYBUTYRATE   Date Value Ref Range Status   11/13/2022 0 1 <0 6 mmol/L Final     Results from last 7 days   Lab Units 11/13/22  1140   CK TOTAL U/L 66     Results from last 7 days   Lab Units 11/14/22  0513 11/13/22  0800   LACTIC ACID mmol/L 0 6 3 1*     Results from last 7 days Lab Units 11/13/22  1140   LIPASE u/L 26     Results from last 7 days   Lab Units 11/13/22  0800   ETHANOL LVL mg/dL <3   ACETAMINOPHEN LVL ug/mL <0 8*   SALICYLATE LVL mg/dL 4         Past Medical History:   Diagnosis Date   • Chronic pain    • Depression    • Pelvic fracture (HCC)    • PTSD (post-traumatic stress disorder)    • Substance abuse ([facility] )      Present on Admission:  • Alcohol use disorder, severe, dependence ([facility] )  • Alcohol withdrawal syndrome with complication ([place] [address] )  • Opioid dependence with opioid-induced mood disorder (HCC)  • Transaminitis  • Anxiety  • Epigastric pain      Admitting Diagnosis: Alcohol withdrawal syndrome with perceptual disturbance ([place] [address] ) [F10 932]  Age/Sex: 30 y o  male  Admission Orders:  Regular Diet  SCDs  Fall & Seizure Precautions  SEWS monitoring  Telemetry & Continuous Pulse Ox      Scheduled Medications:  buprenorphine-naloxone, 8 mg, Sublingual, BID  busPIRone, 10 mg, Oral, TID  cloNIDine, 0 1 mg, Oral, Q12H MARIUM  enoxaparin, 40 mg, Subcutaneous, Daily  folic acid IVPB, 1 mg, Intravenous, Daily  gabapentin, 100 mg, Oral, TID  melatonin, 6 mg, Oral, HS  multivitamin-minerals, 1 tablet, Oral, Daily  QUEtiapine, 200 mg, Oral, HS  thiamine, 500 mg, Intravenous, Q8H [address]    Continuous IV Infusions:  dexmedeTOMIDine, 0 1-1 mcg/kg/hr, Intravenous, Titrated  dextrose 5% and sodium chloride 0 9 %, 100 mL/hr, Intravenous, Continuous    PRN Meds:  acetaminophen, 650 mg, Oral, Q6H PRN; 11/13 x1, 11/14 x1  aluminum-magnesium hydroxide-simethicone, 30 mL, Oral, Q6H PRN  diazepam, 10 mg, Intravenous; 11/13 x2  LORazepam, 2 mg, Intravenous, Q4H PRN  ondansetron, 4 mg, Intravenous, Q6H PRN; 11/13 x1  phenobarbital, 650 mg, Intravenous; 11/11 x2  phenobarbital, 130 mg, Intravenous; 11/11 x2  traZODone, 50 mg, Oral, HS PRN        IP CONSULT TO CASE MANAGEMENT    Network Utilization Review Department  ATTENTION: Please call with any questions or concerns to [phone] and carefully listen to the prompts so that you are directed to the right person  All voicemails are confidential   Lynette Kussmaul all requests for admission clinical reviews, approved or denied determinations and any other requests to dedicated fax number below belonging to the campus where the patient is receiving treatment   List of dedicated fax numbers for the Facilities:  [address] DENIALS (Administrative/Medical Necessity) [phone]   [address] (Maternity/NICU/Pediatrics) [phone]    Alondra Madison [phone]   [facility] [address] [phone]   [address] [place] [address] [facility] [phone]   1558 [facility] 134 815 [facility] [phone]

## 2022-11-14 NOTE — CASE MANAGEMENT
Cm attempted to reach [facility] outpatient treatment to arrange OP mental health treatment at pt preferred location  A  was left requesting a return call

## 2022-11-14 NOTE — QUICK NOTE
MEDICAL DETOX UNIT, LEVEL 4  Department of Medical Toxicology      Reason for Admission/Principal Problem: Alcohol withdrawal  Reassessing Provider: Richard Yousif PA-C  11/13/2022 10:56 AM    11/14/22  TIME    Patient is currently on SEWs protocol  I have reevaluated the patient  Pt currently on precedex 0 6 mcg/kg/hr but developed hypotension as a result, rate was decreased to 0 4 mcg/kg/hr  Around 6 am, patient woke up from sleeping all night and stated "leg was bothering him", pt requested Toradol but I offered Tylenol initial to patient  Pt immediately began throwing a temper tantrum, cursing and calling me derogatory names in front of nursing staffs and took out his IV  Control team was called  Pt requested to be discharged but I informed patient that he would have to sign out AMA and patient refused  After control team spoke to patient, agreed to cooperate       BP 96/56 (BP Location: Right arm)   Pulse 62   Temp 97 6 °F (36 4 °C) (Temporal)   Resp 16   Ht 5' 10" (1 778 m)   Wt 106 kg (233 lb)   SpO2 95%   BMI 33 43 kg/m²       SEWS Total Score: 0 (11/14/2022  3:00 AM)      Clinical Opiate Withdrawal Scale  Pulse: 62        Discussed patient status with attending? no    Plan: Pt currently does not appear to be withdrawing, alert oriented with no sign of DT as a result, peripheral line was not re-established  -Consider discharging patient in AM

## 2022-11-14 NOTE — NURSING NOTE
Pt slept most of noc  Occasionally woke to eat snacks and then went back to bed  Pt was not able to recall taking his seroquel, gabapentin, or buspar  Pt satisfied that he took medications when RN was able to tell him exactly what time he took them  Pt briefly stated this AM when he was woken for labs that he felt "shitty and sweaty "  RN asked in which way he feels unwell, to which pt responded "just shitty "  RN notes no visible diaphoresis present  RN re-entered room less than 5 minutes after this encounter to hang thiamine per order, pt was sleeping and snoring    RN informed Silvano Orozco PA-C
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
## 2022-11-14 NOTE — PROGRESS NOTES
Precedex gtt turned down to 0 3mcg/kg/hr per verbal conversation with Lowell Ram PA-C regarding pt's BP    Will recheck BP in 30 mins per verbal instruction from D[place]AS

## 2022-11-14 NOTE — NURSING NOTE
Pt verbalized understanding of discharge instructions  Pt received all documented belongings and escorted to [facility] by RN
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
## 2022-11-14 NOTE — PROGRESS NOTES
Provider requesting a set of Vital signs prior to discharging pt  RN found pt smoking a cigarette in the bathroom  Pt stated, "well you shouldn't have given then back to me"  Pt only agreeable to RN assessing his blood pressure at this time

## 2022-11-14 NOTE — DISCHARGE SUMMARY
MEDICAL DETOX UNIT, LEVEL 4  Department of Medical Toxicology  Reason for Admission/Principal Problem: ETOH withdrawal, ETOH use disorder   Admitting provider: KHANG Peña  No att  providers found   11/13/2022 10:56 AM       Discharging Physician / Practitioner: KHANG Peña  PCP: Caleb Vivas DO  Admission Date:   Admission Orders (From admission, onward)     Ordered        11/13/22 1057  Inpatient Admission  Once                      Discharge Date: 11/14/22    Medical Problems             Resolved Problems  Date Reviewed: 11/13/2022          Resolved    Alcohol withdrawal syndrome with complication ([facility] [address] ) 43/47/8524     Resolved by  KHANG Peña    Epigastric pain 11/14/2022     Resolved by  KHANG Peña    Alcoholic ketoacidosis 63/67/7644     Resolved by  KHANG Peña                Alcohol withdrawal syndrome with complication (HCC)-resolved as of 11/14/2022  Assessment & Plan  · Last reported drink 11/11/22  · Serum alcohol <10 in the ED  · Received Valium 25 mg, Zofran 8 mg  in the ED PTA  · Received 1560mg phenobarbital and was placed on Precedex infusion overnight due to agitation and disruptive behavior  Infusion discontinued at 6AM 11/14  · Not exhibiting evidence of withdrawal at this time       * Alcohol use disorder, severe, dependence ([facility] [address] )  Assessment & Plan  Pt with a h/o chronic heavy alcohol use  Reports to recent three week binge in which he was consuming 20-30 shots of whiskey (Fireball) daily   Tolerated SEWS and Precedex infusion  Requesting discharge  Recommended further monitoring however patient declines  Given return precautions     Not a candidate for naltrexone currently of MAT buprenorphine   Consult case management for after care planning interested in outpatient services     Opioid dependence with opioid-induced mood disorder ([facility] [address] )  Assessment & Plan  · History of opioid use disorder and polysubstance use   · Denies hx of IVDU  · PDMP reviewed:  · Currently on Maintenance dosing of buprenorphine/naloxone 8/2mg BID  · Last filled on 10/18/22 for 30 days   · Continue maintenance dosing  · Follow up with outpatient MAT upon discharge       Anxiety  Assessment & Plan  · Patient endorses a h/o chronic anxiety and depression  · Home medications include Buspar 10 mg TID, Seroquel 200mg HS   · Denies SI/HI/thoughts of self harm  · Continue home medications  · Recommend OP f/u with PCP/OP Psychiatry      Alcoholic ketoacidosis-resolved as of 11/14/2022  Assessment & Plan  · CMP on admission revealed anion gap 14, CO2 27, Cl 98   · Responded to IVF, gap closure on CMP repeat this morning     Epigastric pain-resolved as of 11/14/2022  Assessment & Plan  · Patient endorses epigastric abdominal pain with associated symptoms of nausea and vomiting in the setting of chronic alcohol use   · Pain improved this morning and has tolerated PO since admission  · Lipase WNL and CT A/P no intra-abdominal findings       Consultations During Hospital Stay:  · Case management     Procedures Performed:   · none    Significant Findings / Test Results:   · transaminitis - stable     Incidental Findings:   · none     Test Results Pending at Discharge (will require follow up):   · none     Outpatient Tests Requested:  · none    Complications:  NA    Reason for Admission: ETOH withdrawal     Hospital Course:     Tiera Kate is a 30 y o  male patient who originally presented to the hospital on 11/13/2022 due to alcohol withdrawal  Patient initially presented to the [facility] ED requesting detoxification from alcohol  Patient was admitted to the [facility] medical detox unit under [facility] protocol for medically assisted alcohol withdrawal and received a total of 1560 mg phenobarbital  On 11/13 after receiving 1430mg phenobarbital patient was placed on dexmedetomidine infusion due to agitation   Infusion was reduced, and shortly after, discontinued at 6AM on 11/14 due to mild hypotension and patient refusing further treatment  Patient exhibiting similar agitation as previous presentation however he was redirectable to verbal de-escalation, fully alert and oriented with capacity to make sound decisions  Vitals were stable, patient was ambulatory, and tolerating PO  He was not exhibiting objective evidence of alcohol withdrawal  Due to receiving over one gram of phenobarbital and being placed on a sedative infusion, patient was recommended to stay for further observation and treatment if required  Patient declined recommendation and therefore given return precautions  Case management was consulted for assistance with disposition, and patient was discharged with transportation to [facility] and recommendation to continue follow up with primary care provider  The patient, initially admitted to the hospital as inpatient, was discharged earlier than expected given the following: patient recieved phenobarbital and precedex  Stable off protocol, not exhibiting symptoms of withdrawal  Ambulating, alert and oriented, tolerating PO  Is requesting discharge       Please see above list of diagnoses and related plan for additional information  Condition at Discharge: good     Discharge Day Visit / Exam:     Subjective:  Patient is exhibiting agitation, however he is redirectable/reasonable  He is alert and fully oriented with stable vital signs  He reports   Vitals: Blood Pressure: (!) 157/103 (11/14/22 0918)  Pulse: 62 (11/14/22 0500)  Temperature: 97 6 °F (36 4 °C) (11/14/22 0500)  Temp Source: Temporal (11/14/22 0500)  Respirations: 16 (11/14/22 0500)  Height: 5' 10" (177 8 cm) (11/13/22 1057)  Weight - Scale: 106 kg (233 lb) (11/13/22 1145)  SpO2: 95 % (11/14/22 0500)  Exam:   Physical Exam  Vitals and nursing note reviewed  Constitutional:       Appearance: He is not ill-appearing, toxic-appearing or diaphoretic  HENT:      Head: Normocephalic     Eyes:      Extraocular Movements: Extraocular movements intact  Conjunctiva/sclera: Conjunctivae normal       Pupils: Pupils are equal, round, and reactive to light  Cardiovascular:      Rate and Rhythm: Normal rate and regular rhythm  Pulses: Normal pulses  Heart sounds: Normal heart sounds  Pulmonary:      Effort: Pulmonary effort is normal  No respiratory distress  Abdominal:      Palpations: Abdomen is soft  Tenderness: There is no abdominal tenderness  Musculoskeletal:         General: No swelling  Normal range of motion  Right lower leg: No edema  Left lower leg: No edema  Skin:     General: Skin is dry  Neurological:      Mental Status: He is alert and oriented to person, place, and time  Mental status is at baseline  Motor: No tremor  Gait: Gait is intact  Psychiatric:         Mood and Affect: Mood is anxious  Behavior: Behavior is agitated  Judgment: Judgment is impulsive  Comments: Exhibits agitation, poor impulse control, disruptive behavior  Redirectable to verbal de-escalation        Discussion with Family: patient    Discharge instructions/Information to patient and family:   See after visit summary for information provided to patient and family  Provisions for Follow-Up Care:  See after visit summary for information related to follow-up care and any pertinent home health orders  Disposition:     Home    For Discharges to [facility]:   · Not Applicable to this Patient - Not Applicable to this Patient    Planned Readmission: NA     Discharge Statement:  I spent 35 minutes discharging the patient  This time was spent on the day of discharge  I had direct contact with the patient on the day of discharge  Greater than 50% of the total time was spent examining patient, answering all patient questions, arranging and discussing plan of care with patient as well as directly providing post-discharge instructions    Additional time then spent on discharge activities  Discharge Medications:  See after visit summary for reconciled discharge medications provided to patient and family        ** Please Note: This note has been constructed using a voice recognition system **

## 2022-11-14 NOTE — CASE MANAGEMENT
Cm informed by medical staff pt AMA  No supportive contact SAL's signed  No call could be made to inform supportive contacts of pt AMA  Medical staff then indicated that pt was not an AMA and stated he was being discharged at pt request  Pt presented as agitated and did not indicate any willingness to participate in discharge planning  Pt signed [employer] release form

## 2022-11-14 NOTE — PROGRESS NOTES
Pt calling nurse to room to give him his medications that he had previously refused  Pt angry at nurse stating, "I was supposed to get these meds at 05:00am!" RN explained to pt that his medications were scheduled for 08:00am and that she did not come on shift until 07:00am  RN then brought pt requested meds and belongings  RN asked pt if he was ready to leave  Pt then told RN that he is not leaving, and that he wants a new breakfast and for us to arrange his transportation  Pt stating, "Ya'll are holding me hostage! My car is an hour away and my uber ain't working because my phone is being dumb! Ya'll need to call the ambulance that brought me here, and have them take me home!" RN explained to pt that we do not facilitate transportation for pts who leave against medical adviec  PT arguing with RN regarding his care and going back and forth between leaving and staying  Pt finally requesting to speak with NP again  RN attempting to educate pt on his right to leave and that it is a voluntary unit, however his provider does not feel comfortably discharging him at this time  PT continued to become agitated stating, "I ain't leaving and I'm gonna be a sirisha to ya'll as long as I'm here!" Pt proceeded to tell RN to get out of room so he could, "google his meds"  Provider and unit manager aware  Will continue to monitor

## 2022-11-15 LAB
ATRIAL RATE: 101 BPM
P AXIS: 54 DEGREES
PR INTERVAL: 136 MS
QRS AXIS: 49 DEGREES
QRSD INTERVAL: 84 MS
QT INTERVAL: 364 MS
QTC INTERVAL: 471 MS
T WAVE AXIS: 57 DEGREES
VENTRICULAR RATE: 101 BPM

## 2022-11-16 ENCOUNTER — OFFICE VISIT (OUTPATIENT)
Dept: FAMILY MEDICINE CLINIC | Facility: CLINIC | Age: 37
End: 2022-11-16

## 2022-11-16 VITALS
BODY MASS INDEX: 33.5 KG/M2 | SYSTOLIC BLOOD PRESSURE: 156 MMHG | WEIGHT: 234 LBS | HEIGHT: 70 IN | TEMPERATURE: 97.2 F | OXYGEN SATURATION: 97 % | RESPIRATION RATE: 18 BRPM | DIASTOLIC BLOOD PRESSURE: 80 MMHG | HEART RATE: 92 BPM

## 2022-11-16 DIAGNOSIS — F10.20 ALCOHOL USE DISORDER, SEVERE, DEPENDENCE (HCC): ICD-10-CM

## 2022-11-16 DIAGNOSIS — F11.24 OPIOID DEPENDENCE WITH OPIOID-INDUCED MOOD DISORDER (HCC): ICD-10-CM

## 2022-11-16 DIAGNOSIS — F41.9 ANXIETY: ICD-10-CM

## 2022-11-16 DIAGNOSIS — Z92.89 STATUS POST ALCOHOL DETOXIFICATION: Primary | ICD-10-CM

## 2022-11-16 RX ORDER — AMOXICILLIN 500 MG/1
CAPSULE ORAL
COMMUNITY
Start: 2022-10-04 | End: 2022-11-16 | Stop reason: ALTCHOICE

## 2022-11-16 RX ORDER — BUPRENORPHINE AND NALOXONE 8; 2 MG/1; MG/1
16 FILM, SOLUBLE BUCCAL; SUBLINGUAL DAILY
Qty: 60 FILM | Refills: 0 | Status: SHIPPED | OUTPATIENT
Start: 2022-11-16 | End: 2022-12-16

## 2022-11-16 RX ORDER — POLYMYXIN B SULFATE AND TRIMETHOPRIM 1; 10000 MG/ML; [USP'U]/ML
SOLUTION OPHTHALMIC
COMMUNITY
Start: 2022-10-03

## 2022-11-16 RX ORDER — BACITRACIN ZINC AND POLYMYXIN B SULFATE 500; 10000 [USP'U]/G; [USP'U]/G
OINTMENT OPHTHALMIC
COMMUNITY
Start: 2022-10-07

## 2022-11-16 RX ORDER — ACAMPROSATE CALCIUM 333 MG/1
666 TABLET, DELAYED RELEASE ORAL 3 TIMES DAILY
Qty: 180 TABLET | Refills: 0 | Status: SHIPPED | OUTPATIENT
Start: 2022-11-16 | End: 2022-12-16

## 2022-11-16 NOTE — PROGRESS NOTES
Assessment/Plan:    No problem-specific Assessment & Plan notes found for this encounter  Diagnoses and all orders for this visit:    Status post alcohol detoxification    Opioid dependence with opioid-induced mood disorder ([facility] [address] )  -     Toxicology screen, urine  -     buprenorphine-naloxone (Suboxone) 8-2 mg; Place 2 Film (16 mg total) under the tongue daily    Anxiety    Alcohol use disorder, severe, dependence ([facility] [address] )  -     Toxicology screen, urine  -     acamprosate (CAMPRAL) 333 mg tablet; Take 2 tablets (666 mg total) by mouth 3 (three) times a day          Advised that at least 4 weeks of SSRI treatment is needed to start having effects, and patient verbalized understanding and compliance with meds  Advised rejoining A-A meetings  Started Acamprosate to decrease cravings  Subjective:      Patient ID: Francy Castro is a 30 y o  male  Patient presents today as a Transitional Care Management recently discharged from [facility] Detox department on 11/14/22 after a 1 day stay for alcohol use withdrawal  Patient arrived to [place] ED on 11/13/22 by himself for vomiting/diarrhea, tremors, and tachycardia and was transferred out to [facility]  Patient received Phenobarbital and Precedex infusion and was discharged in stable condition per patient preference  Today patient states that he does have some nausea, aches and pains and some sweating, but does not have any tremors, lightheadedness or any seizure-like activity  Patient has not had a drink since 11/11/22  Patient states that he hasn't been taking Buspar that was prescribed last visit because after 2 weeks, it wasn't working for him  Review of Systems   Constitutional: Positive for fatigue  Negative for activity change, chills and fever  HENT: Negative for ear pain, postnasal drip, rhinorrhea, sinus pain and sore throat  Eyes: Negative for pain and visual disturbance  Respiratory: Negative for cough and shortness of breath  Cardiovascular: Negative for chest pain and palpitations  Gastrointestinal: Positive for nausea  Negative for abdominal pain, constipation, diarrhea and vomiting  Genitourinary: Negative for dysuria and hematuria  Musculoskeletal: Positive for arthralgias and back pain  Skin: Negative for color change and rash  Neurological: Negative for dizziness, tremors, seizures, syncope, light-headedness and headaches  All other systems reviewed and are negative  Objective:      /80   Pulse 92   Temp (!) 97 2 °F (36 2 °C)   Resp 18   Ht 5' 10" (1 778 m)   Wt 106 kg (234 lb)   SpO2 97%   BMI 33 58 kg/m²          Physical Exam  Constitutional:       General: He is not in acute distress  Appearance: He is obese  He is not toxic-appearing or diaphoretic  HENT:      Nose: Nose normal    Eyes:      General: No scleral icterus  Cardiovascular:      Rate and Rhythm: Normal rate and regular rhythm  Pulses: Normal pulses  Heart sounds: Normal heart sounds  No murmur heard  Pulmonary:      Effort: Pulmonary effort is normal  No respiratory distress  Breath sounds: Wheezing present  Abdominal:      General: Bowel sounds are normal       Palpations: Abdomen is soft  Tenderness: There is no abdominal tenderness  Musculoskeletal:         General: No swelling  Right lower leg: No edema  Left lower leg: No edema  Skin:     General: Skin is warm  Coloration: Skin is not jaundiced  Neurological:      Mental Status: He is alert and oriented to person, place, and time  Psychiatric:         Attention and Perception: Attention normal          Mood and Affect: Mood normal          Speech: Speech normal          Behavior: Behavior is agitated

## 2022-11-28 LAB
AMPHETAMINES UR QL SCN: NEGATIVE NG/ML
BARBITURATES UR QL SCN: POSITIVE
BENZODIAZ UR QL: POSITIVE
BZE UR QL: NEGATIVE NG/ML
CANNABINOIDS UR QL SCN: POSITIVE
METHADONE UR QL SCN: NEGATIVE NG/ML
OPIATES UR QL: NEGATIVE NG/ML
PCP UR QL: NEGATIVE NG/ML
PROPOXYPH UR QL SCN: NEGATIVE NG/ML

## 2022-11-29 DIAGNOSIS — F41.9 ANXIETY: ICD-10-CM

## 2022-11-29 DIAGNOSIS — F43.10 PTSD (POST-TRAUMATIC STRESS DISORDER): ICD-10-CM

## 2022-11-29 RX ORDER — QUETIAPINE FUMARATE 200 MG/1
200 TABLET, FILM COATED ORAL
Qty: 30 TABLET | Refills: 2 | Status: SHIPPED | OUTPATIENT
Start: 2022-11-29 | End: 2022-12-14 | Stop reason: SDUPTHER

## 2022-11-29 NOTE — TELEPHONE ENCOUNTER
Patient called needing this medication Today saying he's going to [facility] for work for 2 weeks or longer and needed this refilled poss today

## 2022-12-13 ENCOUNTER — TELEPHONE (OUTPATIENT)
Dept: FAMILY MEDICINE CLINIC | Facility: CLINIC | Age: 37
End: 2022-12-13

## 2022-12-13 NOTE — TELEPHONE ENCOUNTER
Patient called stating he won't be able to make his appointment tomorrow  He states he got a call he had to go to [place] for work the other day and is there now and then will be going to [place] next  He is asking if refills for medication can be sent out of state  Not sure how you want to proceed with this  If you want to fill them, schedule virtual or not fill until he has in office visit   He states he needs a refill on his suboxone, lexapro, seroquel and campral

## 2022-12-14 DIAGNOSIS — F11.24 OPIOID DEPENDENCE WITH OPIOID-INDUCED MOOD DISORDER (HCC): ICD-10-CM

## 2022-12-14 DIAGNOSIS — F43.10 PTSD (POST-TRAUMATIC STRESS DISORDER): ICD-10-CM

## 2022-12-14 DIAGNOSIS — F10.20 ALCOHOL USE DISORDER, SEVERE, DEPENDENCE (HCC): ICD-10-CM

## 2022-12-14 DIAGNOSIS — F41.9 ANXIETY: ICD-10-CM

## 2022-12-14 RX ORDER — BUPRENORPHINE AND NALOXONE 8; 2 MG/1; MG/1
16 FILM, SOLUBLE BUCCAL; SUBLINGUAL DAILY
Qty: 60 FILM | Refills: 0 | Status: SHIPPED | OUTPATIENT
Start: 2022-12-14 | End: 2023-01-13

## 2022-12-14 RX ORDER — QUETIAPINE FUMARATE 200 MG/1
200 TABLET, FILM COATED ORAL
Qty: 30 TABLET | Refills: 0 | Status: SHIPPED | OUTPATIENT
Start: 2022-12-14

## 2022-12-14 RX ORDER — ACAMPROSATE CALCIUM 333 MG/1
666 TABLET, DELAYED RELEASE ORAL 3 TIMES DAILY
Qty: 180 TABLET | Refills: 0 | Status: SHIPPED | OUTPATIENT
Start: 2022-12-14 | End: 2023-01-13

## 2022-12-14 NOTE — TELEPHONE ENCOUNTER
I can provide him with a 1 month supply but he will need to come in for further refills beyond that  Please inform the patient  Thanks!

## 2023-01-06 ENCOUNTER — DOCUMENTATION (OUTPATIENT)
Dept: FAMILY MEDICINE CLINIC | Facility: CLINIC | Age: 38
End: 2023-01-06

## 2023-01-06 ENCOUNTER — OFFICE VISIT (OUTPATIENT)
Dept: FAMILY MEDICINE CLINIC | Facility: CLINIC | Age: 38
End: 2023-01-06

## 2023-01-06 VITALS
SYSTOLIC BLOOD PRESSURE: 160 MMHG | BODY MASS INDEX: 35.02 KG/M2 | TEMPERATURE: 98 F | HEIGHT: 70 IN | OXYGEN SATURATION: 99 % | DIASTOLIC BLOOD PRESSURE: 102 MMHG | WEIGHT: 244.6 LBS | HEART RATE: 123 BPM | RESPIRATION RATE: 20 BRPM

## 2023-01-06 DIAGNOSIS — I10 PRIMARY HYPERTENSION: ICD-10-CM

## 2023-01-06 DIAGNOSIS — F43.10 PTSD (POST-TRAUMATIC STRESS DISORDER): ICD-10-CM

## 2023-01-06 DIAGNOSIS — F11.24 OPIOID DEPENDENCE WITH OPIOID-INDUCED MOOD DISORDER (HCC): Primary | ICD-10-CM

## 2023-01-06 DIAGNOSIS — F31.9 BIPOLAR I DISORDER WITH DEPRESSION (HCC): ICD-10-CM

## 2023-01-06 DIAGNOSIS — F10.20 ALCOHOL USE DISORDER, SEVERE, DEPENDENCE (HCC): ICD-10-CM

## 2023-01-06 RX ORDER — QUETIAPINE FUMARATE 200 MG/1
200 TABLET, FILM COATED ORAL
Qty: 30 TABLET | Refills: 2 | Status: SHIPPED | OUTPATIENT
Start: 2023-01-06

## 2023-01-06 RX ORDER — BUPRENORPHINE AND NALOXONE 8; 2 MG/1; MG/1
16 FILM, SOLUBLE BUCCAL; SUBLINGUAL DAILY
Qty: 60 FILM | Refills: 0 | Status: SHIPPED | OUTPATIENT
Start: 2023-01-06 | End: 2023-02-05

## 2023-01-06 RX ORDER — AMLODIPINE BESYLATE 5 MG/1
5 TABLET ORAL DAILY
Qty: 30 TABLET | Refills: 1 | Status: SHIPPED | OUTPATIENT
Start: 2023-01-06

## 2023-01-06 RX ORDER — ACAMPROSATE CALCIUM 333 MG/1
666 TABLET, DELAYED RELEASE ORAL 3 TIMES DAILY
Qty: 180 TABLET | Refills: 0 | Status: SHIPPED | OUTPATIENT
Start: 2023-01-06 | End: 2023-02-05

## 2023-01-06 NOTE — PROGRESS NOTES
Assessment/Plan     Diagnoses and all orders for this visit:    Opioid dependence with opioid-induced mood disorder ([facility] )  Comments:  stable  cont Suboxone at current dose  UDS obtained   PDMP reviewed w/o concerns  Refilled Suboxone  Orders:  -     Toxicology screen, urine  -     Suboxone  -     buprenorphine-naloxone (Suboxone) 8-2 mg; Place 2 Film (16 mg total) under the tongue daily    Alcohol use disorder, severe, dependence ([facility] [address] )  Comments:  Concern for ongoing use  Declined need for acute treatment or detox  Cont Campral for now   He is planning to attend AA meeting [facility]  Orders:  -     acamprosate (CAMPRAL) 333 mg tablet; Take 2 tablets (666 mg total) by mouth 3 (three) times a day    Primary hypertension  Comments:  Elevated today  Start Amlodipine 5mg   Advised low-Na diet  Labs as below  Recheck 1 month  Orders:  -     Comprehensive metabolic panel; Future  -     TSH, 3rd generation with Free T4 reflex; Future  -     HEMOGLOBIN A1C W/ EAG ESTIMATION; Future  -     amLODIPine (NORVASC) 5 mg tablet; Take 1 tablet (5 mg total) by mouth daily    Bipolar I disorder with depression ([facility] )  Comments:  Concern for possible bipolar disorder  Continue Seroquel at current dose  Add Vraylar daily  Orders:  -     cariprazine (Vraylar) 1 5 MG capsule; Take 1 capsule (1 5 mg total) by mouth daily  -     cariprazine (VRAYLAR) 3 MG capsule; Take 1 capsule (3 mg total) by mouth daily    PTSD (post-traumatic stress disorder)  Comments:  Cont current meds  Orders:  -     QUEtiapine (SEROquel) 200 mg tablet; Take 1 tablet (200 mg total) by mouth daily at bedtime      The patient indicates understanding of these issues and agrees with the plan  Return in about 4 weeks (around 2/3/2023) for Recheck  Subjective     Patient Id: Colten Aguilar is a 30 y o  male    HPI    Here today for f/u appointment  Hx of opiate dependence, alcohol use disorder, PTSD and anxiety  Has been doing fairly well up until the last couple weeks  Notes his work has been slow and he has not had much money lately which is stressful  Recently returned from [place] where he was visiting family  He enjoyed the trip  Notes he is doing well on his current Suboxone dose  Denies any recent opiate use  He also reports that he has not had any alcohol in approximately 1 month  He continues to take Campral but does note some GI discomfort when taking that he notes is manageable  Today, he is mostly concerned about his mood  Reports bouts of anxiety and depression  He is no longer taking SSRI or Buspar but does continue to take nightly Seroquel which helps with sleep  He is no longer taking clonidine either  His blood pressure is elevated today and he is tachycardic  Notes occasional palpitations but no chest pain or shortness of breath  He states that he just came from the gym and that may be why his blood pressure is elevated  He is otherwise without complaints or concerns at this time  Review of Systems   Constitutional: Negative for chills and fever  Respiratory: Negative for cough, chest tightness, shortness of breath and wheezing  Cardiovascular: Positive for palpitations  Negative for chest pain and leg swelling  Gastrointestinal: Negative for abdominal pain, constipation, diarrhea, nausea and vomiting  Genitourinary: Negative for dysuria  Musculoskeletal: Negative for arthralgias and myalgias  Skin: Negative for rash  Neurological: Negative for headaches  Psychiatric/Behavioral: Positive for agitation, dysphoric mood and sleep disturbance  Negative for confusion, hallucinations, self-injury and suicidal ideas  The patient is nervous/anxious  The patient is not hyperactive  All other systems reviewed and are negative        Past Medical History:   Diagnosis Date   • Chronic pain    • Depression    • Pelvic fracture (HCC)    • PTSD (post-traumatic stress disorder)    • Substance abuse [facility])        Past Surgical History:   Procedure Laterality Date   • BONY PELVIS SURGERY         History reviewed  No pertinent family history  Allergies   Allergen Reactions   • Penicillin V Other (See Comments)     unknown   • Penicillins Hives         Current Outpatient Medications:   •  acamprosate (CAMPRAL) 333 mg tablet, Take 2 tablets (666 mg total) by mouth 3 (three) times a day, Disp: 180 tablet, Rfl: 0  •  amLODIPine (NORVASC) 5 mg tablet, Take 1 tablet (5 mg total) by mouth daily, Disp: 30 tablet, Rfl: 1  •  bacitracin-polymyxin b ophthalmic ointment, APPLY 1/2 INCH IN INNER EYELID IN LEFT EYE AT BED, Disp: , Rfl:   •  buprenorphine-naloxone (Suboxone) 8-2 mg, Place 2 Film (16 mg total) under the tongue daily, Disp: 60 Film, Rfl: 0  •  cariprazine (Vraylar) 1 5 MG capsule, Take 1 capsule (1 5 mg total) by mouth daily, Disp: 14 capsule, Rfl: 0  •  cariprazine (VRAYLAR) 3 MG capsule, Take 1 capsule (3 mg total) by mouth daily, Disp: 30 capsule, Rfl: 0  •  naproxen (NAPROSYN) 500 mg tablet, Take 1 tablet (500 mg total) by mouth 2 (two) times a day with meals, Disp: 14 tablet, Rfl: 0  •  QUEtiapine (SEROquel) 200 mg tablet, Take 1 tablet (200 mg total) by mouth daily at bedtime, Disp: 30 tablet, Rfl: 2  •  busPIRone (BUSPAR) 10 mg tablet, Take 1 tablet (10 mg total) by mouth 3 (three) times a day, Disp: 90 tablet, Rfl: 0  •  polymyxin b-trimethoprim (POLYTRIM) ophthalmic solution, , Disp: , Rfl:     Objective     Vitals:    01/06/23 1136   BP: (!) 160/102   BP Location: Left arm   Patient Position: Sitting   Cuff Size: Large   Pulse: (!) 123   Resp: 20   Temp: 98 °F (36 7 °C)   TempSrc: Tympanic   SpO2: 99%   Weight: 111 kg (244 lb 9 6 oz)   Height: 5' 10" (1 778 m)       Physical Exam  Vitals reviewed  Constitutional:       General: He is in acute distress  Appearance: He is well-developed  He is toxic-appearing and diaphoretic  HENT:      Head: Normocephalic and atraumatic        Mouth/Throat:      Mouth: Mucous membranes are moist    Eyes:      General: No scleral icterus  Extraocular Movements: Extraocular movements intact  Conjunctiva/sclera: Conjunctivae normal       Pupils: Pupils are equal, round, and reactive to light  Neck:      Thyroid: No thyromegaly  Cardiovascular:      Rate and Rhythm: Regular rhythm  Tachycardia present  Pulses: Normal pulses  Heart sounds: Normal heart sounds  No murmur heard  No gallop  Pulmonary:      Effort: Pulmonary effort is normal  No respiratory distress  Breath sounds: Normal breath sounds  No wheezing, rhonchi or rales  Musculoskeletal:      Cervical back: Neck supple  Skin:     General: Skin is warm  Neurological:      Mental Status: He is alert and oriented to person, place, and time  Psychiatric:         Attention and Perception: Attention and perception normal          Mood and Affect: Affect is labile  Speech: Speech normal  Speech is not rapid and pressured or slurred  Behavior: Behavior normal  Behavior is cooperative  Thought Content: Thought content normal  Thought content does not include homicidal or suicidal ideation  Cognition and Memory: Cognition and memory normal          Judgment: Judgment is impulsive and inappropriate           Salma Chong

## 2023-01-12 ENCOUNTER — TELEPHONE (OUTPATIENT)
Dept: FAMILY MEDICINE CLINIC | Facility: CLINIC | Age: 38
End: 2023-01-12

## 2023-01-12 NOTE — TELEPHONE ENCOUNTER
Spoke with patient in regards to Eber Done  [employer] was unable to process the auth since it is coming up he has [employer] care as his primary insurance  Tried processing this through [employer], but this came up that he is not active with his insurance  Pharmacy is aware that he does not have [place] healthcare  Patient is going to call [employer] to inform them that he doesn't have [place] health care so [facility]

## 2023-01-15 DIAGNOSIS — F43.10 PTSD (POST-TRAUMATIC STRESS DISORDER): ICD-10-CM

## 2023-01-16 RX ORDER — QUETIAPINE FUMARATE 200 MG/1
200 TABLET, FILM COATED ORAL
Qty: 30 TABLET | Refills: 2 | Status: SHIPPED | OUTPATIENT
Start: 2023-01-16

## 2023-02-06 DIAGNOSIS — F43.10 PTSD (POST-TRAUMATIC STRESS DISORDER): ICD-10-CM

## 2023-02-06 NOTE — TELEPHONE ENCOUNTER
Patient wanted his seroquel refilled before he leaves  For [place] in 9 hours and wanted you to call him in your free time

## 2023-02-08 RX ORDER — QUETIAPINE FUMARATE 200 MG/1
200 TABLET, FILM COATED ORAL
Qty: 30 TABLET | Refills: 2 | OUTPATIENT
Start: 2023-02-08

## 2023-02-09 DIAGNOSIS — I10 PRIMARY HYPERTENSION: ICD-10-CM

## 2023-02-09 DIAGNOSIS — F10.20 ALCOHOL USE DISORDER, SEVERE, DEPENDENCE (HCC): ICD-10-CM

## 2023-02-09 DIAGNOSIS — F31.9 BIPOLAR I DISORDER WITH DEPRESSION (HCC): ICD-10-CM

## 2023-02-09 DIAGNOSIS — F43.10 PTSD (POST-TRAUMATIC STRESS DISORDER): ICD-10-CM

## 2023-02-09 RX ORDER — ACAMPROSATE CALCIUM 333 MG/1
666 TABLET, DELAYED RELEASE ORAL 3 TIMES DAILY
Qty: 180 TABLET | Refills: 0 | Status: CANCELLED | OUTPATIENT
Start: 2023-02-09 | End: 2023-03-11

## 2023-02-09 RX ORDER — AMLODIPINE BESYLATE 5 MG/1
5 TABLET ORAL DAILY
Qty: 30 TABLET | Refills: 0 | Status: CANCELLED | OUTPATIENT
Start: 2023-02-09

## 2023-02-09 RX ORDER — QUETIAPINE FUMARATE 200 MG/1
200 TABLET, FILM COATED ORAL
Qty: 30 TABLET | Refills: 0 | Status: CANCELLED | OUTPATIENT
Start: 2023-02-09

## 2023-02-10 DIAGNOSIS — F10.20 ALCOHOL USE DISORDER, SEVERE, DEPENDENCE (HCC): ICD-10-CM

## 2023-02-10 DIAGNOSIS — F43.10 PTSD (POST-TRAUMATIC STRESS DISORDER): ICD-10-CM

## 2023-02-10 DIAGNOSIS — I10 PRIMARY HYPERTENSION: ICD-10-CM

## 2023-02-10 DIAGNOSIS — F31.9 BIPOLAR I DISORDER WITH DEPRESSION (HCC): ICD-10-CM

## 2023-02-10 NOTE — TELEPHONE ENCOUNTER
Pt called, requesting a refill for the following that was selected, but pt was not really sure of the name. Please call pt to confirm medications. Pharmacy info was verified and confirmed per pt's request

## 2023-02-13 RX ORDER — ACAMPROSATE CALCIUM 333 MG/1
666 TABLET, DELAYED RELEASE ORAL 3 TIMES DAILY
Qty: 180 TABLET | Refills: 0 | Status: CANCELLED | OUTPATIENT
Start: 2023-02-13 | End: 2023-03-15

## 2023-02-13 RX ORDER — QUETIAPINE FUMARATE 200 MG/1
200 TABLET, FILM COATED ORAL
Qty: 30 TABLET | Refills: 2 | Status: CANCELLED | OUTPATIENT
Start: 2023-02-13

## 2023-02-13 RX ORDER — AMLODIPINE BESYLATE 5 MG/1
5 TABLET ORAL DAILY
Qty: 30 TABLET | Refills: 1 | Status: CANCELLED | OUTPATIENT
Start: 2023-02-13

## 2023-02-23 ENCOUNTER — TELEPHONE (OUTPATIENT)
Dept: FAMILY MEDICINE CLINIC | Facility: CLINIC | Age: 38
End: 2023-02-23

## 2023-02-23 ENCOUNTER — OFFICE VISIT (OUTPATIENT)
Dept: FAMILY MEDICINE CLINIC | Facility: CLINIC | Age: 38
End: 2023-02-23

## 2023-02-23 VITALS
DIASTOLIC BLOOD PRESSURE: 78 MMHG | TEMPERATURE: 97.8 F | WEIGHT: 239 LBS | HEIGHT: 70 IN | SYSTOLIC BLOOD PRESSURE: 130 MMHG | HEART RATE: 121 BPM | OXYGEN SATURATION: 97 % | BODY MASS INDEX: 34.22 KG/M2

## 2023-02-23 DIAGNOSIS — F41.9 ANXIETY: ICD-10-CM

## 2023-02-23 DIAGNOSIS — F11.24 OPIOID DEPENDENCE WITH OPIOID-INDUCED MOOD DISORDER (HCC): Primary | ICD-10-CM

## 2023-02-23 DIAGNOSIS — F43.10 PTSD (POST-TRAUMATIC STRESS DISORDER): ICD-10-CM

## 2023-02-23 DIAGNOSIS — F10.20 ALCOHOL USE DISORDER, SEVERE, DEPENDENCE (HCC): ICD-10-CM

## 2023-02-23 RX ORDER — QUETIAPINE FUMARATE 200 MG/1
200 TABLET, FILM COATED ORAL
Qty: 30 TABLET | Refills: 2 | Status: SHIPPED | OUTPATIENT
Start: 2023-02-23 | End: 2023-02-23 | Stop reason: SDUPTHER

## 2023-02-23 RX ORDER — NALOXONE HYDROCHLORIDE 4 MG/.1ML
SPRAY NASAL
Qty: 1 EACH | Refills: 1 | Status: SHIPPED | OUTPATIENT
Start: 2023-02-23

## 2023-02-23 RX ORDER — FLUOXETINE 10 MG/1
10 CAPSULE ORAL DAILY
Qty: 30 CAPSULE | Refills: 2 | Status: SHIPPED | OUTPATIENT
Start: 2023-02-23

## 2023-02-23 RX ORDER — METHION/INOS/CHOL BT/B COM/LIV 110MG-86MG
100 CAPSULE ORAL DAILY
COMMUNITY
Start: 2023-01-27

## 2023-02-23 RX ORDER — QUETIAPINE FUMARATE 100 MG/1
100 TABLET, FILM COATED ORAL
Qty: 90 TABLET | Refills: 1 | Status: SHIPPED | OUTPATIENT
Start: 2023-02-23

## 2023-02-23 RX ORDER — BUPRENORPHINE AND NALOXONE 8; 2 MG/1; MG/1
8 FILM, SOLUBLE BUCCAL; SUBLINGUAL 2 TIMES DAILY
Qty: 60 FILM | Refills: 0 | Status: SHIPPED | OUTPATIENT
Start: 2023-02-23

## 2023-02-23 RX ORDER — CHLORDIAZEPOXIDE HYDROCHLORIDE 10 MG/1
CAPSULE, GELATIN COATED ORAL
COMMUNITY
Start: 2023-01-27

## 2023-02-23 RX ORDER — LORAZEPAM 1 MG/1
1 TABLET ORAL EVERY 8 HOURS PRN
Qty: 90 TABLET | Refills: 0 | Status: SHIPPED | OUTPATIENT
Start: 2023-02-23

## 2023-02-23 RX ORDER — LORAZEPAM 1 MG/1
1 TABLET ORAL EVERY 6 HOURS PRN
COMMUNITY
Start: 2023-01-27 | End: 2023-02-23 | Stop reason: SDUPTHER

## 2023-02-23 NOTE — PROGRESS NOTES
Assessment/Plan     Diagnoses and all orders for this visit:    Opioid dependence with opioid-induced mood disorder ([facility] )  Comments:  stable at this time  continue current Suboxone dose  UDS obtained today  PDMP reviewed without concerns  Orders:  -     FENTANYL, URINE  -     Suboxone  -     Toxicology screen, urine  -     buprenorphine-naloxone (Suboxone) 8-2 mg; Place 1 Film (8 mg total) under the tongue 2 (two) times a day  -     naloxone (NARCAN) 4 mg/0 1 mL nasal spray; Administer 1 spray into a nostril  If no response after 2-3 minutes, give another dose in the other nostril using a new spray  Alcohol use disorder, severe, dependence ([facility] )  Comments:  Advised to continue attending AA meetings  Congratulated patient on his abstinence     PTSD (post-traumatic stress disorder)  Comments:  Cont current meds  Orders:  -     Discontinue: QUEtiapine (SEROquel) 200 mg tablet; Take 1 tablet (200 mg total) by mouth daily at bedtime  -     QUEtiapine (SEROquel) 100 mg tablet; Take 1 tablet (100 mg total) by mouth daily at bedtime    Anxiety  Comments:  Start Prozac  Will continue Ativan for now with plans to wean  Reduce Seroquel to 100mg nightly  Orders:  -     FLUoxetine (PROzac) 10 mg capsule; Take 1 capsule (10 mg total) by mouth daily  -     LORazepam (ATIVAN) 1 mg tablet; Take 1 tablet (1 mg total) by mouth every 8 (eight) hours as needed for anxiety    Other orders  -     chlordiazePOXIDE (LIBRIUM) 10 mg capsule  -     Discontinue: LORazepam (ATIVAN) 1 mg tablet; Take 1 mg by mouth every 6 (six) hours as needed  -     Thiamine HCl (vitamin B-1) 100 MG TABS; Take 100 mg by mouth daily      Return in about 4 weeks (around 3/23/2023) for Next scheduled follow up  Subjective     Patient Id: Tyrell Koch is a 30 y o  male    HPI    Here today for MAT f/u     OUD managed with Suboxone 16mg daily  Has been working out of state in [facility] and missed last appointment  Overall reports he is doing well today and has no complaints  Also with hx of alcohol use disorder  No longer taking Campral  Reports he followed up with his psychiatrist in [facility] and she adjusted his medications  He was prescribed both Librium and Ativan for his alcohol use  Today, he reports he has not drank alcohol in over 1 month  Also reports he was attending [place] [address] meetings while in [facility]  He does continue to have intermittent episodes of anxiety   He denies any recent opiate use, cravings, symptoms of withdrawal or drug dreams  He is sleeping well with seroquel use but does think the dose needs to be lowered due to daytime drowsiness  No other concerns at this time  Review of Systems   Constitutional: Negative for chills and fever  Respiratory: Negative for shortness of breath  Cardiovascular: Negative for chest pain  Gastrointestinal: Negative for abdominal pain, constipation, diarrhea, nausea and vomiting  Genitourinary: Negative for dysuria  Musculoskeletal: Negative for arthralgias and myalgias  Skin: Negative for rash  Neurological: Negative for headaches  Psychiatric/Behavioral: Negative for dysphoric mood, self-injury, sleep disturbance and suicidal ideas  The patient is nervous/anxious  All other systems reviewed and are negative  Past Medical History:   Diagnosis Date   • Chronic pain    • Depression    • Pelvic fracture (HCC)    • PTSD (post-traumatic stress disorder)    • [facility]        Past Surgical History:   Procedure Laterality Date   • BONY PELVIS SURGERY         History reviewed  No pertinent family history      Social History     Socioeconomic History   • Marital status: Single     Spouse name: None   • Number of children: None   • Years of education: None   • Highest education level: None   Occupational History   • None   Tobacco Use   • Smoking status: Every Day     Packs/day: 1 00     Types: Cigarettes   • Smokeless tobacco: Never   Vaping Use   • Vaping Use: Former   Substance and Sexual Activity   • Alcohol use: Yes     Comment: 20-30 shots per day   • Drug use: Not Currently     Types: Methamphetamines, Marijuana, Cocaine     Comment: Anitra Stew current use   • Sexual activity: Yes     Partners: Female   Other Topics Concern   • None   Social History Narrative   • None     Social Determinants of Health     Financial Resource Strain: Not on file   Food Insecurity: Not on file   Transportation Needs: Not on file   Physical Activity: Not on file   Stress: Not on file   Social Connections: Not on file   Intimate Partner Violence: Not on file   Housing Stability: Not on file       Allergies   Allergen Reactions   • Penicillin V Other (See Comments)     unknown   • Penicillins Hives         Current Outpatient Medications:   •  amLODIPine (NORVASC) 5 mg tablet, Take 1 tablet (5 mg total) by mouth daily, Disp: 30 tablet, Rfl: 1  •  bacitracin-polymyxin b ophthalmic ointment, APPLY 1/2 INCH IN INNER EYELID IN LEFT EYE AT BED, Disp: , Rfl:   •  buprenorphine-naloxone (Suboxone) 8-2 mg, Place 1 Film (8 mg total) under the tongue 2 (two) times a day, Disp: 60 Film, Rfl: 0  •  chlordiazePOXIDE (LIBRIUM) 10 mg capsule, , Disp: , Rfl:   •  FLUoxetine (PROzac) 10 mg capsule, Take 1 capsule (10 mg total) by mouth daily, Disp: 30 capsule, Rfl: 2  •  LORazepam (ATIVAN) 1 mg tablet, Take 1 tablet (1 mg total) by mouth every 8 (eight) hours as needed for anxiety, Disp: 90 tablet, Rfl: 0  •  naloxone (NARCAN) 4 mg/0 1 mL nasal spray, Administer 1 spray into a nostril   If no response after 2-3 minutes, give another dose in the other nostril using a new spray , Disp: 1 each, Rfl: 1  •  naproxen (NAPROSYN) 500 mg tablet, Take 1 tablet (500 mg total) by mouth 2 (two) times a day with meals, Disp: 14 tablet, Rfl: 0  •  polymyxin b-trimethoprim (POLYTRIM) ophthalmic solution, , Disp: , Rfl:   •  QUEtiapine (SEROquel) 100 mg tablet, Take 1 tablet (100 mg total) by mouth daily at bedtime, Disp: 90 tablet, Rfl: 1  •  Thiamine HCl (vitamin B-1) 100 MG TABS, Take 100 mg by mouth daily, Disp: , Rfl:     Objective     Vitals:    02/23/23 1256   BP: 130/78   Pulse: (!) 121   Temp: 97 8 °F (36 6 °C)   SpO2: 97%   Weight: 108 kg (239 lb)   Height: 5' 10" (1 778 m)       Physical Exam  Vitals reviewed  Constitutional:       General: He is not in acute distress  Appearance: He is well-developed  He is not ill-appearing, toxic-appearing or diaphoretic  HENT:      Head: Normocephalic and atraumatic  Eyes:      Extraocular Movements: Extraocular movements intact  Conjunctiva/sclera: Conjunctivae normal       Pupils: Pupils are equal, round, and reactive to light  Neck:      Thyroid: No thyromegaly  Cardiovascular:      Rate and Rhythm: Regular rhythm  Tachycardia present  Pulses: Normal pulses  Heart sounds: Normal heart sounds  No murmur heard  No gallop  Pulmonary:      Effort: Pulmonary effort is normal  No respiratory distress  Breath sounds: Normal breath sounds  No wheezing, rhonchi or rales  Musculoskeletal:      Cervical back: Neck supple  Skin:     General: Skin is warm  Neurological:      Mental Status: He is alert and oriented to person, place, and time  Psychiatric:         Mood and Affect: Mood normal          Behavior: Behavior normal          Thought Content:  Thought content normal          Judgment: Judgment normal          Salma Talavera

## 2023-02-27 LAB
AMPHETAMINES UR QL SCN: NEGATIVE NG/ML
BARBITURATES UR QL SCN: NEGATIVE NG/ML
BENZODIAZ UR QL: NEGATIVE NG/ML
BUPRENORPHINE UR QL CFM: NEGATIVE NG/ML
BZE UR QL: NEGATIVE NG/ML
CANNABINOIDS UR QL SCN: NEGATIVE NG/ML
FENTANYL+NORFENTANYL PNL UR: NEGATIVE PG/ML
METHADONE UR QL SCN: NEGATIVE NG/ML
OPIATES UR QL: NEGATIVE NG/ML
PCP UR QL: NEGATIVE NG/ML
PROPOXYPH UR QL SCN: NEGATIVE NG/ML

## 2023-03-21 ENCOUNTER — OFFICE VISIT (OUTPATIENT)
Dept: FAMILY MEDICINE CLINIC | Facility: CLINIC | Age: 38
End: 2023-03-21

## 2023-03-21 VITALS
HEART RATE: 115 BPM | DIASTOLIC BLOOD PRESSURE: 84 MMHG | TEMPERATURE: 98 F | WEIGHT: 239.6 LBS | RESPIRATION RATE: 18 BRPM | OXYGEN SATURATION: 97 % | BODY MASS INDEX: 34.3 KG/M2 | SYSTOLIC BLOOD PRESSURE: 144 MMHG | HEIGHT: 70 IN

## 2023-03-21 DIAGNOSIS — F41.9 ANXIETY: ICD-10-CM

## 2023-03-21 DIAGNOSIS — F11.24 OPIOID DEPENDENCE WITH OPIOID-INDUCED MOOD DISORDER (HCC): Primary | ICD-10-CM

## 2023-03-21 DIAGNOSIS — F43.10 PTSD (POST-TRAUMATIC STRESS DISORDER): ICD-10-CM

## 2023-03-21 DIAGNOSIS — F11.24 OPIOID DEPENDENCE WITH OPIOID-INDUCED MOOD DISORDER (HCC): ICD-10-CM

## 2023-03-21 DIAGNOSIS — F10.20 ALCOHOL USE DISORDER, SEVERE, DEPENDENCE (HCC): ICD-10-CM

## 2023-03-21 RX ORDER — QUETIAPINE FUMARATE 100 MG/1
100 TABLET, FILM COATED ORAL
Qty: 90 TABLET | Refills: 1 | Status: SHIPPED | OUTPATIENT
Start: 2023-03-21 | End: 2023-03-21 | Stop reason: SDUPTHER

## 2023-03-21 RX ORDER — QUETIAPINE FUMARATE 100 MG/1
100 TABLET, FILM COATED ORAL
Qty: 90 TABLET | Refills: 1 | Status: SHIPPED | OUTPATIENT
Start: 2023-03-21

## 2023-03-21 RX ORDER — BUPRENORPHINE AND NALOXONE 8; 2 MG/1; MG/1
8 FILM, SOLUBLE BUCCAL; SUBLINGUAL 2 TIMES DAILY
Qty: 60 FILM | Refills: 0 | Status: SHIPPED | OUTPATIENT
Start: 2023-03-21 | End: 2023-03-31 | Stop reason: SDUPTHER

## 2023-03-21 RX ORDER — BUPRENORPHINE AND NALOXONE 8; 2 MG/1; MG/1
8 FILM, SOLUBLE BUCCAL; SUBLINGUAL 2 TIMES DAILY
Qty: 60 FILM | Refills: 0 | Status: CANCELLED | OUTPATIENT
Start: 2023-03-21

## 2023-03-21 RX ORDER — LORAZEPAM 1 MG/1
1 TABLET ORAL EVERY 8 HOURS PRN
Qty: 90 TABLET | Refills: 0 | Status: CANCELLED | OUTPATIENT
Start: 2023-03-21

## 2023-03-21 RX ORDER — LORAZEPAM 1 MG/1
1 TABLET ORAL EVERY 8 HOURS PRN
Qty: 90 TABLET | Refills: 0 | Status: SHIPPED | OUTPATIENT
Start: 2023-03-21

## 2023-03-21 NOTE — PROGRESS NOTES
FAMILY MEDICINE OFFICE VISIT  [facility]      NAME: Cristin Dalton  AGE: 30 y o  SEX: male    DATE OF ENCOUNTER: 3/21/2023    Assessment and Plan     1  Opioid dependence with opioid-induced mood disorder ([facility] )  Comments:  Currently Stable  continue current Suboxone dose  UDS obtained today  PDMP reviewed without concerns  Orders:  -     Toxicology screen, urine  -     Suboxone  -     FENTANYL, URINE    2  Anxiety  Comments:  Currently stable on Prozac 10mg daily  Continue Ativan 1mg TID for now   Continue Seroquel 100mg HS    3  PTSD (post-traumatic stress disorder)  Comments:  Cont current meds    4  Alcohol use disorder, severe, dependence ([facility] )  Comments:  Continue AA meetings  Last Alcohol use 2 months ago          Chief Complaint     Chief Complaint   Patient presents with   • Follow-up     MAT       History of Present Illness     Patient is 44-year-old male with PMH of LUKE, anxiety, PTSD presented to the office for follow-up visit  Reports that he is taking Suboxone as prescribed  Denies any cravings, street drug use  States that he is sleeping well with Seroquel 100 mg at nighttime  Currently he is taking Ativan 1 mg 3 times daily for anxiety  Last anxiety episode 2 months ago  Patient is leaving to [place] on Thursday for 4 weeks  Requesting for refills  Mentions that he is staying sober and attending [facility] meetings  Denies nausea, vomiting, shortness of breath, chest pain, abdominal pain, change in bowel and bladder habits  The following portions of the patient's history were reviewed and updated as appropriate: allergies, current medications, past family history, past medical history, past social history, past surgical history and problem list     Review of Systems     Review of Systems   Constitutional: Negative for activity change  HENT: Negative for congestion and rhinorrhea  Respiratory: Negative for shortness of breath and wheezing      Cardiovascular: Negative for chest pain and palpitations  Gastrointestinal: Negative for abdominal pain, diarrhea and nausea  Genitourinary: Negative for difficulty urinating  Skin: Negative for rash  Neurological: Negative for light-headedness and headaches  Psychiatric/Behavioral: The patient is not nervous/anxious  All other systems reviewed and are negative  Active Problem List     Patient Active Problem List   Diagnosis   • Opioid use   • Anxiety   • Encounter for tobacco use cessation counseling   • Opioid dependence with opioid-induced mood disorder ([facility] )   • Seizure ([place] [address] )   • Opioid use disorder, moderate, on maintenance therapy, dependence (HCC)   • Alcohol use disorder, severe, dependence ([facility]   • PTSD (post-traumatic stress disorder)   • Tobacco use disorder   • Transaminitis   • Benzodiazepine dependence ([facility] )   • Unsteady gait   • Bipolar I disorder with depression ([place] [address] )   • Primary hypertension       Objective     /84   Pulse (!) 115   Temp 98 °F (36 7 °C)   Resp 18   Ht 5' 10" (1 778 m)   Wt 109 kg (239 lb 9 6 oz)   SpO2 97%   BMI 34 38 kg/m²     Physical Exam  Vitals and nursing note reviewed  Exam conducted with a chaperone present  Constitutional:       General: He is not in acute distress  Appearance: Normal appearance  HENT:      Head: Normocephalic and atraumatic  Right Ear: External ear normal       Left Ear: External ear normal       Nose: No congestion or rhinorrhea  Mouth/Throat:      Mouth: Mucous membranes are moist       Pharynx: Oropharynx is clear  Eyes:      Extraocular Movements: Extraocular movements intact  Conjunctiva/sclera: Conjunctivae normal    Cardiovascular:      Rate and Rhythm: Normal rate and regular rhythm  Pulses: Normal pulses  Heart sounds: Normal heart sounds  No murmur heard  Pulmonary:      Effort: Pulmonary effort is normal       Breath sounds: Normal breath sounds  No wheezing     Abdominal:      General: Bowel sounds are normal       Palpations: Abdomen is soft  Tenderness: There is no abdominal tenderness  Musculoskeletal:      Cervical back: Neck supple  Right lower leg: No edema  Left lower leg: No edema  Skin:     General: Skin is warm and dry  Capillary Refill: Capillary refill takes less than 2 seconds  Neurological:      General: No focal deficit present  Mental Status: He is alert and oriented to person, place, and time  Psychiatric:         Behavior: Behavior normal          Pertinent Laboratory/Diagnostic Studies:    Component      Latest Ref Rng & Units 2/23/2023   Amphetamine Screen, Ur      Bsxsgs=2922 ng/mL Negative   Barbiturate Screen, Ur      Echrat=923 ng/mL Negative   Cannabinoid Scrn, Ur      Cutoff=50 ng/mL Negative   Methadone Screen, Urine      Hpubzo=194 ng/mL Negative   Opiate Scrn, Ur      Logluk=061 ng/mL Negative   Phencyclidine (PCP), Qual, Ur      Cutoff=25 ng/mL Negative   BENZODIAZEPINES      Ijpckr=205 ng/mL Negative   Cocaine (Metab ) Urine      Eqjtru=888 ng/mL Negative   Propoxyphene Screen, Urine      Cjqkcv=007 ng/mL Negative     Component      Latest Ref Rng & Units 2/23/2023   FENTANYL URINE      Pygbcx=3141 pg/mL Negative     Component      Latest Ref Rng & Units 2/23/2023             BUPRENORPHINE      Cutoff=10 ng/mL Negative       Current Medications     Current Outpatient Medications:   •  amLODIPine (NORVASC) 5 mg tablet, Take 1 tablet (5 mg total) by mouth daily, Disp: 30 tablet, Rfl: 1  •  bacitracin-polymyxin b ophthalmic ointment, APPLY 1/2 INCH IN INNER EYELID IN LEFT EYE AT BED, Disp: , Rfl:   •  chlordiazePOXIDE (LIBRIUM) 10 mg capsule, , Disp: , Rfl:   •  FLUoxetine (PROzac) 10 mg capsule, Take 1 capsule (10 mg total) by mouth daily, Disp: 30 capsule, Rfl: 2  •  naloxone (NARCAN) 4 mg/0 1 mL nasal spray, Administer 1 spray into a nostril   If no response after 2-3 minutes, give another dose in the other nostril using a new spray , Disp: 1 each, Rfl: 1  •  naproxen (NAPROSYN) 500 mg tablet, Take 1 tablet (500 mg total) by mouth 2 (two) times a day with meals, Disp: 14 tablet, Rfl: 0  •  polymyxin b-trimethoprim (POLYTRIM) ophthalmic solution, , Disp: , Rfl:   •  Thiamine HCl (vitamin B-1) 100 MG TABS, Take 100 mg by mouth daily, Disp: , Rfl:   •  buprenorphine-naloxone (Suboxone) 8-2 mg, Place 1 Film (8 mg total) under the tongue 2 (two) times a day, Disp: 60 Film, Rfl: 0  •  LORazepam (ATIVAN) 1 mg tablet, Take 1 tablet (1 mg total) by mouth every 8 (eight) hours as needed for anxiety, Disp: 90 tablet, Rfl: 0  •  QUEtiapine (SEROquel) 100 mg tablet, Take 1 tablet (100 mg total) by mouth daily at bedtime, Disp: 90 tablet, Rfl: 1    Health Maintenance     Health Maintenance   Topic Date Due   • COVID-19 Vaccine (1) Never done   • Annual Physical  Never done   • BMI: Followup Plan  04/20/2023   • Pneumococcal Vaccine: Pediatrics (0 to 5 Years) and At-Risk Patients (6 to 59 Years) (1 - PCV) 04/27/2023 (Originally 8/22/1991)   • Influenza Vaccine (1) 06/30/2023 (Originally 9/1/2022)   • Hepatitis A Vaccine (1 of 2 - Risk 2-dose series) 02/23/2024 (Originally [birth year]1988)   • BMI: Adult  03/21/2024   • DTaP,Tdap,and Td Vaccines (2 - Td or Tdap) 08/10/2031   • Hepatitis B Vaccine (1 of 3 - Risk 3-dose series) 08/22/2047   • HIV Screening  Completed   • Hepatitis C Screening  Completed   • HIB Vaccine  Aged Out   • IPV Vaccine  Aged Out   • Meningococcal ACWY Vaccine  Aged Out   • HPV Vaccine  Aged Out     Immunization History   Administered Date(s) Administered   • Tdap 08/10/2021           Martin Munson MD   Family Medicine  PGY-2  3/24/2023 2:59 PM

## 2023-03-22 LAB — FENTANYL+NORFENTANYL PNL UR: NEGATIVE PG/ML

## 2023-03-23 ENCOUNTER — TELEPHONE (OUTPATIENT)
Dept: FAMILY MEDICINE CLINIC | Facility: CLINIC | Age: 38
End: 2023-03-23

## 2023-03-23 NOTE — TELEPHONE ENCOUNTER
Spoke with patient about suboxone and he states he will be out of his script for 4 days prior to his upcoming appointment   He wants to know if he can get enough to cover until his next appointment on 4/8/, otherwise he will be out 4/24

## 2023-03-25 LAB
AMPHETAMINES UR QL SCN: POSITIVE
BARBITURATES UR QL SCN: NEGATIVE NG/ML
BENZODIAZ UR QL: NEGATIVE
BZE UR QL: NEGATIVE NG/ML
CANNABINOIDS UR QL SCN: NEGATIVE NG/ML
METHADONE UR QL SCN: NEGATIVE NG/ML
OPIATES UR QL: NEGATIVE NG/ML
PCP UR QL: NEGATIVE NG/ML
PROPOXYPH UR QL SCN: NEGATIVE NG/ML

## 2023-03-27 NOTE — TELEPHONE ENCOUNTER
Patient had an appointment today it looks like but cancelled  His recent urine drug screens are not consistent with the medications he is prescribed  He will need to come in for an appointment to discuss

## 2023-03-29 LAB
BUPRENORPHINE UR CFM-MCNC: 316 NG/ML
BUPRENORPHINE UR QL CFM: NORMAL NG/ML
BUPRENORPHINE UR QL CFM: POSITIVE
BUPRENORPHINE+NOR UR QL: POSITIVE
NORBUPRENORPHINE UR CFM-MCNC: 1148 NG/ML
NORBUPRENORPHINE UR QL CFM: POSITIVE

## 2023-03-31 DIAGNOSIS — F11.24 OPIOID DEPENDENCE WITH OPIOID-INDUCED MOOD DISORDER (HCC): ICD-10-CM

## 2023-03-31 RX ORDER — BUPRENORPHINE AND NALOXONE 8; 2 MG/1; MG/1
8 FILM, SOLUBLE BUCCAL; SUBLINGUAL 2 TIMES DAILY
Qty: 4 FILM | Refills: 0 | Status: SHIPPED | OUTPATIENT
Start: 2023-03-31
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
## 2023-04-19 DIAGNOSIS — I10 PRIMARY HYPERTENSION: ICD-10-CM

## 2023-04-19 RX ORDER — AMLODIPINE BESYLATE 5 MG/1
5 TABLET ORAL DAILY
Qty: 30 TABLET | Refills: 1 | Status: SHIPPED | OUTPATIENT
Start: 2023-04-19 | End: 2023-04-20 | Stop reason: SDUPTHER

## 2023-04-20 RX ORDER — NAPROXEN 500 MG/1
500 TABLET ORAL 2 TIMES DAILY WITH MEALS
Qty: 14 TABLET | Refills: 0 | OUTPATIENT
Start: 2023-04-20

## 2023-04-20 RX ORDER — METHION/INOS/CHOL BT/B COM/LIV 110MG-86MG
100 CAPSULE ORAL DAILY
Qty: 30 TABLET | Refills: 1 | OUTPATIENT
Start: 2023-04-20

## 2023-04-21 ENCOUNTER — APPOINTMENT (OUTPATIENT)
Dept: LAB | Facility: HOSPITAL | Age: 38
End: 2023-04-21

## 2023-05-03 ENCOUNTER — HOSPITAL ENCOUNTER (EMERGENCY)
Facility: HOSPITAL | Age: 38
Discharge: HOME | End: 2023-05-03
Attending: EMERGENCY MEDICINE

## 2023-05-03 ENCOUNTER — APPOINTMENT (EMERGENCY)
Dept: CT IMAGING | Facility: HOSPITAL | Age: 38
End: 2023-05-03

## 2023-05-03 ENCOUNTER — APPOINTMENT (EMERGENCY)
Dept: ULTRASOUND IMAGING | Facility: HOSPITAL | Age: 38
End: 2023-05-03

## 2023-05-03 ENCOUNTER — OFFICE VISIT (OUTPATIENT)
Dept: URGENT CARE | Facility: MEDICAL CENTER | Age: 38
End: 2023-05-03

## 2023-05-03 VITALS
RESPIRATION RATE: 20 BRPM | TEMPERATURE: 98.4 F | DIASTOLIC BLOOD PRESSURE: 70 MMHG | HEART RATE: 120 BPM | SYSTOLIC BLOOD PRESSURE: 140 MMHG | WEIGHT: 234 LBS | OXYGEN SATURATION: 98 % | HEIGHT: 70 IN | BODY MASS INDEX: 33.5 KG/M2

## 2023-05-03 VITALS
RESPIRATION RATE: 18 BRPM | OXYGEN SATURATION: 94 % | SYSTOLIC BLOOD PRESSURE: 136 MMHG | TEMPERATURE: 98.2 F | DIASTOLIC BLOOD PRESSURE: 77 MMHG | HEART RATE: 89 BPM

## 2023-05-03 DIAGNOSIS — F10.939 ALCOHOL WITHDRAWAL SYNDROME WITH COMPLICATION (HCC): ICD-10-CM

## 2023-05-03 DIAGNOSIS — R56.9 SEIZURE (HCC): Primary | ICD-10-CM

## 2023-05-03 DIAGNOSIS — R93.5 ABNORMAL CT OF THE ABDOMEN: ICD-10-CM

## 2023-05-03 DIAGNOSIS — R56.9 ALCOHOL RELATED SEIZURE (HCC): Primary | ICD-10-CM

## 2023-05-03 LAB
ALBUMIN SERPL BCP-MCNC: 4.6 G/DL (ref 3.5–5)
ALP SERPL-CCNC: 199 U/L (ref 34–104)
ALT SERPL W P-5'-P-CCNC: 90 U/L (ref 7–52)
ANION GAP SERPL CALCULATED.3IONS-SCNC: 14 MMOL/L (ref 4–13)
APAP SERPL-MCNC: <10 UG/ML (ref 10–20)
AST SERPL W P-5'-P-CCNC: 56 U/L (ref 13–39)
BASOPHILS # BLD AUTO: 0.04 THOUSANDS/ÂΜL (ref 0–0.1)
BASOPHILS NFR BLD AUTO: 1 % (ref 0–1)
BILIRUB SERPL-MCNC: 0.95 MG/DL (ref 0.2–1)
BUN SERPL-MCNC: 19 MG/DL (ref 5–25)
CALCIUM SERPL-MCNC: 9 MG/DL (ref 8.4–10.2)
CARDIAC TROPONIN I PNL SERPL HS: <2 NG/L (ref 8–18)
CHLORIDE SERPL-SCNC: 99 MMOL/L (ref 96–108)
CO2 SERPL-SCNC: 24 MMOL/L (ref 21–32)
CREAT SERPL-MCNC: 1.03 MG/DL (ref 0.6–1.3)
EOSINOPHIL # BLD AUTO: 0.07 THOUSAND/ÂΜL (ref 0–0.61)
EOSINOPHIL NFR BLD AUTO: 1 % (ref 0–6)
ERYTHROCYTE [DISTWIDTH] IN BLOOD BY AUTOMATED COUNT: 12.4 % (ref 11.6–15.1)
ETHANOL SERPL-MCNC: 145 MG/DL
GFR SERPL CREATININE-BSD FRML MDRD: 93 ML/MIN/1.73SQ M
GLUCOSE SERPL-MCNC: 104 MG/DL (ref 65–140)
GLUCOSE SERPL-MCNC: 117 MG/DL (ref 65–140)
HCT VFR BLD AUTO: 46.5 % (ref 36.5–49.3)
HGB BLD-MCNC: 16.3 G/DL (ref 12–17)
IMM GRANULOCYTES # BLD AUTO: 0.02 THOUSAND/UL (ref 0–0.2)
IMM GRANULOCYTES NFR BLD AUTO: 0 % (ref 0–2)
LACTATE SERPL-SCNC: 2 MMOL/L (ref 0.5–2)
LACTATE SERPL-SCNC: 2.1 MMOL/L (ref 0.5–2)
LIPASE SERPL-CCNC: 27 U/L (ref 11–82)
LYMPHOCYTES # BLD AUTO: 1.33 THOUSANDS/ÂΜL (ref 0.6–4.47)
LYMPHOCYTES NFR BLD AUTO: 23 % (ref 14–44)
MAGNESIUM SERPL-MCNC: 2 MG/DL (ref 1.9–2.7)
MCH RBC QN AUTO: 30.7 PG (ref 26.8–34.3)
MCHC RBC AUTO-ENTMCNC: 35.1 G/DL (ref 31.4–37.4)
MCV RBC AUTO: 88 FL (ref 82–98)
MONOCYTES # BLD AUTO: 0.56 THOUSAND/ÂΜL (ref 0.17–1.22)
MONOCYTES NFR BLD AUTO: 10 % (ref 4–12)
NEUTROPHILS # BLD AUTO: 3.9 THOUSANDS/ÂΜL (ref 1.85–7.62)
NEUTS SEG NFR BLD AUTO: 65 % (ref 43–75)
NRBC BLD AUTO-RTO: 0 /100 WBCS
PLATELET # BLD AUTO: 221 THOUSANDS/UL (ref 149–390)
PMV BLD AUTO: 10.7 FL (ref 8.9–12.7)
POTASSIUM SERPL-SCNC: 4 MMOL/L (ref 3.5–5.3)
PROT SERPL-MCNC: 6.8 G/DL (ref 6.4–8.4)
RBC # BLD AUTO: 5.31 MILLION/UL (ref 3.88–5.62)
SALICYLATES SERPL-MCNC: <5 MG/DL (ref 3–20)
SL AMB POCT GLUCOSE BLD: 117
SODIUM SERPL-SCNC: 137 MMOL/L (ref 135–147)
WBC # BLD AUTO: 5.92 THOUSAND/UL (ref 4.31–10.16)

## 2023-05-03 RX ORDER — DIAZEPAM 5 MG/ML
5 INJECTION, SOLUTION INTRAMUSCULAR; INTRAVENOUS ONCE
Status: COMPLETED | OUTPATIENT
Start: 2023-05-03 | End: 2023-05-03

## 2023-05-03 RX ORDER — LORAZEPAM 2 MG/ML
2 INJECTION INTRAMUSCULAR ONCE
Status: COMPLETED | OUTPATIENT
Start: 2023-05-03 | End: 2023-05-03

## 2023-05-03 RX ORDER — LIDOCAINE HYDROCHLORIDE 20 MG/ML
15 SOLUTION OROPHARYNGEAL ONCE
Status: COMPLETED | OUTPATIENT
Start: 2023-05-03 | End: 2023-05-03

## 2023-05-03 RX ORDER — CLONIDINE HYDROCHLORIDE 0.1 MG/1
0.2 TABLET ORAL EVERY 12 HOURS SCHEDULED
Status: DISCONTINUED | OUTPATIENT
Start: 2023-05-03 | End: 2023-05-03 | Stop reason: HOSPADM

## 2023-05-03 RX ORDER — ONDANSETRON 2 MG/ML
4 INJECTION INTRAMUSCULAR; INTRAVENOUS ONCE
Status: COMPLETED | OUTPATIENT
Start: 2023-05-03 | End: 2023-05-03

## 2023-05-03 RX ORDER — SODIUM CHLORIDE, SODIUM GLUCONATE, SODIUM ACETATE, POTASSIUM CHLORIDE, MAGNESIUM CHLORIDE, SODIUM PHOSPHATE, DIBASIC, AND POTASSIUM PHOSPHATE .53; .5; .37; .037; .03; .012; .00082 G/100ML; G/100ML; G/100ML; G/100ML; G/100ML; G/100ML; G/100ML
1000 INJECTION, SOLUTION INTRAVENOUS ONCE
Status: COMPLETED | OUTPATIENT
Start: 2023-05-03 | End: 2023-05-03

## 2023-05-03 RX ORDER — LORAZEPAM 2 MG/ML
1 INJECTION INTRAMUSCULAR ONCE
Status: COMPLETED | OUTPATIENT
Start: 2023-05-03 | End: 2023-05-03

## 2023-05-03 RX ORDER — ONDANSETRON 4 MG/1
4 TABLET, FILM COATED ORAL EVERY 8 HOURS PRN
Qty: 30 TABLET | Refills: 0 | Status: SHIPPED | OUTPATIENT
Start: 2023-05-03

## 2023-05-03 RX ADMIN — DIAZEPAM 5 MG: 5 INJECTION INTRAMUSCULAR; INTRAVENOUS at 15:57

## 2023-05-03 RX ADMIN — IOHEXOL 100 ML: 350 INJECTION, SOLUTION INTRAVENOUS at 11:56

## 2023-05-03 RX ADMIN — DIAZEPAM 5 MG: 10 INJECTION, SOLUTION INTRAMUSCULAR; INTRAVENOUS at 14:51

## 2023-05-03 RX ADMIN — ONDANSETRON 4 MG: 2 INJECTION INTRAMUSCULAR; INTRAVENOUS at 15:53

## 2023-05-03 RX ADMIN — LIDOCAINE HYDROCHLORIDE 15 ML: 20 SOLUTION ORAL at 12:10

## 2023-05-03 RX ADMIN — CLONIDINE HYDROCHLORIDE 0.2 MG: 0.1 TABLET ORAL at 12:16

## 2023-05-03 RX ADMIN — LORAZEPAM 2 MG: 2 INJECTION INTRAMUSCULAR; INTRAVENOUS at 12:54

## 2023-05-03 RX ADMIN — SODIUM CHLORIDE, SODIUM GLUCONATE, SODIUM ACETATE, POTASSIUM CHLORIDE, MAGNESIUM CHLORIDE, SODIUM PHOSPHATE, DIBASIC, AND POTASSIUM PHOSPHATE 1000 ML: .53; .5; .37; .037; .03; .012; .00082 INJECTION, SOLUTION INTRAVENOUS at 11:21

## 2023-05-03 RX ADMIN — LORAZEPAM 2 MG: 2 INJECTION INTRAMUSCULAR; INTRAVENOUS at 16:39

## 2023-05-03 RX ADMIN — ONDANSETRON 4 MG: 2 INJECTION INTRAMUSCULAR; INTRAVENOUS at 11:20

## 2023-05-03 RX ADMIN — LORAZEPAM 1 MG: 2 INJECTION INTRAMUSCULAR; INTRAVENOUS at 11:20

## 2023-05-03 RX ADMIN — THIAMINE HYDROCHLORIDE 150 MG: 100 INJECTION, SOLUTION INTRAMUSCULAR; INTRAVENOUS at 11:45

## 2023-05-03 NOTE — ED PROVIDER NOTES
History  Chief Complaint   Patient presents with    Seizure - Prior Hx Of     Pt brought in by EMS from urgent care for a seizure this morning  Patient presents to the emergency department today via emergency medical services  This is a 27-year-old male who is having an evaluation for nausea vomiting abdominal pain and seizures  He does have a history of alcohol abuse as well as currently taking Suboxone for opiate use disorder  Patient states on average she typically drinks about 1/5 of SNAPP' daily  He states he has not had anything to drink from an alcohol standpoint over the last 70 hours  He states he has had some abdominal pain nausea and vomiting at the onset  He has a history of alcoholic seizures in the past he believes he has had about 8 of them in total   Last of which was about 5 months ago he states  He states this morning he awoke around 0715 hrs , he states at 0930 hrs  he had a seizure  He states he does have an aura prior to the seizure where he feels lightheaded  After he came to he went to the urgent care who sent him here  Current complaints are abdominal pain as well as nausea and feeling lightheaded  Does not take any daily seizure medicine stated he has not actually ever following with a neurologist before  Prior to Admission Medications   Prescriptions Last Dose Informant Patient Reported? Taking? LORazepam (ATIVAN) 1 mg tablet   No No   Sig: Take 1 tablet (1 mg total) by mouth every 8 (eight) hours as needed for anxiety   QUEtiapine (SEROquel) 100 mg tablet   No No   Sig: Take 1 tablet (100 mg total) by mouth daily at bedtime   amLODIPine (NORVASC) 10 mg tablet   No No   Sig: Take 1 tablet (10 mg total) by mouth daily   buprenorphine-naloxone (Suboxone) 8-2 mg   No No   Sig: Place 1 Film (8 mg total) under the tongue 2 (two) times a day   naloxone (NARCAN) 4 mg/0 1 mL nasal spray   No No   Sig: Administer 1 spray into a nostril   If no response after 2-3 minutes, give another dose in the other nostril using a new spray  naproxen (NAPROSYN) 500 mg tablet   No No   Sig: Take 1 tablet (500 mg total) by mouth 2 (two) times a day with meals      Facility-Administered Medications: None       Past Medical History:   Diagnosis Date    Chronic pain     Depression     Pelvic fracture (HCC)     PTSD (post-traumatic stress disorder)     Substance abuse ([facility] [address] )        Past Surgical History:   Procedure Laterality Date    BONY PELVIS SURGERY         History reviewed  No pertinent family history  I have reviewed and agree with the history as documented  E-Cigarette/Vaping    E-Cigarette Use Former User     Comments THC, medical       E-Cigarette/Vaping Substances    Nicotine No     THC No     CBD No     Flavoring No     Other No     Unknown No      Social History     Tobacco Use    Smoking status: Every Day     Packs/day: 1 00     Types: Cigarettes     Passive exposure: Never    Smokeless tobacco: Never   Vaping Use    Vaping Use: Former   Substance Use Topics    Alcohol use: Yes     Comment: 20-30 shots per day    Drug use: Not Currently     Types: Methamphetamines, Marijuana, Cocaine     Comment: Bev Morin current use       Review of Systems   Constitutional: Negative  Negative for activity change, appetite change, chills, diaphoresis, fatigue, fever and unexpected weight change  HENT: Negative  Negative for sore throat, trouble swallowing and voice change  Eyes: Negative  Respiratory: Negative  Negative for cough, chest tightness, shortness of breath and wheezing  Cardiovascular: Negative  Negative for chest pain, palpitations and leg swelling  Gastrointestinal: Positive for abdominal pain, nausea and vomiting  Negative for blood in stool  Endocrine: Negative  Genitourinary: Negative  Negative for flank pain and hematuria  Musculoskeletal: Negative    Negative for arthralgias, back pain, gait problem, joint swelling, myalgias, neck pain and neck stiffness  Skin: Negative  Negative for rash and wound  Allergic/Immunologic: Negative  Neurological: Positive for seizures and light-headedness  Negative for dizziness, syncope, weakness and headaches  Hematological: Negative  Psychiatric/Behavioral: Negative  All other systems reviewed and are negative  Physical Exam  Physical Exam  Vitals reviewed  Constitutional:       General: He is not in acute distress  Appearance: He is well-developed  He is not ill-appearing, toxic-appearing or diaphoretic  HENT:      Right Ear: External ear normal  No swelling  Tympanic membrane is not bulging  Left Ear: External ear normal  No swelling  Tympanic membrane is not bulging  Nose: Nose normal       Mouth/Throat:      Pharynx: No oropharyngeal exudate  Eyes:      General: Lids are normal  No scleral icterus  Right eye: No discharge  Left eye: No discharge  Extraocular Movements: Extraocular movements intact  Conjunctiva/sclera: Conjunctivae normal       Pupils: Pupils are equal, round, and reactive to light  Neck:      Thyroid: No thyromegaly  Vascular: No JVD  Trachea: No tracheal deviation  Cardiovascular:      Rate and Rhythm: Normal rate and regular rhythm  Pulses: Normal pulses  Heart sounds: Normal heart sounds  No murmur heard  No friction rub  No gallop  Pulmonary:      Effort: Pulmonary effort is normal  No respiratory distress  Breath sounds: Normal breath sounds  No stridor  No wheezing, rhonchi or rales  Chest:      Chest wall: No tenderness  Abdominal:      General: Bowel sounds are normal  There is no distension  Palpations: Abdomen is soft  There is no mass  Tenderness: There is abdominal tenderness  There is no guarding or rebound  Negative signs include Mccoy's sign  Hernia: No hernia is present  Musculoskeletal:         General: No tenderness  Normal range of motion  Cervical back: Normal range of motion and neck supple  No edema  Normal range of motion  Lymphadenopathy:      Cervical: No cervical adenopathy  Skin:     General: Skin is warm and dry  Capillary Refill: Capillary refill takes less than 2 seconds  Coloration: Skin is not pale  Findings: No erythema or rash  Neurological:      Mental Status: He is alert and oriented to person, place, and time  GCS: GCS eye subscore is 4  GCS verbal subscore is 5  GCS motor subscore is 6  Cranial Nerves: No cranial nerve deficit  Sensory: No sensory deficit  Deep Tendon Reflexes: Reflexes are normal and symmetric  Psychiatric:         Mood and Affect: Mood normal          Speech: Speech normal          Behavior: Behavior normal          Thought Content:  Thought content normal          Judgment: Judgment normal          Vital Signs  ED Triage Vitals [05/03/23 1058]   Temperature Pulse Respirations Blood Pressure SpO2   98 2 °F (36 8 °C) 96 20 (!) 162/107 96 %      Temp Source Heart Rate Source Patient Position - Orthostatic VS BP Location FiO2 (%)   Temporal Monitor Lying Right arm --      Pain Score       7           Vitals:    05/03/23 1230 05/03/23 1330 05/03/23 1400 05/03/23 1500   BP: 130/78 134/91 137/77 136/77   Pulse: 83 85 85 89   Patient Position - Orthostatic VS: Lying Lying Lying Sitting         Visual Acuity      ED Medications  Medications   cloNIDine (CATAPRES) tablet 0 2 mg (0 2 mg Oral Given 5/3/23 1216)   LORazepam (ATIVAN) injection 2 mg (has no administration in time range)   ondansetron (ZOFRAN) injection 4 mg (4 mg Intravenous Given 5/3/23 1120)   multi-electrolyte (ISOLYTE-S PH 7 4) bolus 1,000 mL (0 mL Intravenous Stopped 5/3/23 1221)   LORazepam (ATIVAN) injection 1 mg (1 mg Intravenous Given 5/3/23 1120)   thiamine (VITAMIN B1) 150 mg in sodium chloride 0 9 % 50 mL IVPB (0 mg Intravenous Stopped 5/3/23 1215)   iohexol (OMNIPAQUE) 350 MG/ML injection (SINGLE-DOSE) 100 mL (100 mL Intravenous Given 5/3/23 1156)   Lidocaine Viscous HCl (XYLOCAINE) 2 % mucosal solution 15 mL (15 mL Swish & Swallow Given 5/3/23 1210)   LORazepam (ATIVAN) injection 2 mg (2 mg Intravenous Given 5/3/23 1254)   diazepam (VALIUM) injection 5 mg (5 mg Intravenous Given 5/3/23 1451)   ondansetron (ZOFRAN) injection 4 mg (4 mg Intravenous Given 5/3/23 1553)   diazepam (VALIUM) injection 5 mg (5 mg Intravenous Given 5/3/23 1557)       Diagnostic Studies  Results Reviewed     Procedure Component Value Units Date/Time    Lactic acid 2 Hours [[id number]]  (Normal) Collected: 05/03/23 1403    Lab Status: Final result Specimen: Blood from Arm, Right Updated: 05/03/23 1420     LACTIC ACID 2 0 mmol/L     Narrative:      Result may be elevated if tourniquet was used during collection  Lactic acid, plasma (w/reflex if result > 2 0) [[id number]]  (Abnormal) Collected: 05/03/23 1100    Lab Status: Final result Specimen: Blood from Arm, Right Updated: 05/03/23 1152     LACTIC ACID 2 1 mmol/L     Narrative:      Result may be elevated if tourniquet was used during collection  High Sensitivity Troponin I Random [[id number]]  (Abnormal) Collected: 05/03/23 1100    Lab Status: Final result Specimen: Blood from Arm, Left Updated: 05/03/23 1147     HS TnI random <2 ng/L     Lipase [[id number]]  (Normal) Collected: 05/03/23 1100    Lab Status: Final result Specimen: Blood from Arm, Left Updated: 05/03/23 1146     Lipase 27 u/L     Salicylate level [[id number]]  (Normal) Collected: 05/03/23 1100    Lab Status: Final result Specimen: Blood from Arm, Left Updated: 44/08/13 0411     Salicylate Lvl <5 mg/dL     Acetaminophen level-If concentration is detectable, please discuss with medical  on call   [[id number]]  (Abnormal) Collected: 05/03/23 1100    Lab Status: Final result Specimen: Blood from Arm, Left Updated: 05/03/23 1146     Acetaminophen Level <10 ug/mL     Comprehensive metabolic panel [[id number]]  (Abnormal) Collected: 05/03/23 1100    Lab Status: Final result Specimen: Blood from Arm, Left Updated: 05/03/23 1146     Sodium 137 mmol/L      Potassium 4 0 mmol/L      Chloride 99 mmol/L      CO2 24 mmol/L      ANION GAP 14 mmol/L      BUN 19 mg/dL      Creatinine 1 03 mg/dL      Glucose 104 mg/dL      Calcium 9 0 mg/dL      AST 56 U/L      ALT 90 U/L      Alkaline Phosphatase 199 U/L      Total Protein 6 8 g/dL      Albumin 4 6 g/dL      Total Bilirubin 0 95 mg/dL      eGFR 93 ml/min/1 73sq m     Narrative:      National Kidney Disease Foundation guidelines for Chronic Kidney Disease (CKD):     Stage 1 with normal or high GFR (GFR > 90 mL/min/1 73 square meters)    Stage 2 Mild CKD (GFR = 60-89 mL/min/1 73 square meters)    Stage 3A Moderate CKD (GFR = 45-59 mL/min/1 73 square meters)    Stage 3B Moderate CKD (GFR = 30-44 mL/min/1 73 square meters)    Stage 4 Severe CKD (GFR = 15-29 mL/min/1 73 square meters)    Stage 5 End Stage CKD (GFR <15 mL/min/1 73 square meters)  Note: GFR calculation is accurate only with a steady state creatinine    Magnesium [[id number]]  (Normal) Collected: 05/03/23 1100    Lab Status: Final result Specimen: Blood from Arm, Left Updated: 05/03/23 1146     Magnesium 2 0 mg/dL     Ethanol [[id number]]  (Abnormal) Collected: 05/03/23 1100    Lab Status: Final result Specimen: Blood from Arm, Left Updated: 05/03/23 1142     Ethanol Lvl 145 mg/dL     CBC and differential [[id number]] Collected: 05/03/23 1100    Lab Status: Final result Specimen: Blood from Arm, Left Updated: 05/03/23 1123     WBC 5 92 Thousand/uL      RBC 5 31 Million/uL      Hemoglobin 16 3 g/dL      Hematocrit 46 5 %      MCV 88 fL      MCH 30 7 pg      MCHC 35 1 g/dL      RDW 12 4 %      MPV 10 7 fL      Platelets 754 Thousands/uL      nRBC 0 /100 WBCs      Neutrophils Relative 65 %      Immat GRANS % 0 %      Lymphocytes Relative 23 %      Monocytes Relative 10 %      Eosinophils Relative 1 %      Basophils Relative 1 % Neutrophils Absolute 3 90 Thousands/µL      Immature Grans Absolute 0 02 Thousand/uL      Lymphocytes Absolute 1 33 Thousands/µL      Monocytes Absolute 0 56 Thousand/µL      Eosinophils Absolute 0 07 Thousand/µL      Basophils Absolute 0 04 Thousands/µL                  US right upper quadrant   Final Result by Aaron Corado MD (05/03 1430)      1  Nonspecific distention of the gallbladder without definitive sonographic evidence for acute cholecystitis  If there is continued clinical concern for acute cholecystitis, consider further evaluation with hepatobiliary scan  2  Hepatomegaly with borderline findings of hepatic steatosis  The study was marked in [facility] for immediate notification  Workstation performed: [id number]         CT abdomen pelvis with contrast   Final Result by Kodak Anguiano MD (05/03 1304)      Mild gallbladder distention  No pericholecystic fluid or stones identified  Consider ultrasound evaluation if there are symptoms referable to this area  CT head without contrast   Final Result by Kodak Anguiano MD (05/03 1256)      No acute intracranial abnormality                    Workstation performed: [id number]                    Procedures  ECG 12 Lead Documentation Only    Date/Time: 5/3/2023 11:16 AM  Performed by: Hemal Patino PA-C  Authorized by: Hemal Patino PA-C     Indications / Diagnosis:  Seizure/vomiting  ECG reviewed by me, the ED Provider: yes    Patient location:  ED  Interpretation:     Interpretation: normal    Rate:     ECG rate:  96    ECG rate assessment: normal    Rhythm:     Rhythm: sinus rhythm    Ectopy:     Ectopy: none    QRS:     QRS axis:  Normal    QRS intervals:  Normal  Conduction:     Conduction: normal    ST segments:     ST segments:  Normal  T waves:     T waves: normal               ED Course  ED Course as of 05/03/23 1638   Wed May 03, 2023   1158 LACTIC ACID(!!): 2 1  Mild lactic acidosis, receiving thiamine   1158 Troponin lipase magnesium CBC within normal limits very mild elevation of his anion gap to 14    1159  AST ALT alk phos elevation however normal total bilirubin  He has had prior elevations in the past potentially consistent with alcoholic liver disease  1303 FINDINGS:     PARENCHYMA:  No intracranial mass, mass effect or midline shift  No CT signs of acute infarction  No acute parenchymal hemorrhage      VENTRICLES AND EXTRA-AXIAL SPACES:  Normal for the patient's age      VISUALIZED ORBITS: Normal visualized orbits      PARANASAL SINUSES: Normal visualized paranasal sinuses      CALVARIUM AND EXTRACRANIAL SOFT TISSUES:  Normal      IMPRESSION:     No acute intracranial abnormality             1308 IMPRESSION:     Mild gallbladder distention  No pericholecystic fluid or stones identified  Consider ultrasound evaluation if there are symptoms referable to this area  1434 IMPRESSION:     1  Nonspecific distention of the gallbladder without definitive sonographic evidence for acute cholecystitis  If there is continued clinical concern for acute cholecystitis, consider further evaluation with hepatobiliary scan      2  Hepatomegaly with borderline findings of hepatic steatosis  [id number] Spoke with patient regarding work-up, there is no point tenderness over the gallbladder, he has gallbladder distention on 2 separate images of the no evidence of acute cholecystitis, he has normal white count no tachycardia or fever  Lactic acidosis has resolved I doubt acute abdomen  I offered him inpatient versus outpatient treatment for his alcohol symptoms he wishes to speak with  warm handoff  [address] call back from warm handoff, pt requesting sandwich, will PO challenge   1634 patient was reevaluated again at 1630 hrs  At this point patient wishes to be discharged home he was to be given phone number for warm handoff which is reasonable they still have not called us back yet    I again offered inpatient [facility] detox however he states he has been there before and did not have a good experience there does not wish to go back  He request that I send Zofran to his pharmacy at home which is reasonable  He has not had any seizures here in the ER  HEART Risk Score    Flowsheet Row Most Recent Value   Heart Score Risk Calculator    History 0 Filed at: 05/03/2023 1117   ECG 0 Filed at: 05/03/2023 1117   Age 0 Filed at: 05/03/2023 1117   Risk Factors 0 Filed at: 05/03/2023 1117   Troponin 0 Filed at: 05/03/2023 1117   HEART Score 0 Filed at: 05/03/2023 1117                        SBIRT 22yo+    Flowsheet Row Most Recent Value   Initial Alcohol Screen: US AUDIT-C     1  How often do you have a drink containing alcohol? 6 Filed at: 05/03/2023 1056   2  How many drinks containing alcohol do you have on a typical day you are drinking? 4 Filed at: 05/03/2023 1056   3a  Male UNDER 65: How often do you have five or more drinks on one occasion? 6 Filed at: 05/03/2023 1056   Audit-C Score 16 Filed at: 05/03/2023 1056   Full Alcohol Screen: US AUDIT    4  How often during the last year have you found that you were not able to stop drinking once you had started? 2 Filed at: 05/03/2023 1056   5  How often during past year have you failed to do what was normally expected of you because of drinking? 2 Filed at: 05/03/2023 1056   6  How often in past year have you needed a first drink in the morning to get yourself going after a heavy drinking session? 3 Filed at: 05/03/2023 1056   7  How often in past year have you had feeling of guilt or remorse after drinking? 4 Filed at: 05/03/2023 1056   8  How often in past year have you been unable to remember what happened night before because you had been drinking? 1 Filed at: 05/03/2023 1056   9  Have you or someone else been injured as a result of your drinking? 0 Filed at: 05/03/2023 1056   10   Has a relative, friend, doctor or other health worker been concerned about your drinking and suggested you cut down? 4 Filed at: 05/03/2023 1056   AUDIT Total Score 32 Filed at: 05/03/2023 1056                    Medical Decision Making  81-year-old male presents via EMS  I did receive bedside report from EMT  They picked him up at the urgent care  Here for evaluation of abdominal pain nausea vomiting and seizures  History of alcohol abuse he does drink 1/5 of Mark  daily  Last drink was 70 hours ago at the onset of his nausea vomiting and abdominal pain  Seizure occurred he states around 0930 hrs  this morning  This was associated with loss of consciousness  History of prior alcoholic seizures x6 last of which was 5 months ago  Will require evaluation of abdomen in terms of a CT to evaluate for underlying acute abdomen such as appendicitis or cholecystitis or bowel obstruction  Will be evaluated for dehydration as well as  CT of the head secondary to seizure and lightheadedness  Amount and/or Complexity of Data Reviewed  Labs: ordered  Radiology: ordered  Risk  Prescription drug management  Disposition  Final diagnoses:   Alcohol related seizure ([address] )   Abnormal CT of the abdomen - Distended gallbladder without other signs of cholecystitis     Time reflects when diagnosis was documented in both MDM as applicable and the Disposition within this note     Time User Action Codes Description Comment    5/3/2023  3:47 PM Flakito DUNBAR Add [R56 9] Alcohol related seizure ([address] )     5/3/2023  4:36 PM Flakito DUNBAR Add [R93 5] Abnormal CT of the abdomen     5/3/2023  4:36 PM Radha Hernández Modify [R93 5] Abnormal CT of the abdomen Distended gallbladder without other signs of cholecystitis      ED Disposition     ED Disposition   Discharge    Condition   Stable    Date/Time   Wed May 3, 2023  4:35 PM    Kathleen Kiran discharge to home/self care                 Follow-up Information     Follow up With Specialties Details Why Contact Info    Rivera Macario  Family Medicine Call in 1 day  [address] [place] [address]  DO Kiersten General Surgery, Wound Care Schedule an appointment as soon as possible for a visit  As needed for abdominal pain [facility] [address] [phone]            Patient's Medications   Discharge Prescriptions    ONDANSETRON (ZOFRAN) 4 MG TABLET    Take 1 tablet (4 mg total) by mouth every 8 (eight) hours as needed for nausea or vomiting       Start Date: 5/3/2023  End Date: --       Order Dose: 4 mg       Quantity: 30 tablet    Refills: 0       No discharge procedures on file      PDMP Review       Value Time User    PDMP Reviewed  Yes 4/20/2023  1:06 PM Leta Duane, DO          ED Provider  Electronically Signed by           Kayli Mendenhall PA-C  05/03/23 787 Izzy Coffman PA-C  05/03/23 6057

## 2023-05-03 NOTE — PROGRESS NOTES
"  [facility]        NAME: Merline Engel is a 30 y o  male  : 1985    MRN: [id number]  DATE: May 3, 2023  TIME: 10:45 AM    Assessment and Plan   Seizure ([facility] [address] ) [R56 [id number]]  1  Seizure ([facility] [address] )  POCT blood glucose    Transfer to other facility      2  Alcohol withdrawal syndrome with complication [facility])              Patient Instructions       Follow up with PCP in 3-5 days  Proceed to  ER if symptoms worsen  Chief Complaint     Chief Complaint   Patient presents with    Seizure - Prior Hx Of     Pt  Has a hx of seizure, last seizure was 6 months ago but also states it was a \"few months ago\", GCS 15, pt  Reports turning on his tv and has a loss of 30 min where he \"woke up\" and was on the floor and bleeding from his head, unsure f he had a seizure, takes daily seizure meds, last seizure was alcohol induced from withdrawal, last alcoholic drink was 4 days ago, pt reports \" i'm not drinking that much\", pt  Is alert but feels \"off\", pt  walked to urgent care, vomiting x 3 days         History of Present Illness       Ativan 1mg Q8hrs daily  Did take this this AM  Last seizure a few months ago - alcohol related, until this AM he had one which he had loss of time for about 30min- when he got up to watch TV, and then woke up on the floor about 30 minutes later  He reports bleeding from his head- I do not see any significant bleeding or open wounds  Patient has history of alcoholism, and drank daily  Reports last drink was about 4 days ago  He most recently hasn't been drinking as much, and has had vomiting x3 days and unable to keep anything down  He has potential for alcohol withdrawal seizure as well as electrolyte imbalance  He did strike his head and has change in his vision  Drinking a redbull in the office this AM, has not had any other PO intake  Patient reports a change in vision as blurry  Patient denies any neck pain or tenderness   ROM of c-spine normal  Patient walked to urgent care from his home up " the street  Patient is tachycardic, but other VS all WNL, and in no acute distress  Given History and Physical, he will be sent directly to the ER for evaluation via EMS  Patient agreeable to same  He is on suboxone and states last used about 8mths ago  He reports he did take his dose this AM      911 call was made requesting EMS- Initial dispatch was BLS only requested ALS upon their arrival  ALS arrived and assumed patient care-  Patient to [facility] via EMS [id number] - Report given to Paramedic Tiffany Esteban, 10:35, ER called, report given  Patient left office awake alert and in no acute distress  Review of Systems   Review of Systems   Constitutional: Positive for appetite change and fatigue  Negative for chills and fever  Increased sleeping the past several days  HENT: Positive for sore throat  Negative for congestion, ear pain, postnasal drip, rhinorrhea, sinus pain and trouble swallowing  Sore throat 'from vomiting'   Eyes: Positive for visual disturbance  Negative for pain and redness  Respiratory: Positive for wheezing  Negative for cough and shortness of breath  Cardiovascular: Negative for chest pain and palpitations  Gastrointestinal: Positive for nausea and vomiting  Negative for abdominal distention, abdominal pain, blood in stool and constipation  Genitourinary: Negative for dysuria and hematuria  Musculoskeletal: Negative for arthralgias, back pain, neck pain and neck stiffness  Skin: Negative for color change and rash  Neurological: Positive for dizziness, seizures, light-headedness and headaches  Negative for tremors and syncope  All other systems reviewed and are negative          Current Medications       Current Outpatient Medications:     buprenorphine-naloxone (Suboxone) 8-2 mg, Place 1 Film (8 mg total) under the tongue 2 (two) times a day, Disp: 60 Film, Rfl: 0    LORazepam (ATIVAN) 1 mg tablet, Take 1 tablet (1 mg total) by mouth every 8 (eight) hours as needed "for anxiety, Disp: 90 tablet, Rfl: 0    QUEtiapine (SEROquel) 100 mg tablet, Take 1 tablet (100 mg total) by mouth daily at bedtime, Disp: 90 tablet, Rfl: 1    amLODIPine (NORVASC) 10 mg tablet, Take 1 tablet (10 mg total) by mouth daily, Disp: 30 tablet, Rfl: 2    naloxone (NARCAN) 4 mg/0 1 mL nasal spray, Administer 1 spray into a nostril  If no response after 2-3 minutes, give another dose in the other nostril using a new spray , Disp: 1 each, Rfl: 1    naproxen (NAPROSYN) 500 mg tablet, Take 1 tablet (500 mg total) by mouth 2 (two) times a day with meals, Disp: 14 tablet, Rfl: 0    Current Allergies     Allergies as of 05/03/2023 - Reviewed 05/03/2023   Allergen Reaction Noted    Penicillin v Other (See Comments) 10/08/2021    Penicillins Hives 06/29/2022            The following portions of the patient's history were reviewed and updated as appropriate: allergies, current medications, past family history, past medical history, past social history, past surgical history and problem list      Past Medical History:   Diagnosis Date    Chronic pain     Depression     Pelvic fracture ([facility] [address] )     PTSD (post-traumatic stress disorder)     [facility]        Past Surgical History:   Procedure Laterality Date    BONY PELVIS SURGERY         History reviewed  No pertinent family history  Medications have been verified  Objective   /70   Pulse (!) 120   Temp 98 4 °F (36 9 °C)   Resp 20   Ht 5' 10\" (1 778 m)   Wt 106 kg (234 lb)   SpO2 98%   BMI 33 58 kg/m²        Physical Exam     Physical Exam  Vitals and nursing note reviewed  Constitutional:       General: He is not in acute distress  Appearance: Normal appearance  He is normal weight  HENT:      Head: Normocephalic  Jaw: There is normal jaw occlusion          Right Ear: Tympanic membrane, ear canal and external ear normal       Left Ear: Tympanic membrane, ear canal and external ear normal       Nose: Nose " normal       Mouth/Throat:      Lips: Pink  Mouth: Mucous membranes are moist       Pharynx: Oropharynx is clear  Posterior oropharyngeal erythema present  Eyes:      General: Lids are normal       Extraocular Movements: Extraocular movements intact  Conjunctiva/sclera: Conjunctivae normal       Pupils: Pupils are equal, round, and reactive to light  Comments: Light sensitivity with pupil exam    Neck:      Trachea: Trachea and phonation normal    Cardiovascular:      Rate and Rhythm: Normal rate and regular rhythm  Pulses: Normal pulses  Heart sounds: Normal heart sounds, S1 normal and S2 normal    Pulmonary:      Effort: Pulmonary effort is normal       Breath sounds: Wheezing present  Comments: No history of asthma, is a smoker- 1/2ppd x 10 years  Abdominal:      General: Abdomen is protuberant  Bowel sounds are normal       Palpations: Abdomen is soft  Musculoskeletal:         General: Normal range of motion  Cervical back: Full passive range of motion without pain, normal range of motion and neck supple  No signs of trauma, rigidity or tenderness  Normal range of motion  Lymphadenopathy:      Cervical: No cervical adenopathy  Skin:     General: Skin is warm and dry  Capillary Refill: Capillary refill takes less than 2 seconds  Neurological:      General: No focal deficit present  Mental Status: He is alert and oriented to person, place, and time  Mental status is at baseline  GCS: GCS eye subscore is 4  GCS verbal subscore is 5  GCS motor subscore is 6  Gait: Gait is intact     Psychiatric:         Mood and Affect: Mood normal

## 2023-05-05 LAB
ATRIAL RATE: 96 BPM
P AXIS: 48 DEGREES
PR INTERVAL: 140 MS
QRS AXIS: 32 DEGREES
QRSD INTERVAL: 86 MS
QT INTERVAL: 362 MS
QTC INTERVAL: 457 MS
T WAVE AXIS: 51 DEGREES
VENTRICULAR RATE: 96 BPM

## 2023-05-22 ENCOUNTER — OFFICE VISIT (OUTPATIENT)
Dept: FAMILY MEDICINE CLINIC | Facility: CLINIC | Age: 38
End: 2023-05-22

## 2023-05-22 VITALS
SYSTOLIC BLOOD PRESSURE: 135 MMHG | HEIGHT: 70 IN | HEART RATE: 117 BPM | TEMPERATURE: 97.8 F | RESPIRATION RATE: 20 BRPM | DIASTOLIC BLOOD PRESSURE: 90 MMHG | WEIGHT: 235.6 LBS | OXYGEN SATURATION: 99 % | BODY MASS INDEX: 33.73 KG/M2

## 2023-05-22 DIAGNOSIS — F41.9 ANXIETY: ICD-10-CM

## 2023-05-22 DIAGNOSIS — F10.90 ALCOHOL USE DISORDER: ICD-10-CM

## 2023-05-22 DIAGNOSIS — F11.24 OPIOID DEPENDENCE WITH OPIOID-INDUCED MOOD DISORDER (HCC): Primary | ICD-10-CM

## 2023-05-22 RX ORDER — QUETIAPINE FUMARATE 100 MG/1
100 TABLET, FILM COATED ORAL
Qty: 90 TABLET | Refills: 1 | Status: CANCELLED | OUTPATIENT
Start: 2023-05-22

## 2023-05-22 RX ORDER — LORAZEPAM 1 MG/1
1 TABLET ORAL EVERY 8 HOURS PRN
Qty: 90 TABLET | Refills: 0 | Status: CANCELLED | OUTPATIENT
Start: 2023-05-22

## 2023-05-22 RX ORDER — AMLODIPINE BESYLATE 10 MG/1
10 TABLET ORAL DAILY
Qty: 30 TABLET | Refills: 2 | Status: CANCELLED | OUTPATIENT
Start: 2023-05-22

## 2023-05-22 RX ORDER — NAPROXEN 500 MG/1
500 TABLET ORAL 2 TIMES DAILY WITH MEALS
Qty: 14 TABLET | Refills: 0 | Status: CANCELLED | OUTPATIENT
Start: 2023-05-22

## 2023-05-22 RX ORDER — BUPRENORPHINE AND NALOXONE 8; 2 MG/1; MG/1
8 FILM, SOLUBLE BUCCAL; SUBLINGUAL 2 TIMES DAILY
Qty: 60 FILM | Refills: 0 | Status: SHIPPED | OUTPATIENT
Start: 2023-05-22 | End: 2023-06-21

## 2023-05-22 RX ORDER — ONDANSETRON 4 MG/1
4 TABLET, FILM COATED ORAL EVERY 8 HOURS PRN
Qty: 30 TABLET | Refills: 0 | Status: CANCELLED | OUTPATIENT
Start: 2023-05-22

## 2023-05-22 RX ORDER — NALOXONE HYDROCHLORIDE 4 MG/.1ML
SPRAY NASAL
Qty: 1 EACH | Refills: 1 | Status: CANCELLED | OUTPATIENT
Start: 2023-05-22

## 2023-05-22 RX ORDER — CLONAZEPAM 1 MG/1
1 TABLET ORAL 2 TIMES DAILY
Qty: 14 TABLET | Refills: 0 | Status: SHIPPED | OUTPATIENT
Start: 2023-05-22 | End: 2023-05-25 | Stop reason: SDUPTHER

## 2023-05-22 NOTE — PROGRESS NOTES
Assessment/Plan     Diagnoses and all orders for this visit:    Opioid dependence with opioid-induced mood disorder (HCC)  -     buprenorphine-naloxone (Suboxone) 8-2 mg; Place 1 Film (8 mg total) under the tongue 2 (two) times a day  -     FENTANYL, URINE  -     Suboxone  -     Toxicology screen, urine    Alcohol use disorder  -     clonazePAM (KlonoPIN) 1 mg tablet; Take 1 tablet (1 mg total) by mouth 2 (two) times a day for 7 days  -     Ethanol, urine    Anxiety  -     clonazePAM (KlonoPIN) 1 mg tablet; Take 1 tablet (1 mg total) by mouth 2 (two) times a day for 7 days      Plan:    - Reviewed previous UDS results today with patient  - Results are not consistent with prescribed medications (negative benzodiazepines x 4)  - Will change ativan to clonazepam as it is longer lasting and has helped him in the past with the alcohol use  - I encouraged him to enter detox program or at the least an outpatient AA program and attend consistently  - He states he will attend AA meetings  - He understands that no further benzos will be prescribed if his UDS returns negative again   - I explained to him the risks of mixing alcohol with benzos  - Will continue his Suboxone at current dose at this time    - PDMP reviewed and showed no concerns  - Patient in agreement with plan  - Follow-up in 1 week  Subjective     Patient Id: Kaylee Palacio is a 30 y o  male    HPI    Patient presents today for MAT follow-up  History of opiate use disorder now managed on Suboxone  Currently takes Suboxone 8-2 mg twice daily  Recently has been having issues with alcohol use and did have an ER visit for an alcohol-related seizure since our last appointment  Patient was discharged home after a prolonged ER stay  He declined to go to detox at that time  He has been to detox in the past   He is currently prescribed lorazepam 1 mg 3 times daily however recent drug screens have been negative for benzodiazepines    He was also on "medication including oral acamprosate in the past for alcohol use disorder  Today, he overall feels well and has had no seizures since his ER visits  He does note ongoing \"stress\" related to some family concerns  He states that he has not taken benzos in the last 4 days and has not drank alcohol since his ER visit  He denies any other concerns or complaints at this time  Review of Systems   Constitutional: Positive for diaphoresis  Negative for chills and fever  Respiratory: Negative for shortness of breath  Cardiovascular: Negative for chest pain  Gastrointestinal: Negative for abdominal pain, constipation, diarrhea, nausea and vomiting  Genitourinary: Negative for dysuria  Musculoskeletal: Negative for arthralgias and myalgias  Skin: Negative for rash  Neurological: Negative for seizures and headaches  Psychiatric/Behavioral: Positive for dysphoric mood and sleep disturbance  Negative for self-injury and suicidal ideas  The patient is nervous/anxious  All other systems reviewed and are negative  Past Medical History:   Diagnosis Date   • Chronic pain    • Depression    • Pelvic fracture (HCC)    • PTSD (post-traumatic stress disorder)    • [facility]        Past Surgical History:   Procedure Laterality Date   • BONY PELVIS SURGERY         History reviewed  No pertinent family history      Social History     Socioeconomic History   • Marital status: Single     Spouse name: None   • Number of children: None   • Years of education: None   • Highest education level: None   Occupational History   • None   Tobacco Use   • Smoking status: Every Day     Packs/day: 1 00     Types: Cigarettes     Passive exposure: Never   • Smokeless tobacco: Never   Vaping Use   • Vaping Use: Former   Substance and Sexual Activity   • Alcohol use: Yes     Comment: 20-30 shots per day   • Drug use: Not Currently     Types: Methamphetamines, Marijuana, Cocaine     Comment: Yulisa Kent current use   • " "Sexual activity: Yes     Partners: Female   Other Topics Concern   • None   Social History Narrative   • None     Social Determinants of Health     Financial Resource Strain: Not on file   Food Insecurity: Not on file   Transportation Needs: Not on file   Physical Activity: Not on file   Stress: Not on file   Social Connections: Not on file   Intimate Partner Violence: Not on file   Housing Stability: Not on file       Allergies   Allergen Reactions   • Penicillin V Other (See Comments)     unknown   • Penicillins Hives         Current Outpatient Medications:   •  amLODIPine (NORVASC) 10 mg tablet, Take 1 tablet (10 mg total) by mouth daily, Disp: 30 tablet, Rfl: 2  •  buprenorphine-naloxone (Suboxone) 8-2 mg, Place 1 Film (8 mg total) under the tongue 2 (two) times a day, Disp: 60 Film, Rfl: 0  •  clonazePAM (KlonoPIN) 1 mg tablet, Take 1 tablet (1 mg total) by mouth 2 (two) times a day for 7 days, Disp: 14 tablet, Rfl: 0  •  naloxone (NARCAN) 4 mg/0 1 mL nasal spray, Administer 1 spray into a nostril  If no response after 2-3 minutes, give another dose in the other nostril using a new spray , Disp: 1 each, Rfl: 1  •  naproxen (NAPROSYN) 500 mg tablet, Take 1 tablet (500 mg total) by mouth 2 (two) times a day with meals, Disp: 14 tablet, Rfl: 0  •  ondansetron (ZOFRAN) 4 mg tablet, Take 1 tablet (4 mg total) by mouth every 8 (eight) hours as needed for nausea or vomiting, Disp: 30 tablet, Rfl: 0  •  QUEtiapine (SEROquel) 100 mg tablet, Take 1 tablet (100 mg total) by mouth daily at bedtime, Disp: 90 tablet, Rfl: 1    Objective     Vitals:    05/22/23 0955   BP: 135/90   BP Location: Left arm   Patient Position: Sitting   Cuff Size: Large   Pulse: (!) 117   Resp: 20   Temp: 97 8 °F (36 6 °C)   SpO2: 99%   Weight: 107 kg (235 lb 9 6 oz)   Height: 5' 10\" (1 778 m)       Physical Exam  Vitals and nursing note reviewed  Constitutional:       General: He is not in acute distress  Appearance: He is well-developed   " He is diaphoretic  He is not ill-appearing or toxic-appearing  HENT:      Head: Normocephalic and atraumatic  Neck:      Thyroid: No thyromegaly  Cardiovascular:      Rate and Rhythm: Regular rhythm  Tachycardia present  Heart sounds: Normal heart sounds  No murmur heard  Pulmonary:      Effort: Pulmonary effort is normal  No respiratory distress  Breath sounds: Normal breath sounds  No wheezing  Abdominal:      Palpations: Abdomen is soft  Tenderness: There is no abdominal tenderness  Musculoskeletal:      Cervical back: Neck supple  Skin:     General: Skin is warm  Neurological:      General: No focal deficit present  Mental Status: He is alert and oriented to person, place, and time  Mental status is at baseline  Cranial Nerves: No cranial nerve deficit  Motor: No weakness  Gait: Gait normal       Deep Tendon Reflexes: Reflexes normal    Psychiatric:         Attention and Perception: He does not perceive auditory or visual hallucinations  Mood and Affect: Mood is anxious  Speech: Speech normal          Behavior: Behavior normal  Behavior is cooperative  Thought Content: Thought content is not delusional  Thought content does not include homicidal or suicidal ideation  Cognition and Memory: Cognition and memory normal          Judgment: Judgment is inappropriate           Salma Harden

## 2023-05-23 LAB — ETHANOL UR-MCNC: NEGATIVE %

## 2023-05-24 LAB — FENTANYL+NORFENTANYL PNL UR: NEGATIVE PG/ML

## 2023-05-25 ENCOUNTER — TELEPHONE (OUTPATIENT)
Dept: FAMILY MEDICINE CLINIC | Facility: CLINIC | Age: 38
End: 2023-05-25

## 2023-05-25 DIAGNOSIS — F41.9 ANXIETY: ICD-10-CM

## 2023-05-25 DIAGNOSIS — F43.10 PTSD (POST-TRAUMATIC STRESS DISORDER): ICD-10-CM

## 2023-05-25 DIAGNOSIS — F10.90 ALCOHOL USE DISORDER: ICD-10-CM

## 2023-05-25 RX ORDER — QUETIAPINE FUMARATE 100 MG/1
100 TABLET, FILM COATED ORAL
Qty: 90 TABLET | Refills: 1 | Status: SHIPPED | OUTPATIENT
Start: 2023-05-25

## 2023-05-25 RX ORDER — CLONAZEPAM 1 MG/1
1 TABLET ORAL 2 TIMES DAILY
Qty: 10 TABLET | Refills: 0 | Status: SHIPPED | OUTPATIENT
Start: 2023-05-25 | End: 2023-06-01 | Stop reason: SDUPTHER

## 2023-05-25 NOTE — TELEPHONE ENCOUNTER
Patient called stating he wont be able to get the seroquel and klonopin refilled before he leave [place] since it's too soon to fill  He is asking if you can change the dosages on these to equal the amount he needs to take so he can get this filled before leaving

## 2023-05-25 NOTE — PROGRESS NOTES
Patient called the office today and noted he is leaving out of town for work and will miss his appointment on Monday  Will be back into town on Tuesday  Requesting medications to get him through until he can be seen again  Plan: Will provide enough clonazepam to get him through to his appointment on Thursday, 6/1/2023  Also refilled his Seroquel  Patient last had Suboxone refilled on 522 so should have plenty of supply of Suboxone

## 2023-05-30 LAB
BUPRENORPHINE UR CFM-MCNC: 160 NG/ML
BUPRENORPHINE UR QL CFM: NORMAL NG/ML
BUPRENORPHINE UR QL CFM: POSITIVE
BUPRENORPHINE+NOR UR QL: POSITIVE
NORBUPRENORPHINE UR CFM-MCNC: 550 NG/ML
NORBUPRENORPHINE UR QL CFM: POSITIVE

## 2023-05-31 LAB
AMPHETAMINES UR QL SCN: NEGATIVE NG/ML
BARBITURATES UR QL SCN: POSITIVE
BENZODIAZ UR QL: NEGATIVE NG/ML
BZE UR QL: NEGATIVE NG/ML
CANNABINOIDS UR QL SCN: POSITIVE
METHADONE UR QL SCN: NEGATIVE NG/ML
OPIATES UR QL: NEGATIVE NG/ML
PCP UR QL: NEGATIVE NG/ML
PROPOXYPH UR QL SCN: NEGATIVE NG/ML

## 2023-06-01 ENCOUNTER — OFFICE VISIT (OUTPATIENT)
Dept: FAMILY MEDICINE CLINIC | Facility: CLINIC | Age: 38
End: 2023-06-01

## 2023-06-01 VITALS
SYSTOLIC BLOOD PRESSURE: 140 MMHG | OXYGEN SATURATION: 99 % | WEIGHT: 234 LBS | BODY MASS INDEX: 33.5 KG/M2 | HEIGHT: 70 IN | DIASTOLIC BLOOD PRESSURE: 80 MMHG | HEART RATE: 121 BPM | TEMPERATURE: 98.1 F | RESPIRATION RATE: 18 BRPM

## 2023-06-01 DIAGNOSIS — F11.24 OPIOID DEPENDENCE WITH OPIOID-INDUCED MOOD DISORDER (HCC): Primary | ICD-10-CM

## 2023-06-01 DIAGNOSIS — F10.90 ALCOHOL USE DISORDER: ICD-10-CM

## 2023-06-01 DIAGNOSIS — F41.9 ANXIETY: ICD-10-CM

## 2023-06-01 DIAGNOSIS — R00.0 TACHYCARDIA: ICD-10-CM

## 2023-06-01 RX ORDER — CLONAZEPAM 1 MG/1
1 TABLET ORAL 2 TIMES DAILY
Qty: 28 TABLET | Refills: 0 | Status: SHIPPED | OUTPATIENT
Start: 2023-06-01 | End: 2023-06-15

## 2023-06-01 NOTE — PROGRESS NOTES
Assessment/Plan     Diagnoses and all orders for this visit:    Opioid dependence with opioid-induced mood disorder (HCC)  -     FENTANYL, URINE  -     Suboxone  -     Toxicology screen, urine    Alcohol use disorder  -     clonazePAM (KlonoPIN) 1 mg tablet; Take 1 tablet (1 mg total) by mouth 2 (two) times a day for 14 days    Polysubstance abuse (HCC)    Tachycardia  -     ECG 12 lead; Future  -     metoprolol tartrate (LOPRESSOR) 25 mg tablet; Take 0 5 tablets (12 5 mg total) by mouth every 12 (twelve) hours    Anxiety  -     clonazePAM (KlonoPIN) 1 mg tablet; Take 1 tablet (1 mg total) by mouth 2 (two) times a day for 14 days      Plan:    OUD  - stable  - continue current Suboxone dose, patient given 30 day supply last visit    AUD  - stable  - continue current medications  - advised continued abstinence from alcohol and to attend AA meetings    Tachycardia  - attemped EKG today in the office and did review on screen but unable to print reading due to equipment malfunction  - sinus tachycardia with no concerning changes on imaging  - will send for formal EKG   - start metoprolol tartrate 12 5mg BID    Anxiety  - continue clonazepam 1mg BID for now    Controlled Substance Review    [place] PDMP or [place]  reviewed: No red flags were identified; safe to proceed with prescription         The patient indicates understanding of these issues and agrees with the plan  Return in about 2 weeks (around 6/15/2023) for Recheck  Subjective     Patient Id: Minnie Munson is a 30 y o  male    HPI    Here today for f/u  No concerns or complaints today  Reports feeling well  Notes good adherence with medications  Has not drank alcohol since last visit  Recent UDS + for phenobarbitol which he notes he received when hospitalized for seizures at [facility]  No other concerns at this time  Review of Systems   Constitutional: Negative for chills and fever     Respiratory: Negative for chest tightness, shortness of breath and wheezing  Cardiovascular: Negative for chest pain, palpitations and leg swelling  Gastrointestinal: Negative for abdominal pain, constipation, diarrhea, nausea and vomiting  Genitourinary: Negative for dysuria  Musculoskeletal: Negative for arthralgias and myalgias  Skin: Negative for rash  Neurological: Negative for headaches  Psychiatric/Behavioral: Negative for agitation, behavioral problems, dysphoric mood and sleep disturbance  The patient is not nervous/anxious  All other systems reviewed and are negative  Past Medical History:   Diagnosis Date   • Chronic pain    • Depression    • Pelvic fracture (HCC)    • PTSD (post-traumatic stress disorder)    • Substance abuse ([facility] [address] )        Past Surgical History:   Procedure Laterality Date   • BONY PELVIS SURGERY         History reviewed  No pertinent family history      Social History     Socioeconomic History   • Marital status: Single     Spouse name: None   • Number of children: None   • Years of education: None   • Highest education level: None   Occupational History   • None   Tobacco Use   • Smoking status: Every Day     Packs/day: 1 00     Types: Cigarettes     Passive exposure: Never   • Smokeless tobacco: Never   Vaping Use   • Vaping Use: Former   Substance and Sexual Activity   • Alcohol use: Yes     Comment: 20-30 shots per day   • Drug use: Not Currently     Types: Methamphetamines, Marijuana, Cocaine     Comment: Corky Quintana current use   • Sexual activity: Yes     Partners: Female   Other Topics Concern   • None   Social History Narrative   • None     Social Determinants of Health     Financial Resource Strain: Not on file   Food Insecurity: Not on file   Transportation Needs: Not on file   Physical Activity: Not on file   Stress: Not on file   Social Connections: Not on file   Intimate Partner Violence: Not on file   Housing Stability: Not on file       Allergies   Allergen Reactions   • Penicillin V Other (See Comments) "unknown   • Penicillins Hives         Current Outpatient Medications:   •  amLODIPine (NORVASC) 10 mg tablet, Take 1 tablet (10 mg total) by mouth daily, Disp: 30 tablet, Rfl: 2  •  buprenorphine-naloxone (Suboxone) 8-2 mg, Place 1 Film (8 mg total) under the tongue 2 (two) times a day, Disp: 60 Film, Rfl: 0  •  clonazePAM (KlonoPIN) 1 mg tablet, Take 1 tablet (1 mg total) by mouth 2 (two) times a day for 14 days, Disp: 28 tablet, Rfl: 0  •  metoprolol tartrate (LOPRESSOR) 25 mg tablet, Take 0 5 tablets (12 5 mg total) by mouth every 12 (twelve) hours, Disp: 30 tablet, Rfl: 1  •  naloxone (NARCAN) 4 mg/0 1 mL nasal spray, Administer 1 spray into a nostril  If no response after 2-3 minutes, give another dose in the other nostril using a new spray , Disp: 1 each, Rfl: 1  •  naproxen (NAPROSYN) 500 mg tablet, Take 1 tablet (500 mg total) by mouth 2 (two) times a day with meals, Disp: 14 tablet, Rfl: 0  •  ondansetron (ZOFRAN) 4 mg tablet, Take 1 tablet (4 mg total) by mouth every 8 (eight) hours as needed for nausea or vomiting, Disp: 30 tablet, Rfl: 0  •  QUEtiapine (SEROquel) 100 mg tablet, Take 1 tablet (100 mg total) by mouth daily at bedtime, Disp: 90 tablet, Rfl: 1    Objective     Vitals:    06/01/23 1106   BP: 140/80   BP Location: Left arm   Patient Position: Sitting   Cuff Size: Large   Pulse: (!) 121   Resp: 18   Temp: 98 1 °F (36 7 °C)   SpO2: 99%   Weight: 106 kg (234 lb)   Height: 5' 10\" (1 778 m)       Physical Exam  Vitals and nursing note reviewed  Constitutional:       General: He is not in acute distress  Appearance: He is well-developed  He is not ill-appearing, toxic-appearing or diaphoretic  HENT:      Head: Normocephalic and atraumatic  Neck:      Thyroid: No thyromegaly  Cardiovascular:      Rate and Rhythm: Regular rhythm  Tachycardia present  Heart sounds: Normal heart sounds  No murmur heard  Pulmonary:      Effort: Pulmonary effort is normal  No respiratory distress        " Breath sounds: Normal breath sounds  No wheezing, rhonchi or rales  Abdominal:      Palpations: Abdomen is soft  Tenderness: There is no abdominal tenderness  Musculoskeletal:      Cervical back: Neck supple  Right lower leg: No edema  Left lower leg: No edema  Skin:     General: Skin is warm  Neurological:      Mental Status: He is alert and oriented to person, place, and time     Psychiatric:         Mood and Affect: Mood normal          Behavior: Behavior normal          Salma Petit 30

## 2023-06-03 LAB — FENTANYL+NORFENTANYL PNL UR: NEGATIVE PG/ML

## 2023-06-08 DIAGNOSIS — F11.24 OPIOID DEPENDENCE WITH OPIOID-INDUCED MOOD DISORDER (HCC): Primary | ICD-10-CM

## 2023-06-08 DIAGNOSIS — F10.90 ALCOHOL USE DISORDER: ICD-10-CM

## 2023-06-09 LAB
BUPRENORPHINE UR CFM-MCNC: 294 NG/ML
BUPRENORPHINE UR QL CFM: NORMAL NG/ML
BUPRENORPHINE UR QL CFM: POSITIVE
BUPRENORPHINE+NOR UR QL: POSITIVE
NORBUPRENORPHINE UR CFM-MCNC: 1273 NG/ML
NORBUPRENORPHINE UR QL CFM: POSITIVE

## 2023-06-12 ENCOUNTER — OFFICE VISIT (OUTPATIENT)
Dept: FAMILY MEDICINE CLINIC | Facility: CLINIC | Age: 38
End: 2023-06-12
Payer: COMMERCIAL

## 2023-06-12 ENCOUNTER — TELEPHONE (OUTPATIENT)
Dept: PSYCHIATRY | Facility: CLINIC | Age: 38
End: 2023-06-12

## 2023-06-12 VITALS
DIASTOLIC BLOOD PRESSURE: 92 MMHG | WEIGHT: 237 LBS | OXYGEN SATURATION: 97 % | HEIGHT: 70 IN | BODY MASS INDEX: 33.93 KG/M2 | TEMPERATURE: 97.9 F | RESPIRATION RATE: 18 BRPM | SYSTOLIC BLOOD PRESSURE: 174 MMHG | HEART RATE: 127 BPM

## 2023-06-12 DIAGNOSIS — I10 PRIMARY HYPERTENSION: ICD-10-CM

## 2023-06-12 DIAGNOSIS — F11.24 OPIOID DEPENDENCE WITH OPIOID-INDUCED MOOD DISORDER (HCC): Primary | ICD-10-CM

## 2023-06-12 DIAGNOSIS — F41.9 ANXIETY: ICD-10-CM

## 2023-06-12 DIAGNOSIS — F10.90 ALCOHOL USE DISORDER: ICD-10-CM

## 2023-06-12 DIAGNOSIS — R00.0 TACHYCARDIA: ICD-10-CM

## 2023-06-12 PROCEDURE — T1015 CLINIC SERVICE: HCPCS | Performed by: FAMILY MEDICINE

## 2023-06-12 PROCEDURE — 99214 OFFICE O/P EST MOD 30 MIN: CPT | Performed by: FAMILY MEDICINE

## 2023-06-12 RX ORDER — AMLODIPINE BESYLATE 10 MG/1
10 TABLET ORAL DAILY
Qty: 30 TABLET | Refills: 2 | Status: SHIPPED | OUTPATIENT
Start: 2023-06-12

## 2023-06-12 NOTE — PROGRESS NOTES
Assessment/Plan     Diagnoses and all orders for this visit:    Opioid dependence with opioid-induced mood disorder ([address] )    Alcohol use disorder    Anxiety  -     Ambulatory Referral to Psychiatry; Future    Tachycardia  -     metoprolol tartrate (LOPRESSOR) 25 mg tablet; Take 1 tablet (25 mg total) by mouth every 12 (twelve) hours    Primary hypertension  -     amLODIPine (NORVASC) 10 mg tablet; Take 1 tablet (10 mg total) by mouth daily      Plan:    Opiate use disorder/alcohol use disorder  -Reviewed recent urine drug screen results as well as patient's MAT contract with patient today  -Due to repetitive violations of this contract, we will no longer be offering MAT services to him  -Referral has been placed to addiction medicine in order for him to establish care with a more robust addiction program   -We will no longer be providing clonazepam at this time due to continued negative urine drug screens for benzodiazepines   -There is sufficient concern and evidence that his medications are being diverted  Anxiety  -Referral placed to psychiatry at this time for further management of anxiety  -As stated above, we will no longer be providing clonazepam at this time due to continued negative urine drug screens  Tachycardia  -Increase metoprolol to 25 mg twice daily    Primary hypertension  -Asymptomatic at this time, blood pressure remains elevated  -I am concerned that he is not adherent with his medications  -Increase metoprolol to 25 mg twice daily      We will continue to see patient for his primary care needs if he so desires  Formal letter to be sent to patient regarding the dismissal from our MAT program    Return in about 2 weeks (around 6/26/2023) for f/u HTN and tachycardia  Subjective     Patient Id: Regis Haro is a 30 y o  male    HPI    Here today for MAT follow-up  He has no complaints today    His recent urine drug screen was again positive for phenobarbital   It was also negative for benzodiazepines despite him being prescribed them  Patient denies taking phenobarbital and also reports that he is taking his clonazepam as prescribed  He denies any opiate use other than his Suboxone  He reports he has been abstinent from alcohol since his last visit  Patient's blood pressure remains elevated  He notes good adherence with his medication  Currently prescribed amlodipine and metoprolol  Also remains tachycardic  He is overall asymptomatic  Denies any headache, vision changes, chest pain, chest tightness, lower extremity edema  No other concerns at this time  Review of Systems   Constitutional: Negative for chills and fever  Respiratory: Negative for shortness of breath  Cardiovascular: Negative for chest pain  Gastrointestinal: Negative for abdominal pain, constipation, diarrhea, nausea and vomiting  Genitourinary: Negative for dysuria  Musculoskeletal: Negative for arthralgias and myalgias  Skin: Negative for rash  Neurological: Negative for headaches  Psychiatric/Behavioral: Negative for agitation, dysphoric mood, self-injury, sleep disturbance and suicidal ideas  The patient is not nervous/anxious  All other systems reviewed and are negative  Past Medical History:   Diagnosis Date   • Chronic pain    • Depression    • Pelvic fracture (HCC)    • PTSD (post-traumatic stress disorder)    • Substance abuse ([facility] )        Past Surgical History:   Procedure Laterality Date   • BONY PELVIS SURGERY         History reviewed  No pertinent family history      Social History     Socioeconomic History   • Marital status: Single     Spouse name: None   • Number of children: None   • Years of education: None   • Highest education level: None   Occupational History   • None   Tobacco Use   • Smoking status: Every Day     Packs/day: 1 00     Types: Cigarettes     Passive exposure: Never   • Smokeless tobacco: Never   Vaping Use   • Vaping Use: Former   Substance and Sexual "Activity   • Alcohol use: Yes     Comment: 20-30 shots per day   • Drug use: Not Currently     Types: Methamphetamines, Marijuana, Cocaine     Comment: Sofie Perez current use   • Sexual activity: Yes     Partners: Female   Other Topics Concern   • None   Social History Narrative   • None     Social Determinants of Health     Financial Resource Strain: Not on file   Food Insecurity: Not on file   Transportation Needs: Not on file   Physical Activity: Not on file   Stress: Not on file   Social Connections: Not on file   Intimate Partner Violence: Not on file   Housing Stability: Not on file       Allergies   Allergen Reactions   • Penicillin V Other (See Comments)     unknown   • Penicillins Hives         Current Outpatient Medications:   •  amLODIPine (NORVASC) 10 mg tablet, Take 1 tablet (10 mg total) by mouth daily, Disp: 30 tablet, Rfl: 2  •  buprenorphine-naloxone (Suboxone) 8-2 mg, Place 1 Film (8 mg total) under the tongue 2 (two) times a day, Disp: 60 Film, Rfl: 0  •  metoprolol tartrate (LOPRESSOR) 25 mg tablet, Take 1 tablet (25 mg total) by mouth every 12 (twelve) hours, Disp: 60 tablet, Rfl: 1  •  naloxone (NARCAN) 4 mg/0 1 mL nasal spray, Administer 1 spray into a nostril  If no response after 2-3 minutes, give another dose in the other nostril using a new spray , Disp: 1 each, Rfl: 1  •  QUEtiapine (SEROquel) 100 mg tablet, Take 1 tablet (100 mg total) by mouth daily at bedtime, Disp: 90 tablet, Rfl: 1    Objective     Vitals:    06/12/23 1035   BP: (!) 174/92   Pulse: (!) 127   Resp: 18   Temp: 97 9 °F (36 6 °C)   SpO2: 97%   Weight: 108 kg (237 lb)   Height: 5' 10\" (1 778 m)       Physical Exam  Vitals and nursing note reviewed  Constitutional:       General: He is not in acute distress  Appearance: He is well-developed  He is not ill-appearing, toxic-appearing or diaphoretic  HENT:      Head: Normocephalic and atraumatic  Neck:      Thyroid: No thyromegaly     Cardiovascular:      Rate and " Rhythm: Tachycardia present  Pulmonary:      Effort: Pulmonary effort is normal  No respiratory distress  Musculoskeletal:      Cervical back: Neck supple  Skin:     General: Skin is warm  Neurological:      Mental Status: He is alert and oriented to person, place, and time  Psychiatric:         Mood and Affect: Mood is anxious  Affect is angry  Speech: Speech normal          Behavior: Behavior is agitated  Thought Content: Thought content is not paranoid  Thought content does not include homicidal or suicidal ideation  Latest Reference Range & Units 06/01/23 11:27   Amphetamine Screen, Ur Hoyxae=0095 ng/mL Negative   Barbiturate Screen, Ur Pmyxmy=338  Positive ! Cannabinoid Scrn, Ur Cutoff=50  Positive !    FENTANYL URINE Skdwfb=9851 pg/mL Negative   Opiate Scrn, Ur Ohakkl=882 ng/mL Negative   Methadone Screen, Urine Qpdyle=770 ng/mL Negative   Phencyclidine (PCP), Qual, Ur Cutoff=25 ng/mL Negative   !: Data is abnormal   Latest Reference Range & Units 06/01/23 11:27   BENZODIAZEPINES Ffvhrp=671 ng/mL Negative      Latest Reference Range & Units 06/01/23 11:27   BUPRENORPHINE Cutoff=10 ng/mL  -  See Final Results  Positive !   !: Data is abnormal     06/01/23 11:27   Norbuprenorphine Positive !   !: Data is abnormal   Latest Reference Range & Units 06/01/23 11:27   Norbuprenorphine Confirmation Cutoff=10 ng/mL [address]  [place]akayla [place]on

## 2023-06-12 NOTE — TELEPHONE ENCOUNTER
Received referral from PCP office due to dx of opioid dependence and alcohol abuse  Due to [facility], patient will be referred to [address] [place] [address]  Attempted to contact patient - LVM requesting he returns my call

## 2023-06-19 DIAGNOSIS — F11.24 OPIOID DEPENDENCE WITH OPIOID-INDUCED MOOD DISORDER (HCC): ICD-10-CM

## 2023-06-19 RX ORDER — BUPRENORPHINE AND NALOXONE 8; 2 MG/1; MG/1
8 FILM, SOLUBLE BUCCAL; SUBLINGUAL 2 TIMES DAILY
Qty: 60 FILM | Refills: 0 | Status: SHIPPED | OUTPATIENT
Start: 2023-06-19 | End: 2023-07-19

## 2023-09-06 ENCOUNTER — TELEPHONE (OUTPATIENT)
Dept: FAMILY MEDICINE CLINIC | Facility: CLINIC | Age: 38
End: 2023-09-06

## 2023-09-06 NOTE — TELEPHONE ENCOUNTER
Patient came into office on 9/6/23 to request records for all meds prescribed by Dr. Jesus Borrego from 7/18/22- present for court date related to [place]. Zain to contact [facility] about receiving these records. Called patient and L/M to relay this information.      FLORENCIO

## 2024-01-22 ENCOUNTER — HOSPITAL ENCOUNTER (EMERGENCY)
Facility: HOSPITAL | Age: 39
Discharge: HOME | End: 2024-01-23
Attending: EMERGENCY MEDICINE
Payer: COMMERCIAL

## 2024-01-22 DIAGNOSIS — F10.10 ALCOHOL ABUSE: Primary | ICD-10-CM

## 2024-01-22 DIAGNOSIS — F10.939 ALCOHOL WITHDRAWAL (HCC): ICD-10-CM

## 2024-01-22 LAB
BASOPHILS # BLD AUTO: 0.06 THOUSANDS/ÂΜL (ref 0–0.1)
BASOPHILS NFR BLD AUTO: 1 % (ref 0–1)
EOSINOPHIL # BLD AUTO: 0.1 THOUSAND/ÂΜL (ref 0–0.61)
EOSINOPHIL NFR BLD AUTO: 1 % (ref 0–6)
ERYTHROCYTE [DISTWIDTH] IN BLOOD BY AUTOMATED COUNT: 12.1 % (ref 11.6–15.1)
ETHANOL EXG-MCNC: 0 MG/DL
HCT VFR BLD AUTO: 43.7 % (ref 36.5–49.3)
HGB BLD-MCNC: 14.7 G/DL (ref 12–17)
IMM GRANULOCYTES # BLD AUTO: 0.03 THOUSAND/UL (ref 0–0.2)
IMM GRANULOCYTES NFR BLD AUTO: 0 % (ref 0–2)
LYMPHOCYTES # BLD AUTO: 2.25 THOUSANDS/ÂΜL (ref 0.6–4.47)
LYMPHOCYTES NFR BLD AUTO: 18 % (ref 14–44)
MCH RBC QN AUTO: 29.8 PG (ref 26.8–34.3)
MCHC RBC AUTO-ENTMCNC: 33.6 G/DL (ref 31.4–37.4)
MCV RBC AUTO: 89 FL (ref 82–98)
MONOCYTES # BLD AUTO: 1.36 THOUSAND/ÂΜL (ref 0.17–1.22)
MONOCYTES NFR BLD AUTO: 11 % (ref 4–12)
NEUTROPHILS # BLD AUTO: 8.56 THOUSANDS/ÂΜL (ref 1.85–7.62)
NEUTS SEG NFR BLD AUTO: 69 % (ref 43–75)
NRBC BLD AUTO-RTO: 0 /100 WBCS
PLATELET # BLD AUTO: 237 THOUSANDS/UL (ref 149–390)
PMV BLD AUTO: 11.7 FL (ref 8.9–12.7)
RBC # BLD AUTO: 4.93 MILLION/UL (ref 3.88–5.62)
WBC # BLD AUTO: 12.36 THOUSAND/UL (ref 4.31–10.16)

## 2024-01-22 PROCEDURE — 99285 EMERGENCY DEPT VISIT HI MDM: CPT | Performed by: EMERGENCY MEDICINE

## 2024-01-22 PROCEDURE — 96361 HYDRATE IV INFUSION ADD-ON: CPT

## 2024-01-22 PROCEDURE — 93005 ELECTROCARDIOGRAM TRACING: CPT

## 2024-01-22 PROCEDURE — 80053 COMPREHEN METABOLIC PANEL: CPT | Performed by: EMERGENCY MEDICINE

## 2024-01-22 PROCEDURE — 83735 ASSAY OF MAGNESIUM: CPT | Performed by: EMERGENCY MEDICINE

## 2024-01-22 PROCEDURE — 99284 EMERGENCY DEPT VISIT MOD MDM: CPT

## 2024-01-22 PROCEDURE — 36415 COLL VENOUS BLD VENIPUNCTURE: CPT | Performed by: EMERGENCY MEDICINE

## 2024-01-22 PROCEDURE — 85025 COMPLETE CBC W/AUTO DIFF WBC: CPT | Performed by: EMERGENCY MEDICINE

## 2024-01-22 PROCEDURE — 82075 ASSAY OF BREATH ETHANOL: CPT | Performed by: EMERGENCY MEDICINE

## 2024-01-22 RX ORDER — DIAZEPAM 5 MG/1
10 TABLET ORAL ONCE
Status: COMPLETED | OUTPATIENT
Start: 2024-01-22 | End: 2024-01-22

## 2024-01-22 RX ADMIN — SODIUM CHLORIDE 1000 ML: 0.9 INJECTION, SOLUTION INTRAVENOUS at 23:46

## 2024-01-22 RX ADMIN — DIAZEPAM 10 MG: 5 TABLET ORAL at 23:46

## 2024-01-23 ENCOUNTER — DOCUMENTATION (OUTPATIENT)
Dept: EMERGENCY DEPT | Facility: HOSPITAL | Age: 39
End: 2024-01-23

## 2024-01-23 VITALS
HEIGHT: 70 IN | HEART RATE: 66 BPM | DIASTOLIC BLOOD PRESSURE: 68 MMHG | RESPIRATION RATE: 18 BRPM | BODY MASS INDEX: 33.93 KG/M2 | WEIGHT: 237 LBS | SYSTOLIC BLOOD PRESSURE: 112 MMHG | TEMPERATURE: 97.1 F | OXYGEN SATURATION: 94 %

## 2024-01-23 LAB
ALBUMIN SERPL BCP-MCNC: 5 G/DL (ref 3.5–5)
ALP SERPL-CCNC: 85 U/L (ref 34–104)
ALT SERPL W P-5'-P-CCNC: 26 U/L (ref 7–52)
ANION GAP SERPL CALCULATED.3IONS-SCNC: 12 MMOL/L
AST SERPL W P-5'-P-CCNC: 29 U/L (ref 13–39)
BILIRUB SERPL-MCNC: 1.02 MG/DL (ref 0.2–1)
BUN SERPL-MCNC: 17 MG/DL (ref 5–25)
CALCIUM SERPL-MCNC: 9.7 MG/DL (ref 8.4–10.2)
CHLORIDE SERPL-SCNC: 101 MMOL/L (ref 96–108)
CO2 SERPL-SCNC: 24 MMOL/L (ref 21–32)
CREAT SERPL-MCNC: 0.81 MG/DL (ref 0.6–1.3)
GFR SERPL CREATININE-BSD FRML MDRD: 114 ML/MIN/1.73SQ M
GLUCOSE SERPL-MCNC: 121 MG/DL (ref 65–140)
MAGNESIUM SERPL-MCNC: 1.9 MG/DL (ref 1.9–2.7)
POTASSIUM SERPL-SCNC: 3 MMOL/L (ref 3.5–5.3)
PROT SERPL-MCNC: 7.2 G/DL (ref 6.4–8.4)
SODIUM SERPL-SCNC: 137 MMOL/L (ref 135–147)

## 2024-01-23 PROCEDURE — 96376 TX/PRO/DX INJ SAME DRUG ADON: CPT

## 2024-01-23 PROCEDURE — 96374 THER/PROPH/DIAG INJ IV PUSH: CPT

## 2024-01-23 PROCEDURE — 96375 TX/PRO/DX INJ NEW DRUG ADDON: CPT

## 2024-01-23 RX ORDER — DIAZEPAM 5 MG/ML
5 INJECTION, SOLUTION INTRAMUSCULAR; INTRAVENOUS ONCE
Status: COMPLETED | OUTPATIENT
Start: 2024-01-23 | End: 2024-01-23

## 2024-01-23 RX ORDER — DIAZEPAM 5 MG/1
10 TABLET ORAL ONCE
Status: DISCONTINUED | OUTPATIENT
Start: 2024-01-23 | End: 2024-01-23

## 2024-01-23 RX ORDER — ONDANSETRON 2 MG/ML
4 INJECTION INTRAMUSCULAR; INTRAVENOUS ONCE
Status: COMPLETED | OUTPATIENT
Start: 2024-01-23 | End: 2024-01-23

## 2024-01-23 RX ORDER — DIAZEPAM 5 MG/ML
10 INJECTION, SOLUTION INTRAMUSCULAR; INTRAVENOUS ONCE
Status: COMPLETED | OUTPATIENT
Start: 2024-01-23 | End: 2024-01-23

## 2024-01-23 RX ORDER — POTASSIUM CHLORIDE 20 MEQ/1
40 TABLET, EXTENDED RELEASE ORAL ONCE
Status: COMPLETED | OUTPATIENT
Start: 2024-01-23 | End: 2024-01-23

## 2024-01-23 RX ADMIN — DIAZEPAM 10 MG: 5 INJECTION INTRAMUSCULAR; INTRAVENOUS at 00:48

## 2024-01-23 RX ADMIN — DIAZEPAM 10 MG: 10 INJECTION, SOLUTION INTRAMUSCULAR; INTRAVENOUS at 03:47

## 2024-01-23 RX ADMIN — DIAZEPAM 5 MG: 10 INJECTION, SOLUTION INTRAMUSCULAR; INTRAVENOUS at 07:13

## 2024-01-23 RX ADMIN — POTASSIUM CHLORIDE 40 MEQ: 1500 TABLET, EXTENDED RELEASE ORAL at 00:48

## 2024-01-23 RX ADMIN — ONDANSETRON 4 MG: 2 INJECTION INTRAMUSCULAR; INTRAVENOUS at 07:25

## 2024-01-23 NOTE — ED NOTES
[facility] here to transport pt home. Care transferred to same     Krystle Nova, JODEE  01/23/24 0807

## 2024-01-23 NOTE — ED NOTES
"Pt awake. Complaining of stomach pains from \"not eating\" given ham sandwich and ginger ale at this time     Krystle Nova RN  01/23/24 4973    "

## 2024-01-23 NOTE — DISCHARGE INSTRUCTIONS
It was explained to you multiple times that you may significantly worsen by signing out against medical advice. You may develop alcohol withdrawal seizures, delirium tremens, cardiac arrest or permanent neurologic disability. This was explained to you.    Please return at anytime if you desire further care, resources, or rehab/detox.    Thank you.

## 2024-01-23 NOTE — ED PROVIDER NOTES
History  Chief Complaint   Patient presents with    Drug / Alcohol Assessment     Patient states he has been having 15 shots of fireball a day for the past week. Patients last drink was 1pm yesterday.      38-year-old male with a past medical history of polysubstance abuse, alcohol abuse, methamphetamine abuse, who presents for concern for alcohol withdrawal.  Patient reports that he used meth approximately 3 to 4 days ago, and over the past week has been drinking approximately 15 shots of fireball.  His last drink was at 1 PM yesterday.  Patient states that he wants to quit using alcohol, however is adamant that he does not want rehab or detox.  He does describe feeling anxious, tremulous, he does have a history of alcohol withdrawal seizures and that is what most concerned him.  He states that he is here because he just wants to make sure he will have a seizure.  I explained that it is important for him to either undergo detox or close referral for rehab services as the most dangerous part of withdrawal is often to 3 days out, at the least he would require further management for this.  Patient is adamant that he does not want this despite the explained the risks.  He denies any other symptoms currently.  Review of systems otherwise negative        Prior to Admission Medications   Prescriptions Last Dose Informant Patient Reported? Taking?   QUEtiapine (SEROquel) 100 mg tablet   No No   Sig: Take 1 tablet (100 mg total) by mouth daily at bedtime   amLODIPine (NORVASC) 10 mg tablet   No No   Sig: Take 1 tablet (10 mg total) by mouth daily   metoprolol tartrate (LOPRESSOR) 25 mg tablet   No No   Sig: Take 1 tablet (25 mg total) by mouth every 12 (twelve) hours   naloxone (NARCAN) 4 mg/0.1 mL nasal spray   No No   Sig: Administer 1 spray into a nostril. If no response after 2-3 minutes, give another dose in the other nostril using a new spray.      Facility-Administered Medications: None       Past Medical History:    Diagnosis Date    Chronic pain     Depression     Pelvic fracture (HCC)     PTSD (post-traumatic stress disorder)     Substance abuse (HCC)        Past Surgical History:   Procedure Laterality Date    BONY PELVIS SURGERY         History reviewed. No pertinent family history.  I have reviewed and agree with the history as documented.    E-Cigarette/Vaping    E-Cigarette Use Former User     Comments THC, medical       E-Cigarette/Vaping Substances    Nicotine No     THC No     CBD No     Flavoring No     Other No     Unknown No      Social History     Tobacco Use    Smoking status: Every Day     Current packs/day: 1.00     Types: Cigarettes     Passive exposure: Never    Smokeless tobacco: Never   Vaping Use    Vaping status: Former   Substance Use Topics    Alcohol use: Yes     Comment: 20-30 shots per day    Drug use: Not Currently     Types: Methamphetamines, Marijuana, Cocaine     Comment: Marijuana current use       Review of Systems   Constitutional:  Negative for chills, diaphoresis, fatigue and fever.   HENT:  Negative for congestion and sore throat.    Eyes:  Negative for visual disturbance.   Respiratory:  Negative for cough, chest tightness and shortness of breath.    Cardiovascular:  Negative for chest pain, palpitations and leg swelling.   Gastrointestinal:  Negative for abdominal distention, abdominal pain, constipation, diarrhea, nausea and vomiting.   Genitourinary:  Negative for difficulty urinating and dysuria.   Musculoskeletal:  Negative for arthralgias and myalgias.   Skin:  Negative for rash.   Neurological:  Positive for tremors. Negative for dizziness, weakness, light-headedness, numbness and headaches.   Psychiatric/Behavioral:  Negative for agitation, behavioral problems and confusion. The patient is nervous/anxious.    All other systems reviewed and are negative.      Physical Exam  Physical Exam  Constitutional:       Appearance: He is well-developed.   HENT:      Head: Normocephalic and  atraumatic.   Cardiovascular:      Rate and Rhythm: Normal rate and regular rhythm.      Heart sounds: Normal heart sounds. No murmur heard.  Pulmonary:      Effort: Pulmonary effort is normal. No respiratory distress.      Breath sounds: Normal breath sounds.   Abdominal:      General: Bowel sounds are normal. There is no distension.      Palpations: Abdomen is soft.      Tenderness: There is no abdominal tenderness.   Musculoskeletal:         General: No deformity.   Skin:     General: Skin is warm.      Findings: No rash.   Neurological:      Mental Status: He is alert and oriented to person, place, and time.      Comments: Tremulous, anxious on exam   Psychiatric:         Behavior: Behavior normal.         Thought Content: Thought content normal.         Judgment: Judgment normal.         Vital Signs  ED Triage Vitals   Temperature Pulse Respirations Blood Pressure SpO2   01/22/24 2318 01/22/24 2316 01/22/24 2318 01/22/24 2318 01/22/24 2316   (!) 97.1 °F (36.2 °C) (!) 110 18 (!) 178/80 96 %      Temp Source Heart Rate Source Patient Position - Orthostatic VS BP Location FiO2 (%)   01/22/24 2318 01/22/24 2318 01/22/24 2330 01/22/24 2330 --   Temporal Monitor Lying Left arm       Pain Score       01/22/24 2330       No Pain           Vitals:    01/23/24 0630 01/23/24 0700 01/23/24 0730 01/23/24 0800   BP: 110/71 107/79 113/60 112/68   Pulse: 68 74 68 66   Patient Position - Orthostatic VS: Sitting Sitting Sitting Sitting         Visual Acuity      ED Medications  Medications   diazepam (VALIUM) tablet 10 mg (10 mg Oral Given 1/22/24 2346)   sodium chloride 0.9 % bolus 1,000 mL (0 mL Intravenous Stopped 1/23/24 0046)   potassium chloride (K-DUR,KLOR-CON) CR tablet 40 mEq (40 mEq Oral Given 1/23/24 0048)   diazepam (VALIUM) injection 10 mg (10 mg Intravenous Given 1/23/24 0048)   diazepam (VALIUM) injection 10 mg (10 mg Intravenous Given 1/23/24 0347)   diazepam (VALIUM) injection 5 mg (5 mg Intravenous Given  1/23/24 0713)   ondansetron (ZOFRAN) injection 4 mg (4 mg Intravenous Given 1/23/24 0725)       Diagnostic Studies  Results Reviewed       Procedure Component Value Units Date/Time    Comprehensive metabolic panel [[id number]]  (Abnormal) Collected: 01/22/24 2330    Lab Status: Final result Specimen: Blood from Arm, Right Updated: 01/23/24 0011     Sodium 137 mmol/L      Potassium 3.0 mmol/L      Chloride 101 mmol/L      CO2 24 mmol/L      ANION GAP 12 mmol/L      BUN 17 mg/dL      Creatinine 0.81 mg/dL      Glucose 121 mg/dL      Calcium 9.7 mg/dL      AST 29 U/L      ALT 26 U/L      Alkaline Phosphatase 85 U/L      Total Protein 7.2 g/dL      Albumin 5.0 g/dL      Total Bilirubin 1.02 mg/dL      eGFR 114 ml/min/1.73sq m     Narrative:      National Kidney Disease Foundation guidelines for Chronic Kidney Disease (CKD):     Stage 1 with normal or high GFR (GFR > 90 mL/min/1.73 square meters)    Stage 2 Mild CKD (GFR = 60-89 mL/min/1.73 square meters)    Stage 3A Moderate CKD (GFR = 45-59 mL/min/1.73 square meters)    Stage 3B Moderate CKD (GFR = 30-44 mL/min/1.73 square meters)    Stage 4 Severe CKD (GFR = 15-29 mL/min/1.73 square meters)    Stage 5 End Stage CKD (GFR <15 mL/min/1.73 square meters)  Note: GFR calculation is accurate only with a steady state creatinine    Magnesium [[id number]]  (Normal) Collected: 01/22/24 2330    Lab Status: Final result Specimen: Blood from Arm, Right Updated: 01/23/24 0011     Magnesium 1.9 mg/dL     CBC and differential [[id number]]  (Abnormal) Collected: 01/22/24 2330    Lab Status: Final result Specimen: Blood from Arm, Right Updated: 01/22/24 2351     WBC 12.36 Thousand/uL      RBC 4.93 Million/uL      Hemoglobin 14.7 g/dL      Hematocrit 43.7 %      MCV 89 fL      MCH 29.8 pg      MCHC 33.6 g/dL      RDW 12.1 %      MPV 11.7 fL      Platelets 237 Thousands/uL      nRBC 0 /100 WBCs      Neutrophils Relative 69 %      Immat GRANS % 0 %      Lymphocytes Relative 18 %       Monocytes Relative 11 %      Eosinophils Relative 1 %      Basophils Relative 1 %      Neutrophils Absolute 8.56 Thousands/µL      Immature Grans Absolute 0.03 Thousand/uL      Lymphocytes Absolute 2.25 Thousands/µL      Monocytes Absolute 1.36 Thousand/µL      Eosinophils Absolute 0.10 Thousand/µL      Basophils Absolute 0.06 Thousands/µL     POCT alcohol breath test [[id number]]  (Normal) Resulted: 01/22/24 2331    Lab Status: Final result Updated: 01/22/24 2331     EXTBreath Alcohol 0.00                   No orders to display              Procedures  Procedures         ED Course  ED Course as of 01/23/24 2151 Tue Jan 23, 2024   0110 Patient states he does not want to decide on going to detox now. Is requesting to stay in the ED for now while we reassess, states he may change his mind on transfer in the morning.                               SBIRT 22yo+      Flowsheet Row Most Recent Value   Initial Alcohol Screen: US AUDIT-C     1. How often do you have a drink containing alcohol? 6 Filed at: 01/23/2024 0000   2. How many drinks containing alcohol do you have on a typical day you are drinking?  6 Filed at: 01/23/2024 0000   3a. Male UNDER 65: How often do you have five or more drinks on one occasion? 6 Filed at: 01/23/2024 0000   Audit-C Score 18 Filed at: 01/23/2024 0000   Full Alcohol Screen: US AUDIT    4. How often during the last year have you found that you were not able to stop drinking once you had started? 4 Filed at: 01/23/2024 0000   5. How often during past year have you failed to do what was normally expected of you because of drinking?  4 Filed at: 01/23/2024 0000   6. How often in past year have you needed a first drink in the morning to get yourself going after a heavy drinking session?  4 Filed at: 01/23/2024 0000   7. How often in past year have you had feeling of guilt or remorse after drinking?  4 Filed at: 01/23/2024 0000   8. How often in past year have you been unable to remember what  happened night before because you had been drinking?  4 Filed at: 01/23/2024 0000   9. Have you or someone else been injured as a result of your drinking?  0 Filed at: 01/23/2024 0000   10. Has a relative, friend, doctor or other health worker been concerned about your drinking and suggested you cut down?  4 Filed at: 01/23/2024 0000   AUDIT Total Score 42 Filed at: 01/23/2024 0000   VI[place]A: How many times in the past year have you...    Used an illegal drug or used a prescription medication for non-medical reasons? Once or Twice Filed at: 01/23/2024 0000                      Medical Decision Making  I reviewed the patient's medical chart, PMHx, prior encounters, medications.    My DDx includes: Alcohol abuse, alcohol withdrawal, methamphetamine abuse    Patient currently is tremulous on my exam.  10 mg of Valium IV ordered.  See HPI, patient states that he only wants short temporization of his symptoms, and then wants to go home.  I explained that this is not adequate, as it is very possible he could develop further alcohol withdrawal, seizures, and potentially die by doing this. He is of clear mind, speaking fluently, understands risks, and states he will come back if he desires further care/intervention.    On reassessment, the patient is well-appearing. Despite multiple attempts to convince him to stay or go to rehab/detox, he refuses, does not want any intervention, wants to sign out AMA. Was explained again all the risks above. AMA signed and in chart.     Amount and/or Complexity of Data Reviewed  Labs: ordered.    Risk  Prescription drug management.             Disposition  Final diagnoses:   Alcohol abuse   Alcohol withdrawal (HCC)     Time reflects when diagnosis was documented in both MDM as applicable and the Disposition within this note       Time User Action Codes Description Comment    1/23/2024  5:13 AM Amilcar Bermeo [F10.10] Alcohol abuse     1/23/2024  5:13 AM Amilcar Bermeo Add  "[F10.939] Alcohol withdrawal (HCC)           ED Disposition       ED Disposition   AMA    Condition   --    Date/Time   Tue Jan 23, 2024 0513    Comment   Date: 1/23/2024  Patient: Aniket Pace  Admitted: 1/22/2024 11:15 PM  Attending Provider: Amilcar Bermeo    Aniket Pace or his authorized caregiver has made the decision for the patient to leave the emergency department against the ad vice of his attending physician. He or his authorized caregiver has been informed and understands the inherent risks, including death, cardiac arrest, alcohol withdrawal seizures, permanent neurologic disability.  He or his authorized caregiver has d ecided to accept the responsibility for this decision. Aniket Pace and all necessary parties have been advised that he may return for further evaluation or treatment. His condition at time of discharge was stable.  Aniket Pace had current vital s igns as follows:  /74 (BP Location: Left arm)   Pulse 76   Temp (!) 97.1 °F (36.2 °C) (Temporal)   Resp 18   Ht 5' 10\" (1.778 m)   Wt 108 kg (237 lb)                Follow-up Information       Follow up With Specialties Details Why Contact Info Additional Information    Jason Guerra, DO Family Medicine Call  For re-evaluation [address] [place] [id number]  [phone]       [facility] Emergency Medicine  For re-evaluation [address] [facility] [id number]  [phone] [facility], [address], [place], [address]            Discharge Medication List as of 1/23/2024  5:15 AM        CONTINUE these medications which have NOT CHANGED    Details   amLODIPine (NORVASC) 10 mg tablet Take 1 tablet (10 mg total) by mouth daily, Starting Mon 6/12/2023, Normal      metoprolol tartrate (LOPRESSOR) 25 mg tablet Take 1 tablet (25 mg total) by mouth every 12 (twelve) hours, Starting Mon 6/12/2023, Until Wed " 7/12/2023, Normal      naloxone (NARCAN) 4 mg/0.1 mL nasal spray Administer 1 spray into a nostril. If no response after 2-3 minutes, give another dose in the other nostril using a new spray., Normal      QUEtiapine (SEROquel) 100 mg tablet Take 1 tablet (100 mg total) by mouth daily at bedtime, Starting Thu 5/25/2023, Normal             No discharge procedures on file.    PDMP Review         Value Time User    PDMP Reviewed  Yes 6/19/2023 10:28 AM Jason Guerra DO            ED Provider  Electronically Signed by             Amilcar Bermeo MD  01/23/24 9137

## 2024-01-24 LAB
ATRIAL RATE: 110 BPM
P AXIS: 59 DEGREES
PR INTERVAL: 126 MS
QRS AXIS: 64 DEGREES
QRSD INTERVAL: 80 MS
QT INTERVAL: 328 MS
QTC INTERVAL: 443 MS
T WAVE AXIS: 71 DEGREES
VENTRICULAR RATE: 110 BPM

## 2024-04-17 ENCOUNTER — TELEPHONE (OUTPATIENT)
Dept: FAMILY MEDICINE CLINIC | Facility: CLINIC | Age: 39
End: 2024-04-17

## 2024-04-17 NOTE — TELEPHONE ENCOUNTER
-- DO NOT REPLY / DO NOT REPLY ALL --  -- Message is from [facility] ([facility]) --    General Patient Message: Patient is having difficulties with medication after having his recent surgery.Patient is having a lot of pain and discomfort. Patient would also like lab order to be in the system so that he can have them done tomorrow.     Caller Information       Type Contact Phone/Fax    05/15/2023 09:23 AM CDT Phone (Incoming) Trevon Sandra (Self) [phone] (M)        Alternative phone number: na    Can a detailed message be left? Yes    Message Turnaround: [place]:    Refer to site's KB page for routing instructions    Please give this turnaround time to the caller:   \"You can expect to receive a response 1-3 business days after your provider's clinical team reviews the message\"               LM for patient to call office back in regards to scheduling a dental appt.

## 2024-07-28 ENCOUNTER — APPOINTMENT (EMERGENCY)
Dept: CT IMAGING | Facility: HOSPITAL | Age: 39
DRG: 773 | End: 2024-07-28
Payer: COMMERCIAL

## 2024-07-28 ENCOUNTER — APPOINTMENT (EMERGENCY)
Dept: RADIOLOGY | Facility: HOSPITAL | Age: 39
DRG: 773 | End: 2024-07-28
Payer: COMMERCIAL

## 2024-07-28 ENCOUNTER — HOSPITAL ENCOUNTER (INPATIENT)
Facility: HOSPITAL | Age: 39
LOS: 1 days | Discharge: HOME | DRG: 773 | End: 2024-07-30
Attending: EMERGENCY MEDICINE | Admitting: FAMILY MEDICINE
Payer: COMMERCIAL

## 2024-07-28 DIAGNOSIS — R56.9 SEIZURE-LIKE ACTIVITY (HCC): ICD-10-CM

## 2024-07-28 DIAGNOSIS — E86.0 DEHYDRATION: ICD-10-CM

## 2024-07-28 DIAGNOSIS — M62.82 RHABDOMYOLYSIS: ICD-10-CM

## 2024-07-28 DIAGNOSIS — F10.10 ALCOHOL ABUSE: ICD-10-CM

## 2024-07-28 DIAGNOSIS — R55 SYNCOPE: ICD-10-CM

## 2024-07-28 DIAGNOSIS — R56.9 ALCOHOL WITHDRAWAL SEIZURE WITH COMPLICATION (HCC): Primary | ICD-10-CM

## 2024-07-28 DIAGNOSIS — F10.939 ALCOHOL WITHDRAWAL SEIZURE WITH COMPLICATION (HCC): Primary | ICD-10-CM

## 2024-07-28 DIAGNOSIS — F10.20 ALCOHOL USE DISORDER, SEVERE, DEPENDENCE (HCC): ICD-10-CM

## 2024-07-28 DIAGNOSIS — I95.9 HYPOTENSION: ICD-10-CM

## 2024-07-28 DIAGNOSIS — N19 RENAL FAILURE: ICD-10-CM

## 2024-07-28 DIAGNOSIS — F17.200 TOBACCO USE DISORDER: ICD-10-CM

## 2024-07-28 DIAGNOSIS — F10.939 ALCOHOL WITHDRAWAL (HCC): ICD-10-CM

## 2024-07-28 LAB
ALBUMIN SERPL BCG-MCNC: 4.8 G/DL (ref 3.5–5)
ALP SERPL-CCNC: 127 U/L (ref 34–104)
ALT SERPL W P-5'-P-CCNC: 27 U/L (ref 7–52)
ANION GAP SERPL CALCULATED.3IONS-SCNC: 23 MMOL/L (ref 4–13)
AST SERPL W P-5'-P-CCNC: 31 U/L (ref 13–39)
B-OH-BUTYR SERPL-MCNC: 0.22 MMOL/L (ref 0.02–0.27)
BASE EX.OXY STD BLDV CALC-SCNC: 60.5 % (ref 60–80)
BASE EXCESS BLDV CALC-SCNC: -8.8 MMOL/L
BASOPHILS # BLD AUTO: 0.04 THOUSANDS/ÂΜL (ref 0–0.1)
BASOPHILS NFR BLD AUTO: 0 % (ref 0–1)
BILIRUB SERPL-MCNC: 0.49 MG/DL (ref 0.2–1)
BUN SERPL-MCNC: 74 MG/DL (ref 5–25)
CALCIUM SERPL-MCNC: 9.2 MG/DL (ref 8.4–10.2)
CARDIAC TROPONIN I PNL SERPL HS: 6 NG/L
CHLORIDE SERPL-SCNC: 93 MMOL/L (ref 96–108)
CK SERPL-CCNC: 600 U/L (ref 39–308)
CO2 SERPL-SCNC: 16 MMOL/L (ref 21–32)
CREAT SERPL-MCNC: 8.38 MG/DL (ref 0.6–1.3)
EOSINOPHIL # BLD AUTO: 0.1 THOUSAND/ÂΜL (ref 0–0.61)
EOSINOPHIL NFR BLD AUTO: 1 % (ref 0–6)
ERYTHROCYTE [DISTWIDTH] IN BLOOD BY AUTOMATED COUNT: 13.2 % (ref 11.6–15.1)
ETHANOL SERPL-MCNC: <10 MG/DL
GFR SERPL CREATININE-BSD FRML MDRD: 7 ML/MIN/1.73SQ M
GLUCOSE SERPL-MCNC: 94 MG/DL (ref 65–140)
HCO3 BLDV-SCNC: 19 MMOL/L (ref 24–30)
HCT VFR BLD AUTO: 45.6 % (ref 36.5–49.3)
HGB BLD-MCNC: 15.1 G/DL (ref 12–17)
IMM GRANULOCYTES # BLD AUTO: 0.02 THOUSAND/UL (ref 0–0.2)
IMM GRANULOCYTES NFR BLD AUTO: 0 % (ref 0–2)
LACTATE SERPL-SCNC: 1 MMOL/L (ref 0.5–2)
LYMPHOCYTES # BLD AUTO: 1.39 THOUSANDS/ÂΜL (ref 0.6–4.47)
LYMPHOCYTES NFR BLD AUTO: 14 % (ref 14–44)
MAGNESIUM SERPL-MCNC: 3 MG/DL (ref 1.9–2.7)
MCH RBC QN AUTO: 30.2 PG (ref 26.8–34.3)
MCHC RBC AUTO-ENTMCNC: 33.1 G/DL (ref 31.4–37.4)
MCV RBC AUTO: 91 FL (ref 82–98)
MONOCYTES # BLD AUTO: 1.43 THOUSAND/ÂΜL (ref 0.17–1.22)
MONOCYTES NFR BLD AUTO: 14 % (ref 4–12)
NEUTROPHILS # BLD AUTO: 7.05 THOUSANDS/ÂΜL (ref 1.85–7.62)
NEUTS SEG NFR BLD AUTO: 71 % (ref 43–75)
NRBC BLD AUTO-RTO: 0 /100 WBCS
O2 CT BLDV-SCNC: 11.4 ML/DL
PCO2 BLDV: 48.4 MM HG (ref 42–50)
PH BLDV: 7.21 [PH] (ref 7.3–7.4)
PLATELET # BLD AUTO: 207 THOUSANDS/UL (ref 149–390)
PMV BLD AUTO: 12.1 FL (ref 8.9–12.7)
PO2 BLDV: 34.9 MM HG (ref 35–45)
POTASSIUM SERPL-SCNC: 4.3 MMOL/L (ref 3.5–5.3)
PROT SERPL-MCNC: 7.7 G/DL (ref 6.4–8.4)
RBC # BLD AUTO: 5 MILLION/UL (ref 3.88–5.62)
SODIUM SERPL-SCNC: 132 MMOL/L (ref 135–147)
WBC # BLD AUTO: 10.03 THOUSAND/UL (ref 4.31–10.16)

## 2024-07-28 PROCEDURE — 74177 CT ABD & PELVIS W/CONTRAST: CPT

## 2024-07-28 PROCEDURE — 82805 BLOOD GASES W/O2 SATURATION: CPT | Performed by: EMERGENCY MEDICINE

## 2024-07-28 PROCEDURE — 96365 THER/PROPH/DIAG IV INF INIT: CPT

## 2024-07-28 PROCEDURE — 96368 THER/DIAG CONCURRENT INF: CPT

## 2024-07-28 PROCEDURE — 80053 COMPREHEN METABOLIC PANEL: CPT | Performed by: EMERGENCY MEDICINE

## 2024-07-28 PROCEDURE — 36415 COLL VENOUS BLD VENIPUNCTURE: CPT | Performed by: EMERGENCY MEDICINE

## 2024-07-28 PROCEDURE — 84484 ASSAY OF TROPONIN QUANT: CPT | Performed by: EMERGENCY MEDICINE

## 2024-07-28 PROCEDURE — 85025 COMPLETE CBC W/AUTO DIFF WBC: CPT | Performed by: EMERGENCY MEDICINE

## 2024-07-28 PROCEDURE — 82077 ASSAY SPEC XCP UR&BREATH IA: CPT | Performed by: EMERGENCY MEDICINE

## 2024-07-28 PROCEDURE — 93005 ELECTROCARDIOGRAM TRACING: CPT

## 2024-07-28 PROCEDURE — 71045 X-RAY EXAM CHEST 1 VIEW: CPT

## 2024-07-28 PROCEDURE — 82550 ASSAY OF CK (CPK): CPT | Performed by: EMERGENCY MEDICINE

## 2024-07-28 PROCEDURE — 72125 CT NECK SPINE W/O DYE: CPT

## 2024-07-28 PROCEDURE — 99291 CRITICAL CARE FIRST HOUR: CPT | Performed by: EMERGENCY MEDICINE

## 2024-07-28 PROCEDURE — 99285 EMERGENCY DEPT VISIT HI MDM: CPT

## 2024-07-28 PROCEDURE — 83605 ASSAY OF LACTIC ACID: CPT | Performed by: EMERGENCY MEDICINE

## 2024-07-28 PROCEDURE — 83735 ASSAY OF MAGNESIUM: CPT | Performed by: EMERGENCY MEDICINE

## 2024-07-28 PROCEDURE — 70450 CT HEAD/BRAIN W/O DYE: CPT

## 2024-07-28 PROCEDURE — 96366 THER/PROPH/DIAG IV INF ADDON: CPT

## 2024-07-28 PROCEDURE — 82010 KETONE BODYS QUAN: CPT | Performed by: EMERGENCY MEDICINE

## 2024-07-28 PROCEDURE — 71275 CT ANGIOGRAPHY CHEST: CPT

## 2024-07-28 PROCEDURE — 96375 TX/PRO/DX INJ NEW DRUG ADDON: CPT

## 2024-07-28 RX ORDER — SODIUM CHLORIDE, SODIUM GLUCONATE, SODIUM ACETATE, POTASSIUM CHLORIDE, MAGNESIUM CHLORIDE, SODIUM PHOSPHATE, DIBASIC, AND POTASSIUM PHOSPHATE .53; .5; .37; .037; .03; .012; .00082 G/100ML; G/100ML; G/100ML; G/100ML; G/100ML; G/100ML; G/100ML
1000 INJECTION, SOLUTION INTRAVENOUS ONCE
Status: COMPLETED | OUTPATIENT
Start: 2024-07-28 | End: 2024-07-29

## 2024-07-28 RX ORDER — SODIUM CHLORIDE, SODIUM GLUCONATE, SODIUM ACETATE, POTASSIUM CHLORIDE, MAGNESIUM CHLORIDE, SODIUM PHOSPHATE, DIBASIC, AND POTASSIUM PHOSPHATE .53; .5; .37; .037; .03; .012; .00082 G/100ML; G/100ML; G/100ML; G/100ML; G/100ML; G/100ML; G/100ML
2000 INJECTION, SOLUTION INTRAVENOUS ONCE
Status: COMPLETED | OUTPATIENT
Start: 2024-07-28 | End: 2024-07-28

## 2024-07-28 RX ORDER — FOLIC ACID 5 MG/ML
INJECTION, SOLUTION INTRAMUSCULAR; INTRAVENOUS; SUBCUTANEOUS
Status: COMPLETED
Start: 2024-07-28 | End: 2024-07-29

## 2024-07-28 RX ORDER — DIAZEPAM 5 MG/ML
10 INJECTION, SOLUTION INTRAMUSCULAR; INTRAVENOUS ONCE
Status: COMPLETED | OUTPATIENT
Start: 2024-07-28 | End: 2024-07-28

## 2024-07-28 RX ADMIN — SODIUM CHLORIDE, SODIUM GLUCONATE, SODIUM ACETATE, POTASSIUM CHLORIDE, MAGNESIUM CHLORIDE, SODIUM PHOSPHATE, DIBASIC, AND POTASSIUM PHOSPHATE 2000 ML: .53; .5; .37; .037; .03; .012; .00082 INJECTION, SOLUTION INTRAVENOUS at 22:21

## 2024-07-28 RX ADMIN — IOHEXOL 100 ML: 350 INJECTION, SOLUTION INTRAVENOUS at 22:54

## 2024-07-28 RX ADMIN — THIAMINE HYDROCHLORIDE 400 MG: 100 INJECTION, SOLUTION INTRAMUSCULAR; INTRAVENOUS at 23:30

## 2024-07-28 RX ADMIN — SODIUM CHLORIDE, SODIUM GLUCONATE, SODIUM ACETATE, POTASSIUM CHLORIDE, MAGNESIUM CHLORIDE, SODIUM PHOSPHATE, DIBASIC, AND POTASSIUM PHOSPHATE 1000 ML: .53; .5; .37; .037; .03; .012; .00082 INJECTION, SOLUTION INTRAVENOUS at 23:10

## 2024-07-28 RX ADMIN — DIAZEPAM 10 MG: 5 INJECTION INTRAMUSCULAR; INTRAVENOUS at 22:58

## 2024-07-28 NOTE — LETTER
[facility]  [address] [facility] [address]  Dept: [phone]    July 30, 2024     Patient: Aniket Pace   YOB: 1985   Date of Visit: 7/28/2024       To Whom it May Concern:    Aniket Pace is under my professional care. He was seen in the hospital from 7/28/2024 to 07/30/24. He may return to school on 7/31/2024 without limitations.    If you have any questions or concerns, please don't hesitate to call.         Sincerely,          Cale Fried Counts, DO

## 2024-07-28 NOTE — LETTER
[facility] INTENSIVE CARE UNIT  [address] [address]  Dept: [phone]    July 30, 2024     Patient: Aniket Pace   YOB: 1985   Date of Visit: 7/28/2024       To Whom it May Concern:    Aniket Pace is under my professional care. He was seen in the hospital from 7/28/2024 to 07/30/24. He may return to school on 8/1/2024 without limitations.    If you have any questions or concerns, please don't hesitate to call.         Sincerely,          Cale Fried Counts, DO

## 2024-07-29 PROBLEM — N17.9 ACUTE KIDNEY INJURY (HCC): Status: ACTIVE | Noted: 2024-07-29

## 2024-07-29 PROBLEM — R56.9 ALCOHOL WITHDRAWAL SEIZURE WITHOUT COMPLICATION (HCC): Status: ACTIVE | Noted: 2023-08-11

## 2024-07-29 PROBLEM — F10.930 ALCOHOL WITHDRAWAL SEIZURE WITHOUT COMPLICATION (HCC): Status: ACTIVE | Noted: 2023-08-11

## 2024-07-29 PROBLEM — F10.939 ALCOHOL WITHDRAWAL SEIZURE (HCC): Status: ACTIVE | Noted: 2024-07-29

## 2024-07-29 PROBLEM — E86.1 HYPOTENSION DUE TO HYPOVOLEMIA: Status: ACTIVE | Noted: 2024-07-29

## 2024-07-29 PROBLEM — F51.01 PRIMARY INSOMNIA: Status: ACTIVE | Noted: 2024-07-29

## 2024-07-29 PROBLEM — M62.82 NON-TRAUMATIC RHABDOMYOLYSIS: Status: ACTIVE | Noted: 2024-07-29

## 2024-07-29 PROBLEM — R56.9 ALCOHOL WITHDRAWAL SEIZURE (HCC): Status: ACTIVE | Noted: 2024-07-29

## 2024-07-29 LAB
2HR DELTA HS TROPONIN: 0 NG/L
ALBUMIN SERPL BCG-MCNC: 3.8 G/DL (ref 3.5–5)
ALBUMIN SERPL BCG-MCNC: 3.8 G/DL (ref 3.5–5)
ALP SERPL-CCNC: 98 U/L (ref 34–104)
ALP SERPL-CCNC: 99 U/L (ref 34–104)
ALT SERPL W P-5'-P-CCNC: 19 U/L (ref 7–52)
ALT SERPL W P-5'-P-CCNC: 20 U/L (ref 7–52)
ANION GAP SERPL CALCULATED.3IONS-SCNC: 13 MMOL/L (ref 4–13)
ANION GAP SERPL CALCULATED.3IONS-SCNC: 20 MMOL/L (ref 4–13)
AST SERPL W P-5'-P-CCNC: 17 U/L (ref 13–39)
AST SERPL W P-5'-P-CCNC: 19 U/L (ref 13–39)
BACTERIA UR QL AUTO: ABNORMAL /HPF
BILIRUB SERPL-MCNC: 0.31 MG/DL (ref 0.2–1)
BILIRUB SERPL-MCNC: 0.34 MG/DL (ref 0.2–1)
BILIRUB UR QL STRIP: NEGATIVE
BUN SERPL-MCNC: 62 MG/DL (ref 5–25)
BUN SERPL-MCNC: 72 MG/DL (ref 5–25)
CALCIUM SERPL-MCNC: 8 MG/DL (ref 8.4–10.2)
CALCIUM SERPL-MCNC: 8.3 MG/DL (ref 8.4–10.2)
CARDIAC TROPONIN I PNL SERPL HS: 6 NG/L
CHLORIDE SERPL-SCNC: 102 MMOL/L (ref 96–108)
CHLORIDE SERPL-SCNC: 95 MMOL/L (ref 96–108)
CK SERPL-CCNC: 369 U/L (ref 39–308)
CLARITY UR: CLEAR
CO2 SERPL-SCNC: 22 MMOL/L (ref 21–32)
CO2 SERPL-SCNC: 23 MMOL/L (ref 21–32)
COLOR UR: ABNORMAL
CREAT SERPL-MCNC: 5.12 MG/DL (ref 0.6–1.3)
CREAT SERPL-MCNC: 7.15 MG/DL (ref 0.6–1.3)
ERYTHROCYTE [DISTWIDTH] IN BLOOD BY AUTOMATED COUNT: 13.4 % (ref 11.6–15.1)
GFR SERPL CREATININE-BSD FRML MDRD: 13 ML/MIN/1.73SQ M
GFR SERPL CREATININE-BSD FRML MDRD: 8 ML/MIN/1.73SQ M
GLUCOSE SERPL-MCNC: 107 MG/DL (ref 65–140)
GLUCOSE SERPL-MCNC: 89 MG/DL (ref 65–140)
GLUCOSE UR STRIP-MCNC: NEGATIVE MG/DL
GRAN CASTS #/AREA URNS LPF: ABNORMAL /[LPF]
HCT VFR BLD AUTO: 39.3 % (ref 36.5–49.3)
HGB BLD-MCNC: 13.2 G/DL (ref 12–17)
HGB UR QL STRIP.AUTO: ABNORMAL
KETONES UR STRIP-MCNC: NEGATIVE MG/DL
LEUKOCYTE ESTERASE UR QL STRIP: NEGATIVE
MAGNESIUM SERPL-MCNC: 2.8 MG/DL (ref 1.9–2.7)
MAGNESIUM SERPL-MCNC: 3.2 MG/DL (ref 1.9–2.7)
MCH RBC QN AUTO: 30.8 PG (ref 26.8–34.3)
MCHC RBC AUTO-ENTMCNC: 33.6 G/DL (ref 31.4–37.4)
MCV RBC AUTO: 92 FL (ref 82–98)
NITRITE UR QL STRIP: NEGATIVE
NON-SQ EPI CELLS URNS QL MICRO: ABNORMAL /HPF
PH UR STRIP.AUTO: 5 [PH]
PHOSPHATE SERPL-MCNC: 6.6 MG/DL (ref 2.7–4.5)
PHOSPHATE SERPL-MCNC: 8.9 MG/DL (ref 2.7–4.5)
PLATELET # BLD AUTO: 174 THOUSANDS/UL (ref 149–390)
PMV BLD AUTO: 11.7 FL (ref 8.9–12.7)
POTASSIUM SERPL-SCNC: 3.5 MMOL/L (ref 3.5–5.3)
POTASSIUM SERPL-SCNC: 3.9 MMOL/L (ref 3.5–5.3)
PROT SERPL-MCNC: 5.8 G/DL (ref 6.4–8.4)
PROT SERPL-MCNC: 5.9 G/DL (ref 6.4–8.4)
PROT UR STRIP-MCNC: ABNORMAL MG/DL
RBC # BLD AUTO: 4.29 MILLION/UL (ref 3.88–5.62)
RBC #/AREA URNS AUTO: ABNORMAL /HPF
SODIUM SERPL-SCNC: 137 MMOL/L (ref 135–147)
SODIUM SERPL-SCNC: 138 MMOL/L (ref 135–147)
SP GR UR STRIP.AUTO: 1.02 (ref 1–1.03)
UROBILINOGEN UR STRIP-ACNC: <2 MG/DL
WBC # BLD AUTO: 8.4 THOUSAND/UL (ref 4.31–10.16)
WBC #/AREA URNS AUTO: ABNORMAL /HPF

## 2024-07-29 PROCEDURE — 99255 IP/OBS CONSLTJ NEW/EST HI 80: CPT | Performed by: INTERNAL MEDICINE

## 2024-07-29 PROCEDURE — 84100 ASSAY OF PHOSPHORUS: CPT | Performed by: FAMILY MEDICINE

## 2024-07-29 PROCEDURE — 84100 ASSAY OF PHOSPHORUS: CPT

## 2024-07-29 PROCEDURE — 81001 URINALYSIS AUTO W/SCOPE: CPT | Performed by: FAMILY MEDICINE

## 2024-07-29 PROCEDURE — 83735 ASSAY OF MAGNESIUM: CPT

## 2024-07-29 PROCEDURE — 84484 ASSAY OF TROPONIN QUANT: CPT | Performed by: FAMILY MEDICINE

## 2024-07-29 PROCEDURE — 99223 1ST HOSP IP/OBS HIGH 75: CPT | Performed by: FAMILY MEDICINE

## 2024-07-29 PROCEDURE — 80053 COMPREHEN METABOLIC PANEL: CPT | Performed by: FAMILY MEDICINE

## 2024-07-29 PROCEDURE — 83735 ASSAY OF MAGNESIUM: CPT | Performed by: FAMILY MEDICINE

## 2024-07-29 PROCEDURE — 80053 COMPREHEN METABOLIC PANEL: CPT

## 2024-07-29 PROCEDURE — 82550 ASSAY OF CK (CPK): CPT | Performed by: FAMILY MEDICINE

## 2024-07-29 PROCEDURE — 85027 COMPLETE CBC AUTOMATED: CPT | Performed by: FAMILY MEDICINE

## 2024-07-29 RX ORDER — AMLODIPINE BESYLATE 10 MG/1
10 TABLET ORAL DAILY
Status: DISCONTINUED | OUTPATIENT
Start: 2024-07-29 | End: 2024-07-29

## 2024-07-29 RX ORDER — LANOLIN ALCOHOL/MO/W.PET/CERES
100 CREAM (GRAM) TOPICAL DAILY
Status: DISCONTINUED | OUTPATIENT
Start: 2024-07-29 | End: 2024-07-30 | Stop reason: HOSPADM

## 2024-07-29 RX ORDER — SODIUM CHLORIDE, SODIUM GLUCONATE, SODIUM ACETATE, POTASSIUM CHLORIDE, MAGNESIUM CHLORIDE, SODIUM PHOSPHATE, DIBASIC, AND POTASSIUM PHOSPHATE .53; .5; .37; .037; .03; .012; .00082 G/100ML; G/100ML; G/100ML; G/100ML; G/100ML; G/100ML; G/100ML
1000 INJECTION, SOLUTION INTRAVENOUS ONCE
Status: COMPLETED | OUTPATIENT
Start: 2024-07-29 | End: 2024-07-29

## 2024-07-29 RX ORDER — DEXMEDETOMIDINE HYDROCHLORIDE 4 UG/ML
.1-.7 INJECTION, SOLUTION INTRAVENOUS
Status: DISCONTINUED | OUTPATIENT
Start: 2024-07-29 | End: 2024-07-30 | Stop reason: HOSPADM

## 2024-07-29 RX ORDER — HYDROMORPHONE HCL/PF 1 MG/ML
0.5 SYRINGE (ML) INJECTION EVERY 4 HOURS PRN
Status: DISCONTINUED | OUTPATIENT
Start: 2024-07-29 | End: 2024-07-30 | Stop reason: HOSPADM

## 2024-07-29 RX ORDER — SODIUM CHLORIDE 9 MG/ML
125 INJECTION, SOLUTION INTRAVENOUS CONTINUOUS
Status: DISCONTINUED | OUTPATIENT
Start: 2024-07-29 | End: 2024-07-29

## 2024-07-29 RX ORDER — NICOTINE 21 MG/24HR
1 PATCH, TRANSDERMAL 24 HOURS TRANSDERMAL DAILY
Status: DISCONTINUED | OUTPATIENT
Start: 2024-07-29 | End: 2024-07-30 | Stop reason: HOSPADM

## 2024-07-29 RX ORDER — SODIUM CHLORIDE, SODIUM GLUCONATE, SODIUM ACETATE, POTASSIUM CHLORIDE, MAGNESIUM CHLORIDE, SODIUM PHOSPHATE, DIBASIC, AND POTASSIUM PHOSPHATE .53; .5; .37; .037; .03; .012; .00082 G/100ML; G/100ML; G/100ML; G/100ML; G/100ML; G/100ML; G/100ML
125 INJECTION, SOLUTION INTRAVENOUS CONTINUOUS
Status: DISCONTINUED | OUTPATIENT
Start: 2024-07-29 | End: 2024-07-30

## 2024-07-29 RX ORDER — QUETIAPINE FUMARATE 100 MG/1
100 TABLET, FILM COATED ORAL
Status: DISCONTINUED | OUTPATIENT
Start: 2024-07-29 | End: 2024-07-30 | Stop reason: HOSPADM

## 2024-07-29 RX ORDER — HEPARIN SODIUM 5000 [USP'U]/ML
5000 INJECTION, SOLUTION INTRAVENOUS; SUBCUTANEOUS EVERY 8 HOURS SCHEDULED
Status: DISCONTINUED | OUTPATIENT
Start: 2024-07-29 | End: 2024-07-30 | Stop reason: HOSPADM

## 2024-07-29 RX ORDER — POTASSIUM CHLORIDE 14.9 MG/ML
20 INJECTION INTRAVENOUS ONCE
Status: COMPLETED | OUTPATIENT
Start: 2024-07-29 | End: 2024-07-29

## 2024-07-29 RX ORDER — FAMOTIDINE 20 MG/1
10 TABLET, FILM COATED ORAL DAILY
Status: DISCONTINUED | OUTPATIENT
Start: 2024-07-29 | End: 2024-07-30 | Stop reason: HOSPADM

## 2024-07-29 RX ORDER — DIAZEPAM 5 MG/1
5 TABLET ORAL EVERY 6 HOURS PRN
Status: DISCONTINUED | OUTPATIENT
Start: 2024-07-29 | End: 2024-07-29

## 2024-07-29 RX ORDER — FOLIC ACID 1 MG/1
1 TABLET ORAL DAILY
Status: DISCONTINUED | OUTPATIENT
Start: 2024-07-29 | End: 2024-07-30 | Stop reason: HOSPADM

## 2024-07-29 RX ORDER — DIAZEPAM 2 MG/1
2 TABLET ORAL EVERY 6 HOURS PRN
Status: DISCONTINUED | OUTPATIENT
Start: 2024-07-29 | End: 2024-07-30 | Stop reason: HOSPADM

## 2024-07-29 RX ADMIN — POTASSIUM CHLORIDE 20 MEQ: 14.9 INJECTION, SOLUTION INTRAVENOUS at 11:02

## 2024-07-29 RX ADMIN — HYDROMORPHONE HYDROCHLORIDE 0.5 MG: 1 INJECTION, SOLUTION INTRAMUSCULAR; INTRAVENOUS; SUBCUTANEOUS at 00:42

## 2024-07-29 RX ADMIN — HEPARIN SODIUM 5000 UNITS: 5000 INJECTION INTRAVENOUS; SUBCUTANEOUS at 21:06

## 2024-07-29 RX ADMIN — FAMOTIDINE 10 MG: 20 TABLET, FILM COATED ORAL at 08:44

## 2024-07-29 RX ADMIN — HEPARIN SODIUM 5000 UNITS: 5000 INJECTION INTRAVENOUS; SUBCUTANEOUS at 06:07

## 2024-07-29 RX ADMIN — HYDROMORPHONE HYDROCHLORIDE 0.5 MG: 1 INJECTION, SOLUTION INTRAMUSCULAR; INTRAVENOUS; SUBCUTANEOUS at 07:16

## 2024-07-29 RX ADMIN — FOLIC ACID 1 MG: 5 INJECTION, SOLUTION INTRAMUSCULAR; INTRAVENOUS; SUBCUTANEOUS at 00:44

## 2024-07-29 RX ADMIN — DIAZEPAM 5 MG: 5 TABLET ORAL at 08:48

## 2024-07-29 RX ADMIN — HYDROMORPHONE HYDROCHLORIDE 0.5 MG: 1 INJECTION, SOLUTION INTRAMUSCULAR; INTRAVENOUS; SUBCUTANEOUS at 19:18

## 2024-07-29 RX ADMIN — SODIUM CHLORIDE 2000 ML: 0.9 INJECTION, SOLUTION INTRAVENOUS at 07:23

## 2024-07-29 RX ADMIN — DEXMEDETOMIDINE HYDROCHLORIDE 0.1 MCG/KG/HR: 400 INJECTION INTRAVENOUS at 01:18

## 2024-07-29 RX ADMIN — FOLIC ACID 1 MG: 1 TABLET ORAL at 08:44

## 2024-07-29 RX ADMIN — QUETIAPINE FUMARATE 100 MG: 100 TABLET ORAL at 01:18

## 2024-07-29 RX ADMIN — SODIUM CHLORIDE 125 ML/HR: 0.9 INJECTION, SOLUTION INTRAVENOUS at 01:15

## 2024-07-29 RX ADMIN — HEPARIN SODIUM 5000 UNITS: 5000 INJECTION INTRAVENOUS; SUBCUTANEOUS at 14:56

## 2024-07-29 RX ADMIN — SODIUM CHLORIDE, SODIUM GLUCONATE, SODIUM ACETATE, POTASSIUM CHLORIDE, MAGNESIUM CHLORIDE, SODIUM PHOSPHATE, DIBASIC, AND POTASSIUM PHOSPHATE 125 ML/HR: .53; .5; .37; .037; .03; .012; .00082 INJECTION, SOLUTION INTRAVENOUS at 08:30

## 2024-07-29 RX ADMIN — B-COMPLEX W/ C & FOLIC ACID TAB 1 TABLET: TAB at 08:44

## 2024-07-29 RX ADMIN — QUETIAPINE FUMARATE 100 MG: 100 TABLET ORAL at 21:06

## 2024-07-29 RX ADMIN — THIAMINE HCL TAB 100 MG 100 MG: 100 TAB at 08:44

## 2024-07-29 RX ADMIN — SODIUM CHLORIDE, SODIUM GLUCONATE, SODIUM ACETATE, POTASSIUM CHLORIDE, MAGNESIUM CHLORIDE, SODIUM PHOSPHATE, DIBASIC, AND POTASSIUM PHOSPHATE 1000 ML: .53; .5; .37; .037; .03; .012; .00082 INJECTION, SOLUTION INTRAVENOUS at 02:49

## 2024-07-29 RX ADMIN — DIAZEPAM 2 MG: 2 TABLET ORAL at 14:56

## 2024-07-29 RX ADMIN — DIAZEPAM 2 MG: 2 TABLET ORAL at 21:06

## 2024-07-29 RX ADMIN — NICOTINE 1 PATCH: 21 PATCH, EXTENDED RELEASE TRANSDERMAL at 08:44

## 2024-07-29 RX ADMIN — HEPARIN SODIUM 5000 UNITS: 5000 INJECTION INTRAVENOUS; SUBCUTANEOUS at 01:18

## 2024-07-29 RX ADMIN — NOREPINEPHRINE BITARTRATE 1 MCG/MIN: 1 SOLUTION INTRAVENOUS at 18:11

## 2024-07-29 RX ADMIN — HYDROMORPHONE HYDROCHLORIDE 0.5 MG: 1 INJECTION, SOLUTION INTRAMUSCULAR; INTRAVENOUS; SUBCUTANEOUS at 14:31

## 2024-07-29 RX ADMIN — NOREPINEPHRINE BITARTRATE 1 MCG/MIN: 1 SOLUTION INTRAVENOUS at 04:17

## 2024-07-29 RX ADMIN — DEXMEDETOMIDINE HYDROCHLORIDE 0.1 MCG/KG/HR: 400 INJECTION INTRAVENOUS at 18:15

## 2024-07-29 RX ADMIN — SODIUM CHLORIDE, SODIUM GLUCONATE, SODIUM ACETATE, POTASSIUM CHLORIDE, MAGNESIUM CHLORIDE, SODIUM PHOSPHATE, DIBASIC, AND POTASSIUM PHOSPHATE 125 ML/HR: .53; .5; .37; .037; .03; .012; .00082 INJECTION, SOLUTION INTRAVENOUS at 18:17

## 2024-07-29 NOTE — ASSESSMENT & PLAN NOTE
Cr 8.3, baseline likely 0.8  Received 3L bolus in ER  Hyperphos, Hypermag, Hypokalemia     Nephrology on consult:   Transitioned from NS to isolate at 125 mL/hr, now d/c'd  Continue to encourage PO intake  Potassium supplement on 7/29  Electrolyte abnormalities resolved, Continue to monitor w CMP  Today Cr is 1.77

## 2024-07-29 NOTE — QUICK NOTE
Patient still can to be hypotensive after 4 L of fluid.  This would be considered hypovolemic shock  Will start him on Levophed peripherally to obtain a MAP greater than 60.

## 2024-07-29 NOTE — ASSESSMENT & PLAN NOTE
Likely 2/2 seizure activity    IVF now discontinued, encourage PO intake  CK now wnl  F/u proteinuria in outpatient setting

## 2024-07-29 NOTE — ASSESSMENT & PLAN NOTE
Current BUN is 74 and creatinine of 8.3.  Most likely prerenal  He has gotten a 3 L bolus in the emergency room.  Will continue with IV fluids  Repeat BMP, mag and Phos in the morning

## 2024-07-29 NOTE — ASSESSMENT & PLAN NOTE
Stopped drinking 3 days ago, goes through alcohol and drug withdrawal and has seizures when he does stop drinking.  He drinks vodka  Will place him on a Precedex drip as he is tremulous.

## 2024-07-29 NOTE — CASE MANAGEMENT
Case Management Progress Note    Patient name Aniket Pace  Location / MRN [id number]  : 1985 Date 2024       LOS (days): 0  Geometric Mean LOS (GMLOS) (days):   Days to GMLOS:        OBJECTIVE:        Current admission status: Inpatient  Preferred Pharmacy:   [id number] - [place] - [address] [address]  Phone: [phone] Fax: [phone]    [id number] [place] - [address] [facility]  [address] [facility] [address]  Phone: [phone] Fax: [phone]    [id number] - [place] - [address] [facility]  [address] [place] [address]  Phone: [phone] Fax: [phone]    [id number] [place] [facility] [address] [address] [facility]  [address] [address] [facility] [address]  Phone: [phone] Fax: [phone]    [id number] [place] [facility] [address] [facility] [address] [place] [address]  Phone: [phone] Fax: [phone]    Primary Care Provider: Jason Guerra DO    Primary Insurance: [employer]  Secondary Insurance:     PROGRESS NOTE:      Chart review completed.    CM to provide warm hand off once patient is more stable.    CM to follow for discharge needs

## 2024-07-29 NOTE — ASSESSMENT & PLAN NOTE
Daily vodka, at least a fifth, stopped 2-3d ago   H/o withdrawal seizures   Tremulous on presentation, improved  Noted hypovolemic shock requiring pressors  HARRIET w multiple electrolyte abnormalities  Muscle spasms requiring Valium    No longer requiring Precedex drip, weaned off at 5am   Valium 2mg Q6H PRN for spasms  IVF discontinued, encourage PO intake  Continue folic acid, thiamine

## 2024-07-29 NOTE — ASSESSMENT & PLAN NOTE
CK level 600, mild rhabdomyolysis.  Secondary to seizure activity.  Will continue IV fluids repeat   CK level in the morning

## 2024-07-29 NOTE — QUICK NOTE
Patient seen and examined    38-year-old male who presents with alcohol withdrawal with seizure, as well as notable HARRIET.  Suspect significant hypovolemia as etiology of HARRIET, and patient and hypovolemic shock requiring pressors.  On my exam, patient wakes easily to light physical stimuli, alert and oriented.  Has muscle spasm/shooting pain in the lower extremities bilaterally.  No notable tremors, denies any significant nausea at this time.    This morning pressors have been weaned down, were stopped around 10:00 and patient's blood pressures did drop so they were resumed, continue to wean as needed.    Patient's creatinine is coming down with fluids, continue to monitor with repeat CMP pending at this time, appreciate nephrology review and will continue to follow additional electrolytes.    Patient continued on Precedex drip for alcohol withdrawal.  Patient having significant muscle spasms causing pain of the lower extremities bilaterally, did start a 5 mg as needed Valium, will with continuing soft pressures will reduce to 2 mg every 6 hours as needed.

## 2024-07-29 NOTE — H&P
[facility]  H&P  Name: Aniket Pace 38 y.o. male I MRN: [id number]  Unit/Bed#: RM04 I Date of Admission: 7/28/2024   Date of Service: 7/29/2024 I Hospital Day: 0      Assessment & Plan   * Alcohol withdrawal seizure (HCC)  Assessment & Plan  Stopped drinking 3 days ago, goes through alcohol and drug withdrawal and has seizures when he does stop drinking.  He drinks vodka  Will place him on a Precedex drip as he is tremulous.    Acute kidney injury (HCC)  Assessment & Plan  Current BUN is 74 and creatinine of 8.3.  Most likely prerenal  He has gotten a 3 L bolus in the emergency room.  Will continue with IV fluids  Repeat BMP, mag and Phos in the morning    Hypotension due to hypovolemia  Assessment & Plan  Secondary to hypovolemia, responding well to fluids    Non-traumatic rhabdomyolysis  Assessment & Plan  CK level 600, mild rhabdomyolysis.  Secondary to seizure activity.  Will continue IV fluids repeat   CK level in the morning      Primary hypertension  Assessment & Plan  At home he takes Norvasc and metoprolol which we will hold due to hypotension    Tobacco use disorder  Assessment & Plan  Placed on nicotine patch    Alcohol use disorder, severe, dependence (HCC)  Assessment & Plan  Drinks daily, 5th of vodka.    Primary insomnia  Assessment & Plan  On Seroquel at home.         Disposition  #1  IV Precedex  #2  IV fluids  #3  CBC, CMP, mag, Phos in the morning, CK level  #4  CIWA protocol        VTE Prophylaxis: Heparin  / sequential compression device   Code Status: Level 1 - Full Code       Anticipated Length of Stay:  Patient will be admitted on an Inpatient basis with an anticipated length of stay of greater than 2 midnights.   Justification for Hospital Stay: Please see detailed plans noted above.    Chief Complaint:     Alcohol withdrawal seizure  History of Present Illness:  Aniket Pace is a 38 y.o. male who has past medical history significant for alcohol dependence,  insomnia, hypertension presents to the hospital as he states he had about 20 seizures last night and usually gets this while he is withdrawing from alcohol.  He his last drink was 2 or 3 days ago.  He drinks about a half of 1/5 a day.  He has been trying detox at home.  He states he is weak.  Had multiple falls      Review of Systems:    Constitutional: Feels weak  Eyes:  Denies change in visual acuity   HENT:  Denies nasal congestion or sore throat   Respiratory:  Denies cough or shortness of breath   Cardiovascular:  Denies chest pain or edema   GI:  Denies abdominal pain or bloody stools  :  Denies dysuria   Musculoskeletal: Leg pain  Integument:  Denies rash   Neurologic: Seizures  Endocrine:  Denies polyuria or polydipsia   Lymphatic:  Denies swollen glands   Psychiatric:  Denies depression or anxiety     Past Medical and Surgical History:   Past Medical History:   Diagnosis Date    Chronic pain     Depression     Pelvic fracture (HCC)     PTSD (post-traumatic stress disorder)     Substance abuse (HCC)      Past Surgical History:   Procedure Laterality Date    BONY PELVIS SURGERY         Meds/Allergies:  No current facility-administered medications on file prior to encounter.     Current Outpatient Medications on File Prior to Encounter   Medication Sig Dispense Refill    amLODIPine (NORVASC) 10 mg tablet Take 1 tablet (10 mg total) by mouth daily 30 tablet 2    metoprolol tartrate (LOPRESSOR) 25 mg tablet Take 1 tablet (25 mg total) by mouth every 12 (twelve) hours 60 tablet 1    naloxone (NARCAN) 4 mg/0.1 mL nasal spray Administer 1 spray into a nostril. If no response after 2-3 minutes, give another dose in the other nostril using a new spray. 1 each 1    QUEtiapine (SEROquel) 100 mg tablet Take 1 tablet (100 mg total) by mouth daily at bedtime 90 tablet 1           Allergies:   Allergies   Allergen Reactions    Penicillin V Other (See Comments)     unknown    Penicillins Hives       History:  Marital  Status: Single     Substance Use History:   Social History     Substance and Sexual Activity   Alcohol Use Yes    Comment: 20-30 shots per day     Social History     Tobacco Use   Smoking Status Every Day    Current packs/day: 1.00    Types: Cigarettes    Passive exposure: Never   Smokeless Tobacco Never     Social History     Substance and Sexual Activity   Drug Use Not Currently    Types: Methamphetamines, Marijuana, Cocaine    Comment: Marijuana current use       Family History:  History reviewed. No pertinent family history.    Physical Exam:     Vitals:   Blood Pressure: (!) 86/56 (07/28/24 2350)  Pulse: 81 (07/28/24 2350)  Temperature: (!) 97 °F (36.1 °C) (07/28/24 2135)  Temp Source: Temporal (07/28/24 2135)  Respirations: 20 (07/28/24 2350)  SpO2: 98 % (07/28/24 2350)    Constitutional:  Non-toxic appearance  Eyes:  EOMI, No scleral icterus   HENT:   oropharynx moist, external ears normal, external nose normal   Respiratory:  No respiratory distress, no wheezing   Cardiovascular:  Normal rate, no murmurs   GI:  Soft, nondistended, no guarding   :  No costovertebral angle tenderness   Musculoskeletal:  no tenderness, no deformities.   Integument:  no jaundice, no rash   Neurologic:  Alert &awake, communicative, CN 2-12 normal,  no focal deficits noted   Psychiatric:  Speech and behavior appropriate       Lab Results: I have personally reviewed pertinent reports.      Results from last 7 days   Lab Units 07/28/24  2213   WBC Thousand/uL 10.03   HEMOGLOBIN g/dL 15.1   HEMATOCRIT % 45.6   PLATELETS Thousands/uL 207   SEGS PCT % 71   LYMPHO PCT % 14   MONO PCT % 14*   EOS PCT % 1     Results from last 7 days   Lab Units 07/28/24  2213   POTASSIUM mmol/L 4.3   CHLORIDE mmol/L 93*   CO2 mmol/L 16*   BUN mg/dL 74*   CREATININE mg/dL 8.38*   CALCIUM mg/dL 9.2   ALK PHOS U/L 127*   ALT U/L 27   AST U/L 31               Imaging: I have personally reviewed pertinent reports.      TRAUMA - CT spine cervical wo  contrast    Result Date: 7/28/2024  Narrative: CT CERVICAL SPINE - WITHOUT CONTRAST INDICATION:   TRAUMA. COMPARISON: 7/19/2022 TECHNIQUE:  CT examination of the cervical spine was performed without intravenous contrast.  Contiguous axial images were obtained. Multiplanar 2D reformatted images were created from the source data. Radiation dose length product (DLP) for this visit:  463.29 mGy-cm .  This examination, like all CT scans performed in the [facility], was performed utilizing techniques to minimize radiation dose exposure, including the use of iterative  reconstruction and automated exposure control. IMAGE QUALITY:  Diagnostic. FINDINGS: ALIGNMENT:  There is straightening of normal cervical lordosis.  No subluxation or compression deformity. VERTEBRAE:  No fracture. DEGENERATIVE CHANGES:  No significant cervical degenerative changes are noted. PREVERTEBRAL AND PARASPINAL SOFT TISSUES: Unremarkable THORACIC INLET:  Normal.     Impression: No cervical spine fracture or traumatic malalignment. Workstation performed: [id number]     TRAUMA - CT head wo contrast    Result Date: 7/28/2024  Narrative: CT BRAIN - WITHOUT CONTRAST INDICATION:   TRAUMA. COMPARISON: Multiple priors most recently 5/3/2023 TECHNIQUE:  CT examination of the brain was performed.  Multiplanar 2D reformatted images were created from the source data. Radiation dose length product (DLP) for this visit:  948.77 mGy-cm .  This examination, like all CT scans performed in the [facility], was performed utilizing techniques to minimize radiation dose exposure, including the use of iterative  reconstruction and automated exposure control. IMAGE QUALITY:  Diagnostic. FINDINGS: PARENCHYMA:  No intracranial mass, mass effect or midline shift. No CT signs of acute infarction.  No acute parenchymal hemorrhage. VENTRICLES AND EXTRA-AXIAL SPACES:  Normal for the patient's age. VISUALIZED ORBITS: Normal visualized orbits.  PARANASAL SINUSES: Normal visualized paranasal sinuses. CALVARIUM AND EXTRACRANIAL SOFT TISSUES:  Normal.     Impression: No acute intracranial abnormality. Workstation performed: [id number]     CTA chest (pe study) abdomen pelvis contrast    Result Date: 7/28/2024  Narrative: CT PULMONARY ANGIOGRAM OF THE CHEST AND CT ABDOMEN AND PELVIS WITH INTRAVENOUS CONTRAST INDICATION: TRAUMA. COMPARISON: Multiple priors most recently same day chest radiograph TECHNIQUE: CT examination of the chest, abdomen and pelvis was performed. Thin section CT angiographic technique was used in the chest in order to evaluate for pulmonary embolus and coronal 3D MIP postprocessing was performed on the acquisition scanner. Multiplanar 2D reformatted images were created from the source data. This examination, like all CT scans performed in the [facility], was performed utilizing techniques to minimize radiation dose exposure, including the use of iterative reconstruction and automated exposure control. Radiation dose length product (DLP) for this visit: 1989.61 mGy-cm IV Contrast: 100 mL of iohexol (OMNIPAQUE) Enteric Contrast: Not administered. FINDINGS: CHEST PULMONARY ARTERIAL TREE:  No pulmonary embolus. LUNGS: Mild bilateral lower lobe mucous plugging. No focal consolidation or suspicious mass. Small scattered pulmonary nodules are unchanged, benign PLEURA: Unremarkable. HEART/AORTA: Heart is unremarkable for patient's age. No thoracic aortic aneurysm. MEDIASTINUM AND ARIANNA: Unremarkable. CHEST WALL AND LOWER NECK: Unremarkable. ABDOMEN LIVER/BILIARY TREE: Mild hepatomegaly without suspicious hepatic lesion. GALLBLADDER: No calcified gallstones. No pericholecystic inflammatory change. SPLEEN: Unremarkable. PANCREAS: Unremarkable. ADRENAL GLANDS: Unremarkable. KIDNEYS/URETERS: Unremarkable. No hydronephrosis. STOMACH AND BOWEL: Colonic diverticulosis without findings of acute diverticulitis. APPENDIX: Normal appendix.  ABDOMINOPELVIC CAVITY: No ascites. No pneumoperitoneum. No lymphadenopathy. VESSELS: Unremarkable for patient's age. PELVIS REPRODUCTIVE ORGANS: Unremarkable for patient's age. URINARY BLADDER: Unremarkable. ABDOMINAL WALL/INGUINAL REGIONS: Unremarkable. BONES: No acute fracture or suspicious osseous lesion. ORIF right hemipelvis.     Impression: No findings of acute traumatic injury in the chest, abdomen or pelvis. The study was marked in EPIC for immediate notification, per trauma protocol. Workstation performed: [id number]     XR chest 1 view portable    Result Date: 7/28/2024  Narrative: XR CHEST PORTABLE INDICATION: fall. Seizure. COMPARISON: Chest and abdomen CT 7/28/2024. CXR 7/19/2022. FINDINGS: Clear lungs. No pneumothorax or pleural effusion. Normal cardiomediastinal silhouette. Skeleton normal for age.  No acute displaced fractures. Benign bone island left humeral head. Normal upper abdomen.     Impression: No acute cardiopulmonary disease. Workstation performed: [id number]         Medical decision making: High  Diagnosis addressed: 1 illness/injury posing threat to life or bodily function which includes alcohol withdrawal seizure  Data:   Reviewed  CBC, CMP, VBG, magnesium, CK, ethanol level, CT head, CT of the chest, CT neck  Ordered CBC, BMP, mag   reviewed external notes from PCP  Discussion of management with ER provider: Received Valium and 3 L bolus of fluids pressures improved, still has some tremors           Risk:  Prescription drug management: IV Precedex  Discussion for hospitalization with ER provider: Patient requires hospitalization due to alcohol withdrawal seizure and detox  Drug therapy requiring intensive monitoring: IV Precedex which requires monitoring at the bedside via nursing  Initiation of parenteral controlled substances: IV Dilaudid                   Epic Records Reviewed as well as Records in Care Everywhere    ** Please Note: Dragon 360 Dictation voice to text software was used  in the creation of this document. **

## 2024-07-29 NOTE — PLAN OF CARE
Problem: PAIN - ADULT  Goal: Verbalizes/displays adequate comfort level or baseline comfort level  Description: Interventions:  - Encourage patient to monitor pain and request assistance  - Assess pain using appropriate pain scale  - Administer analgesics based on type and severity of pain and evaluate response  - Implement non-pharmacological measures as appropriate and evaluate response  - Consider cultural and social influences on pain and pain management  - Notify physician/advanced practitioner if interventions unsuccessful or patient reports new pain  Outcome: Progressing     Problem: INFECTION - ADULT  Goal: Absence or prevention of progression during hospitalization  Description: INTERVENTIONS:  - Assess and monitor for signs and symptoms of infection  - Monitor lab/diagnostic results  - Monitor all insertion sites, i.e. indwelling lines,   - Dixie appropriate cooling/warming therapies per order  - Administer medications as ordered  - Instruct and encourage patient and family to use good hand hygiene technique  - Identify and instruct in appropriate isolation precautions for identified infection/condition  Outcome: Progressing     Problem: SAFETY ADULT  Goal: Patient will remain free of falls  Description: INTERVENTIONS:  - Educate patient/family on patient safety including physical limitations  - Instruct patient to call for assistance with activity   - Consult OT/PT to assist with strengthening/mobility   - Keep Call bell within reach  - Keep bed low and locked with side rails adjusted as appropriate  - Keep care items and personal belongings within reach  - Initiate and maintain comfort rounds  - Make Fall Risk Sign visible to staff  - Offer Toileting every 2 Hours, in advance of need  - Initiate/Maintain bed/chair alarm  - Obtain necessary fall risk management equipment:   - Apply yellow socks and bracelet for high fall risk patients  - Consider moving patient to room near nurses station  Outcome:  Progressing  Goal: Maintain or return to baseline ADL function  Description: INTERVENTIONS:  -  Assess patient's ability to carry out ADLs; assess patient's baseline for ADL function and identify physical deficits which impact ability to perform ADLs (bathing, care of mouth/teeth, toileting, grooming, dressing, etc.)  - Assess/evaluate cause of self-care deficits   - Assess range of motion  - Assess patient's mobility; develop plan if impaired  - Assess patient's need for assistive devices and provide as appropriate  - Encourage maximum independence but intervene and supervise when necessary  - Involve family in performance of ADLs  - Assess for home care needs following discharge   - Consider OT consult to assist with ADL evaluation and planning for discharge  - Provide patient education as appropriate  Outcome: Progressing  Goal: Maintains/Returns to pre admission functional level  Description: INTERVENTIONS:  - Perform AM-PAC 6 Click Basic Mobility/ Daily Activity assessment daily.  - Set and communicate daily mobility goal to care team and patient/family/caregiver.   - Collaborate with rehabilitation services on mobility goals if consulted  - Perform Range of Motion 3 times a day.  - Reposition patient every 2 hours.  - Dangle patient 3 times a day  - Stand patient 3 times a day  - Ambulate patient 3 times a day  - Out of bed to chair 3 times a day   - Out of bed for meals 3 times a day  - Out of bed for toileting  - Record patient progress and toleration of activity level   Outcome: Progressing     Problem: DISCHARGE PLANNING  Goal: Discharge to home or other facility with appropriate resources  Description: INTERVENTIONS:  - Identify barriers to discharge w/patient and caregiver  - Arrange for needed discharge resources and transportation as appropriate  - Identify discharge learning needs (meds, wound care, etc.)  - Refer to Case Management Department for coordinating discharge planning if the patient needs  post-hospital services based on physician/advanced practitioner order or complex needs related to functional status, cognitive ability, or social support system  Outcome: Progressing     Problem: Knowledge Deficit  Goal: Patient/family/caregiver demonstrates understanding of disease process, treatment plan, medications, and discharge instructions  Description: Complete learning assessment and assess knowledge base.  Interventions:  - Provide teaching at level of understanding  - Provide teaching via preferred learning methods  Outcome: Progressing     Problem: NEUROSENSORY - ADULT  Goal: Achieves stable or improved neurological status  Description: INTERVENTIONS  - Monitor and report changes in neurological status  - Monitor vital signs such as temperature, blood pressure, glucose, and any other labs ordered   - Initiate measures to prevent increased intracranial pressure  - Monitor for seizure activity and implement precautions if appropriate      Outcome: Progressing  Goal: Remains free of injury related to seizures activity  Description: INTERVENTIONS  - Maintain airway, patient safety  and administer oxygen as ordered  - Monitor patient for seizure activity, document and report duration and description of seizure to physician/advanced practitioner  - If seizure occurs,  ensure patient safety during seizure  - Reorient patient post seizure  - Seizure pads on all 4 side rails  - Instruct patient/family to notify RN of any seizure activity including if an aura is experienced  - Instruct patient/family to call for assistance with activity based on nursing assessment  - Administer anti-seizure medications if ordered    Outcome: Progressing  Goal: Achieves maximal functionality and self care  Description: INTERVENTIONS  - Monitor swallowing and airway patency with patient fatigue and changes in neurological status  - Encourage and assist patient to increase activity and self care.   - Encourage visually impaired,  hearing impaired and aphasic patients to use assistive/communication devices  Outcome: Progressing     Problem: CARDIOVASCULAR - ADULT  Goal: Maintains optimal cardiac output and hemodynamic stability  Description: INTERVENTIONS:  - Monitor I/O, vital signs and rhythm  - Monitor for S/S and trends of decreased cardiac output  - Administer and titrate ordered vasoactive medications to optimize hemodynamic stability  - Assess quality of pulses, skin color and temperature  - Assess for signs of decreased coronary artery perfusion  - Instruct patient to report change in severity of symptoms  Outcome: Progressing  Goal: Absence of cardiac dysrhythmias or at baseline rhythm  Description: INTERVENTIONS:  - Continuous cardiac monitoring, vital signs, obtain 12 lead EKG if ordered  - Administer antiarrhythmic and heart rate control medications as ordered  - Monitor electrolytes and administer replacement therapy as ordered  Outcome: Progressing     Problem: GASTROINTESTINAL - ADULT  Goal: Minimal or absence of nausea and/or vomiting  Description: INTERVENTIONS:  - Administer IV fluids if ordered to ensure adequate hydration  - Maintain NPO status until nausea and vomiting are resolved  - Nasogastric tube if ordered  - Administer ordered antiemetic medications as needed  - Provide nonpharmacologic comfort measures as appropriate  - Advance diet as tolerated, if ordered  - Consider nutrition services referral to assist patient with adequate nutrition and appropriate food choices  Outcome: Progressing  Goal: Maintains or returns to baseline bowel function  Description: INTERVENTIONS:  - Assess bowel function  - Encourage oral fluids to ensure adequate hydration  - Administer IV fluids if ordered to ensure adequate hydration  - Administer ordered medications as needed  - Encourage mobilization and activity  - Consider nutritional services referral to assist patient with adequate nutrition and appropriate food choices  Outcome:  Progressing  Goal: Maintains adequate nutritional intake  Description: INTERVENTIONS:  - Monitor percentage of each meal consumed  - Identify factors contributing to decreased intake, treat as appropriate  - Assist with meals as needed  - Monitor I&O, weight, and lab values if indicated  - Obtain nutrition services referral as needed  Outcome: Progressing  Goal: Oral mucous membranes remain intact  Description: INTERVENTIONS  - Assess oral mucosa and hygiene practices  - Implement preventative oral hygiene regimen  - Implement oral medicated treatments as ordered  - Initiate Nutrition services referral as needed  Outcome: Progressing     Problem: METABOLIC, FLUID AND ELECTROLYTES - ADULT  Goal: Electrolytes maintained within normal limits  Description: INTERVENTIONS:  - Monitor labs and assess patient for signs and symptoms of electrolyte imbalances  - Administer electrolyte replacement as ordered  - Monitor response to electrolyte replacements, including repeat lab results as appropriate  - Instruct patient on fluid and nutrition as appropriate  Outcome: Progressing  Goal: Fluid balance maintained  Description: INTERVENTIONS:  - Monitor labs   - Monitor I/O and WT  - Instruct patient on fluid and nutrition as appropriate  - Assess for signs & symptoms of volume excess or deficit  Outcome: Progressing

## 2024-07-29 NOTE — ED PROVIDER NOTES
Emergency Department Trauma Note  Aniket Pace 38 y.o. male MRN: [id number]  Unit/Bed#: / Encounter: [id number]      Trauma Alert: Trauma Acuity: Trauma Evaluation  Model of Arrival: Mode of Arrival: BLS via Trauma Squad Name and Number: Jonel  Trauma Team: Current Providers  Attending Provider: Amilcar Bermeo MD  Attending Provider: Manuel Thompson MD  Attending Provider: Cale Carter DO  Registered Nurse: Wilma Adhikari, JODEE  ED Technician: Eliezer Thornton  ED Technician: Dia Mims  Registered Nurse: Mara Wooten, RN  Patient Care Assistant: Zakiya Garza  Respiratory Therapist: RT Mone  Registered Nurse: Wil Coats RN  Resident: Anita Lee MD  Consulting Physician: Martinez Dubon DO  Patient Care Assistant: Kaitlynn Young  Unit Clerk: Emiliana Rust RN Care Manager: Racheal Chanel RN  Registered Nurse: Adelina Banks RN  Patient Care Assistant: Enio Anaya  Patient Care Assistant: [facility]  Registered Nurse: Mara Wooten RN  Consultants:     None      History of Present Illness     Chief Complaint:   Chief Complaint   Patient presents with    Seizure - Prior Hx Of     Patient states he had about 20 seizures last night, states he has seizures when he believes he is withdrawing from alcohol. + headstrike. Patient says his last drink was 2-3 days ago, states he drinks about 1/2 of a fifth a day     HPI:  Aniket Pace is a 38 y.o. male who presents with alcohol withdrawal, suspected seizures, multiple falls.    Mechanism:Details of Incident: Pt reports that he has multiple falls that resulted in him hitting his head due to seizures since detoxing from alcohol Injury Date: 07/27/24        Aniket Pace is a 38 y.o. male who presents with alcohol withdrawal, suspected seizures, multiple falls.  Patient reports that he has a history of alcohol abuse, alcohol withdrawal seizures.  States that he has been trying  to detox on his own at home.  It has been 3 days since his last drink, however he did have 2 beers after he suspects that he had 2 seizures.  He describes that he felt lightheaded before use, denies urinary incontinence or tongue biting.  He describes that he feels like his vision is blurry, both eyes.  He describes generalized weakness, however nonfocal.  Denies any focal numbness.  Does describe that he fell multiple times hitting his head, with positive loss of consciousness.  Review of systems otherwise negative.      Review of Systems   Constitutional:  Negative for chills, diaphoresis, fatigue and fever.   HENT:  Negative for congestion and sore throat.    Eyes:  Positive for visual disturbance.   Respiratory:  Negative for cough, chest tightness and shortness of breath.    Cardiovascular:  Negative for chest pain, palpitations and leg swelling.   Gastrointestinal:  Negative for abdominal distention, abdominal pain, constipation, diarrhea, nausea and vomiting.   Genitourinary:  Negative for difficulty urinating and dysuria.   Musculoskeletal:  Negative for arthralgias and myalgias.   Skin:  Negative for rash.   Neurological:  Positive for weakness and headaches. Negative for dizziness, light-headedness and numbness.   Psychiatric/Behavioral:  Negative for agitation, behavioral problems and confusion. The patient is not nervous/anxious.    All other systems reviewed and are negative.      Historical Information     Immunizations:   Immunization History   Administered Date(s) Administered    Tdap 03/23/2016, 08/10/2021       Past Medical History:   Diagnosis Date    Chronic pain     Depression     Pelvic fracture (HCC)     PTSD (post-traumatic stress disorder)     Substance abuse (HCC)      History reviewed. No pertinent family history.  Past Surgical History:   Procedure Laterality Date    BONY PELVIS SURGERY       Social History     Tobacco Use    Smoking status: Every Day     Current packs/day: 1.00      Average packs/day: 1 pack/day for 5.0 years (5.0 ttl pk-yrs)     Types: Cigarettes     Start date: 7/29/2019     Passive exposure: Never    Smokeless tobacco: Never   Vaping Use    Vaping status: Former   Substance Use Topics    Alcohol use: Yes     Alcohol/week: 20.0 standard drinks of alcohol     Types: 20 Shots of liquor per week     Comment: 20-30 shots per day    Drug use: Not Currently     Types: Methamphetamines, Marijuana, Cocaine     Comment: Marijuana current use     E-Cigarette/Vaping    E-Cigarette Use Former User     Comments THC, medical       E-Cigarette/Vaping Substances    Nicotine No     THC No     CBD No     Flavoring No     Other No     Unknown No        Family History: non-contributory    Meds/Allergies   Prior to Admission Medications   Prescriptions Last Dose Informant Patient Reported? Taking?   QUEtiapine (SEROquel) 100 mg tablet 7/28/2024  No Yes   Sig: Take 1 tablet (100 mg total) by mouth daily at bedtime   amLODIPine (NORVASC) 10 mg tablet Past Month  No Yes   Sig: Take 1 tablet (10 mg total) by mouth daily   metoprolol tartrate (LOPRESSOR) 25 mg tablet   No No   Sig: Take 1 tablet (25 mg total) by mouth every 12 (twelve) hours   naloxone (NARCAN) 4 mg/0.1 mL nasal spray   No No   Sig: Administer 1 spray into a nostril. If no response after 2-3 minutes, give another dose in the other nostril using a new spray.      Facility-Administered Medications: None       Allergies   Allergen Reactions    Penicillin V Other (See Comments)     unknown    Penicillins Hives       PHYSICAL EXAM    PE limited by:     Objective   Vitals:   First set: Temperature: (!) 97 °F (36.1 °C) (07/28/24 2135)  Pulse: 104 (07/28/24 2135)  Respirations: 20 (07/28/24 2135)  Blood Pressure: (!) 81/51 (provider aware) (07/28/24 2145)  SpO2: 93 % (07/28/24 2145)    Primary Survey:   (A) Airway: Intact  (B) Breathing: Equal BL  (C) Circulation: Pulses:   normal  (D) Disabliity:  GCS Total:  15  (E) Expose:   Completed    Secondary Survey: (Click on Physical Exam tab above)  Physical Exam  Constitutional:       Appearance: He is well-developed.   HENT:      Head: Normocephalic and atraumatic.   Eyes:      Comments: Pupils 3 mm and reactive   Cardiovascular:      Rate and Rhythm: Normal rate and regular rhythm.      Heart sounds: Normal heart sounds. No murmur heard.  Pulmonary:      Effort: Pulmonary effort is normal. No respiratory distress.      Breath sounds: Normal breath sounds.   Abdominal:      General: Bowel sounds are normal. There is no distension.      Palpations: Abdomen is soft.      Tenderness: There is no abdominal tenderness.   Musculoskeletal:         General: Tenderness present. No deformity.      Comments: Reports positive midline tenderness of the C-spine, T-spine, L-spine throughout.     No tenderness of the thorax    No instability or pain when pressing on pelvis    RUE:  No tenderness, no pain ranging joints of the shoulder, elbow, wrist, hand    LUE:  No tenderness, no pain ranging joints of the shoulder, elbow, wrist, hand    RLE:   No tenderness, no pain ranging the hip, knee, ankle    LLE:   No tenderness, no pain ranging the hip, knee, ankle      Skin:     General: Skin is warm.      Findings: No rash.   Neurological:      Mental Status: He is alert and oriented to person, place, and time.      Comments: Patient AAOx3. CN II-XII intact. Normal CFTs. 5/5 strength in all extremities. Normal sensation in all extremities.      Psychiatric:         Behavior: Behavior normal.         Thought Content: Thought content normal.         Judgment: Judgment normal.         Cervical spine cleared by clinical criteria? No (imaging required)      Invasive Devices       Peripheral Intravenous Line  Duration             Peripheral IV 07/28/24 Left Antecubital <1 day    Peripheral IV 07/29/24 Distal;Dorsal (posterior);Right Forearm <1 day                    Lab Results:   Results Reviewed       Procedure  Component Value Units Date/Time    Comprehensive metabolic panel [[id number]]  (Abnormal) Collected: 07/29/24 0605    Lab Status: Final result Specimen: Blood from Arm, Left Updated: 07/29/24 0710     Sodium 137 mmol/L      Potassium 3.5 mmol/L      Chloride 95 mmol/L      CO2 22 mmol/L      ANION GAP 20 mmol/L      BUN 72 mg/dL      Creatinine 7.15 mg/dL      Glucose 89 mg/dL      Calcium 8.0 mg/dL      AST 19 U/L      ALT 20 U/L      Alkaline Phosphatase 98 U/L      Total Protein 5.8 g/dL      Albumin 3.8 g/dL      Total Bilirubin 0.34 mg/dL      eGFR 8 ml/min/1.73sq m     Narrative:      National Kid[employer] Disease Foundation guidelines for Chronic Kidney Disease (CKD):     Stage 1 with normal or high GFR (GFR > 90 mL/min/1.73 square meters)    Stage 2 Mild CKD (GFR = 60-89 mL/min/1.73 square meters)    Stage 3A Moderate CKD (GFR = 45-59 mL/min/1.73 square meters)    Stage 3B Moderate CKD (GFR = 30-44 mL/min/1.73 square meters)    Stage 4 Severe CKD (GFR = 15-29 mL/min/1.73 square meters)    Stage 5 End Stage CKD (GFR <15 mL/min/1.73 square meters)  Note: GFR calculation is accurate only with a steady state creatinine    Magnesium [[id number]]  (Abnormal) Collected: 07/29/24 0605    Lab Status: Final result Specimen: Blood from Arm, Left Updated: 07/29/24 0710     Magnesium 3.2 mg/dL     Phosphorus [[id number]]  (Abnormal) Collected: 07/29/24 0605    Lab Status: Final result Specimen: Blood from Arm, Left Updated: 07/29/24 0710     Phosphorus 8.9 mg/dL     CK [[id number]]  (Abnormal) Collected: 07/29/24 0605    Lab Status: Final result Specimen: Blood from Arm, Left Updated: 07/29/24 0710     Total  U/L     CBC (With Platelets) [[id number]]  (Normal) Collected: 07/29/24 0605    Lab Status: Final result Specimen: Blood from Arm, Left Updated: 07/29/24 0623     WBC 8.40 Thousand/uL      RBC 4.29 Million/uL      Hemoglobin 13.2 g/dL      Hematocrit 39.3 %      MCV 92 fL      MCH 30.8 pg      MCHC 33.6 g/dL      RDW  13.4 %      Platelets 174 Thousands/uL      MPV 11.7 fL     UA w Reflex to Microscopic w Reflex to Culture [[id number]]  (Abnormal) Collected: 07/29/24 0453    Lab Status: Final result Specimen: Urine, Clean Catch Updated: 07/29/24 0538     Color, UA Light Yellow     Clarity, UA Clear     Specific Gravity, UA 1.020     pH, UA 5.0     Leukocytes, UA Negative     Nitrite, UA Negative     Protein, UA 30 (1+) mg/dl      Glucose, UA Negative mg/dl      Ketones, UA Negative mg/dl      Urobilinogen, UA <2.0 mg/dl      Bilirubin, UA Negative     Occult Blood, UA Small    HS Troponin I 2hr [[id number]]  (Normal) Collected: 07/29/24 0205    Lab Status: Final result Specimen: Blood from Arm, Right Updated: 07/29/24 0236     hs TnI 2hr 6 ng/L      Delta 2hr hsTnI 0 ng/L     Lactic acid, plasma (w/reflex if result > 2.0) [[id number]]  (Normal) Collected: 07/28/24 2259    Lab Status: Final result Specimen: Blood from Arm, Left Updated: 07/28/24 2321     LACTIC ACID 1.0 mmol/L     Narrative:      Result may be elevated if tourniquet was used during collection.    Beta Hydroxybutyrate [[id number]]  (Normal) Collected: 07/28/24 2213    Lab Status: Final result Specimen: Blood Updated: 07/28/24 2320     Beta- Hydroxybutyrate 0.22 mmol/L     CK [[id number]]  (Abnormal) Collected: 07/28/24 2213    Lab Status: Final result Specimen: Blood Updated: 07/28/24 2320     Total  U/L     Blood gas, venous [[id number]]  (Abnormal) Collected: 07/28/24 2259    Lab Status: Final result Specimen: Blood from Arm, Left Updated: 07/28/24 2309     pH, Syed 7.212     pCO2, Syed 48.4 mm Hg      pO2, Syed 34.9 mm Hg      HCO3, Syed 19.0 mmol/L      Base Excess, Syed -8.8 mmol/L      O2 Content, Syed 11.4 ml/dL      O2 HGB, VENOUS 60.5 %     HS Troponin 0hr (reflex protocol) [[id number]]  (Normal) Collected: 07/28/24 2213    Lab Status: Final result Specimen: Blood from Arm, Right Updated: 07/28/24 2252     hs TnI 0hr 6 ng/L     Ethanol [[id number]]  (Normal)  Collected: 07/28/24 2213    Lab Status: Final result Specimen: Blood from Arm, Right Updated: 07/28/24 2246     Ethanol Lvl <10 mg/dL     Comprehensive metabolic panel [[id number]]  (Abnormal) Collected: 07/28/24 2213    Lab Status: Final result Specimen: Blood from Arm, Right Updated: 07/28/24 2246     Sodium 132 mmol/L      Potassium 4.3 mmol/L      Chloride 93 mmol/L      CO2 16 mmol/L      ANION GAP 23 mmol/L      BUN 74 mg/dL      Creatinine 8.38 mg/dL      Glucose 94 mg/dL      Calcium 9.2 mg/dL      AST 31 U/L      ALT 27 U/L      Alkaline Phosphatase 127 U/L      Total Protein 7.7 g/dL      Albumin 4.8 g/dL      Total Bilirubin 0.49 mg/dL      eGFR 7 ml/min/1.73sq m     Narrative:      National Kidney Disease Foundation guidelines for Chronic Kidney Disease (CKD):     Stage 1 with normal or high GFR (GFR > 90 mL/min/1.73 square meters)    Stage 2 Mild CKD (GFR = 60-89 mL/min/1.73 square meters)    Stage 3A Moderate CKD (GFR = 45-59 mL/min/1.73 square meters)    Stage 3B Moderate CKD (GFR = 30-44 mL/min/1.73 square meters)    Stage 4 Severe CKD (GFR = 15-29 mL/min/1.73 square meters)    Stage 5 End Stage CKD (GFR <15 mL/min/1.73 square meters)  Note: GFR calculation is accurate only with a steady state creatinine    Magnesium [[id number]]  (Abnormal) Collected: 07/28/24 2213    Lab Status: Final result Specimen: Blood from Arm, Right Updated: 07/28/24 2244     Magnesium 3.0 mg/dL     CBC and differential [[id number]]  (Abnormal) Collected: 07/28/24 2213    Lab Status: Final result Specimen: Blood from Arm, Right Updated: 07/28/24 2230     WBC 10.03 Thousand/uL      RBC 5.00 Million/uL      Hemoglobin 15.1 g/dL      Hematocrit 45.6 %      MCV 91 fL      MCH 30.2 pg      MCHC 33.1 g/dL      RDW 13.2 %      MPV 12.1 fL      Platelets 207 Thousands/uL      nRBC 0 /100 WBCs      Segmented % 71 %      Immature Grans % 0 %      Lymphocytes % 14 %      Monocytes % 14 %      Eosinophils Relative 1 %      Basophils  Relative 0 %      Absolute Neutrophils 7.05 Thousands/µL      Absolute Immature Grans 0.02 Thousand/uL      Absolute Lymphocytes 1.39 Thousands/µL      Absolute Monocytes 1.43 Thousand/µL      Eosinophils Absolute 0.10 Thousand/µL      Basophils Absolute 0.04 Thousands/µL                    Imaging Studies:   Direct to CT: Yes  TRAUMA - CT head wo contrast   Final Result by Devin Bowen DO (07/28 2312)      No acute intracranial abnormality.                  Workstation performed: [id number]         TRAUMA - CT spine cervical wo contrast   Final Result by Devin Bowen DO (07/28 2313)      No cervical spine fracture or traumatic malalignment.                  Workstation performed: [id number]         CTA chest (pe study) abdomen pelvis contrast   Final Result by Devin Bowen DO (07/28 2312)      No findings of acute traumatic injury in the chest, abdomen or pelvis.               The study was marked in EPIC for immediate notification, per trauma protocol.      Workstation performed: [id number]         XR chest 1 view portable   Final Result by Dee Grijalva MD (07/28 2253)      No acute cardiopulmonary disease.            Workstation performed: [id number]               Procedures  CriticalCare Time    Date/Time: 7/28/2024 11:35 PM    Performed by: Amilcar Bermeo MD  Authorized by: Amilcar Bermeo MD    Critical care provider statement:     Critical care time (minutes):  35    Critical care start time:  7/28/2024 11:00 PM    Critical care end time:  7/28/2024 11:35 PM    Critical care time was exclusive of:  Separately billable procedures and treating other patients and teaching time    Critical care was necessary to treat or prevent imminent or life-threatening deterioration of the following conditions:  Shock and dehydration    Critical care was time spent personally by me on the following activities:  Obtaining history from patient or surrogate, development of treatment plan  with patient or surrogate, evaluation of patient's response to treatment, examination of patient, ordering and performing treatments and interventions, ordering and review of laboratory studies, ordering and review of radiographic studies, re-evaluation of patient's condition and review of old charts    I assumed direction of critical care for this patient from another provider in my specialty: yes    ECG 12 Lead Documentation Only    Date/Time: 7/29/2024 12:26 AM    Performed by: Amilcar Bermeo MD  Authorized by: Amilcar Bermeo MD    Indications / Diagnosis:  Hypotension  ECG reviewed by me, the ED Provider: yes    Patient location:  ED  Previous ECG:     Previous ECG:  Compared to current    Similarity:  No change    Comparison to cardiac monitor: No    Interpretation:     Interpretation: abnormal    Rate:     ECG rate:  110    ECG rate assessment: tachycardic    Rhythm:     Rhythm: sinus rhythm    Ectopy:     Ectopy: none    QRS:     QRS axis:  Normal    QRS intervals:  Normal  Conduction:     Conduction: normal    ST segments:     ST segments:  Non-specific  T waves:     T waves: non-specific             ED Course  ED Course as of 07/29/24 2141   Sun Jul 28, 2024   2249 Creatinine(!): 8.38  Suspect dehydration.   2300 Pt is reporting improvement in sx. Pressures are improving with fluids.   2310 pH, Syed(!): 7.212   2310 HCO3, Syed(!): 19.0   2321 Beta- Hydroxybutyrate: 0.22  Negative.   2321 Total CK(!): 600  Mild elevation.           Medical Decision Making  I reviewed the patient's medical chart, PMHx, prior encounters, medications.    My independent interpretation of ECG: Non-specific ST/T wave changes, no acute ischemic changes, sinus tachycardia  My independent interpretation of CXR: No acute cardiopulmonary disease    My DDx includes: Alcohol withdrawal, seizure, syncope, dehydration, traumatic ICH, traumatic C-spine fx, traumatic T-spine fx, traumatic L-spine fx    Will perform  trauma scans as ordered. Will reassess patient.     Will perform cardiac workup. CMP to evaluate electrolytes, CBC to evaluate for anemia, troponin to evaluate for cardiac ischemia, ECG. Imaging of chest. Will reassess symptoms.     Was initially found to be quite hypotensive, in the 60s to 70s.  I strongly suspected dehydration.  I do not suspect infection.  He has no infectious source.  Perhaps these seizure episodes were in fact multiple episodes of syncope secondary to dehydration and hypotension, as patient had no tongue biting, no urinary incontinence, or other symptoms to suggest seizure at this time.  Trauma evaluation was largely negative for acute pathology.  Patient was given a total of 3 L of fluid with improvement in blood pressures to the 90s systolic.  Patient was also given 10 of Valium with improvement in tremors.  I also gave the patient thiamine and folate.    Patient was found to have very significant HARRIET with a creatinine of 8.  Suspect dehydration as the cause as patient had a normal beta-hydroxybutyrate, less suspicious for alcoholic ketoacidosis.  There is a slight rhabdomyolysis, although this is rather minimal and likely not explained for the patient's very significant HARRIET.      Patient was offered transfer to detox center, however declined states that he wants to stay at [facility].    Discussed with Darion who agreed the patient should be admitted level 2 stepdown    Amount and/or Complexity of Data Reviewed  Labs: ordered. Decision-making details documented in ED Course.  Radiology: ordered.    Risk  Prescription drug management.  Decision regarding hospitalization.                Disposition  [facility] Transfer: No  Final diagnoses:   Dehydration   Alcohol withdrawal (HCC)   Seizure-like activity (HCC)   Hypotension   Rhabdomyolysis   Alcohol abuse   Syncope     Time reflects when diagnosis was documented in both MDM as applicable and the Disposition within this note       Time User Action  Codes Description Comment    7/29/2024 12:30 AM Amilcar Bermeo Add [E86.0] Dehydration     7/29/2024 12:30 AM Amilcar Bermeo Add [F10.939] Alcohol withdrawal (HCC)     7/29/2024 12:30 AM Amilcar Bermeo Add [R56.9] Seizure-like activity (HCC)     7/29/2024 12:30 AM Amilcar Bermeo Add [I95.9] Hypotension     7/29/2024 12:30 AM Amilcar Bermeo Add [M62.82] Rhabdomyolysis     7/29/2024 12:31 AM Amilcar Bermeo Add [F10.10] Alcohol abuse     7/29/2024 12:31 AM Amilcar Bermeo Add [R55] Syncope     7/29/2024  1:07 AM Mara Wooten Modify [E86.0] Dehydration     7/29/2024  1:07 AM Mara Wooten Add [F10.939,  R56.9] Alcohol withdrawal seizure with complication (HCC)     7/29/2024  7:39 AM Raul Cale Add [N19] Renal failure           ED Disposition       ED Disposition   Admit    Condition   Stable    Date/Time   Mon Jul 29, 2024 12:30 AM    Comment   Case was discussed with JAIMEE and the patient's admission status was agreed to be Admission Status: inpatient status to the service of Dr. Thompson .               Follow-up Information    None       Current Discharge Medication List        CONTINUE these medications which have NOT CHANGED    Details   amLODIPine (NORVASC) 10 mg tablet Take 1 tablet (10 mg total) by mouth daily  Qty: 30 tablet, Refills: 2    Associated Diagnoses: Primary hypertension      QUEtiapine (SEROquel) 100 mg tablet Take 1 tablet (100 mg total) by mouth daily at bedtime  Qty: 90 tablet, Refills: 1    Associated Diagnoses: PTSD (post-traumatic stress disorder)      metoprolol tartrate (LOPRESSOR) 25 mg tablet Take 1 tablet (25 mg total) by mouth every 12 (twelve) hours  Qty: 60 tablet, Refills: 1    Associated Diagnoses: Tachycardia      naloxone (NARCAN) 4 mg/0.1 mL nasal spray Administer 1 spray into a nostril. If no response after 2-3 minutes, give another dose in the other nostril using a new spray.  Qty: 1 each, Refills: 1    Associated Diagnoses: Opioid  dependence with opioid-induced mood disorder (HCC)           No discharge procedures on file.    PDMP Review         Value Time User    PDMP Reviewed  Yes 6/19/2023 10:28 AM Jason Guerra DO            ED Provider  Electronically Signed by           Amilcar Bermeo MD  07/29/24 2141       Amilcar Bermeo MD  08/01/24 0604

## 2024-07-29 NOTE — PLAN OF CARE
Problem: PAIN - ADULT  Goal: Verbalizes/displays adequate comfort level or baseline comfort level  Description: Interventions:  - Encourage patient to monitor pain and request assistance  - Assess pain using appropriate pain scale  - Administer analgesics based on type and severity of pain and evaluate response  - Implement non-pharmacological measures as appropriate and evaluate response  - Consider cultural and social influences on pain and pain management  - Notify physician/advanced practitioner if interventions unsuccessful or patient reports new pain  Outcome: Progressing     Problem: INFECTION - ADULT  Goal: Absence or prevention of progression during hospitalization  Description: INTERVENTIONS:  - Assess and monitor for signs and symptoms of infection  - Monitor lab/diagnostic results  - Monitor all insertion sites, i.e. indwelling lines, tubes, and drains  - Monitor endotracheal if appropriate and nasal secretions for changes in amount and color  - Schulter appropriate cooling/warming therapies per order  - Administer medications as ordered  - Instruct and encourage patient and family to use good hand hygiene technique  - Identify and instruct in appropriate isolation precautions for identified infection/condition  Outcome: Progressing

## 2024-07-29 NOTE — CONSULTS
Consultation - Nephrology   Aniket Pace 38 y.o. male MRN: [id number]  Unit/Bed#:  Encounter: [id number]      A/P:  1.  Acute kidney injury   Significantly proved status post volume expansion.  Continue with volume expansion but will transition from normal saline to isolate for now.  Will maintain the rate at 125 mL/hr.  Given significant improvement, will not proceed with additional workup at this time.  Although the patient has mild proteinuria, this may be due to mild rhabdomyolysis.  Avoid potential for toxins, continue to optimize the patient's overall hemodynamics.  2.  Volume depletion   Continues to be addressed with volume expansion.  Continue with current care, follow patient's parameters closely.  3.  Hypovolemic hyponatremia   Improved up to 137 mmol/L from 132 mmol/L.  This increase of 5 mmol/L has occurred over the course of 8 hours.  Will transition to fluid as noted above, will reassess blood work around noon time.  4.  Hypokalemia   Likely multifactorial, patient's potassium initially within normal limits at 4.3 mmol/L, currently down to 3.5 mmol/L, will add potassium supplementation on top of the small amount of potassium that is also included within the recently initiated Isolyte.  5.  Acidosis   Resolved this morning, with serum bicarbonate up to 22 mmol/L from 16 mmol/L.  6.  Proteinuria   Potentially related to rhabdomyolysis, should be reassessed when the patient is usual state of health.  7.  Rhabdomyolysis   Continue isotonic saline volume expansion, monitor electrolytes closely, supplement were indicated.  8.  Hyperphosphatemia   Most likely due to acute kidney injury which was significant and severe.  Would expect the phosphorus levels to improve as the patient's kidney function improves.  Continue to monitor phosphorus at least once every 24 hours for now.  9.  Hypermagnesemia   Continue to monitor for now, patient has a history of alcoholism, typically related with hypomagnesemia,  current elevation in magnesium levels may be due to acute kidney injury as well.  Will continue to monitor at least once every 24 hours for now.  10.  Hypovolemic shock   Patient currently on norepinephrine for pressure support, status post volume expansion and continues to receive volume as needed.  Unclear if there are other potential etiologies to the patient's hypotension.  11.  Alcohol withdrawal seizure   Patient is about 3 days past last drink, currently on Precedex drip and is somewhat somnolent but arouses.  Continue care according to our hospitalist colleagues.    Case discussed with hospitalist, change fluids as noted above to isolate.  Closely monitor electrolytes and supplement as indicated.       Thank you for allowing us to participate in the care of your patient.     Please feel free to contact us regarding the care of this patient, or any other questions/concerns that may be applicable.    Patient Active Problem List   Diagnosis    Opioid use    Anxiety    Encounter for tobacco use cessation counseling    Opioid dependence with opioid-induced mood disorder (HCC)    Seizure (HCC)    Opioid use disorder, moderate, on maintenance therapy, dependence (HCC)    Alcohol use disorder, severe, dependence (HCC)    PTSD (post-traumatic stress disorder)    Tobacco use disorder    Transaminitis    Benzodiazepine dependence (HCC)    Unsteady gait    Bipolar I disorder with depression (HCC)    Primary hypertension    Alcohol withdrawal seizure without complication (HCC)    Tachycardia    Alcohol withdrawal seizure (HCC)    Acute kidney injury (HCC)    Non-traumatic rhabdomyolysis    Hypotension due to hypovolemia    Primary insomnia       History of Present Illness   Physician Requesting Consult: Cale Carter DO  Reason for Consult / Principal Problem: Acute kidney injury  Hx and PE limited by:   HPI: Aniket Pace is a 38 y.o. year old male who presented to the emergency room late yesterday evening and  admitted earlier this morning just after midnight due to having suffered alcohol withdrawal seizures.  Patient had apparently stopped drinking about 3 days ago when he experienced a seizure.  At presentation, he was put on a Precedex drip and has been improving from that standpoint.  At this time, the patient remains on Precedex drip and is fairly somnolent.  Unfortunately no history of present illness was able to be obtained from the patient but rather discussion with providers and review of electronic medical records.    From the renal standpoint, the patient presented with a creatinine of around 8.4 mg/dL, this has improved down to 7.15 mg/dL with volume expansion.  Patient's serum sodium level of 132 mmol/L is now increased to 137 mmol/L over the last 8 hours.  Bicarbonate has improved from 16 up to 22 mmol/L, potassium has reduced from 4.3 down to 3.5 mmol/L.  Patient was noted to have a +1 proteinuria on urinalysis, along with a minimally elevated CK which was 600 now down to 369.  Phosphorus level was elevated as was magnesium level.    History obtained from chart review and the patient    Constitutional ROS- Denies fatigue, fever, chills, night sweats, weight changes.  HEENT ROS- Denies history of eye surgeries, glaucoma, headaches or history of trauma, blurred vision..  Endocrine ROS- No history diabetes mellitus or thyroid disease.  Cardiovascular ROS- Denies chest pain, palpitation, dyspnea exertion, orthopnea, claudication.  Pulmonary ROS- Denies history of COPD, asthma.  Denies cough, hemoptysis, shortness of breath.  GI ROS- Denies abdominal pain, diarrhea, nausea, swallowing problems, vomiting, constipation, blood in stools, fecal incontinence.   Hematological ROS- Denies history of easy bruising, blood clots, bleeding or blood transfusions.  Genitourinary ROS- Denies recent hematuria, pyuria, flank pain, change in urinary stream, decreased urinary output, increased urinary frequency, nocturia, foamy  urine, or urinary incontinence.  Lymphatic ROS- Denies lymphadenopathy.  Musculoskeletal ROS- Denies history of muscle weakness, joint pain.  Dermatological ROS- Denies rash, wounds, ulcers, itching, jaundice.  Psychiatric ROS- Denies anxiety, depression, hallucinations, disorientation.  Neurological ROS- No stroke or TIA symptoms. .    Historical Information   Past Medical History:   Diagnosis Date    Chronic pain     Depression     Pelvic fracture (HCC)     PTSD (post-traumatic stress disorder)     Substance abuse (HCC)      Past Surgical History:   Procedure Laterality Date    BONY PELVIS SURGERY       Social History   Social History     Substance and Sexual Activity   Alcohol Use Yes    Alcohol/week: 20.0 standard drinks of alcohol    Types: 20 Shots of liquor per week    Comment: 20-30 shots per day     Social History     Substance and Sexual Activity   Drug Use Not Currently    Types: Methamphetamines, Marijuana, Cocaine    Comment: Marijuana current use     Social History     Tobacco Use   Smoking Status Every Day    Current packs/day: 1.00    Average packs/day: 1 pack/day for 5.0 years (5.0 ttl pk-yrs)    Types: Cigarettes    Start date: 7/29/2019    Passive exposure: Never   Smokeless Tobacco Never     History reviewed. No pertinent family history.    Meds/Allergies   all current active meds have been reviewed, current meds:   Current Facility-Administered Medications   Medication Dose Route Frequency    dexmedeTOMIDine (Precedex) 400 mcg in sodium chloride 0.9% 100 mL  0.1-0.7 mcg/kg/hr Intravenous Titrated    diazepam (VALIUM) tablet 5 mg  5 mg Oral Q6H PRN    famotidine (PEPCID) tablet 10 mg  10 mg Oral Daily    folic acid (FOLVITE) tablet 1 mg  1 mg Oral Daily    heparin (porcine) subcutaneous injection 5,000 Units  5,000 Units Subcutaneous Q8H MARIUM    HYDROmorphone (DILAUDID) injection 0.5 mg  0.5 mg Intravenous Q4H PRN    multi-electrolyte (PLASMALYTE-A/ISOLYTE-S PH 7.4) IV solution  125 mL/hr  Intravenous Continuous    multivitamin stress formula tablet 1 tablet  1 tablet Oral Daily    nicotine (NICODERM CQ) 21 mg/24 hr TD 24 hr patch 1 patch  1 patch Transdermal Daily    norepinephrine (LEVOPHED) 4 mg (STANDARD CONCENTRATION) IV in sodium chloride 0.9% 250 mL  1-30 mcg/min Intravenous Titrated    QUEtiapine (SEROquel) tablet 100 mg  100 mg Oral HS    thiamine tablet 100 mg  100 mg Oral Daily    and PTA meds:    Medications Prior to Admission:     amLODIPine (NORVASC) 10 mg tablet    QUEtiapine (SEROquel) 100 mg tablet    metoprolol tartrate (LOPRESSOR) 25 mg tablet    naloxone (NARCAN) 4 mg/0.1 mL nasal spray      Allergies   Allergen Reactions    Penicillin V Other (See Comments)     unknown    Penicillins Hives       Objective     Intake/Output Summary (Last 24 hours) at 7/29/2024 0913  Last data filed at 7/29/2024 0823  Gross per 24 hour   Intake 4068.98 ml   Output 1275 ml   Net 2793.98 ml       Invasive Devices:        Physical Exam      I/O last 3 completed shifts:  In: 1889.5 [I.V.:1789.5; IV Piggyback:100]  Out: 400 [Urine:400]    Vitals:    07/29/24 0900   BP: 120/62   Pulse: 78   Resp: 22   Temp:    SpO2: 98%       General Appearance:    No acute distress. Cooperative. Appears stated age.   Head:    Normocephalic. Atraumatic. Normal jaw occlusion.   Eyes:    Lids, conjunctiva normal. No scleral icterus.   Ears:    Normal external ears.   Nose:   Nares normal. No drainage.   Mouth:   Lips, tongue normal. Mucosa normal. Phonation normal.   Neck:   Supple. Symmetrical.   Back:     Symmetric. No CVA tenderness.   Lungs:     Normal respiratory effort. Clear to auscultation bilaterally.   Chest wall:    No tenderness or deformity.   Heart:    Regular rate and rhythm. Normal S1 and S2. No murmur. No JVD. No edema.   Abdomen:     Soft. Non-tender. Bowel sounds active.   Genitourinary:   No Harp catheter present.   Extremities:   Extremities normal. Atraumatic. No cyanosis.   Skin:   Warm and dry. No  "pallor, jaundice, rash, ecchymoses.   Neurologic:   Alert and oriented to person, place, time. No focal deficit.         Current Weight: Weight - Scale: 106 kg (232 lb 12.9 oz)  First Weight: Weight - Scale: 106 kg (232 lb 12.9 oz)    Lab Results:  I have personally reviewed pertinent labs.    CBC:   Lab Results   Component Value Date    WBC 8.40 07/29/2024    HGB 13.2 07/29/2024    HCT 39.3 07/29/2024    MCV 92 07/29/2024     07/29/2024    RBC 4.29 07/29/2024    MCH 30.8 07/29/2024    MCHC 33.6 07/29/2024    RDW 13.4 07/29/2024    MPV 11.7 07/29/2024    NRBC 0 07/28/2024     CMP:   Lab Results   Component Value Date    K 3.5 07/29/2024    CL 95 (L) 07/29/2024    CO2 22 07/29/2024    BUN 72 (H) 07/29/2024    CREATININE 7.15 (H) 07/29/2024    CALCIUM 8.0 (L) 07/29/2024    AST 19 07/29/2024    ALT 20 07/29/2024    ALKPHOS 98 07/29/2024    EGFR 8 07/29/2024     Phosphorus:   Lab Results   Component Value Date    PHOS 8.9 (H) 07/29/2024     Magnesium:   Lab Results   Component Value Date    MG 3.2 (H) 07/29/2024     Urinalysis:   Lab Results   Component Value Date    COLORU Light Yellow 07/29/2024    CLARITYU Clear 07/29/2024    SPECGRAV 1.020 07/29/2024    PHUR 5.0 07/29/2024    LEUKOCYTESUR Negative 07/29/2024    NITRITE Negative 07/29/2024    GLUCOSEU Negative 07/29/2024    KETONESU Negative 07/29/2024    BILIRUBINUR Negative 07/29/2024    BLOODU Small (A) 07/29/2024     Ionized Calcium: No results found for: \"CAION\"  Coagulation: No results found for: \"PT\", \"INR\", \"APTT\"  Troponin: No results found for: \"TROPONINI\"  ABG: No results found for: \"PHART\", \"BPK8XMY\", \"PO2ART\", \"VTV6FUY\", \"P7LQVMGP\", \"BEART\", \"SOURCE\"    Results from last 7 days   Lab Units 07/29/24  0605 07/28/24  2213   POTASSIUM mmol/L 3.5 4.3   CHLORIDE mmol/L 95* 93*   CO2 mmol/L 22 16*   BUN mg/dL 72* 74*   CREATININE mg/dL 7.15* 8.38*   CALCIUM mg/dL 8.0* 9.2   ALK PHOS U/L 98 127*   ALT U/L 20 27   AST U/L 19 31       Radiology " review:  Procedure: TRAUMA - CT spine cervical wo contrast    Result Date: 7/28/2024  Narrative: CT CERVICAL SPINE - WITHOUT CONTRAST INDICATION:   TRAUMA. COMPARISON: 7/19/2022 TECHNIQUE:  CT examination of the cervical spine was performed without intravenous contrast.  Contiguous axial images were obtained. Multiplanar 2D reformatted images were created from the source data. Radiation dose length product (DLP) for this visit:  463.29 mGy-cm .  This examination, like all CT scans performed in the [facility], was performed utilizing techniques to minimize radiation dose exposure, including the use of iterative  reconstruction and automated exposure control. IMAGE QUALITY:  Diagnostic. FINDINGS: ALIGNMENT:  There is straightening of normal cervical lordosis.  No subluxation or compression deformity. VERTEBRAE:  No fracture. DEGENERATIVE CHANGES:  No significant cervical degenerative changes are noted. PREVERTEBRAL AND PARASPINAL SOFT TISSUES: Unremarkable THORACIC INLET:  Normal.     Impression: No cervical spine fracture or traumatic malalignment. Workstation performed: [id number]     Procedure: TRAUMA - CT head wo contrast    Result Date: 7/28/2024  Narrative: CT BRAIN - WITHOUT CONTRAST INDICATION:   TRAUMA. COMPARISON: Multiple priors most recently 5/3/2023 TECHNIQUE:  CT examination of the brain was performed.  Multiplanar 2D reformatted images were created from the source data. Radiation dose length product (DLP) for this visit:  948.77 mGy-cm .  This examination, like all CT scans performed in the [facility], was performed utilizing techniques to minimize radiation dose exposure, including the use of iterative  reconstruction and automated exposure control. IMAGE QUALITY:  Diagnostic. FINDINGS: PARENCHYMA:  No intracranial mass, mass effect or midline shift. No CT signs of acute infarction.  No acute parenchymal hemorrhage. VENTRICLES AND EXTRA-AXIAL SPACES:  Normal for the  patient's age. VISUALIZED ORBITS: Normal visualized orbits. PARANASAL SINUSES: Normal visualized paranasal sinuses. CALVARIUM AND EXTRACRANIAL SOFT TISSUES:  Normal.     Impression: No acute intracranial abnormality. Workstation performed: [id number]     Procedure: CTA chest (pe study) abdomen pelvis contrast    Result Date: 7/28/2024  Narrative: CT PULMONARY ANGIOGRAM OF THE CHEST AND CT ABDOMEN AND PELVIS WITH INTRAVENOUS CONTRAST INDICATION: TRAUMA. COMPARISON: Multiple priors most recently same day chest radiograph TECHNIQUE: CT examination of the chest, abdomen and pelvis was performed. Thin section CT angiographic technique was used in the chest in order to evaluate for pulmonary embolus and coronal 3D MIP postprocessing was performed on the acquisition scanner. Multiplanar 2D reformatted images were created from the source data. This examination, like all CT scans performed in the [facility], was performed utilizing techniques to minimize radiation dose exposure, including the use of iterative reconstruction and automated exposure control. Radiation dose length product (DLP) for this visit: 1989.61 mGy-cm IV Contrast: 100 mL of iohexol (OMNIPAQUE) Enteric Contrast: Not administered. FINDINGS: CHEST PULMONARY ARTERIAL TREE:  No pulmonary embolus. LUNGS: Mild bilateral lower lobe mucous plugging. No focal consolidation or suspicious mass. Small scattered pulmonary nodules are unchanged, benign PLEURA: Unremarkable. HEART/AORTA: Heart is unremarkable for patient's age. No thoracic aortic aneurysm. MEDIASTINUM AND ARIANNA: Unremarkable. CHEST WALL AND LOWER NECK: Unremarkable. ABDOMEN LIVER/BILIARY TREE: Mild hepatomegaly without suspicious hepatic lesion. GALLBLADDER: No calcified gallstones. No pericholecystic inflammatory change. SPLEEN: Unremarkable. PANCREAS: Unremarkable. ADRENAL GLANDS: Unremarkable. KIDNEYS/URETERS: Unremarkable. No hydronephrosis. STOMACH AND BOWEL: Colonic diverticulosis  without findings of acute diverticulitis. APPENDIX: Normal appendix. ABDOMINOPELVIC CAVITY: No ascites. No pneumoperitoneum. No lymphadenopathy. VESSELS: Unremarkable for patient's age. PELVIS REPRODUCTIVE ORGANS: Unremarkable for patient's age. URINARY BLADDER: Unremarkable. ABDOMINAL WALL/INGUINAL REGIONS: Unremarkable. BONES: No acute fracture or suspicious osseous lesion. ORIF right hemipelvis.     Impression: No findings of acute traumatic injury in the chest, abdomen or pelvis. The study was marked in EPIC for immediate notification, per trauma protocol. Workstation performed: [id number]     Procedure: XR chest 1 view portable    Result Date: 7/28/2024  Narrative: XR CHEST PORTABLE INDICATION: fall. Seizure. COMPARISON: Chest and abdomen CT 7/28/2024. CXR 7/19/2022. FINDINGS: Clear lungs. No pneumothorax or pleural effusion. Normal cardiomediastinal silhouette. Skeleton normal for age.  No acute displaced fractures. Benign bone island left humeral head. Normal upper abdomen.     Impression: No acute cardiopulmonary disease. Workstation performed: [id number]     Martinez Dubon DO      This consultation note was produced in part using a dictation device which may document imprecise wording from author's original intent.

## 2024-07-29 NOTE — UTILIZATION REVIEW
Initial Clinical Review    Admission: Date/Time/Statement:   Admission Orders (From admission, onward)       Ordered        24 0007  Inpatient Admission  Once                          Orders Placed This Encounter   Procedures    Inpatient Admission     Standing Status:   Standing     Number of Occurrences:   1     Order Specific Question:   Level of Care     Answer:   Critical Care [15]     Order Specific Question:   Estimated length of stay     Answer:   More than 2 Midnights     Order Specific Question:   Certification     Answer:   I certify that inpatient services are medically necessary for this patient for a duration of greater than two midnights. See H&P and MD Progress Notes for additional information about the patient's course of treatment.     ED Arrival Information       Expected   -    Arrival   2024 21:31    Acuity   Emergent              Means of arrival   Ambulance    Escorted by   [facility]    [facility]   Hospitalist    Admission type   Emergency              Arrival complaint   Seizure             Chief Complaint   Patient presents with    Seizure - Prior Hx Of     Patient states he had about 20 seizures last night, states he has seizures when he believes he is withdrawing from alcohol. + headstrike. Patient says his last drink was 2-3 days ago, states he drinks about 1/2 of a fifth a day       Initial Presentation: 38 y.o. male presents to ed from home via ems on 24 for evaluation and treatment of alcohol withdrawal with unwitnessed seizures and head strike due to falls.  Last drink 2-3 days ago.  He states he drinks vodka 1/2 of a fifth daily.  He reports 20 seizures overnight.  PMHX: ETOH ABUSE, ETOH WITHDRAWAL SEIZURES.  Clinical assessment significant for hypotension 81/51, temp 97,tachypnea 22-28, ht rt , pain 9/10, GCS =15, tremors.  , VB.212 / 48.4 / 34.9 /19, ,  BUN 74, CR 8.38, ALK PHOS 127, MAG 3.0.   Imaging w/o acute finding.   EKG: tachycardia, non specific ST/T.  Initially treated with iv multi electrolyte 2L bolus, iv thiamine, iv folic acid, iv valium.  Admit to inpatient critical care  for alcohol withdrawal seizures. HARRIET ( likely pre renal), non traumatic rhabdomyolysis r/t seizures. Plan includes ciwa assessments, CMP, CBC, CK, MAG , PHOS, iv fluids, iv Precedex.  Refused transfer to detox unit.  Wishes to remain at [facility].      Anticipated Length of Stay/Certification Statement: Patient will be admitted on an Inpatient basis with an anticipated length of stay of greater than 2 midnights.     Date: 7- 29-24    Day 2: inpatient critical care  CIWA = 14, hypotensive,  Map 55, tachypnea, temp 96.7.  CR 7.15, CK  369.  Since remains hypotensive after 4L of fluids, start levophed gtt for hypovolemic shock to maintain Mag greater than 60.  Continue iv multi electrolyte 125/hr, po thiamine, po folic acid and  iv precedex gtt. Nephrology consult completed. HARRIET improving with volume expansion. Agree with rate 125/hr. No additional work up.       ED Triage Vitals   Temperature Pulse Respirations Blood Pressure SpO2 Pain Score   07/28/24 2135 07/28/24 2135 07/28/24 2135 07/28/24 2145 07/28/24 2145 07/29/24 0042   (!) 97 °F (36.1 °C) 104 20 (!) 81/51 93 % 9     Weight (last 2 days)       Date/Time Weight    07/29/24 0110 106 (232.81)            Vital Signs (last 3 days)       Date/Time Temp Pulse Resp BP MAP (mmHg) SpO2 O2 Device Saul Coma Scale Score CIWA-Ar Total Pain    07/29/24 1100 -- 67 11 93/51 68 94 % -- -- -- --    07/29/24 1030 -- 68 12 91/53 64 93 % -- -- -- --    07/29/24 1008 -- 74 24 108/60 76 98 % -- -- -- --    07/29/24 0900 -- 78 22 120/62 84 98 % -- -- -- --    07/29/24 0850 -- 78 24 123/69 90 99 % -- -- -- --    07/29/24 0840 -- 73 26 134/64 91 98 % -- -- -- --    07/29/24 0830 -- 72 24 116/63 85 98 % -- -- -- --    07/29/24 0820 -- 67 20 114/66 85 98 % None (Room air) -- -- --    07/29/24 0810 -- 64 24 101/56 74 96 % --  -- -- --    07/29/24 0800 -- 62 22 100/51 72 95 % -- 14 -- No Pain    07/29/24 0716 96.7 °F (35.9 °C) -- -- -- -- -- -- -- -- 10 - Worst Possible Pain    07/29/24 0710 -- 63 20 99/51 69 94 % None (Room air) -- -- --    07/29/24 0635 -- 63 42 90/44 64 -- -- -- -- --    07/29/24 0630 -- 63 43 90/45 65 -- -- -- -- --    07/29/24 0625 -- 62 42 85/46 63 -- -- -- -- --    07/29/24 0620 -- 61 41 82/44 57 -- -- -- -- --    07/29/24 0615 -- 62 42 87/48 62 -- -- -- -- --    07/29/24 0605 -- 58 34 79/45 57 95 % -- -- -- --    07/29/24 0600 -- 61 40 75/43 55 95 % -- -- -- --    07/29/24 0545 -- 61 38 79/46 59 96 % -- -- -- --    07/29/24 0530 -- 62 13 86/49 66 96 % -- -- -- --    07/29/24 0515 -- 60 22 85/48 60 97 % -- -- -- --    07/29/24 0500 -- 65 25 86/51 61 97 % -- -- -- --    07/29/24 0455 -- 70 13 81/51 61 97 % -- -- -- --    07/29/24 0450 -- 71 -- 89/53 66 98 % -- -- -- --    07/29/24 0445 -- 64 38 101/50 69 97 % -- -- -- --    07/29/24 0435 -- 61 26 78/48 56 96 % -- -- -- --    07/29/24 0430 -- 65 23 76/40 52 97 % -- -- -- --    07/29/24 0425 -- 65 17 83/41 59 95 % -- -- -- --    07/29/24 0420 -- 67 29 76/40 55 95 % -- -- -- --    07/29/24 0417 -- 66 20 70/39 49 95 % -- -- 0 --    07/29/24 0400 -- 70 40 73/40 52 94 % -- -- -- --    07/29/24 0300 -- 75 50 71/37 47 92 % -- -- -- --    07/29/24 0200 -- 86 37 95/46 65 -- -- -- -- --    07/29/24 0110 96.9 °F (36.1 °C) 79 18 92/45 65 99 % None (Room air) 14 14 --    07/29/24 0108 -- -- -- -- -- -- -- -- 14 --    07/29/24 0102 -- -- -- -- -- -- -- -- -- 8    07/29/24 0042 -- -- -- -- -- -- -- -- -- 9    07/29/24 0008 -- -- -- -- -- 99 % None (Room air) -- -- --    07/29/24 0000 -- 82 20 102/50 -- 97 % None (Room air) 15 -- --    07/28/24 23:50:42 -- 81 20 86/56 -- 98 % None (Room air) 15 -- --    07/28/24 23:48:15 -- 80 20 97/55 -- 98 % None (Room air) 15 -- --    07/28/24 2330 -- 80 21 106/51 -- 100 % None (Room air) 15 -- --    07/28/24 2315 -- 77 28 91/52 -- 98 % None  (Room air) 15 -- --    07/28/24 2300 -- 80 20 83/51 -- 98 % -- -- -- --    07/28/24 2245 -- 78 20 78/45 -- 98 % None (Room air) 15 -- --    07/28/24 2230 -- 83 22 55/39 -- 94 % -- -- -- --    07/28/24 2215 -- 95 26 61/37 -- 94 % None (Room air) -- -- --    07/28/24 2200 -- 97 20 -- -- 96 % None (Room air) 15 -- --    07/28/24 2145 -- 100 20 81/51 63 93 % None (Room air) -- -- --    07/28/24 2135 97 °F (36.1 °C) 104 20 -- -- -- -- -- -- --          Row Name 07/29/24 0110 07/29/24 0108   CIWA-Ar   BP 92/45 Abnormal   -RK --   Pulse 79  -RK --   Nausea and Vomiting 0  -RK 0  -RK   Tactile Disturbances 0  -RK 0  -RK   Tremor 4  -RK 4  -RK   Auditory Disturbances 0  -RK 0  -RK   Paroxysmal Sweats 2  -RK 2  -RK   Visual Disturbances 0  -RK 0  -RK   Anxiety 3  -RK 3  -RK   Headache, Fullness in Head 0  -RK 0  -RK   Agitation 4  -RK 4  -RK   Orientation and Clouding of Sensorium 1  -RK 1  -RK   CIWA-Ar Total 14  -RK 14  -RK       Pertinent Labs/Diagnostic Test Results:   Radiology:  TRAUMA - CT head wo contrast   Final  (07/28 2312)      No acute intracranial abnormality.         TRAUMA - CT spine cervical wo contrast   Final  (07/28 2313)      No cervical spine fracture or traumatic malalignment.         CTA chest (pe study) abdomen pelvis contrast   Final  (07/28 2312)      No findings of acute traumatic injury in the chest, abdomen or pelvis.         XR chest 1 view portable   Final  (07/28 2253)      No acute cardiopulmonary disease.        Cardiology:  No orders to display     GI:  No orders to display           Results from last 7 days   Lab Units 07/29/24  0605 07/28/24 2213   WBC Thousand/uL 8.40 10.03   HEMOGLOBIN g/dL 13.2 15.1   HEMATOCRIT % 39.3 45.6   PLATELETS Thousands/uL 174 207   TOTAL NEUT ABS Thousands/µL  --  7.05         Results from last 7 days   Lab Units 07/29/24  0605 07/28/24  2213   SODIUM mmol/L 137 132*   POTASSIUM mmol/L 3.5 4.3   CHLORIDE mmol/L 95* 93*   CO2 mmol/L 22 16*   ANION GAP mmol/L  20* 23*   BUN mg/dL 72* 74*   CREATININE mg/dL 7.15* 8.38*   EGFR ml/min/1.73sq m 8 7   CALCIUM mg/dL 8.0* 9.2   MAGNESIUM mg/dL 3.2* 3.0*   PHOSPHORUS mg/dL 8.9*  --      Results from last 7 days   Lab Units 07/29/24  0605 07/28/24  2213   AST U/L 19 31   ALT U/L 20 27   ALK PHOS U/L 98 127*   TOTAL PROTEIN g/dL 5.8* 7.7   ALBUMIN g/dL 3.8 4.8   TOTAL BILIRUBIN mg/dL 0.34 0.49         Results from last 7 days   Lab Units 07/29/24  0605 07/28/24 2213   GLUCOSE RANDOM mg/dL 89 94     Beta- Hydroxybutyrate   Date Value Ref Range Status   07/28/2024 0.22 0.02 - 0.27 mmol/L Final            Results from last 7 days   Lab Units 07/28/24  2259   PH RAMÓN  7.212*   PCO2 RAMÓN mm Hg 48.4   PO2 RAMÓN mm Hg 34.9*   HCO3 RAMÓN mmol/L 19.0*   BASE EXC RAMÓN mmol/L -8.8   O2 CONTENT RAMÓN ml/dL 11.4   O2 HGB, VENOUS % 60.5         Results from last 7 days   Lab Units 07/29/24  0605 07/28/24 2213   CK TOTAL U/L 369* 600*     Results from last 7 days   Lab Units 07/29/24  0205 07/28/24  2213   HS TNI 0HR ng/L  --  6   HS TNI 2HR ng/L 6  --    HSTNI D2 ng/L 0  --                      Results from last 7 days   Lab Units 07/28/24  2259   LACTIC ACID mmol/L 1.0       Results from last 7 days   Lab Units 07/29/24  0453   CLARITY UA  Clear   COLOR UA  Light Yellow   SPEC GRAV UA  1.020   PH UA  5.0   GLUCOSE UA mg/dl Negative   KETONES UA mg/dl Negative   BLOOD UA  Small*   PROTEIN UA mg/dl 30 (1+)*   NITRITE UA  Negative   BILIRUBIN UA  Negative   UROBILINOGEN UA (BE) mg/dl <2.0   LEUKOCYTES UA  Negative   WBC UA /hpf 0-1   RBC UA /hpf 0-1   BACTERIA UA /hpf Occasional   EPITHELIAL CELLS WET PREP /hpf None Seen       Results from last 7 days   Lab Units 07/28/24  2213   ETHANOL LVL mg/dL <10       ED Treatment-Medication Administration from 07/28/2024 2130 to 07/29/2024 0101         Date/Time Order Dose Route Action     07/28/2024 2221 multi-electrolyte (ISOLYTE-S PH 7.4) bolus 2,000 mL 2,000 mL Intravenous New Bag     07/28/2024 3939 diazepam  (VALIUM) injection 10 mg 10 mg Intravenous Given     07/28/2024 2330 thiamine (VITAMIN B1) 400 mg in sodium chloride 0.9 % 50 mL IVPB 400 mg Intravenous New Bag     07/29/2024 0044 folic acid 1 mg in sodium chloride 0.9 % 50 mL IVPB 1 mg Intravenous New Bag     07/28/2024 2310 multi-electrolyte (ISOLYTE-S PH 7.4) bolus 1,000 mL 1,000 mL Intravenous New Bag     07/29/2024 0042 HYDROmorphone (DILAUDID) injection 0.5 mg 0.5 mg Intravenous Given            Past Medical History:   Diagnosis Date    Chronic pain     Depression     Pelvic fracture (HCC)     PTSD (post-traumatic stress disorder)     Substance abuse (HCC)      Present on Admission:   Tobacco use disorder   Primary hypertension   Alcohol withdrawal seizure (HCC)   Acute kidney injury (HCC)   Non-traumatic rhabdomyolysis   Hypotension due to hypovolemia   Alcohol use disorder, severe, dependence (HCC)   Primary insomnia      Admitting Diagnosis:     Alcohol withdrawal (HCC) [F10.939]  Rhabdomyolysis [M62.82]  Dehydration [E86.0]  Alcohol abuse [F10.10]  Seizure (HCC) [R56.9]  Syncope [R55]  Hypotension [I95.9]  Seizure-like activity (HCC) [R56.9]    Age/Sex: 38 y.o. male    Scheduled Medications:  famotidine, 10 mg, Oral, Daily  folic acid, 1 mg, Oral, Daily  heparin (porcine), 5,000 Units, Subcutaneous, Q8H MARIUM  multivitamin stress formula, 1 tablet, Oral, Daily  nicotine, 1 patch, Transdermal, Daily  potassium chloride, 20 mEq, Intravenous, Once  QUEtiapine, 100 mg, Oral, HS  thiamine, 100 mg, Oral, Daily      Continuous IV Infusions:  dexmedetomidine, 0.1-0.7 mcg/kg/hr, Intravenous, Titrated  multi-electrolyte, 125 mL/hr, Intravenous, Continuous  norepinephrine, 1-30 mcg/min, Intravenous, Titrated      PRN Meds:  diazepam, 5 mg, Oral, Q6H PRN  HYDROmorphone, 0.5 mg, Intravenous, Q4H PRN        IP CONSULT TO ALCOHOL BRIEF INTERVENTION TRAUMA  IP CONSULT TO NEPHROLOGY    Network Utilization Review Department  ATTENTION: Please call with any questions or  concerns to [phone] and carefully listen to the prompts so that you are directed to the right person. All voicemails are confidential.   For Discharge needs, contact Care Management DC Support Team at [phone] opt. [phone]  Send all requests for admission clinical reviews, approved or denied determinations and any other requests to dedicated fax number below belonging to the [facility] where the patient is receiving treatment. List of dedicated fax numbers for the Facilities:  FACILITY NAME UR FAX NUMBER   ADMISSION DENIALS (Administrative/Medical Necessity) [phone]   DISCHARGE SUPPORT TEAM (NETWORK) [phone]   PARENT CHILD HEALTH (Maternity/NICU/Pediatrics) [phone]   [facility] [phone]   [facility] [phone]   [facility] [phone]   [facility] [phone]   formerly [facility] [phone]   [facility] [phone]   [facility] [phone]   [facility] [phone]   [facility] [phone]   [facility] [phone]   [facility] [phone]   [facility] [phone]

## 2024-07-30 ENCOUNTER — TRANSITIONAL CARE MANAGEMENT (OUTPATIENT)
Dept: FAMILY MEDICINE CLINIC | Facility: CLINIC | Age: 39
End: 2024-07-30

## 2024-07-30 VITALS
SYSTOLIC BLOOD PRESSURE: 131 MMHG | OXYGEN SATURATION: 96 % | DIASTOLIC BLOOD PRESSURE: 70 MMHG | HEIGHT: 70 IN | HEART RATE: 71 BPM | RESPIRATION RATE: 15 BRPM | WEIGHT: 232.81 LBS | TEMPERATURE: 97.7 F | BODY MASS INDEX: 33.33 KG/M2

## 2024-07-30 LAB
ALBUMIN SERPL BCG-MCNC: 3.6 G/DL (ref 3.5–5)
ALP SERPL-CCNC: 94 U/L (ref 34–104)
ALT SERPL W P-5'-P-CCNC: 17 U/L (ref 7–52)
ANION GAP SERPL CALCULATED.3IONS-SCNC: 7 MMOL/L (ref 4–13)
AST SERPL W P-5'-P-CCNC: 14 U/L (ref 13–39)
ATRIAL RATE: 105 BPM
BILIRUB SERPL-MCNC: 0.32 MG/DL (ref 0.2–1)
BUN SERPL-MCNC: 33 MG/DL (ref 5–25)
CALCIUM SERPL-MCNC: 8.6 MG/DL (ref 8.4–10.2)
CHLORIDE SERPL-SCNC: 102 MMOL/L (ref 96–108)
CK SERPL-CCNC: 122 U/L (ref 39–308)
CO2 SERPL-SCNC: 27 MMOL/L (ref 21–32)
CREAT SERPL-MCNC: 1.77 MG/DL (ref 0.6–1.3)
ERYTHROCYTE [DISTWIDTH] IN BLOOD BY AUTOMATED COUNT: 13.4 % (ref 11.6–15.1)
GFR SERPL CREATININE-BSD FRML MDRD: 48 ML/MIN/1.73SQ M
GLUCOSE SERPL-MCNC: 97 MG/DL (ref 65–140)
HCT VFR BLD AUTO: 38.7 % (ref 36.5–49.3)
HGB BLD-MCNC: 12.8 G/DL (ref 12–17)
MAGNESIUM SERPL-MCNC: 2.3 MG/DL (ref 1.9–2.7)
MCH RBC QN AUTO: 30.8 PG (ref 26.8–34.3)
MCHC RBC AUTO-ENTMCNC: 33.1 G/DL (ref 31.4–37.4)
MCV RBC AUTO: 93 FL (ref 82–98)
P AXIS: 59 DEGREES
PHOSPHATE SERPL-MCNC: 2.7 MG/DL (ref 2.7–4.5)
PLATELET # BLD AUTO: 159 THOUSANDS/UL (ref 149–390)
PMV BLD AUTO: 11.5 FL (ref 8.9–12.7)
POTASSIUM SERPL-SCNC: 3.9 MMOL/L (ref 3.5–5.3)
PR INTERVAL: 120 MS
PROT SERPL-MCNC: 5.5 G/DL (ref 6.4–8.4)
QRS AXIS: 57 DEGREES
QRSD INTERVAL: 84 MS
QT INTERVAL: 352 MS
QTC INTERVAL: 465 MS
RBC # BLD AUTO: 4.16 MILLION/UL (ref 3.88–5.62)
SODIUM SERPL-SCNC: 136 MMOL/L (ref 135–147)
T WAVE AXIS: 55 DEGREES
VENTRICULAR RATE: 105 BPM
WBC # BLD AUTO: 6.42 THOUSAND/UL (ref 4.31–10.16)

## 2024-07-30 PROCEDURE — 93010 ELECTROCARDIOGRAM REPORT: CPT | Performed by: INTERNAL MEDICINE

## 2024-07-30 PROCEDURE — 99239 HOSP IP/OBS DSCHRG MGMT >30: CPT | Performed by: HOSPITALIST

## 2024-07-30 PROCEDURE — 83735 ASSAY OF MAGNESIUM: CPT

## 2024-07-30 PROCEDURE — 82550 ASSAY OF CK (CPK): CPT

## 2024-07-30 PROCEDURE — 80053 COMPREHEN METABOLIC PANEL: CPT

## 2024-07-30 PROCEDURE — 85027 COMPLETE CBC AUTOMATED: CPT

## 2024-07-30 PROCEDURE — 84100 ASSAY OF PHOSPHORUS: CPT

## 2024-07-30 PROCEDURE — 99233 SBSQ HOSP IP/OBS HIGH 50: CPT | Performed by: INTERNAL MEDICINE

## 2024-07-30 RX ORDER — FOLIC ACID 1 MG/1
1 TABLET ORAL DAILY
Qty: 30 TABLET | Refills: 0 | Status: SHIPPED | OUTPATIENT
Start: 2024-07-31

## 2024-07-30 RX ORDER — LANOLIN ALCOHOL/MO/W.PET/CERES
100 CREAM (GRAM) TOPICAL DAILY
Qty: 30 TABLET | Refills: 0 | Status: SHIPPED | OUTPATIENT
Start: 2024-07-31

## 2024-07-30 RX ORDER — NICOTINE 21 MG/24HR
1 PATCH, TRANSDERMAL 24 HOURS TRANSDERMAL DAILY
Qty: 28 PATCH | Refills: 0 | Status: SHIPPED | OUTPATIENT
Start: 2024-07-31

## 2024-07-30 RX ORDER — AMOXICILLIN 250 MG
1 CAPSULE ORAL
Status: DISCONTINUED | OUTPATIENT
Start: 2024-07-30 | End: 2024-07-30 | Stop reason: HOSPADM

## 2024-07-30 RX ORDER — FAMOTIDINE 10 MG
10 TABLET ORAL DAILY
Qty: 30 TABLET | Refills: 0 | Status: SHIPPED | OUTPATIENT
Start: 2024-07-31

## 2024-07-30 RX ORDER — AMOXICILLIN 250 MG
1 CAPSULE ORAL
Status: DISCONTINUED | OUTPATIENT
Start: 2024-07-30 | End: 2024-07-30

## 2024-07-30 RX ORDER — ACETAMINOPHEN 325 MG/1
650 TABLET ORAL EVERY 6 HOURS PRN
Status: DISCONTINUED | OUTPATIENT
Start: 2024-07-30 | End: 2024-07-30 | Stop reason: HOSPADM

## 2024-07-30 RX ORDER — POLYETHYLENE GLYCOL 3350 17 G/17G
17 POWDER, FOR SOLUTION ORAL DAILY
Status: DISCONTINUED | OUTPATIENT
Start: 2024-07-30 | End: 2024-07-30 | Stop reason: HOSPADM

## 2024-07-30 RX ADMIN — HYDROMORPHONE HYDROCHLORIDE 0.5 MG: 1 INJECTION, SOLUTION INTRAMUSCULAR; INTRAVENOUS; SUBCUTANEOUS at 08:24

## 2024-07-30 RX ADMIN — DIAZEPAM 2 MG: 2 TABLET ORAL at 04:52

## 2024-07-30 RX ADMIN — ACETAMINOPHEN 650 MG: 325 TABLET, FILM COATED ORAL at 11:00

## 2024-07-30 RX ADMIN — HYDROMORPHONE HYDROCHLORIDE 0.5 MG: 1 INJECTION, SOLUTION INTRAMUSCULAR; INTRAVENOUS; SUBCUTANEOUS at 00:11

## 2024-07-30 RX ADMIN — FAMOTIDINE 10 MG: 20 TABLET, FILM COATED ORAL at 08:22

## 2024-07-30 RX ADMIN — B-COMPLEX W/ C & FOLIC ACID TAB 1 TABLET: TAB at 08:22

## 2024-07-30 RX ADMIN — FOLIC ACID 1 MG: 1 TABLET ORAL at 08:22

## 2024-07-30 RX ADMIN — THIAMINE HCL TAB 100 MG 100 MG: 100 TAB at 08:22

## 2024-07-30 RX ADMIN — POLYETHYLENE GLYCOL 3350 17 G: 17 POWDER, FOR SOLUTION ORAL at 09:07

## 2024-07-30 RX ADMIN — HEPARIN SODIUM 5000 UNITS: 5000 INJECTION INTRAVENOUS; SUBCUTANEOUS at 05:00

## 2024-07-30 RX ADMIN — NICOTINE 1 PATCH: 21 PATCH, EXTENDED RELEASE TRANSDERMAL at 08:21

## 2024-07-30 RX ADMIN — DIAZEPAM 2 MG: 2 TABLET ORAL at 11:00

## 2024-07-30 NOTE — CASE MANAGEMENT
Case Management Discharge Planning Note    Patient name Aniket Pace  Location / MRN [id number]  : 1985 Date 2024       Current Admission Date: 2024  Current Admission Diagnosis:Alcohol withdrawal seizure (HCC)   Patient Active Problem List    Diagnosis Date Noted Date Diagnosed    Alcohol withdrawal seizure (HCC) 2024     Acute kidney injury (HCC) 2024     Non-traumatic rhabdomyolysis 2024     Hypotension due to hypovolemia 2024     Primary insomnia 2024     Alcohol withdrawal seizure without complication (HCC) 2023     Bipolar I disorder with depression (HCC) 2023     Primary hypertension 2023     Benzodiazepine dependence (HCC) 2022     Unsteady gait 2022     Opioid use disorder, moderate, on maintenance therapy, dependence (HCC) 2022     Alcohol use disorder, severe, dependence (HCC) 2022     PTSD (post-traumatic stress disorder) 2022     Tobacco use disorder 2022     Transaminitis 2022     Anxiety 2022     Encounter for tobacco use cessation counseling 2022     Opioid dependence with opioid-induced mood disorder (HCC) 2022     Seizure ([facility]) 2022     Opioid use 2021     Tachycardia 2015       LOS (days): 1  Geometric Mean LOS (GMLOS) (days):   Days to GMLOS:     OBJECTIVE:  Risk of Unplanned Readmission Score: 20.26         Current admission status: Inpatient   Preferred Pharmacy:   [id number] - [place] [address] [address]  Phone: [phone] Fax: [phone]    [id number] - [place], [place] - [address]   [address] [facility] [address]  Phone: [phone] Fax: [phone]    [id number] - [place] - [address]  [address] [address]  Phone: [phone] Fax: [phone]    [id number] [place] - [address] [address] [address]  [address] [address] [address]  [facility] [address]  Phone: [phone] Fax: [phone]    [id number] - [place] - [address]  [address]  [place] [address]  Phone: [phone] Fax: [phone]    Primary Care Provider: Jason Guerra DO    Primary Insurance: [employer]  Secondary Insurance:     DISCHARGE DETAILS:        Patient provided warm hand off at bedside for Alcohol out patient therapy. Patient stated he is active with [facility] treatment program and will return  Patient [facility] on [facility].    Patient stated he will need a ride home.  Transport requested via Round trip via medical sedan.    secured for 1400.    Follow up providers listed on AVS/

## 2024-07-30 NOTE — DISCHARGE SUMMARY
[facility]  Discharge- Aniket Pace 1985, 38 y.o. male MRN: [id number]  Unit/Bed#:  Encounter: [id number]  Primary Care Provider: Jason Guerra DO   Date and time admitted to hospital: 7/28/2024  9:31 PM    * Alcohol withdrawal seizure (HCC)  Assessment & Plan  Daily vodka, at least a fifth, stopped 2-3d ago   H/o withdrawal seizures   Tremulous on presentation, improved  Noted hypovolemic shock requiring pressors  HARRIET w multiple electrolyte abnormalities  Muscle spasms requiring Valium    No longer requiring Precedex drip, weaned off at 5am   Valium 2mg Q6H PRN for spasms  IVF discontinued, encourage PO intake  Continue folic acid, thiamine       Alcohol use disorder, severe, dependence (HCC)  Assessment & Plan  See A&P for Alcohol withdrawal seizure    Hypotension due to hypovolemia  Assessment & Plan  Resolved  See A&P for Acute Kidney Injury    Acute kidney injury (HCC)  Assessment & Plan  Cr 8.3, baseline likely 0.8  Received 3L bolus in ER  Hyperphos, Hypermag, Hypokalemia     Nephrology on consult:   Transitioned from NS to isolate at 125 mL/hr, now d/c'd  Continue to encourage PO intake  Potassium supplement on 7/29  Electrolyte abnormalities resolved, Continue to monitor w CMP  Today Cr is 1.77    Non-traumatic rhabdomyolysis  Assessment & Plan    Likely 2/2 seizure activity    IVF now discontinued, encourage PO intake  CK now wnl  F/u proteinuria in outpatient setting    Primary insomnia  Assessment & Plan  Continue home seroquel     Primary hypertension  Assessment & Plan  Home meds Norvasc, metoprolol held in the setting of hypotension    Tobacco use disorder  Assessment & Plan  5pack years  Offer nicotine patch  Encourage cessation      Discharging Physician / Practitioner: Anita Lee MD  PCP: Jason Guerra DO  Admission Date:   Admission Orders (From admission, onward)       Ordered        07/29/24 0007  Inpatient Admission  Once                           Discharge Date: 07/30/24    Medical Problems       Resolved Problems  Date Reviewed: 7/29/2024   None         Consultations During Hospital Stay:  Nephrology    Procedures Performed:   TRAUMA - CT head wo contrast   Final Result      No acute intracranial abnormality.                  Workstation performed: [id number]         TRAUMA - CT spine cervical wo contrast   Final Result      No cervical spine fracture or traumatic malalignment.                  Workstation performed: [id number]         CTA chest (pe study) abdomen pelvis contrast   Final Result      No findings of acute traumatic injury in the chest, abdomen or pelvis.               The study was marked in EPIC for immediate notification, per trauma protocol.      Workstation performed: [id number]         XR chest 1 view portable   Final Result      No acute cardiopulmonary disease.            Workstation performed: [id number]               Significant Findings / Test Results:   Results for orders placed or performed during the hospital encounter of 07/28/24   CBC and differential   Result Value Ref Range    WBC 10.03 4.31 - 10.16 Thousand/uL    RBC 5.00 3.88 - 5.62 Million/uL    Hemoglobin 15.1 12.0 - 17.0 g/dL    Hematocrit 45.6 36.5 - 49.3 %    MCV 91 82 - 98 fL    MCH 30.2 26.8 - 34.3 pg    MCHC 33.1 31.4 - 37.4 g/dL    RDW 13.2 11.6 - 15.1 %    MPV 12.1 8.9 - 12.7 fL    Platelets 207 149 - 390 Thousands/uL    nRBC 0 /100 WBCs    Segmented % 71 43 - 75 %    Immature Grans % 0 0 - 2 %    Lymphocytes % 14 14 - 44 %    Monocytes % 14 (H) 4 - 12 %    Eosinophils Relative 1 0 - 6 %    Basophils Relative 0 0 - 1 %    Absolute Neutrophils 7.05 1.85 - 7.62 Thousands/µL    Absolute Immature Grans 0.02 0.00 - 0.20 Thousand/uL    Absolute Lymphocytes 1.39 0.60 - 4.47 Thousands/µL    Absolute Monocytes 1.43 (H) 0.17 - 1.22 Thousand/µL    Eosinophils Absolute 0.10 0.00 - 0.61 Thousand/µL    Basophils Absolute 0.04 0.00 - 0.10 Thousands/µL  "  Comprehensive metabolic panel   Result Value Ref Range    Sodium 132 (L) 135 - 147 mmol/L    Potassium 4.3 3.5 - 5.3 mmol/L    Chloride 93 (L) 96 - 108 mmol/L    CO2 16 (L) 21 - 32 mmol/L    ANION GAP 23 (H) 4 - 13 mmol/L    BUN 74 (H) 5 - 25 mg/dL    Creatinine 8.38 (H) 0.60 - 1.30 mg/dL    Glucose 94 65 - 140 mg/dL    Calcium 9.2 8.4 - 10.2 mg/dL    AST 31 13 - 39 U/L    ALT 27 7 - 52 U/L    Alkaline Phosphatase 127 (H) 34 - 104 U/L    Total Protein 7.7 6.4 - 8.4 g/dL    Albumin 4.8 3.5 - 5.0 g/dL    Total Bilirubin 0.49 0.20 - 1.00 mg/dL    eGFR 7 ml/min/1.73sq m   HS Troponin 0hr (reflex protocol)   Result Value Ref Range    hs TnI 0hr 6 \"Refer to ACS Flowchart\"- see link ng/L   Magnesium   Result Value Ref Range    Magnesium 3.0 (H) 1.9 - 2.7 mg/dL   Ethanol   Result Value Ref Range    Ethanol Lvl <10 <10 mg/dL   HS Troponin I 2hr   Result Value Ref Range    hs TnI 2hr 6 \"Refer to ACS Flowchart\"- see link ng/L    Delta 2hr hsTnI 0 <20 ng/L   Beta Hydroxybutyrate   Result Value Ref Range    Beta- Hydroxybutyrate 0.22 0.02 - 0.27 mmol/L   Blood gas, venous   Result Value Ref Range    pH, Syed 7.212 (L) 7.300 - 7.400    pCO2, Syed 48.4 42.0 - 50.0 mm Hg    pO2, Syed 34.9 (L) 35.0 - 45.0 mm Hg    HCO3, Syed 19.0 (L) 24 - 30 mmol/L    Base Excess, Syed -8.8 mmol/L    O2 Content, Syed 11.4 ml/dL    O2 HGB, VENOUS 60.5 60.0 - 80.0 %   Lactic acid, plasma (w/reflex if result > 2.0)   Result Value Ref Range    LACTIC ACID 1.0 0.5 - 2.0 mmol/L   CK   Result Value Ref Range    Total  (H) 39 - 308 U/L   UA w Reflex to Microscopic w Reflex to Culture    Specimen: Urine, Clean Catch   Result Value Ref Range    Color, UA Light Yellow     Clarity, UA Clear     Specific Gravity, UA 1.020 1.005 - 1.030    pH, UA 5.0 4.5, 5.0, 5.5, 6.0, 6.5, 7.0, 7.5, 8.0    Leukocytes, UA Negative Negative    Nitrite, UA Negative Negative    Protein, UA 30 (1+) (A) Negative mg/dl    Glucose, UA Negative Negative mg/dl    Ketones, UA Negative " Negative mg/dl    Urobilinogen, UA <2.0 <2.0 mg/dl mg/dl    Bilirubin, UA Negative Negative    Occult Blood, UA Small (A) Negative   Comprehensive metabolic panel   Result Value Ref Range    Sodium 137 135 - 147 mmol/L    Potassium 3.5 3.5 - 5.3 mmol/L    Chloride 95 (L) 96 - 108 mmol/L    CO2 22 21 - 32 mmol/L    ANION GAP 20 (H) 4 - 13 mmol/L    BUN 72 (H) 5 - 25 mg/dL    Creatinine 7.15 (H) 0.60 - 1.30 mg/dL    Glucose 89 65 - 140 mg/dL    Calcium 8.0 (L) 8.4 - 10.2 mg/dL    AST 19 13 - 39 U/L    ALT 20 7 - 52 U/L    Alkaline Phosphatase 98 34 - 104 U/L    Total Protein 5.8 (L) 6.4 - 8.4 g/dL    Albumin 3.8 3.5 - 5.0 g/dL    Total Bilirubin 0.34 0.20 - 1.00 mg/dL    eGFR 8 ml/min/1.73sq m   Magnesium   Result Value Ref Range    Magnesium 3.2 (H) 1.9 - 2.7 mg/dL   Phosphorus   Result Value Ref Range    Phosphorus 8.9 (H) 2.7 - 4.5 mg/dL   CBC (With Platelets)   Result Value Ref Range    WBC 8.40 4.31 - 10.16 Thousand/uL    RBC 4.29 3.88 - 5.62 Million/uL    Hemoglobin 13.2 12.0 - 17.0 g/dL    Hematocrit 39.3 36.5 - 49.3 %    MCV 92 82 - 98 fL    MCH 30.8 26.8 - 34.3 pg    MCHC 33.6 31.4 - 37.4 g/dL    RDW 13.4 11.6 - 15.1 %    Platelets 174 149 - 390 Thousands/uL    MPV 11.7 8.9 - 12.7 fL   CK   Result Value Ref Range    Total  (H) 39 - 308 U/L   Urine Microscopic   Result Value Ref Range    RBC, UA 0-1 None Seen, 0-1, 1-2, 2-4, 0-5 /hpf    WBC, UA 0-1 None Seen, 0-1, 1-2, 0-5, 2-4 /hpf    Epithelial Cells None Seen None Seen, Occasional /hpf    Bacteria, UA Occasional None Seen, Occasional /hpf    Granular Casts, UA 0-3 (A) (none)   Phosphorus   Result Value Ref Range    Phosphorus 6.6 (H) 2.7 - 4.5 mg/dL   Magnesium   Result Value Ref Range    Magnesium 2.8 (H) 1.9 - 2.7 mg/dL   Comprehensive metabolic panel   Result Value Ref Range    Sodium 138 135 - 147 mmol/L    Potassium 3.9 3.5 - 5.3 mmol/L    Chloride 102 96 - 108 mmol/L    CO2 23 21 - 32 mmol/L    ANION GAP 13 4 - 13 mmol/L    BUN 62 (H) 5 - 25  mg/dL    Creatinine 5.12 (H) 0.60 - 1.30 mg/dL    Glucose 107 65 - 140 mg/dL    Calcium 8.3 (L) 8.4 - 10.2 mg/dL    AST 17 13 - 39 U/L    ALT 19 7 - 52 U/L    Alkaline Phosphatase 99 34 - 104 U/L    Total Protein 5.9 (L) 6.4 - 8.4 g/dL    Albumin 3.8 3.5 - 5.0 g/dL    Total Bilirubin 0.31 0.20 - 1.00 mg/dL    eGFR 13 ml/min/1.73sq m   CBC and Platelet   Result Value Ref Range    WBC 6.42 4.31 - 10.16 Thousand/uL    RBC 4.16 3.88 - 5.62 Million/uL    Hemoglobin 12.8 12.0 - 17.0 g/dL    Hematocrit 38.7 36.5 - 49.3 %    MCV 93 82 - 98 fL    MCH 30.8 26.8 - 34.3 pg    MCHC 33.1 31.4 - 37.4 g/dL    RDW 13.4 11.6 - 15.1 %    Platelets 159 149 - 390 Thousands/uL    MPV 11.5 8.9 - 12.7 fL   Comprehensive metabolic panel   Result Value Ref Range    Sodium 136 135 - 147 mmol/L    Potassium 3.9 3.5 - 5.3 mmol/L    Chloride 102 96 - 108 mmol/L    CO2 27 21 - 32 mmol/L    ANION GAP 7 4 - 13 mmol/L    BUN 33 (H) 5 - 25 mg/dL    Creatinine 1.77 (H) 0.60 - 1.30 mg/dL    Glucose 97 65 - 140 mg/dL    Calcium 8.6 8.4 - 10.2 mg/dL    AST 14 13 - 39 U/L    ALT 17 7 - 52 U/L    Alkaline Phosphatase 94 34 - 104 U/L    Total Protein 5.5 (L) 6.4 - 8.4 g/dL    Albumin 3.6 3.5 - 5.0 g/dL    Total Bilirubin 0.32 0.20 - 1.00 mg/dL    eGFR 48 ml/min/1.73sq m   Magnesium   Result Value Ref Range    Magnesium 2.3 1.9 - 2.7 mg/dL   Phosphorus   Result Value Ref Range    Phosphorus 2.7 2.7 - 4.5 mg/dL   CK   Result Value Ref Range    Total  39 - 308 U/L   ECG 12 lead   Result Value Ref Range    Ventricular Rate 105 BPM    Atrial Rate 105 BPM    W[place] Interval 120 ms    QRSD Interval 84 ms    QT Interval 352 ms    QTC Interval 465 ms    P [place] 59 degrees    QRS Axis 57 degrees    T Wave Axis 55 degrees     Incidental Findings:   None     Test Results Pending at Discharge (will require follow up):   none     Outpatient Tests Requested:  None    Complications:  None    Reason for Admission: Alcohol withdrawal seizure and HARRIET 2/2 hypovolemic  shock    Hospital Course:   Patient admitted to ICU level of care to the med surg team. Initiated Precedex drip, aggressive volume resuscitation. Further workup showing HARRIET with multiple electrolyte abnormalities likely 2/2 hypotensive hypovolemia. Nephrology on consult, continued Precedex, rehydration, correction of electrolyte imbalances. Precedex weaned off, patient improved clinically, Cr now wnl, IVF discontinued, patient tolerating oral intake well. Some muscle soreness persists, otherwise stable. Declines inpatient services, agreeable to outpatient management of alcohol use disorder.       * Alcohol withdrawal seizure (HCC)  Assessment & Plan  Daily vodka, at least a fifth, stopped 2-3d ago   H/o withdrawal seizures   Tremulous on presentation, improved  Noted hypovolemic shock requiring pressors  HARRIET w multiple electrolyte abnormalities  Muscle spasms requiring Valium    No longer requiring Precedex drip, weaned off at 5am   Valium 2mg Q6H PRN for spasms  IVF discontinued, encourage PO intake  Continue folic acid, thiamine       Alcohol use disorder, severe, dependence (HCC)  Assessment & Plan  See A&P for Alcohol withdrawal seizure    Hypotension due to hypovolemia  Assessment & Plan  Resolved  See A&P for Acute Kidney Injury    Acute kidney injury (HCC)  Assessment & Plan  Cr 8.3, baseline likely 0.8  Received 3L bolus in ER  Hyperphos, Hypermag, Hypokalemia     Nephrology on consult:   Transitioned from NS to isolate at 125 mL/hr, now d/c'd  Continue to encourage PO intake  Potassium supplement on 7/29  Electrolyte abnormalities resolved, Continue to monitor w CMP  Today Cr is 1.77    Non-traumatic rhabdomyolysis  Assessment & Plan    Likely 2/2 seizure activity    IVF now discontinued, encourage PO intake  CK now wnl  F/u proteinuria in outpatient setting    Primary insomnia  Assessment & Plan  Continue home seroquel     Primary hypertension  Assessment & Plan  Home meds Norvasc, metoprolol held in  "the setting of hypotension    Tobacco use disorder  Assessment & Plan  5pack years  Offer nicotine patch  Encourage cessation        HPI as per admission:  Per Dr Amilcar Bermeo:   \"Aniket Pace is a 38 y.o. male patient who originally presented to the hospital on 7/28/2024 with alcohol withdrawal, suspected seizures, multiple falls. Patient reports that he has a history of alcohol abuse, alcohol withdrawal seizures. States that he has been trying to detox on his own at home. It has been 3 days since his last drink, however he did have 2 beers after he suspects that he had 2 seizures. He describes that he felt lightheaded before use, denies urinary incontinence or tongue biting. He describes that he feels like his vision is blurry, both eyes. He describes generalized weakness, however nonfocal. Denies any focal numbness. Does describe that he fell multiple times hitting his head, with positive loss of consciousness. Review of systems otherwise negative. \"    Admitted to ICU level of care on med surg team for management of alcohol withdrawal seizures     Please see above list of diagnoses and related plan for additional information.     Condition at Discharge: stable     Discharge Day Visit / Exam:     Subjective:  Mr Pace is doing well this am, no longer experiences muscle spasms, but endorses HA and muscle tenderness of the lower extremities. Otherwise no acute events ovn. Declines inpatient services but agreeable to an outpatient program for his alcohol use disorder.     Vitals: Blood Pressure: 115/91 (07/30/24 0908)  Pulse: 85 (07/30/24 0908)  Temperature: 97.7 °F (36.5 °C) (07/29/24 2258)  Temp Source: Temporal (07/29/24 2258)  Respirations: (!) 29 (07/30/24 0908)  Height: 5' 10\" (177.8 cm) (07/29/24 0110)  Weight - Scale: 106 kg (232 lb 12.9 oz) (07/29/24 0110)  SpO2: 94 % (07/30/24 0620)  Exam:   Physical Exam  Vitals and nursing note reviewed.   Constitutional:       General: He is not in acute " distress.     Appearance: Normal appearance. He is well-developed. He is not toxic-appearing or diaphoretic.   HENT:      Head: Normocephalic and atraumatic.      Right Ear: External ear normal.      Left Ear: External ear normal.      Nose: Nose normal.      Mouth/Throat:      Mouth: Mucous membranes are moist.      Pharynx: Oropharynx is clear.   Eyes:      General: No scleral icterus.     Extraocular Movements: Extraocular movements intact.      Conjunctiva/sclera: Conjunctivae normal.   Cardiovascular:      Rate and Rhythm: Normal rate and regular rhythm.      Pulses: Normal pulses.      Heart sounds: Normal heart sounds. No murmur heard.  Pulmonary:      Effort: Pulmonary effort is normal. No respiratory distress.      Breath sounds: Normal breath sounds.   Abdominal:      General: Bowel sounds are normal.      Palpations: Abdomen is soft.      Tenderness: There is no abdominal tenderness. There is no guarding.   Musculoskeletal:         General: Tenderness present. No swelling or signs of injury. Normal range of motion.      Cervical back: Normal range of motion and neck supple. No rigidity.   Skin:     General: Skin is warm and dry.      Capillary Refill: Capillary refill takes less than 2 seconds.      Coloration: Skin is not jaundiced.      Findings: No erythema or rash.   Neurological:      General: No focal deficit present.      Mental Status: He is alert and oriented to person, place, and time.   Psychiatric:         Mood and Affect: Mood normal.         Behavior: Behavior normal.       Discussion with Family: Declined    Discharge instructions/Information to patient and family:   See after visit summary for information provided to patient and family.      Provisions for Follow-Up Care:  See after visit summary for information related to follow-up care and any pertinent home health orders.      Disposition:     Home    Planned Readmission: N/A     Discharge Statement:  I spent 60 minutes discharging the  patient. This time was spent on the day of discharge. I had direct contact with the patient on the day of discharge. Greater than 50% of the total time was spent examining patient, answering all patient questions, arranging and discussing plan of care with patient as well as directly providing post-discharge instructions.  Additional time then spent on discharge activities.    Discharge Medications:  See after visit summary for reconciled discharge medications provided to patient and family.

## 2024-07-30 NOTE — PROGRESS NOTES
Progress Note - Nephrology   Aniket Pace 38 y.o. male MRN: [id number]  Unit/Bed#:  Encounter: [id number]    A/P:  1.  Acute kidney injury              Chronic continues to significantly improve, now down to 1.77 mg/dL and 5.1 to mg/dL.  Patient is eating and drinking well will discontinue IV fluids at this time.  2.  Volume depletion              Clinically appears to be euvolemic  3.  Hypovolemic hyponatremia              Resolved  4.  Hypokalemia              Noted to be 3.9 mmol/L this morning, no additional supplementation indicated at this time.  Continue to monitor for now.  5.  Acidosis              Resolved  6.  Proteinuria              Consider reevaluation in the outpatient setting when he is in usual state of health.  7.  Rhabdomyolysis              Resolved, CK this morning is within normal limits  8.  Hyperphosphatemia              Phosphorus levels are now normal status post significant improvement in kidney function.  No additional workup or treatment indicated at this time.  9.  Hypermagnesemia              Resolved  10.  Hypovolemic shock              Resolved status post volume expansion, and patient no longer on norepinephrine drip  11.  Alcohol withdrawal seizure              Status post Precedex drip, patient feels improved in general.    Case discussed with hospitalist, will discontinue IV fluids, no additional intervention from the renal standpoint this time.    Will sign off on the patient at this time, if additional help is required during this hospitalization, please do not hesitate to reach out.    Follow up reason for today's visit: Acute kidney injury/volume management/multiple electrolyte abnormalities    Alcohol withdrawal seizure (HCC)    Patient Active Problem List   Diagnosis    Opioid use    Anxiety    Encounter for tobacco use cessation counseling    Opioid dependence with opioid-induced mood disorder (HCC)    Seizure (HCC)    Opioid use disorder, moderate, on maintenance  "therapy, dependence (HCC)    Alcohol use disorder, severe, dependence (HCC)    PTSD (post-traumatic stress disorder)    Tobacco use disorder    Transaminitis    Benzodiazepine dependence (HCC)    Unsteady gait    Bipolar I disorder with depression (HCC)    Primary hypertension    Alcohol withdrawal seizure without complication (HCC)    Tachycardia    Alcohol withdrawal seizure (HCC)    Acute kidney injury (HCC)    Non-traumatic rhabdomyolysis    Hypotension due to hypovolemia    Primary insomnia         Subjective:   No acute issues, patient is eating and drinking well with no nausea or vomiting.    Objective:     Vitals: Blood pressure 106/58, pulse 71, temperature 97.7 °F (36.5 °C), temperature source Temporal, resp. rate 12, height 5' 10\" (1.778 m), weight 106 kg (232 lb 12.9 oz), SpO2 94%.,Body mass index is 33.4 kg/m².    Weight (last 2 days)       Date/Time Weight    07/29/24 0110 106 (232.81)              Intake/Output Summary (Last 24 hours) at 7/30/2024 0729  Last data filed at 7/30/2024 0247  Gross per 24 hour   Intake 6012.01 ml   Output 8150 ml   Net -2137.99 ml     I/O last 3 completed shifts:  In: 7901.5 [P.O.:1500; I.V.:4201.5; IV Piggyback:2200]  Out: 8550 [Urine:8550]         Physical Exam: /58   Pulse 71   Temp 97.7 °F (36.5 °C) (Temporal)   Resp 12   Ht 5' 10\" (1.778 m)   Wt 106 kg (232 lb 12.9 oz)   SpO2 94%   BMI 33.40 kg/m²     General Appearance:    Alert, cooperative, no distress, appears stated age   Head:    Normocephalic, without obvious abnormality, atraumatic   Eyes:    Conjunctiva/corneas clear   Ears:    Normal external ears   Nose:   Nares normal, septum midline, mucosa normal, no drainage    or sinus tenderness   Throat:   Lips, mucosa, and tongue normal; teeth and gums normal   Neck:   Supple, symmetrical, trachea midline, no adenopathy;        thyroid:  No enlargement/tenderness/nodules; no carotid    bruit or JVD   Back:     Symmetric, no curvature, ROM normal, no CVA " "tenderness   Lungs:     Clear to auscultation bilaterally, respirations unlabored   Chest wall:    No tenderness or deformity   Heart:    Regular rate and rhythm, S1 and S2 normal, no murmur, rub   or gallop   Abdomen:     Soft, non-tender, bowel sounds active   Extremities:   Extremities normal, atraumatic, no cyanosis or edema   Skin:   Skin color, texture, turgor normal, no rashes or lesions   Lymph nodes:   Cervical normal   Neurologic:   CNII-XII intact            Lab, Imaging and other studies: I have personally reviewed pertinent labs.  CBC:   Lab Results   Component Value Date    WBC 6.42 07/30/2024    HGB 12.8 07/30/2024    HCT 38.7 07/30/2024    MCV 93 07/30/2024     07/30/2024    RBC 4.16 07/30/2024    MCH 30.8 07/30/2024    MCHC 33.1 07/30/2024    RDW 13.4 07/30/2024    MPV 11.5 07/30/2024     CMP:   Lab Results   Component Value Date    K 3.9 07/30/2024     07/30/2024    CO2 27 07/30/2024    BUN 33 (H) 07/30/2024    CREATININE 1.77 (H) 07/30/2024    CALCIUM 8.6 07/30/2024    AST 14 07/30/2024    ALT 17 07/30/2024    ALKPHOS 94 07/30/2024    EGFR 48 07/30/2024       .  Results from last 7 days   Lab Units 07/30/24  0448 07/29/24  1248 07/29/24  0605   POTASSIUM mmol/L 3.9 3.9 3.5   CHLORIDE mmol/L 102 102 95*   CO2 mmol/L 27 23 22   BUN mg/dL 33* 62* 72*   CREATININE mg/dL 1.77* 5.12* 7.15*   CALCIUM mg/dL 8.6 8.3* 8.0*   ALK PHOS U/L 94 99 98   ALT U/L 17 19 20   AST U/L 14 17 19         Phosphorus:   Lab Results   Component Value Date    PHOS 2.7 07/30/2024     Magnesium:   Lab Results   Component Value Date    MG 2.3 07/30/2024     Urinalysis: No results found for: \"COLORU\", \"CLARITYU\", \"SPECGRAV\", \"PHUR\", \"LEUKOCYTESUR\", \"NITRITE\", \"PROTEINUA\", \"GLUCOSEU\", \"KETONESU\", \"BILIRUBINUR\", \"BLOODU\"  Ionized Calcium: No results found for: \"CAION\"  Coagulation: No results found for: \"PT\", \"INR\", \"APTT\"  Troponin: No results found for: \"TROPONINI\"  ABG: No results found for: \"PHART\", \"VMH1BZT\", " "\"PO2ART\", \"PTY4YAS\", \"K7PFPTGX\", \"BEART\", \"SOURCE\"  Radiology review:     IMAGING  Procedure: TRAUMA - CT spine cervical wo contrast    Result Date: 7/28/2024  Narrative: CT CERVICAL SPINE - WITHOUT CONTRAST INDICATION:   TRAUMA. COMPARISON: 7/19/2022 TECHNIQUE:  CT examination of the cervical spine was performed without intravenous contrast.  Contiguous axial images were obtained. Multiplanar 2D reformatted images were created from the source data. Radiation dose length product (DLP) for this visit:  463.29 mGy-cm .  This examination, like all CT scans performed in the [facility], was performed utilizing techniques to minimize radiation dose exposure, including the use of iterative  reconstruction and automated exposure control. IMAGE QUALITY:  Diagnostic. FINDINGS: ALIGNMENT:  There is straightening of normal cervical lordosis.  No subluxation or compression deformity. VERTEBRAE:  No fracture. DEGENERATIVE CHANGES:  No significant cervical degenerative changes are noted. PREVERTEBRAL AND PARASPINAL SOFT TISSUES: Unremarkable THORACIC INLET:  Normal.     Impression: No cervical spine fracture or traumatic malalignment. Workstation performed: [id number]     Procedure: TRAUMA - CT head wo contrast    Result Date: 7/28/2024  Narrative: CT BRAIN - WITHOUT CONTRAST INDICATION:   TRAUMA. COMPARISON: Multiple priors most recently 5/3/2023 TECHNIQUE:  CT examination of the brain was performed.  Multiplanar 2D reformatted images were created from the source data. Radiation dose length product (DLP) for this visit:  948.77 mGy-cm .  This examination, like all CT scans performed in the [facility], was performed utilizing techniques to minimize radiation dose exposure, including the use of iterative  reconstruction and automated exposure control. IMAGE QUALITY:  Diagnostic. FINDINGS: PARENCHYMA:  No intracranial mass, mass effect or midline shift. No CT signs of acute infarction.  No acute " parenchymal hemorrhage. VENTRICLES AND EXTRA-AXIAL SPACES:  Normal for the patient's age. VISUALIZED ORBITS: Normal visualized orbits. PARANASAL SINUSES: Normal visualized paranasal sinuses. CALVARIUM AND EXTRACRANIAL SOFT TISSUES:  Normal.     Impression: No acute intracranial abnormality. Workstation performed: [id number]     Procedure: CTA chest (pe study) abdomen pelvis contrast    Result Date: 7/28/2024  Narrative: CT PULMONARY ANGIOGRAM OF THE CHEST AND CT ABDOMEN AND PELVIS WITH INTRAVENOUS CONTRAST INDICATION: TRAUMA. COMPARISON: Multiple priors most recently same day chest radiograph TECHNIQUE: CT examination of the chest, abdomen and pelvis was performed. Thin section CT angiographic technique was used in the chest in order to evaluate for pulmonary embolus and coronal 3D MIP postprocessing was performed on the acquisition scanner. Multiplanar 2D reformatted images were created from the source data. This examination, like all CT scans performed in the [facility], was performed utilizing techniques to minimize radiation dose exposure, including the use of iterative reconstruction and automated exposure control. Radiation dose length product (DLP) for this visit: 1989.61 mGy-cm IV Contrast: 100 mL of iohexol (OMNIPAQUE) Enteric Contrast: Not administered. FINDINGS: CHEST PULMONARY ARTERIAL TREE:  No pulmonary embolus. LUNGS: Mild bilateral lower lobe mucous plugging. No focal consolidation or suspicious mass. Small scattered pulmonary nodules are unchanged, benign PLEURA: Unremarkable. HEART/AORTA: Heart is unremarkable for patient's age. No thoracic aortic aneurysm. MEDIASTINUM AND ARIANNA: Unremarkable. CHEST WALL AND LOWER NECK: Unremarkable. ABDOMEN LIVER/BILIARY TREE: Mild hepatomegaly without suspicious hepatic lesion. GALLBLADDER: No calcified gallstones. No pericholecystic inflammatory change. SPLEEN: Unremarkable. PANCREAS: Unremarkable. ADRENAL GLANDS: Unremarkable. KIDNEYS/URETERS:  Unremarkable. No hydronephrosis. STOMACH AND BOWEL: Colonic diverticulosis without findings of acute diverticulitis. APPENDIX: Normal appendix. ABDOMINOPELVIC CAVITY: No ascites. No pneumoperitoneum. No lymphadenopathy. VESSELS: Unremarkable for patient's age. PELVIS REPRODUCTIVE ORGANS: Unremarkable for patient's age. URINARY BLADDER: Unremarkable. ABDOMINAL WALL/INGUINAL REGIONS: Unremarkable. BONES: No acute fracture or suspicious osseous lesion. ORIF right hemipelvis.     Impression: No findings of acute traumatic injury in the chest, abdomen or pelvis. The study was marked in EPIC for immediate notification, per trauma protocol. Workstation performed: [id number]     Procedure: XR chest 1 view portable    Result Date: 7/28/2024  Narrative: XR CHEST PORTABLE INDICATION: fall. Seizure. COMPARISON: Chest and abdomen CT 7/28/2024. CXR 7/19/2022. FINDINGS: Clear lungs. No pneumothorax or pleural effusion. Normal cardiomediastinal silhouette. Skeleton normal for age.  No acute displaced fractures. Benign bone island left humeral head. Normal upper abdomen.     Impression: No acute cardiopulmonary disease. Workstation performed: [id number]       Current Facility-Administered Medications   Medication Dose Route Frequency    dexmedeTOMIDine (Precedex) 400 mcg in sodium chloride 0.9% 100 mL  0.1-0.7 mcg/kg/hr Intravenous Titrated    diazepam (VALIUM) tablet 2 mg  2 mg Oral Q6H PRN    famotidine (PEPCID) tablet 10 mg  10 mg Oral Daily    folic acid (FOLVITE) tablet 1 mg  1 mg Oral Daily    heparin (porcine) subcutaneous injection 5,000 Units  5,000 Units Subcutaneous Q8H MARIUM    HYDROmorphone (DILAUDID) injection 0.5 mg  0.5 mg Intravenous Q4H PRN    multivitamin stress formula tablet 1 tablet  1 tablet Oral Daily    nicotine (NICODERM CQ) 21 mg/24 hr TD 24 hr patch 1 patch  1 patch Transdermal Daily    norepinephrine (LEVOPHED) 4 mg (STANDARD CONCENTRATION) IV in sodium chloride 0.9% 250 mL  1-30 mcg/min Intravenous  Titrated    QUEtiapine (SEROquel) tablet 100 mg  100 mg Oral HS    thiamine tablet 100 mg  100 mg Oral Daily     Medications Discontinued During This Encounter   Medication Reason    amLODIPine (NORVASC) tablet 10 mg     metoprolol tartrate (LOPRESSOR) tablet 25 mg     sodium chloride 0.9 % infusion     diazepam (VALIUM) tablet 5 mg     multi-electrolyte (PLASMALYTE-A/ISOLYTE-S PH 7.4) IV solution        Martinez Dubon,       This progress note was produced in part using a dictation device which may document imprecise wording from author's original intent.

## 2024-07-30 NOTE — PLAN OF CARE
Problem: PAIN - ADULT  Goal: Verbalizes/displays adequate comfort level or baseline comfort level  Description: Interventions:  - Encourage patient to monitor pain and request assistance  - Assess pain using appropriate pain scale  - Administer analgesics based on type and severity of pain and evaluate response  - Implement non-pharmacological measures as appropriate and evaluate response  - Consider cultural and social influences on pain and pain management  - Notify physician/advanced practitioner if interventions unsuccessful or patient reports new pain  Outcome: Progressing     Problem: INFECTION - ADULT  Goal: Absence or prevention of progression during hospitalization  Description: INTERVENTIONS:  - Assess and monitor for signs and symptoms of infection  - Monitor lab/diagnostic results  - Monitor all insertion sites, i.e. indwelling lines, tubes, and drains  - Monitor endotracheal if appropriate and nasal secretions for changes in amount and color  - [place] appropriate cooling/warming therapies per order  - Administer medications as ordered  - Instruct and encourage patient and family to use good hand hygiene technique  - Identify and instruct in appropriate isolation precautions for identified infection/condition  Outcome: Progressing     Problem: SAFETY ADULT  Goal: Patient will remain free of falls  Description: INTERVENTIONS:  - Educate patient/family on patient safety including physical limitations  - Instruct patient to call for assistance with activity   - Consult OT/PT to assist with strengthening/mobility   - Keep Call bell within reach  - Keep bed low and locked with side rails adjusted as appropriate  - Keep care items and personal belongings within reach  - Initiate and maintain comfort rounds  - Make Fall Risk Sign visible to staff  - Offer Toileting every 2 Hours, in advance of need  - Initiate/Maintain fall alarm  - Obtain necessary fall risk management equipment: alarm, nonskid footwear,  mobility aids  - Apply yellow socks and bracelet for high fall risk patients  - Consider moving patient to room near nurses station  Outcome: Progressing  Goal: Maintain or return to baseline ADL function  Description: INTERVENTIONS:  -  Assess patient's ability to carry out ADLs; assess patient's baseline for ADL function and identify physical deficits which impact ability to perform ADLs (bathing, care of mouth/teeth, toileting, grooming, dressing, etc.)  - Assess/evaluate cause of self-care deficits   - Assess range of motion  - Assess patient's mobility; develop plan if impaired  - Assess patient's need for assistive devices and provide as appropriate  - Encourage maximum independence but intervene and supervise when necessary  - Involve family in performance of ADLs  - Assess for home care needs following discharge   - Consider OT consult to assist with ADL evaluation and planning for discharge  - Provide patient education as appropriate  Outcome: Progressing  Goal: Maintains/Returns to pre admission functional level  Description: INTERVENTIONS:  - Perform AM-PAC 6 Click Basic Mobility/ Daily Activity assessment daily.  - Set and communicate daily mobility goal to care team and patient/family/caregiver.   - Collaborate with rehabilitation services on mobility goals if consulted  - Perform Range of Motion 4 times a day.  - Reposition patient every 2 hours.  - Dangle patient 3 times a day  - Stand patient 3 times a day  - Ambulate patient 3 times a day  - Out of bed to chair 3 times a day   - Out of bed for meals 3 times a day  - Out of bed for toileting  - Record patient progress and toleration of activity level   Outcome: Progressing     Problem: DISCHARGE PLANNING  Goal: Discharge to home or other facility with appropriate resources  Description: INTERVENTIONS:  - Identify barriers to discharge w/patient and caregiver  - Arrange for needed discharge resources and transportation as appropriate  - Identify  discharge learning needs (meds, wound care, etc.)  - Arrange for interpretive services to assist at discharge as needed  - Refer to Case Management Department for coordinating discharge planning if the patient needs post-hospital services based on physician/advanced practitioner order or complex needs related to functional status, cognitive ability, or social support system  Outcome: Progressing     Problem: Knowledge Deficit  Goal: Patient/family/caregiver demonstrates understanding of disease process, treatment plan, medications, and discharge instructions  Description: Complete learning assessment and assess knowledge base.  Interventions:  - Provide teaching at level of understanding  - Provide teaching via preferred learning methods  Outcome: Progressing     Problem: NEUROSENSORY - ADULT  Goal: Achieves stable or improved neurological status  Description: INTERVENTIONS  - Monitor and report changes in neurological status  - Monitor vital signs such as temperature, blood pressure, glucose, and any other labs ordered   - Initiate measures to prevent increased intracranial pressure  - Monitor for seizure activity and implement precautions if appropriate      Outcome: Progressing  Goal: Remains free of injury related to seizures activity  Description: INTERVENTIONS  - Maintain airway, patient safety  and administer oxygen as ordered  - Monitor patient for seizure activity, document and report duration and description of seizure to physician/advanced practitioner  - If seizure occurs,  ensure patient safety during seizure  - Reorient patient post seizure  - Seizure pads on all 4 side rails  - Instruct patient/family to notify RN of any seizure activity including if an aura is experienced  - Instruct patient/family to call for assistance with activity based on nursing assessment  - Administer anti-seizure medications if ordered    Outcome: Progressing  Goal: Achieves maximal functionality and self care  Description:  INTERVENTIONS  - Monitor swallowing and airway patency with patient fatigue and changes in neurological status  - Encourage and assist patient to increase activity and self care.   - Encourage visually impaired, hearing impaired and aphasic patients to use assistive/communication devices  Outcome: Progressing     Problem: CARDIOVASCULAR - ADULT  Goal: Maintains optimal cardiac output and hemodynamic stability  Description: INTERVENTIONS:  - Monitor I/O, vital signs and rhythm  - Monitor for S/S and trends of decreased cardiac output  - Administer and titrate ordered vasoactive medications to optimize hemodynamic stability  - Assess quality of pulses, skin color and temperature  - Assess for signs of decreased coronary artery perfusion  - Instruct patient to report change in severity of symptoms  Outcome: Progressing  Goal: Absence of cardiac dysrhythmias or at baseline rhythm  Description: INTERVENTIONS:  - Continuous cardiac monitoring, vital signs, obtain 12 lead EKG if ordered  - Administer antiarrhythmic and heart rate control medications as ordered  - Monitor electrolytes and administer replacement therapy as ordered  Outcome: Progressing     Problem: GASTROINTESTINAL - ADULT  Goal: Minimal or absence of nausea and/or vomiting  Description: INTERVENTIONS:  - Administer IV fluids if ordered to ensure adequate hydration  - Maintain NPO status until nausea and vomiting are resolved  - Nasogastric tube if ordered  - Administer ordered antiemetic medications as needed  - Provide nonpharmacologic comfort measures as appropriate  - Advance diet as tolerated, if ordered  - Consider nutrition services referral to assist patient with adequate nutrition and appropriate food choices  Outcome: Progressing  Goal: Maintains or returns to baseline bowel function  Description: INTERVENTIONS:  - Assess bowel function  - Encourage oral fluids to ensure adequate hydration  - Administer IV fluids if ordered to ensure adequate  hydration  - Administer ordered medications as needed  - Encourage mobilization and activity  - Consider nutritional services referral to assist patient with adequate nutrition and appropriate food choices  Outcome: Progressing  Goal: Maintains adequate nutritional intake  Description: INTERVENTIONS:  - Monitor percentage of each meal consumed  - Identify factors contributing to decreased intake, treat as appropriate  - Assist with meals as needed  - Monitor I&O, weight, and lab values if indicated  - Obtain nutrition services referral as needed  Outcome: Progressing  Goal: Oral mucous membranes remain intact  Description: INTERVENTIONS  - Assess oral mucosa and hygiene practices  - Implement preventative oral hygiene regimen  - Implement oral medicated treatments as ordered  - Initiate Nutrition services referral as needed  Outcome: Progressing     Problem: METABOLIC, FLUID AND ELECTROLYTES - ADULT  Goal: Electrolytes maintained within normal limits  Description: INTERVENTIONS:  - Monitor labs and assess patient for signs and symptoms of electrolyte imbalances  - Administer electrolyte replacement as ordered  - Monitor response to electrolyte replacements, including repeat lab results as appropriate  - Instruct patient on fluid and nutrition as appropriate  Outcome: Progressing  Goal: Fluid balance maintained  Description: INTERVENTIONS:  - Monitor labs   - Monitor I/O and WT  - Instruct patient on fluid and nutrition as appropriate  - Assess for signs & symptoms of volume excess or deficit  Outcome: Progressing     Problem: Prexisting or High Potential for Compromised Skin Integrity  Goal: Skin integrity is maintained or improved  Description: INTERVENTIONS:  - Identify patients at risk for skin breakdown  - Assess and monitor skin integrity  - Assess and monitor nutrition and hydration status  - Monitor labs   - Assess for incontinence   - Turn and reposition patient  - Assist with mobility/ambulation  - Relieve  pressure over bony prominences  - Avoid friction and shearing  - Provide appropriate hygiene as needed including keeping skin clean and dry  - Evaluate need for skin moisturizer/barrier cream  - Collaborate with interdisciplinary team   - Patient/family teaching  - Consider wound care consult   Outcome: Progressing     Problem: Nutrition/Hydration-ADULT  Goal: Nutrient/Hydration intake appropriate for improving, restoring or maintaining nutritional needs  Description: Monitor and assess patient's nutrition/hydration status for malnutrition. Collaborate with interdisciplinary team and initiate plan and interventions as ordered.  Monitor patient's weight and dietary intake as ordered or per policy. Utilize nutrition screening tool and intervene as necessary. Determine patient's food preferences and provide high-protein, high-caloric foods as appropriate.     INTERVENTIONS:  - Monitor oral intake, urinary output, labs, and treatment plans  - Assess nutrition and hydration status and recommend course of action  - Evaluate amount of meals eaten  - Assist patient with eating if necessary   - Allow adequate time for meals  - Recommend/ encourage appropriate diets, oral nutritional supplements, and vitamin/mineral supplements  - Order, calculate, and assess calorie counts as needed  - Recommend, monitor, and adjust tube feedings and TPN/PPN based on assessed needs  - Assess need for intravenous fluids  - Provide specific nutrition/hydration education as appropriate  - Include patient/family/caregiver in decisions related to nutrition  Outcome: Progressing

## 2024-07-30 NOTE — PLAN OF CARE
Problem: PAIN - ADULT  Goal: Verbalizes/displays adequate comfort level or baseline comfort level  Description: Interventions:  - Encourage patient to monitor pain and request assistance  - Assess pain using appropriate pain scale  - Administer analgesics based on type and severity of pain and evaluate response  - Implement non-pharmacological measures as appropriate and evaluate response  - Consider cultural and social influences on pain and pain management  - Notify physician/advanced practitioner if interventions unsuccessful or patient reports new pain  Outcome: Progressing     Problem: INFECTION - ADULT  Goal: Absence or prevention of progression during hospitalization  Description: INTERVENTIONS:  - Assess and monitor for signs and symptoms of infection  - Monitor lab/diagnostic results  - Monitor all insertion sites, i.e. indwelling lines, tubes, and drains  - Monitor endotracheal if appropriate and nasal secretions for changes in amount and color  - [place] appropriate cooling/warming therapies per order  - Administer medications as ordered  - Instruct and encourage patient and family to use good hand hygiene technique  - Identify and instruct in appropriate isolation precautions for identified infection/condition  Outcome: Progressing     Problem: Knowledge Deficit  Goal: Patient/family/caregiver demonstrates understanding of disease process, treatment plan, medications, and discharge instructions  Description: Complete learning assessment and assess knowledge base.  Interventions:  - Provide teaching at level of understanding  - Provide teaching via preferred learning methods  Outcome: Progressing     Problem: CARDIOVASCULAR - ADULT  Goal: Maintains optimal cardiac output and hemodynamic stability  Description: INTERVENTIONS:  - Monitor I/O, vital signs and rhythm  - Monitor for S/S and trends of decreased cardiac output  - Administer and titrate ordered vasoactive medications to optimize hemodynamic  stability  - Assess quality of pulses, skin color and temperature  - Assess for signs of decreased coronary artery perfusion  - Instruct patient to report change in severity of symptoms  Outcome: Progressing  Goal: Absence of cardiac dysrhythmias or at baseline rhythm  Description: INTERVENTIONS:  - Continuous cardiac monitoring, vital signs, obtain 12 lead EKG if ordered  - Administer antiarrhythmic and heart rate control medications as ordered  - Monitor electrolytes and administer replacement therapy as ordered  Outcome: Progressing     Problem: Nutrition/Hydration-ADULT  Goal: Nutrient/Hydration intake appropriate for improving, restoring or maintaining nutritional needs  Description: Monitor and assess patient's nutrition/hydration status for malnutrition. Collaborate with interdisciplinary team and initiate plan and interventions as ordered.  Monitor patient's weight and dietary intake as ordered or per policy. Utilize nutrition screening tool and intervene as necessary. Determine patient's food preferences and provide high-protein, high-caloric foods as appropriate.     INTERVENTIONS:  - Monitor oral intake, urinary output, labs, and treatment plans  - Assess nutrition and hydration status and recommend course of action  - Evaluate amount of meals eaten  - Assist patient with eating if necessary   - Allow adequate time for meals  - Recommend/ encourage appropriate diets, oral nutritional supplements, and vitamin/mineral supplements  - Order, calculate, and assess calorie counts as needed  - Recommend, monitor, and adjust tube feedings and TPN/PPN based on assessed needs  - Assess need for intravenous fluids  - Provide specific nutrition/hydration education as appropriate  - Include patient/family/caregiver in decisions related to nutrition  Outcome: Progressing

## 2024-07-30 NOTE — CASE MANAGEMENT
Case Management Discharge Planning Note    Patient name Aniket Pace  Location / MRN [id number]  : 1985 Date 2024       Current Admission Date: 2024  Current Admission Diagnosis:Alcohol withdrawal seizure (HCC)   Patient Active Problem List    Diagnosis Date Noted Date Diagnosed    Alcohol withdrawal seizure (HCC) 2024     Acute kidney injury (HCC) 2024     Non-traumatic rhabdomyolysis 2024     Hypotension due to hypovolemia 2024     Primary insomnia 2024     Alcohol withdrawal seizure without complication (HCC) 2023     Bipolar I disorder with depression (HCC) 2023     Primary hypertension 2023     Benzodiazepine dependence (HCC) 2022     Unsteady gait 2022     Opioid use disorder, moderate, on maintenance therapy, dependence (HCC) 2022     Alcohol use disorder, severe, dependence (HCC) 2022     PTSD (post-traumatic stress disorder) 2022     Tobacco use disorder 2022     Transaminitis 2022     Anxiety 2022     Encounter for tobacco use cessation counseling 2022     Opioid dependence with opioid-induced mood disorder (HCC) 2022     Seizure ([facility]) 2022     Opioid use 2021     Tachycardia 2015       LOS (days): 1  Geometric Mean LOS (GMLOS) (days):   Days to GMLOS:     OBJECTIVE:  Risk of Unplanned Readmission Score: 23.36         Current admission status: Inpatient   Preferred Pharmacy:   [id number] - [place] [address] [address]  Phone: [phone] Fax: [phone]    [id number] - [place], [place] - [address]   [address] [facility] [address]  Phone: [phone] Fax: [phone]    [id number] - [place] - [address] [facility]  [address] [facility] [address]  Phone: [phone] Fax: [phone]    [id number] [place] - [address] [address] [address]  [address] [address] [address]  [facility] [address]  Phone: [phone] Fax: [phone]    [id number] [place] - [address]  [address]  [place] [address]  Phone: [phone] Fax: [phone]    Primary Care Provider: Jason Guerra DO    Primary Insurance: [employer]  Secondary Insurance:     DISCHARGE DETAILS:      Patient stated he called a [employer] for ride home.      Transport cancelled in round trip.

## 2024-07-31 DIAGNOSIS — F10.20 ALCOHOL USE DISORDER, SEVERE, DEPENDENCE (HCC): ICD-10-CM

## 2024-07-31 DIAGNOSIS — Z59.82 TRANSPORTATION INSECURITY: Primary | ICD-10-CM

## 2024-07-31 SDOH — ECONOMIC STABILITY - TRANSPORTATION SECURITY: TRANSPORTATION INSECURITY: Z59.82

## 2024-07-31 NOTE — UTILIZATION REVIEW
NOTIFICATION OF ADMISSION DISCHARGE   This is a Notification of Discharge from [facility]. Please be advised that this patient has been discharge from our facility. Below you will find the admission and discharge date and time including the patient’s disposition.   UTILIZATION REVIEW CONTACT:  Sae Stubbs  Utilization   Network Utilization Review Department  Phone: [phone] x carefully listen to the prompts. All voicemails are confidential.  Email: [email or web address]     ADMISSION INFORMATION  PRESENTATION DATE: 7/28/2024  9:31 PM  OBERVATION ADMISSION DATE: N/A  INPATIENT ADMISSION DATE: 7/29/24 12:07 AM   DISCHARGE DATE: 7/30/2024 12:40 PM   DISPOSITION:Home/Self Care    Network Utilization Review Department  ATTENTION: Please call with any questions or concerns to [phone] and carefully listen to the prompts so that you are directed to the right person. All voicemails are confidential.   For Discharge needs, contact Care Management DC Support Team at [phone] opt. 2  Send all requests for admission clinical reviews, approved or denied determinations and any other requests to dedicated fax number below belonging to the campus where the patient is receiving treatment. List of dedicated fax numbers for the Facilities:  FACILITY NAME UR FAX NUMBER   ADMISSION DENIALS (Administrative/Medical Necessity) [phone]   DISCHARGE SUPPORT TEAM ([facility]) [phone]   PARENT CHILD HEALTH (Maternity/NICU/Pediatrics) [phone]   [facility] [phone]   [facility] [phone]   [facility] [phone]   General acute hospital [phone]   [facility] [phone]   [facility] [phone]   [facility] [phone]   [facility] [phone]   [facility]  [facility] [phone]   [facility] [phone]   [facility] [phone]   [facility] [phone]

## 2024-08-01 ENCOUNTER — PATIENT OUTREACH (OUTPATIENT)
Dept: CASE MANAGEMENT | Facility: OTHER | Age: 39
End: 2024-08-01

## 2024-08-01 NOTE — PROGRESS NOTES
ESTEBAN KILGORE received IB from OP CM AC with referral d/t SDOH needs. Pt recently d/c from [facility]. Pt was admitted for alcohol withdrawal complications. Pt shared with IP team that he is attending OP treatment with [facility].    ESTEBAN KILGORE attempted to reach pt via phone to introduce self and discuss needs. Pt answered, but audio quality was very poor. ESTEBAN KILGORE placed another call to pt and pt did not answer. Left a message. Will attempt another outreach within one week if call is not returned.

## 2024-08-05 ENCOUNTER — PATIENT OUTREACH (OUTPATIENT)
Dept: CASE MANAGEMENT | Facility: OTHER | Age: 39
End: 2024-08-05

## 2024-08-05 NOTE — LETTER
[address] [facility] [address]  [phone]    Re: Unable to Reach   8/5/2024       Dear Aniket,    I am a [facility]  and wanted to be certain you had information to contact me should you desire assistance with or have questions about non-medical aspects of your care such as [but not limited to] medical insurance, housing, transportation, material needs, or emergency needs.  If I do not have an answer I will assist you in finding the appropriate agency or individual who can help.      Please feel free to contact me at [phone]. Thank You.    Sincerely,         Eusebia Hardin LCSW

## 2024-08-05 NOTE — PROGRESS NOTES
ESTEBAN KILGORE attempted another outreach to pt to f/u on referral. Phone rang and then stopped. ESTEBAN KILGORE unable to hear anyone on other end. Attempted call again and was met with same result.    ESTEBAN KILGORE unable to reach pt after multiple attempts. UTR letter mailed to pt. Referral closed at this time. OP CM team available should pt reach out for assistance.

## 2024-09-27 ENCOUNTER — TELEPHONE (OUTPATIENT)
Dept: FAMILY MEDICINE CLINIC | Facility: CLINIC | Age: 39
End: 2024-09-27

## 2024-11-20 ENCOUNTER — TELEPHONE (OUTPATIENT)
Dept: FAMILY MEDICINE CLINIC | Facility: CLINIC | Age: 39
End: 2024-11-20

## 2024-12-04 ENCOUNTER — HOSPITAL ENCOUNTER (INPATIENT)
Facility: HOSPITAL | Age: 39
LOS: 5 days | Discharge: OTHER INSTITUTION | DRG: 469 | End: 2024-12-09
Attending: EMERGENCY MEDICINE | Admitting: HOSPITALIST
Payer: COMMERCIAL

## 2024-12-04 DIAGNOSIS — R00.0 TACHYCARDIA: ICD-10-CM

## 2024-12-04 DIAGNOSIS — F11.90 OPIOID USE DISORDER: ICD-10-CM

## 2024-12-04 DIAGNOSIS — N17.9 AKI (ACUTE KIDNEY INJURY) (HCC): ICD-10-CM

## 2024-12-04 DIAGNOSIS — F10.939 ALCOHOL WITHDRAWAL SYNDROME WITH COMPLICATION (HCC): ICD-10-CM

## 2024-12-04 DIAGNOSIS — F10.939 ALCOHOL WITHDRAWAL (HCC): Primary | ICD-10-CM

## 2024-12-04 DIAGNOSIS — I10 HTN (HYPERTENSION): ICD-10-CM

## 2024-12-04 DIAGNOSIS — E87.1 HYPONATREMIA: ICD-10-CM

## 2024-12-04 DIAGNOSIS — Z13.9 ENCOUNTER FOR SCREENING INVOLVING SOCIAL DETERMINANTS OF HEALTH (SDOH): ICD-10-CM

## 2024-12-04 PROBLEM — F10.931 ALCOHOL WITHDRAWAL DELIRIUM, ACUTE, HYPERACTIVE (HCC): Status: ACTIVE | Noted: 2024-12-04

## 2024-12-04 LAB
ALBUMIN SERPL BCG-MCNC: 6.9 G/DL (ref 3.5–5)
ALP SERPL-CCNC: 183 U/L (ref 34–104)
ALT SERPL W P-5'-P-CCNC: 46 U/L (ref 7–52)
ANION GAP SERPL CALCULATED.3IONS-SCNC: 17 MMOL/L (ref 4–13)
ANION GAP SERPL CALCULATED.3IONS-SCNC: 9 MMOL/L (ref 4–13)
AST SERPL W P-5'-P-CCNC: 87 U/L (ref 13–39)
ATRIAL RATE: 227 BPM
BASOPHILS # BLD AUTO: 0.04 THOUSANDS/ÂΜL (ref 0–0.1)
BASOPHILS NFR BLD AUTO: 0 % (ref 0–1)
BILIRUB SERPL-MCNC: 2.15 MG/DL (ref 0.2–1)
BUN SERPL-MCNC: 18 MG/DL (ref 5–25)
BUN SERPL-MCNC: 22 MG/DL (ref 5–25)
CALCIUM SERPL-MCNC: 10.6 MG/DL (ref 8.4–10.2)
CALCIUM SERPL-MCNC: 9 MG/DL (ref 8.4–10.2)
CHLORIDE SERPL-SCNC: 84 MMOL/L (ref 96–108)
CHLORIDE SERPL-SCNC: 90 MMOL/L (ref 96–108)
CK SERPL-CCNC: 187 U/L (ref 39–308)
CO2 SERPL-SCNC: 27 MMOL/L (ref 21–32)
CO2 SERPL-SCNC: 29 MMOL/L (ref 21–32)
CREAT SERPL-MCNC: 0.81 MG/DL (ref 0.6–1.3)
CREAT SERPL-MCNC: 1.41 MG/DL (ref 0.6–1.3)
EOSINOPHIL # BLD AUTO: 0.04 THOUSAND/ÂΜL (ref 0–0.61)
EOSINOPHIL NFR BLD AUTO: 0 % (ref 0–6)
ERYTHROCYTE [DISTWIDTH] IN BLOOD BY AUTOMATED COUNT: 12.8 % (ref 11.6–15.1)
GFR SERPL CREATININE-BSD FRML MDRD: 113 ML/MIN/1.73SQ M
GFR SERPL CREATININE-BSD FRML MDRD: 63 ML/MIN/1.73SQ M
GLUCOSE SERPL-MCNC: 114 MG/DL (ref 65–140)
GLUCOSE SERPL-MCNC: 97 MG/DL (ref 65–140)
HCT VFR BLD AUTO: 51.4 % (ref 36.5–49.3)
HGB BLD-MCNC: 18.2 G/DL (ref 12–17)
IMM GRANULOCYTES # BLD AUTO: 0.05 THOUSAND/UL (ref 0–0.2)
IMM GRANULOCYTES NFR BLD AUTO: 1 % (ref 0–2)
LYMPHOCYTES # BLD AUTO: 1.57 THOUSANDS/ÂΜL (ref 0.6–4.47)
LYMPHOCYTES NFR BLD AUTO: 16 % (ref 14–44)
MAGNESIUM SERPL-MCNC: 2 MG/DL (ref 1.9–2.7)
MCH RBC QN AUTO: 30.8 PG (ref 26.8–34.3)
MCHC RBC AUTO-ENTMCNC: 35.4 G/DL (ref 31.4–37.4)
MCV RBC AUTO: 87 FL (ref 82–98)
MONOCYTES # BLD AUTO: 1.38 THOUSAND/ÂΜL (ref 0.17–1.22)
MONOCYTES NFR BLD AUTO: 14 % (ref 4–12)
NEUTROPHILS # BLD AUTO: 6.81 THOUSANDS/ÂΜL (ref 1.85–7.62)
NEUTS SEG NFR BLD AUTO: 69 % (ref 43–75)
NRBC BLD AUTO-RTO: 0 /100 WBCS
P AXIS: 71 DEGREES
PHOSPHATE SERPL-MCNC: 2.9 MG/DL (ref 2.7–4.5)
PLATELET # BLD AUTO: 307 THOUSANDS/UL (ref 149–390)
PMV BLD AUTO: 10 FL (ref 8.9–12.7)
POTASSIUM SERPL-SCNC: 3.7 MMOL/L (ref 3.5–5.3)
POTASSIUM SERPL-SCNC: 3.7 MMOL/L (ref 3.5–5.3)
PR INTERVAL: 140 MS
PROT SERPL-MCNC: 9.4 G/DL (ref 6.4–8.4)
QRS AXIS: 69 DEGREES
QRSD INTERVAL: 76 MS
QT INTERVAL: 482 MS
QTC INTERVAL: 645 MS
RBC # BLD AUTO: 5.9 MILLION/UL (ref 3.88–5.62)
SODIUM SERPL-SCNC: 128 MMOL/L (ref 135–147)
SODIUM SERPL-SCNC: 128 MMOL/L (ref 135–147)
T WAVE AXIS: 82 DEGREES
VENTRICULAR RATE: 108 BPM
WBC # BLD AUTO: 9.89 THOUSAND/UL (ref 4.31–10.16)

## 2024-12-04 PROCEDURE — 36415 COLL VENOUS BLD VENIPUNCTURE: CPT | Performed by: EMERGENCY MEDICINE

## 2024-12-04 PROCEDURE — 82550 ASSAY OF CK (CPK): CPT | Performed by: EMERGENCY MEDICINE

## 2024-12-04 PROCEDURE — 93010 ELECTROCARDIOGRAM REPORT: CPT | Performed by: INTERNAL MEDICINE

## 2024-12-04 PROCEDURE — 96375 TX/PRO/DX INJ NEW DRUG ADDON: CPT

## 2024-12-04 PROCEDURE — 80053 COMPREHEN METABOLIC PANEL: CPT | Performed by: EMERGENCY MEDICINE

## 2024-12-04 PROCEDURE — 96365 THER/PROPH/DIAG IV INF INIT: CPT

## 2024-12-04 PROCEDURE — 99255 IP/OBS CONSLTJ NEW/EST HI 80: CPT | Performed by: EMERGENCY MEDICINE

## 2024-12-04 PROCEDURE — 99223 1ST HOSP IP/OBS HIGH 75: CPT | Performed by: INTERNAL MEDICINE

## 2024-12-04 PROCEDURE — 96368 THER/DIAG CONCURRENT INF: CPT

## 2024-12-04 PROCEDURE — 93005 ELECTROCARDIOGRAM TRACING: CPT

## 2024-12-04 PROCEDURE — 84100 ASSAY OF PHOSPHORUS: CPT | Performed by: EMERGENCY MEDICINE

## 2024-12-04 PROCEDURE — 85025 COMPLETE CBC W/AUTO DIFF WBC: CPT | Performed by: EMERGENCY MEDICINE

## 2024-12-04 PROCEDURE — 83735 ASSAY OF MAGNESIUM: CPT | Performed by: EMERGENCY MEDICINE

## 2024-12-04 PROCEDURE — 96372 THER/PROPH/DIAG INJ SC/IM: CPT

## 2024-12-04 PROCEDURE — 96376 TX/PRO/DX INJ SAME DRUG ADON: CPT

## 2024-12-04 PROCEDURE — 99285 EMERGENCY DEPT VISIT HI MDM: CPT

## 2024-12-04 PROCEDURE — 99291 CRITICAL CARE FIRST HOUR: CPT | Performed by: EMERGENCY MEDICINE

## 2024-12-04 PROCEDURE — 80048 BASIC METABOLIC PNL TOTAL CA: CPT | Performed by: INTERNAL MEDICINE

## 2024-12-04 RX ORDER — BUPRENORPHINE AND NALOXONE 8; 2 MG/1; MG/1
8 FILM, SOLUBLE BUCCAL; SUBLINGUAL 2 TIMES DAILY
Status: DISCONTINUED | OUTPATIENT
Start: 2024-12-04 | End: 2024-12-09 | Stop reason: HOSPADM

## 2024-12-04 RX ORDER — SODIUM CHLORIDE, SODIUM GLUCONATE, SODIUM ACETATE, POTASSIUM CHLORIDE, MAGNESIUM CHLORIDE, SODIUM PHOSPHATE, DIBASIC, AND POTASSIUM PHOSPHATE .53; .5; .37; .037; .03; .012; .00082 G/100ML; G/100ML; G/100ML; G/100ML; G/100ML; G/100ML; G/100ML
1000 INJECTION, SOLUTION INTRAVENOUS ONCE
Status: COMPLETED | OUTPATIENT
Start: 2024-12-04 | End: 2024-12-04

## 2024-12-04 RX ORDER — DEXMEDETOMIDINE HYDROCHLORIDE 4 UG/ML
.1-.7 INJECTION, SOLUTION INTRAVENOUS
Status: DISCONTINUED | OUTPATIENT
Start: 2024-12-04 | End: 2024-12-06

## 2024-12-04 RX ORDER — QUETIAPINE FUMARATE 100 MG/1
100 TABLET, FILM COATED ORAL
Status: DISCONTINUED | OUTPATIENT
Start: 2024-12-04 | End: 2024-12-09 | Stop reason: HOSPADM

## 2024-12-04 RX ORDER — DEXMEDETOMIDINE HYDROCHLORIDE 4 UG/ML
INJECTION, SOLUTION INTRAVENOUS
Status: COMPLETED
Start: 2024-12-04 | End: 2024-12-04

## 2024-12-04 RX ORDER — MAGNESIUM SULFATE HEPTAHYDRATE 40 MG/ML
2 INJECTION, SOLUTION INTRAVENOUS ONCE
Status: COMPLETED | OUTPATIENT
Start: 2024-12-04 | End: 2024-12-04

## 2024-12-04 RX ORDER — DIAZEPAM 10 MG/2ML
10 INJECTION, SOLUTION INTRAMUSCULAR; INTRAVENOUS ONCE
Status: COMPLETED | OUTPATIENT
Start: 2024-12-04 | End: 2024-12-04

## 2024-12-04 RX ORDER — MAGNESIUM HYDROXIDE/ALUMINUM HYDROXICE/SIMETHICONE 120; 1200; 1200 MG/30ML; MG/30ML; MG/30ML
30 SUSPENSION ORAL EVERY 6 HOURS PRN
Status: DISCONTINUED | OUTPATIENT
Start: 2024-12-04 | End: 2024-12-09 | Stop reason: HOSPADM

## 2024-12-04 RX ORDER — PHENOBARBITAL SODIUM 130 MG/ML
130 INJECTION, SOLUTION INTRAMUSCULAR; INTRAVENOUS ONCE
Status: COMPLETED | OUTPATIENT
Start: 2024-12-04 | End: 2024-12-04

## 2024-12-04 RX ORDER — ACETAMINOPHEN 325 MG/1
650 TABLET ORAL EVERY 6 HOURS PRN
Status: DISCONTINUED | OUTPATIENT
Start: 2024-12-04 | End: 2024-12-06

## 2024-12-04 RX ORDER — ONDANSETRON 2 MG/ML
4 INJECTION INTRAMUSCULAR; INTRAVENOUS EVERY 4 HOURS PRN
Status: DISCONTINUED | OUTPATIENT
Start: 2024-12-04 | End: 2024-12-09 | Stop reason: HOSPADM

## 2024-12-04 RX ORDER — LORAZEPAM 2 MG/ML
1 INJECTION INTRAMUSCULAR EVERY 4 HOURS PRN
Status: DISCONTINUED | OUTPATIENT
Start: 2024-12-04 | End: 2024-12-06

## 2024-12-04 RX ORDER — NICOTINE 21 MG/24HR
21 PATCH, TRANSDERMAL 24 HOURS TRANSDERMAL ONCE
Status: DISCONTINUED | OUTPATIENT
Start: 2024-12-04 | End: 2024-12-05

## 2024-12-04 RX ORDER — PANTOPRAZOLE SODIUM 40 MG/10ML
40 INJECTION, POWDER, LYOPHILIZED, FOR SOLUTION INTRAVENOUS
Status: DISCONTINUED | OUTPATIENT
Start: 2024-12-04 | End: 2024-12-09 | Stop reason: HOSPADM

## 2024-12-04 RX ORDER — MORPHINE SULFATE 15 MG/1
15 TABLET ORAL EVERY 6 HOURS PRN
Refills: 0 | Status: DISCONTINUED | OUTPATIENT
Start: 2024-12-04 | End: 2024-12-04

## 2024-12-04 RX ORDER — ENOXAPARIN SODIUM 100 MG/ML
40 INJECTION SUBCUTANEOUS DAILY
Status: DISCONTINUED | OUTPATIENT
Start: 2024-12-04 | End: 2024-12-09 | Stop reason: HOSPADM

## 2024-12-04 RX ORDER — SODIUM CHLORIDE, SODIUM LACTATE, POTASSIUM CHLORIDE, CALCIUM CHLORIDE 600; 310; 30; 20 MG/100ML; MG/100ML; MG/100ML; MG/100ML
125 INJECTION, SOLUTION INTRAVENOUS CONTINUOUS
Status: DISCONTINUED | OUTPATIENT
Start: 2024-12-04 | End: 2024-12-06

## 2024-12-04 RX ORDER — DEXTROSE, SODIUM CHLORIDE, SODIUM LACTATE, POTASSIUM CHLORIDE, AND CALCIUM CHLORIDE 5; .6; .31; .03; .02 G/100ML; G/100ML; G/100ML; G/100ML; G/100ML
125 INJECTION, SOLUTION INTRAVENOUS ONCE
Status: COMPLETED | OUTPATIENT
Start: 2024-12-04 | End: 2024-12-05

## 2024-12-04 RX ADMIN — PHENOBARBITAL SODIUM 260 MG: 130 INJECTION INTRAMUSCULAR; INTRAVENOUS at 11:36

## 2024-12-04 RX ADMIN — SODIUM CHLORIDE, SODIUM GLUCONATE, SODIUM ACETATE, POTASSIUM CHLORIDE, MAGNESIUM CHLORIDE, SODIUM PHOSPHATE, DIBASIC, AND POTASSIUM PHOSPHATE 1000 ML: .53; .5; .37; .037; .03; .012; .00082 INJECTION, SOLUTION INTRAVENOUS at 09:44

## 2024-12-04 RX ADMIN — DEXTROSE, SODIUM CHLORIDE, SODIUM LACTATE, POTASSIUM CHLORIDE, AND CALCIUM CHLORIDE 125 ML/HR: 5; .6; .31; .03; .02 INJECTION, SOLUTION INTRAVENOUS at 08:58

## 2024-12-04 RX ADMIN — TRIMETHOBENZAMIDE HYDROCHLORIDE 200 MG: 100 INJECTION INTRAMUSCULAR at 09:34

## 2024-12-04 RX ADMIN — NICOTINE 21 MG: 21 PATCH, EXTENDED RELEASE TRANSDERMAL at 11:34

## 2024-12-04 RX ADMIN — ONDANSETRON 4 MG: 2 INJECTION INTRAMUSCULAR; INTRAVENOUS at 21:58

## 2024-12-04 RX ADMIN — SODIUM CHLORIDE, SODIUM LACTATE, POTASSIUM CHLORIDE, AND CALCIUM CHLORIDE 125 ML/HR: .6; .31; .03; .02 INJECTION, SOLUTION INTRAVENOUS at 16:22

## 2024-12-04 RX ADMIN — DIAZEPAM 10 MG: 5 INJECTION INTRAMUSCULAR; INTRAVENOUS at 11:34

## 2024-12-04 RX ADMIN — MORPHINE SULFATE 15 MG: 15 TABLET ORAL at 15:08

## 2024-12-04 RX ADMIN — ONDANSETRON 4 MG: 2 INJECTION INTRAMUSCULAR; INTRAVENOUS at 15:03

## 2024-12-04 RX ADMIN — ACETAMINOPHEN 650 MG: 325 TABLET, FILM COATED ORAL at 20:33

## 2024-12-04 RX ADMIN — SODIUM CHLORIDE, SODIUM GLUCONATE, SODIUM ACETATE, POTASSIUM CHLORIDE, MAGNESIUM CHLORIDE, SODIUM PHOSPHATE, DIBASIC, AND POTASSIUM PHOSPHATE 1000 ML: .53; .5; .37; .037; .03; .012; .00082 INJECTION, SOLUTION INTRAVENOUS at 08:24

## 2024-12-04 RX ADMIN — MAGNESIUM SULFATE HEPTAHYDRATE 2 G: 40 INJECTION, SOLUTION INTRAVENOUS at 08:25

## 2024-12-04 RX ADMIN — PHENOBARBITAL SODIUM 130 MG: 130 INJECTION INTRAMUSCULAR at 08:24

## 2024-12-04 RX ADMIN — FOLIC ACID 1 MG: 5 INJECTION, SOLUTION INTRAMUSCULAR; INTRAVENOUS; SUBCUTANEOUS at 08:43

## 2024-12-04 RX ADMIN — DIAZEPAM 10 MG: 5 INJECTION INTRAMUSCULAR; INTRAVENOUS at 08:57

## 2024-12-04 RX ADMIN — BUPRENORPHINE AND NALOXONE 8 MG: 8; 2 FILM BUCCAL; SUBLINGUAL at 16:35

## 2024-12-04 RX ADMIN — PHENOBARBITAL SODIUM 260 MG: 130 INJECTION INTRAMUSCULAR; INTRAVENOUS at 09:20

## 2024-12-04 RX ADMIN — QUETIAPINE FUMARATE 100 MG: 100 TABLET ORAL at 21:58

## 2024-12-04 RX ADMIN — Medication 2 TABLET: at 09:45

## 2024-12-04 RX ADMIN — THIAMINE HYDROCHLORIDE 100 MG: 100 INJECTION, SOLUTION INTRAMUSCULAR; INTRAVENOUS at 08:42

## 2024-12-04 RX ADMIN — PANTOPRAZOLE SODIUM 40 MG: 40 INJECTION, POWDER, FOR SOLUTION INTRAVENOUS at 15:03

## 2024-12-04 RX ADMIN — DEXMEDETOMIDINE HYDROCHLORIDE 0.1 MCG/KG/HR: 400 INJECTION INTRAVENOUS at 20:12

## 2024-12-04 RX ADMIN — PHENOBARBITAL SODIUM 260 MG: 130 INJECTION INTRAMUSCULAR; INTRAVENOUS at 16:35

## 2024-12-04 NOTE — ASSESSMENT & PLAN NOTE
Suspected hypovolemic hyponatremia, continue IV fluid, follow-up repeat sodium level.    Will change IV fluid to LR at 125.

## 2024-12-04 NOTE — PLAN OF CARE
Problem: Potential for Falls  Goal: Patient will remain free of falls  Description: INTERVENTIONS:  - Educate patient/family on patient safety including physical limitations  - Instruct patient to call for assistance with activity   - Consult OT/PT to assist with strengthening/mobility   - Keep Call bell within reach  - Keep bed low and locked with side rails adjusted as appropriate  - Keep care items and personal belongings within reach  - Initiate and maintain comfort rounds  - Make Fall Risk Sign visible to staff  - Offer Toileting every 2 Hours, in advance of need  - Initiate/Maintain fall alarm  - Obtain necessary fall risk management equipment: alarm, nonskid footwear, mobility equipment  - Apply yellow socks and bracelet for high fall risk patients  - Consider moving patient to room near nurses station  Outcome: Progressing     Problem: Nutrition/Hydration-ADULT  Goal: Nutrient/Hydration intake appropriate for improving, restoring or maintaining nutritional needs  Description: Monitor and assess patient's nutrition/hydration status for malnutrition. Collaborate with interdisciplinary team and initiate plan and interventions as ordered.  Monitor patient's weight and dietary intake as ordered or per policy. Utilize nutrition screening tool and intervene as necessary. Determine patient's food preferences and provide high-protein, high-caloric foods as appropriate.     INTERVENTIONS:  - Monitor oral intake, urinary output, labs, and treatment plans  - Assess nutrition and hydration status and recommend course of action  - Evaluate amount of meals eaten  - Assist patient with eating if necessary   - Allow adequate time for meals  - Recommend/ encourage appropriate diets, oral nutritional supplements, and vitamin/mineral supplements  - Order, calculate, and assess calorie counts as needed  - Recommend, monitor, and adjust tube feedings and TPN/PPN based on assessed needs  - Assess need for intravenous fluids  -  Provide specific nutrition/hydration education as appropriate  - Include patient/family/caregiver in decisions related to nutrition  Outcome: Progressing     Problem: PAIN - ADULT  Goal: Verbalizes/displays adequate comfort level or baseline comfort level  Description: Interventions:  - Encourage patient to monitor pain and request assistance  - Assess pain using appropriate pain scale  - Administer analgesics based on type and severity of pain and evaluate response  - Implement non-pharmacological measures as appropriate and evaluate response  - Consider cultural and social influences on pain and pain management  - Notify physician/advanced practitioner if interventions unsuccessful or patient reports new pain  Outcome: Progressing     Problem: INFECTION - ADULT  Goal: Absence or prevention of progression during hospitalization  Description: INTERVENTIONS:  - Assess and monitor for signs and symptoms of infection  - Monitor lab/diagnostic results  - Monitor all insertion sites, i.e. indwelling lines, tubes, and drains  - Monitor endotracheal if appropriate and nasal secretions for changes in amount and color  - [place] appropriate cooling/warming therapies per order  - Administer medications as ordered  - Instruct and encourage patient and family to use good hand hygiene technique  - Identify and instruct in appropriate isolation precautions for identified infection/condition  Outcome: Progressing  Goal: Absence of fever/infection during neutropenic period  Description: INTERVENTIONS:  - Monitor WBC    Outcome: Progressing     Problem: SAFETY ADULT  Goal: Maintain or return to baseline ADL function  Description: INTERVENTIONS:  -  Assess patient's ability to carry out ADLs; assess patient's baseline for ADL function and identify physical deficits which impact ability to perform ADLs (bathing, care of mouth/teeth, toileting, grooming, dressing, etc.)  - Assess/evaluate cause of self-care deficits   - Assess range  of motion  - Assess patient's mobility; develop plan if impaired  - Assess patient's need for assistive devices and provide as appropriate  - Encourage maximum independence but intervene and supervise when necessary  - Involve family in performance of ADLs  - Assess for home care needs following discharge   - Consider OT consult to assist with ADL evaluation and planning for discharge  - Provide patient education as appropriate  Outcome: Progressing  Goal: Maintains/Returns to pre admission functional level  Description: INTERVENTIONS:  - Perform AM-PAC 6 Click Basic Mobility/ Daily Activity assessment daily.  - Set and communicate daily mobility goal to care team and patient/family/caregiver.   - Collaborate with rehabilitation services on mobility goals if consulted  - Perform Range of Motion 4 times a day.  - Reposition patient every 2 hours.  - Dangle patient 3 times a day  - Stand patient 3 times a day  - Ambulate patient 3 times a day  - Out of bed to chair 3 times a day   - Out of bed for meals 3 times a day  - Out of bed for toileting  - Record patient progress and toleration of activity level   Outcome: Progressing     Problem: DISCHARGE PLANNING  Goal: Discharge to home or other facility with appropriate resources  Description: INTERVENTIONS:  - Identify barriers to discharge w/patient and caregiver  - Arrange for needed discharge resources and transportation as appropriate  - Identify discharge learning needs (meds, wound care, etc.)  - Arrange for interpretive services to assist at discharge as needed  - Refer to Case Management Department for coordinating discharge planning if the patient needs post-hospital services based on physician/advanced practitioner order or complex needs related to functional status, cognitive ability, or social support system  Outcome: Progressing     Problem: Knowledge Deficit  Goal: Patient/family/caregiver demonstrates understanding of disease process, treatment plan,  medications, and discharge instructions  Description: Complete learning assessment and assess knowledge base.  Interventions:  - Provide teaching at level of understanding  - Provide teaching via preferred learning methods  Outcome: Progressing     Problem: NEUROSENSORY - ADULT  Goal: Achieves stable or improved neurological status  Description: INTERVENTIONS  - Monitor and report changes in neurological status  - Monitor vital signs such as temperature, blood pressure, glucose, and any other labs ordered   - Initiate measures to prevent increased intracranial pressure  - Monitor for seizure activity and implement precautions if appropriate      Outcome: Progressing  Goal: Remains free of injury related to seizures activity  Description: INTERVENTIONS  - Maintain airway, patient safety  and administer oxygen as ordered  - Monitor patient for seizure activity, document and report duration and description of seizure to physician/advanced practitioner  - If seizure occurs,  ensure patient safety during seizure  - Reorient patient post seizure  - Seizure pads on all 4 side rails  - Instruct patient/family to notify RN of any seizure activity including if an aura is experienced  - Instruct patient/family to call for assistance with activity based on nursing assessment  - Administer anti-seizure medications if ordered    Outcome: Progressing  Goal: Achieves maximal functionality and self care  Description: INTERVENTIONS  - Monitor swallowing and airway patency with patient fatigue and changes in neurological status  - Encourage and assist patient to increase activity and self care.   - Encourage visually impaired, hearing impaired and aphasic patients to use assistive/communication devices  Outcome: Progressing     Problem: METABOLIC, FLUID AND ELECTROLYTES - ADULT  Goal: Electrolytes maintained within normal limits  Description: INTERVENTIONS:  - Monitor labs and assess patient for signs and symptoms of electrolyte  imbalances  - Administer electrolyte replacement as ordered  - Monitor response to electrolyte replacements, including repeat lab results as appropriate  - Instruct patient on fluid and nutrition as appropriate  Outcome: Progressing  Goal: Fluid balance maintained  Description: INTERVENTIONS:  - Monitor labs   - Monitor I/O and WT  - Instruct patient on fluid and nutrition as appropriate  - Assess for signs & symptoms of volume excess or deficit  Outcome: Progressing

## 2024-12-04 NOTE — ASSESSMENT & PLAN NOTE
He has received 650 mg phenobarbital. Based on his IBW, he can tolerate more. I ordered an additional 260mg now.  Overall his withdrawal is adequately controlled. Additional 130-260mg doses can be given for objective evidence of withdrawal (HTN, diaphoresis, tremor with tongue fasciculations, etc). Overall his w/d seems adequately controlled and I do not suspect he will progress to more severe manifestations at this point. However, if he does and is approaching 2 grams of phenobarbital, adjuncts such as dexmedetomidine or ketamine can be used.   Continue thiamine, folate, supportive care.

## 2024-12-04 NOTE — ASSESSMENT & PLAN NOTE
Patient with significant withdrawal symptoms, received phenobarbital in the emergency room.    Toxicology consult requested for assistance with acute withdrawal.

## 2024-12-04 NOTE — H&P
H&P - Hospitalist   Name: Aniket Pace 39 y.o. male I MRN: [id number]  Unit/Bed#:  I Date of Admission: 12/4/2024   Date of Service: 12/4/2024 I Hospital Day: 0     Assessment & Plan  Alcohol withdrawal delirium, acute, hyperactive (HCC)  Patient with significant withdrawal symptoms, received phenobarbital in the emergency room.    Toxicology consult requested for assistance with acute withdrawal.  HARRIET (acute kidney injury) (HCC)  Creatinine elevated 1.41, baseline creatinine 0.95.  Avoid nephrotoxins.    Urinary retention protocol, continue IV fluid.  Hyponatremia  Suspected hypovolemic hyponatremia, continue IV fluid, follow-up repeat sodium level.    Will change IV fluid to LR at 125.  Primary hypertension  Patient reportedly not taking Norvasc, will hold Norvasc, start Lopressor 25 mg every 12.  Bipolar I disorder with depression (HCC)  The patient was previously prescribed Seroquel 100 mg daily    Hold Seroquel.  Tobacco use disorder  Nicotine patch ordered  Opioid use        VTE Pharmacologic Prophylaxis:   Moderate Risk (Score 3-4) - Pharmacological DVT Prophylaxis Ordered: enoxaparin (Lovenox).  Code Status: Level 1 - Full Code   Discussion with family: Patient declined call to .     Anticipated Length of Stay: Patient will be admitted on an inpatient basis with an anticipated length of stay of greater than 2 midnights secondary to acute alcohol withdrawal, acute kidney injury, hyponatremia..    History of Present Illness   Chief Complaint: Patient presented due to ongoing alcohol use with desire for detox treatment.    Aniket Pace is a 39 y.o. male with a PMH of as noted below, notable for history of depression, posttraumatic stress disorder, history of substance abuse, chronic tobacco use, chronic alcohol use.  Who presents with presumed symptoms of alcohol withdrawal.    Patient reports last alcoholic drink on 12/3/2024 at approximately 7 PM, previous report of alcohol withdrawal  seizures, patient reports generalized body cramps, patient also reports ongoing throbbing diffuse headache, nausea and vomiting, no hematemesis.    Patient reports ongoing poor p.o. intake for several days, endorses generalized tremors.    Review of Systems   All other systems reviewed and are negative.      Historical Information   Past Medical History:   Diagnosis Date    Chronic pain     Depression     Pelvic fracture (HCC)     PTSD (post-traumatic stress disorder)     Substance abuse (HCC)      Past Surgical History:   Procedure Laterality Date    BONY PELVIS SURGERY       Social History     Tobacco Use    Smoking status: Every Day     Current packs/day: 1.00     Average packs/day: 1 pack/day for 5.4 years (5.4 ttl pk-yrs)     Types: Cigarettes     Start date: 7/29/2019     Passive exposure: Never    Smokeless tobacco: Never   Vaping Use    Vaping status: Former   Substance and Sexual Activity    Alcohol use: Yes     Alcohol/week: 20.0 standard drinks of alcohol     Types: 20 Shots of liquor per week     Comment: 20-30 shots per day    Drug use: Not Currently     Types: Methamphetamines, Marijuana, Cocaine     Comment: Marijuana current use    Sexual activity: Yes     Partners: Female     E-Cigarette/Vaping    E-Cigarette Use Former User     Comments THC, medical       E-Cigarette/Vaping Substances    Nicotine No     THC No     CBD No     Flavoring No     Other No     Unknown No      Family history non-contributory  Social History:  Marital Status: Single     Meds/Allergies   I have reviewed home medications with patient personally.  Prior to Admission medications    Medication Sig Start Date End Date Taking? Authorizing Provider   amLODIPine (NORVASC) 10 mg tablet Take 1 tablet (10 mg total) by mouth daily  Patient not taking: Reported on 12/4/2024 6/12/23   Jason Guerra DO   famotidine (PEPCID) 10 mg tablet Take 1 tablet (10 mg total) by mouth daily  Patient not taking: Reported on 12/4/2024 7/31/24    Anita Lee MD   folic acid (FOLVITE) 1 mg tablet Take 1 tablet (1 mg total) by mouth daily  Patient not taking: Reported on 12/4/2024 7/31/24   Anita Lee MD   metoprolol tartrate (LOPRESSOR) 25 mg tablet Take 1 tablet (25 mg total) by mouth every 12 (twelve) hours 6/12/23 7/12/23  Jason Guerra DO   naloxone (NARCAN) 4 mg/0.1 mL nasal spray Administer 1 spray into a nostril. If no response after 2-3 minutes, give another dose in the other nostril using a new spray.  Patient not taking: Reported on 12/4/2024 2/23/23   Jason Guerra DO   nicotine (NICODERM CQ) 21 mg/24 hr TD 24 hr patch Place 1 patch on the skin over 24 hours daily  Patient not taking: Reported on 12/4/2024 7/31/24   Anita Lee MD   QUEtiapine (SEROquel) 100 mg tablet Take 1 tablet (100 mg total) by mouth daily at bedtime  Patient not taking: Reported on 12/4/2024 5/25/23   Jason Guerra DO   thiamine 100 MG tablet Take 1 tablet (100 mg total) by mouth daily  Patient not taking: Reported on 12/4/2024 7/31/24   Anita Lee MD     Allergies   Allergen Reactions    Penicillin V Other (See Comments)     unknown    Penicillins Hives       Objective :  Temp:  [97.1 °F (36.2 °C)-97.8 °F (36.6 °C)] 97.8 °F (36.6 °C)  HR:  [] 104  BP: (143-229)/() 162/95  Resp:  [18-22] 21  SpO2:  [94 %-98 %] 96 %  O2 Device: None (Room air)    Physical Exam  Vitals and nursing note reviewed.   Constitutional:       General: He is not in acute distress.     Appearance: He is normal weight. He is ill-appearing. He is not toxic-appearing or diaphoretic.   HENT:      Head: Normocephalic and atraumatic.      Right Ear: External ear normal.      Left Ear: External ear normal.      Nose: Nose normal.   Cardiovascular:      Rate and Rhythm: Tachycardia present.   Pulmonary:      Effort: Pulmonary effort is normal. No respiratory distress.      Breath sounds: Normal breath sounds. No wheezing.   Abdominal:  "     General: Bowel sounds are normal. There is no distension.      Palpations: Abdomen is soft.      Tenderness: There is no abdominal tenderness.   Musculoskeletal:         General: No swelling or deformity. Normal range of motion.      Cervical back: Normal range of motion.      Right lower leg: No edema.      Left lower leg: No edema.   Skin:     General: Skin is warm and dry.   Neurological:      General: No focal deficit present.      Mental Status: He is alert and oriented to person, place, and time. Mental status is at baseline.      Cranial Nerves: No cranial nerve deficit.      Motor: No weakness.   Psychiatric:         Mood and Affect: Mood normal.         Behavior: Behavior normal.         Thought Content: Thought content normal.         Judgment: Judgment normal.          Lines/Drains:            Lab Results: I have reviewed the following results:  Results from last 7 days   Lab Units 12/04/24  0824   WBC Thousand/uL 9.89   HEMOGLOBIN g/dL 18.2*   HEMATOCRIT % 51.4*   PLATELETS Thousands/uL 307   SEGS PCT % 69   LYMPHO PCT % 16   MONO PCT % 14*   EOS PCT % 0     Results from last 7 days   Lab Units 12/04/24  0824   SODIUM mmol/L 128*   POTASSIUM mmol/L 3.7   CHLORIDE mmol/L 84*   CO2 mmol/L 27   BUN mg/dL 22   CREATININE mg/dL 1.41*   ANION GAP mmol/L 17*   CALCIUM mg/dL 10.6*   ALBUMIN g/dL 6.9*   TOTAL BILIRUBIN mg/dL 2.15*   ALK PHOS U/L 183*   ALT U/L 46   AST U/L 87*   GLUCOSE RANDOM mg/dL 114             No results found for: \"HGBA1C\"        Imaging Results Review: No pertinent imaging studies reviewed.  Other Study Results Review: EKG was reviewed.     ** Please Note: This note has been constructed using a voice recognition system. **    "

## 2024-12-04 NOTE — CONSULTS
Consultation - Medical Toxicology   Name: Aniket Walden y.o. male I MRN: [id number]  Unit/Bed#:  I Date of Admission: 12/4/2024   Date of Service: 12/4/2024 I Hospital Day: 0   Inpatient consult to Toxicology  Consult performed by: Fabricio Nails DO  Consult ordered by: Abelino Grissom DO        Physician Requesting Evaluation: Abelino Grissom DO   Reason for Evaluation / Principal Problem: Alcohol Withdrawal      TeleConsultation - Medical Toxicology  Aniket Walden y.o. male MRN: [id number]  Unit/Bed#:  Encounter: [id number]      REQUIRED DOCUMENTATION:     1. This service was provided via Telemedicine.  2. Provider located at office.  3. TeleMed provider: Fabricio Nails DO.  4. Identify all parties in room with patient during tele consult:  Patinet only  5. After connecting through [employer], patient was identified by name and date of birth and assistant checked wristband.  Patient was then informed that this was a Telemedicine visit and that the exam was being conducted confidentially over secure lines. My office door was closed. No one else was in the room.  Patient acknowledged consent and understanding of privacy and security of the Telemedicine visit, and gave us permission to have the assistant stay in the room in order to assist with the history and to conduct the exam.  I informed the patient that I have reviewed their record in Epic and presented the opportunity for them to ask any questions regarding the visit today.  The patient agreed to participate.       Assessment & Plan  Opioid use disorder  The patient has been maintained on buprenorphine MOUD for most of the year either via Sublocade or SL. He ran out 3 days ago from an Rx he had from [facility]. He denies using any other opioids.   I will restart his buprenorphine at his outpatient dose.  Morphine was ordered here. I would avoid opioids unless there is a clear indication for them.   Involve warm nguyen off process for  linkage to ongoing MOUD / LUKE treatment after discharge as he does not have a prescriber currently. A Suboxone bridge Rx shouls be sent upon discharge.   Tobacco use disorder    Bipolar I disorder with depression (HCC)    Primary hypertension    HARRIET (acute kidney injury) (HCC)    Hyponatremia    Alcohol withdrawal syndrome with complication (HCC)  He has received 650 mg phenobarbital. Based on his IBW, he can tolerate more. I ordered an additional 260mg now.  Overall his withdrawal is adequately controlled. Additional 130-260mg doses can be given for objective evidence of withdrawal (HTN, diaphoresis, tremor with tongue fasciculations, etc). Overall his w/d seems adequately controlled and I do not suspect he will progress to more severe manifestations at this point. However, if he does and is approaching 2 grams of phenobarbital, adjuncts such as dexmedetomidine or ketamine can be used.   Continue thiamine, folate, supportive care.   I have discussed the above management plan in detail with the primary service.         For further questions, please contact the medical  on call via SecureChat between 8am and 9pm. If between 9pm and 8am, please reach out to the [facility] at [phone].     History of Present Illness   Aniket Pace is a 39 y.o. year old male who presents with alcohol withdrawal. He has been drinking about a half gallon of vodka every 24 hours. His last drink was 7PM yesterday. He developed anxiety, nausea, tremor and sought treatment for withdrawal. He was given about 650mg of phenobarbital prior to my evaluation and states he is feeling a bit better, but still c/o nausea, anxiety. He endorses a h/o ETOH withdrawal seizures. It is also noted that he has been on buprenorphine for OUD with his last dose being 2-3 days ago as his Rx ran out. He denies taking opioids otherwise.     Review of Systems   Constitutional:  Negative for chills and fever.   HENT:  Negative for ear pain and  sore throat.    Eyes:  Negative for pain and visual disturbance.   Respiratory:  Negative for cough and shortness of breath.    Cardiovascular:  Negative for chest pain and palpitations.   Gastrointestinal:  Positive for nausea. Negative for abdominal pain and vomiting.   Genitourinary:  Negative for dysuria and hematuria.   Musculoskeletal:  Negative for arthralgias and back pain.   Skin:  Negative for color change and rash.   Neurological:  Negative for seizures and syncope.   Psychiatric/Behavioral:  The patient is nervous/anxious.    All other systems reviewed and are negative.      Historical Information   I have reviewed the patient's PMH, PSH, Social History, Family History, Meds, and Allergies  Social History     Tobacco Use    Smoking status: Every Day     Current packs/day: 1.00     Average packs/day: 1 pack/day for 5.4 years (5.4 ttl pk-yrs)     Types: Cigarettes     Start date: 7/29/2019     Passive exposure: Never    Smokeless tobacco: Never   Vaping Use    Vaping status: Former   Substance and Sexual Activity    Alcohol use: Yes     Alcohol/week: 20.0 standard drinks of alcohol     Types: 20 Shots of liquor per week     Comment: 20-30 shots per day    Drug use: Not Currently     Types: Methamphetamines, Marijuana, Cocaine     Comment: Marijuana current use    Sexual activity: Yes     Partners: Female     History reviewed. No pertinent family history.    Meds/Allergies   Prior to Admission medications    Medication Sig Start Date End Date Taking? Authorizing Provider   amLODIPine (NORVASC) 10 mg tablet Take 1 tablet (10 mg total) by mouth daily  Patient not taking: Reported on 12/4/2024 6/12/23   Jason Guerra DO   famotidine (PEPCID) 10 mg tablet Take 1 tablet (10 mg total) by mouth daily  Patient not taking: Reported on 12/4/2024 7/31/24   Anita Lee MD   folic acid (FOLVITE) 1 mg tablet Take 1 tablet (1 mg total) by mouth daily  Patient not taking: Reported on 12/4/2024 7/31/24    Anita Lee MD   metoprolol tartrate (LOPRESSOR) 25 mg tablet Take 1 tablet (25 mg total) by mouth every 12 (twelve) hours 6/12/23 7/12/23  Jason Guerra DO   naloxone (NARCAN) 4 mg/0.1 mL nasal spray Administer 1 spray into a nostril. If no response after 2-3 minutes, give another dose in the other nostril using a new spray.  Patient not taking: Reported on 12/4/2024 2/23/23   Jason Guerra DO   nicotine (NICODERM CQ) 21 mg/24 hr TD 24 hr patch Place 1 patch on the skin over 24 hours daily  Patient not taking: Reported on 12/4/2024 7/31/24   Anita Lee MD   QUEtiapine (SEROquel) 100 mg tablet Take 1 tablet (100 mg total) by mouth daily at bedtime  Patient not taking: Reported on 12/4/2024 5/25/23   Jason Guerra DO   thiamine 100 MG tablet Take 1 tablet (100 mg total) by mouth daily  Patient not taking: Reported on 12/4/2024 7/31/24   Anita Lee MD     Current Facility-Administered Medications:     acetaminophen (TYLENOL) tablet 650 mg, 650 mg, Oral, Q6H PRN, Abelino Grissom DO    aluminum-magnesium hydroxide-simethicone (MAALOX) oral suspension 30 mL, 30 mL, Oral, Q6H PRN, Abelino Grissom DO    buprenorphine-naloxone (Suboxone) film 8 mg, 8 mg, Sublingual, BID, Fabricio Nails DO, 8 mg at 12/04/24 1635    enoxaparin (LOVENOX) subcutaneous injection 40 mg, 40 mg, Subcutaneous, Daily, Abelino Grissom DO    [START ON 12/5/2024] folic acid 1 mg, thiamine (VITAMIN B1) 100 mg in sodium chloride 0.9 % 100 mL IV piggyback, , Intravenous, Daily, Abelino Grissom DO    lactated ringers infusion, 125 mL/hr, Intravenous, Continuous, Abelino Grissom DO, Last Rate: 125 mL/hr at 12/04/24 1622, 125 mL/hr at 12/04/24 1622    nicotine (NICODERM CQ) 21 mg/24 hr TD 24 hr patch 21 mg, 21 mg, Transdermal, Once, Drake Boston, , 21 mg at 12/04/24 1134    ondansetron (ZOFRAN) injection 4 mg, 4 mg, Intravenous, Q4H PRN, Abelino Grissom DO, 4 mg at  12/04/24 1503    pantoprazole (PROTONIX) injection 40 mg, 40 mg, Intravenous, Q24H MARIUM, Abelino Grissom DO, 40 mg at 12/04/24 1503    PHENobarbital 260 mg in sodium chloride 0.9 % 100 mL IVPB, 260 mg, Intravenous, Once, Fabricio Nails DO, Last Rate: 200 mL/hr at 12/04/24 1635, 260 mg at 12/04/24 1635    QUEtiapine (SEROquel) tablet 100 mg, 100 mg, Oral, HS, Abelino Grissom DO    trimethobenzamide (TIGAN) IM injection 200 mg, 200 mg, Intramuscular, Q6H PRN, Drake Boston, , 200 mg at 12/04/24 0934   Allergies   Allergen Reactions    Penicillin V Other (See Comments)     unknown    Penicillins Hives       Objective :  Temp:  [97.1 °F (36.2 °C)-97.8 °F (36.6 °C)] 97.6 °F (36.4 °C)  HR:  [] 84  BP: (143-229)/() 150/91  Resp:  [12-22] 12  SpO2:  [94 %-98 %] 94 %  O2 Device: None (Room air)      Intake/Output Summary (Last 24 hours) at 12/4/2024 1656  Last data filed at 12/4/2024 1620  Gross per 24 hour   Intake 2250 ml   Output 400 ml   Net 1850 ml       Physical Exam  Constitutional:       General: He is not in acute distress.     Appearance: Normal appearance.   HENT:      Mouth/Throat:      Mouth: Mucous membranes are moist.   Eyes:      Extraocular Movements: Extraocular movements intact.   Pulmonary:      Effort: Pulmonary effort is normal.   Skin:     Coloration: Skin is not jaundiced.   Neurological:      Mental Status: He is alert and oriented to person, place, and time.      Comments: Mild tongue fasciculations   Psychiatric:         Behavior: Behavior normal.           Lab Results: I have reviewed the following results:  Results from last 7 days   Lab Units 12/04/24  0824   WBC Thousand/uL 9.89   HEMOGLOBIN g/dL 18.2*   HEMATOCRIT % 51.4*   PLATELETS Thousands/uL 307   SEGS PCT % 69   LYMPHO PCT % 16   MONO PCT % 14*   EOS PCT % 0      Results from last 7 days   Lab Units 12/04/24  1555 12/04/24  0824   POTASSIUM mmol/L 3.7 3.7   CHLORIDE mmol/L 90* 84*   CO2 mmol/L 29 27   BUN  mg/dL 18 22   CREATININE mg/dL 0.81 1.41*   CALCIUM mg/dL 9.0 10.6*   ALBUMIN g/dL  --  6.9*   ALK PHOS U/L  --  183*   ALT U/L  --  46   AST U/L  --  87*   MAGNESIUM mg/dL  --  2.0   PHOSPHORUS mg/dL  --  2.9                           Imaging Results Review: No pertinent imaging studies reviewed.  Other Study Results Review: No additional pertinent studies reviewed.    Administrative Statements   I have spent a total time of 30 minutes in caring for this patient on the day of the visit/encounter including Patient and family education, Impressions, Documenting in the medical record, Reviewing / ordering tests, medicine, procedures  , Obtaining or reviewing history  , and Communicating with other healthcare professionals .

## 2024-12-04 NOTE — ASSESSMENT & PLAN NOTE
The patient has been maintained on buprenorphine MOUD. He does't currently have a prescriber, but did get an Rx from [facility] recently. He states he has been taking it and just ran out a couple days ago. He denies any other opioid use besides his buprenorphine.   We will resume bup 8mg BID. The patient had morphine ordered. I would avoid opioids unless there is a clear indication for them now  Recommend warm hand off process to link t ongoing LUKE treatment after discharge.

## 2024-12-04 NOTE — SEPSIS NOTE
Sepsis Note   Aniket Pace 39 y.o. male MRN: [id number]  Unit/Bed#: RM01 Encounter: [id number]       Initial Sepsis Screening       Row Name 12/04/24 1145                Is the patient's history suggestive of a new or worsening infection? No  -JT                  User Key  (r) = Recorded By, (t) = Taken By, (c) = Cosigned By      Initials Name Provider Type    JT Drake Boston DO Physician                        Body mass index is 33.29 kg/m².  Wt Readings from Last 1 Encounters:   12/04/24 105 kg (232 lb)     IBW (Ideal Body Weight): 73 kg    Ideal body weight: 73 kg (160 lb 15 oz)  Adjusted ideal body weight: 85.9 kg (189 lb 5.8 oz)

## 2024-12-04 NOTE — ASSESSMENT & PLAN NOTE
The patient has been maintained on buprenorphine MOUD for most of the year either via Sublocade or SL. He ran out 3 days ago from an Rx he had from [facility]. He denies using any other opioids.   I will restart his buprenorphine at his outpatient dose.  Morphine was ordered here. I would avoid opioids unless there is a clear indication for them.   Involve warm nguyen off process for linkage to ongoing MOUD / LUKE treatment after discharge as he does not have a prescriber currently. A Suboxone bridge Rx shouls be sent upon discharge.

## 2024-12-04 NOTE — ASSESSMENT & PLAN NOTE
The patient has received about 650mg of phenobarbital. That is a bit less than 10mg/kg ideal body weight. Therefore, he has some room for additional dosing.   Will order an additional 260mg now. Additional 130-260mg doses can be given krystal 2-4 hours as needed for ongoing objective evidence of withdrawal (HTN, tachy, diaphoresis, tremor with tongue fasciculations, etc).   If you are approaching 2 grams and the patient is in severe withdrawal, you can consider adjuncts like either dexmedetomidine or ketamine infusion. His withdrawal looks relatively well controlled now. So, I don't suspect he will progress to that point.   Continue supportive care, thiamine, folate

## 2024-12-04 NOTE — ED PROVIDER NOTES
Time reflects when diagnosis was documented in both MDM as applicable and the Disposition within this note       Time User Action Codes Description Comment    12/4/2024  8:47 AM Ludy Drake Add [F10.939] Alcohol withdrawal (HCC)     12/4/2024  8:47 AM Drake Boston Add [R00.0] Tachycardia     12/4/2024  8:47 AM TraDrake tucker Add [I10] HTN (hypertension)     12/4/2024  8:48 AM TraDrake tucker Add [E87.1] Hyponatremia     12/4/2024  8:55 AM Drake Boston Remove [E87.1] Hyponatremia     12/4/2024  9:10 AM TraDrake tucker Add [E87.1] Hyponatremia     12/4/2024  9:11 AM Drake Boston Add [N17.9] HARRIET (acute kidney injury) (HCC)           ED Disposition       ED Disposition   Admit    Condition   Stable    Date/Time   Wed Dec 4, 2024  9:36 AM    Comment   Case was discussed with JAIMEE and the patient's admission status was agreed to be Admission Status: inpatient status to the service of Dr. Carter .               Assessment & Plan       Medical Decision Making  39-year-old male presents for evaluation of presumed alcohol withdrawal.  States was trying to get to detox but was advised that would likely need to be seen in the hospital due to his symptoms.  Patient states daily drinker for a long time of vodka.  States last drink was approximate 24 hours ago.  Patient states has gone through withdrawal before and has had alcohol withdrawal seizures.  Patient feels as though he may have a seizure.  Also complaining of bodily cramping.  Does affirm nausea and vomiting over the past 24 hours.  Poor p.o. intake due to feeling nauseated.  No chest pain, shortness of breath, dizziness.  Noted tremors on hands/arms.  On exam, the patient appears ill but nontoxic.    Check basic labs, IV fluids, EKG, symptom management, management of alcohol withdrawal, thiamine and folate, admission.    Amount and/or Complexity of Data Reviewed  Labs: ordered.    Risk  Prescription drug management.             Medications    trimethobenzamide (TIGAN) IM injection 200 mg (200 mg Intramuscular Given 12/4/24 0934)   PHENobarbital 260 mg in sodium chloride 0.9 % 100 mL IVPB (260 mg Intravenous New Bag 12/4/24 1136)   nicotine (NICODERM CQ) 21 mg/24 hr TD 24 hr patch 21 mg (21 mg Transdermal Medication Applied 12/4/24 1134)   multi-electrolyte (ISOLYTE-S PH 7.4) bolus 1,000 mL (0 mL Intravenous Stopped 12/4/24 0938)   PHENobarbital injection 130 mg (130 mg Intravenous Given 12/4/24 0824)   thiamine (VITAMIN B1) 100 mg in sodium chloride 0.9 % 50 mL IVPB (0 mg Intravenous Stopped 12/4/24 0912)   folic acid 1 mg in sodium chloride 0.9 % 50 mL IVPB (0 mg Intravenous Stopped 12/4/24 0913)   magnesium sulfate 2 g/50 mL IVPB (premix) 2 g (0 g Intravenous Stopped 12/4/24 0855)   dextrose 5 % in lactated Ringer's infusion (125 mL/hr Intravenous New Bag 12/4/24 0858)   PHENobarbital 260 mg in sodium chloride 0.9 % 100 mL IVPB (0 mg Intravenous Stopped 12/4/24 0950)   diazepam (VALIUM) injection 10 mg (10 mg Intravenous Given 12/4/24 0857)   potassium-sodium phosphateS (K-PHOS,PHOSPHA 250) -250 mg tablet 2 tablet (2 tablets Oral Given 12/4/24 0945)   multi-electrolyte (ISOLYTE-S PH 7.4) bolus 1,000 mL (0 mL Intravenous Stopped 12/4/24 1134)   diazepam (VALIUM) injection 10 mg (10 mg Intravenous Given 12/4/24 1134)       ED Risk Strat Scores                CIWA-Ar Score       Row Name 12/04/24 0830             CIWA-Ar    Blood Pressure 229/115      Pulse 117      Nausea and Vomiting 4      Tactile Disturbances 2      Tremor 7      Auditory Disturbances 0      Paroxysmal Sweats 4      Visual Disturbances 0      Anxiety 4      Headache, Fullness in Head 3      Agitation 4      Orientation and Clouding of Sensorium 0      CIWA-Ar Total 28                              SBIRT 22yo+      Flowsheet Row Most Recent Value   Initial Alcohol Screen: US AUDIT-C     1. How often do you have a drink containing alcohol? 6 Filed at: 12/04/2024 0814   2. How  many drinks containing alcohol do you have on a typical day you are drinking?  6  [1/2 gallon vodka daily] Filed at: 12/04/2024 0814   3a. Male UNDER 65: How often do you have five or more drinks on one occasion? 6 Filed at: 12/04/2024 0814   3b. FEMALE Any Age, or MALE 65+: How often do you have 4 or more drinks on one occassion? 6 Filed at: 12/04/2024 0814   Audit-C Score 24 Filed at: 12/04/2024 0814                            History of Present Illness       Chief Complaint   Patient presents with    Delirium Tremens (DTS)     Patient reports that he drinks daily, last drink was 24 hours ago. Pt reports shaking, abdominal cramping, with nausea and vomiting.        Past Medical History:   Diagnosis Date    Chronic pain     Depression     Pelvic fracture (HCC)     PTSD (post-traumatic stress disorder)     Substance abuse (HCC)       Past Surgical History:   Procedure Laterality Date    BONY PELVIS SURGERY        History reviewed. No pertinent family history.   Social History     Tobacco Use    Smoking status: Every Day     Current packs/day: 1.00     Average packs/day: 1 pack/day for 5.4 years (5.4 ttl pk-yrs)     Types: Cigarettes     Start date: 7/29/2019     Passive exposure: Never    Smokeless tobacco: Never   Vaping Use    Vaping status: Former   Substance Use Topics    Alcohol use: Yes     Alcohol/week: 20.0 standard drinks of alcohol     Types: 20 Shots of liquor per week     Comment: 20-30 shots per day    Drug use: Not Currently     Types: Methamphetamines, Marijuana, Cocaine     Comment: Marijuana current use      E-Cigarette/Vaping    E-Cigarette Use Former User     Comments THC, medical        E-Cigarette/Vaping Substances    Nicotine No     THC No     CBD No     Flavoring No     Other No     Unknown No       I have reviewed and agree with the history as documented.     39-year-old male presents for evaluation of presumed alcohol withdrawal.  States was trying to get to detox but was advised that would  likely need to be seen in the hospital due to his symptoms.  Patient states daily drinker for a long time of vodka.  States last drink was approximate 24 hours ago.  Patient states has gone through withdrawal before and has had alcohol withdrawal seizures.  Patient feels as though he may have a seizure.  Also complaining of bodily cramping.  Does affirm nausea and vomiting over the past 24 hours.  Poor p.o. intake due to feeling nauseated.  No chest pain, shortness of breath, dizziness.  Noted tremors on hands/arms.  On exam, the patient appears ill but nontoxic.          Review of Systems   Constitutional:  Negative for activity change, appetite change, chills, diaphoresis, fatigue and fever.   HENT:  Negative for dental problem, ear pain, sore throat, trouble swallowing and voice change.    Eyes:  Negative for pain and visual disturbance.   Respiratory:  Negative for cough, chest tightness, shortness of breath and wheezing.    Cardiovascular:  Negative for chest pain, palpitations and leg swelling.   Gastrointestinal:  Positive for nausea and vomiting. Negative for abdominal pain, anal bleeding, blood in stool, diarrhea and rectal pain.   Endocrine: Negative for polydipsia, polyphagia and polyuria.   Genitourinary:  Negative for difficulty urinating, dysuria, flank pain, frequency, hematuria and urgency.   Musculoskeletal:  Negative for back pain, joint swelling, myalgias, neck pain and neck stiffness.        Cramping   Skin:  Negative for pallor, rash and wound.   Neurological:  Positive for tremors. Negative for dizziness, facial asymmetry, speech difficulty, weakness, light-headedness, numbness and headaches.   Hematological:  Negative for adenopathy.   Psychiatric/Behavioral:  Negative for agitation. The patient is not nervous/anxious.    All other systems reviewed and are negative.          Objective       ED Triage Vitals [12/04/24 0815]   Temperature Pulse Blood Pressure Respirations SpO2 Patient Position -  Orthostatic VS   (!) 97.1 °F (36.2 °C) (!) 117 (!) 229/115 22 98 % Lying      Temp Source Heart Rate Source BP Location FiO2 (%) Pain Score    Temporal Monitor Left arm -- 8      Vitals      Date and Time Temp Pulse SpO2 Resp BP Pain Score FACES Pain Rating User   12/04/24 1130 -- 80 94 % 18 143/75 -- -- PP   12/04/24 1100 -- 91 96 % 20 193/79 -- -- PP   12/04/24 1030 -- 101 98 % 22 217/96 -- -- PP   12/04/24 1000 -- 82 95 % 20 148/89 -- -- PP   12/04/24 0930 -- 86 96 % 22 158/91 -- -- PP   12/04/24 0900 -- 94 95 % 22 199/96 -- -- PP   12/04/24 0838 -- -- -- -- -- 8 -- PP   12/04/24 0830 -- 117 -- -- 229/115 -- -- PP   12/04/24 0815 97.1 °F (36.2 °C) 117 98 % 22 229/115 8 -- PP            Physical Exam  Vitals and nursing note reviewed.   Constitutional:       General: He is not in acute distress.     Appearance: He is well-developed. He is ill-appearing. He is not toxic-appearing or diaphoretic.   HENT:      Head: Normocephalic and atraumatic.      Right Ear: External ear normal.      Left Ear: External ear normal.      Nose: Nose normal. No congestion or rhinorrhea.      Mouth/Throat:      Mouth: Mucous membranes are dry.   Eyes:      General: No visual field deficit or scleral icterus.        Right eye: No discharge.         Left eye: No discharge.      Extraocular Movements: Extraocular movements intact.      Conjunctiva/sclera: Conjunctivae normal.      Pupils: Pupils are equal, round, and reactive to light.   Neck:      Thyroid: No thyromegaly.      Vascular: No JVD.      Trachea: No tracheal deviation.   Cardiovascular:      Rate and Rhythm: Regular rhythm. Tachycardia present.      Pulses: Normal pulses.      Heart sounds: Normal heart sounds, S1 normal and S2 normal. No murmur heard.     No friction rub. No gallop.   Pulmonary:      Effort: Pulmonary effort is normal. No accessory muscle usage, respiratory distress or retractions.      Breath sounds: Normal breath sounds and air entry. No stridor or  decreased air movement. No decreased breath sounds, wheezing, rhonchi or rales.   Chest:      Chest wall: No tenderness.   Abdominal:      General: There is no distension.      Palpations: Abdomen is soft. There is no mass.      Tenderness: There is no abdominal tenderness. There is no guarding or rebound.      Hernia: No hernia is present.   Musculoskeletal:         General: No swelling, tenderness, deformity or signs of injury. Normal range of motion.      Cervical back: Normal range of motion and neck supple. No rigidity.      Right lower leg: No edema.      Left lower leg: No edema.   Lymphadenopathy:      Cervical: No cervical adenopathy.   Skin:     General: Skin is warm and dry.      Capillary Refill: Capillary refill takes less than 2 seconds.      Coloration: Skin is not jaundiced or pale.      Findings: No bruising, erythema, lesion or rash.   Neurological:      General: No focal deficit present.      Mental Status: He is alert and oriented to person, place, and time.      GCS: GCS eye subscore is 4. GCS verbal subscore is 5. GCS motor subscore is 6.      Cranial Nerves: Cranial nerves 2-12 are intact. No cranial nerve deficit, dysarthria or facial asymmetry.      Sensory: Sensation is intact. No sensory deficit.      Motor: Tremor present. No weakness, atrophy, abnormal muscle tone or seizure activity.      Coordination: Coordination is intact. Coordination normal.      Gait: Gait is intact. Gait normal.      Deep Tendon Reflexes: Reflexes are normal and symmetric. Reflexes normal.   Psychiatric:         Attention and Perception: Attention and perception normal.         Mood and Affect: Mood is anxious.         Speech: Speech normal.         Behavior: Behavior normal. Behavior is cooperative.         Thought Content: Thought content normal.         Results Reviewed       Procedure Component Value Units Date/Time    Comprehensive metabolic panel [[id number]]  (Abnormal) Collected: 12/04/24 0824    Lab  Status: Final result Specimen: Blood from Arm, Left Updated: 12/04/24 0910     Sodium 128 mmol/L      Potassium 3.7 mmol/L      Chloride 84 mmol/L      CO2 27 mmol/L      ANION GAP 17 mmol/L      BUN 22 mg/dL      Creatinine 1.41 mg/dL      Glucose 114 mg/dL      Calcium 10.6 mg/dL      AST 87 U/L      ALT 46 U/L      Alkaline Phosphatase 183 U/L      Total Protein 9.4 g/dL      Albumin 6.9 g/dL      Total Bilirubin 2.15 mg/dL      eGFR 63 ml/min/1.73sq m     Narrative:      National Kidney Disease Foundation guidelines for Chronic Kidney Disease (CKD):     Stage 1 with normal or high GFR (GFR > 90 mL/min/1.73 square meters)    Stage 2 Mild CKD (GFR = 60-89 mL/min/1.73 square meters)    Stage 3A Moderate CKD (GFR = 45-59 mL/min/1.73 square meters)    Stage 3B Moderate CKD (GFR = 30-44 mL/min/1.73 square meters)    Stage 4 Severe CKD (GFR = 15-29 mL/min/1.73 square meters)    Stage 5 End Stage CKD (GFR <15 mL/min/1.73 square meters)  Note: GFR calculation is accurate only with a steady state creatinine    Phosphorus [[id number]]  (Normal) Collected: 12/04/24 0824    Lab Status: Final result Specimen: Blood from Arm, Left Updated: 12/04/24 0910     Phosphorus 2.9 mg/dL     Magnesium [[id number]]  (Normal) Collected: 12/04/24 0824    Lab Status: Final result Specimen: Blood from Arm, Left Updated: 12/04/24 0910     Magnesium 2.0 mg/dL     CK [[id number]]  (Normal) Collected: 12/04/24 0824    Lab Status: Final result Specimen: Blood from Arm, Left Updated: 12/04/24 0910     Total  U/L     CBC and differential [[id number]]  (Abnormal) Collected: 12/04/24 0824    Lab Status: Final result Specimen: Blood from Arm, Left Updated: 12/04/24 0835     WBC 9.89 Thousand/uL      RBC 5.90 Million/uL      Hemoglobin 18.2 g/dL      Hematocrit 51.4 %      MCV 87 fL      MCH 30.8 pg      MCHC 35.4 g/dL      RDW 12.8 %      MPV 10.0 fL      Platelets 307 Thousands/uL      nRBC 0 /100 WBCs      Segmented % 69 %      Immature Grans % 1  %      Lymphocytes % 16 %      Monocytes % 14 %      Eosinophils Relative 0 %      Basophils Relative 0 %      Absolute Neutrophils 6.81 Thousands/µL      Absolute Immature Grans 0.05 Thousand/uL      Absolute Lymphocytes 1.57 Thousands/µL      Absolute Monocytes 1.38 Thousand/µL      Eosinophils Absolute 0.04 Thousand/µL      Basophils Absolute 0.04 Thousands/µL             No orders to display       CriticalCare Time    Date/Time: 12/4/2024 9:37 AM    Performed by: Drake Boston DO  Authorized by: Drake Boston DO    Critical care provider statement:     Critical care time (minutes):  50    Critical care time was exclusive of:  Separately billable procedures and treating other patients and teaching time    Critical care was necessary to treat or prevent imminent or life-threatening deterioration of the following conditions:  Renal failure, metabolic crisis and toxidrome    Critical care was time spent personally by me on the following activities:  Blood draw for specimens, obtaining history from patient or surrogate, development of treatment plan with patient or surrogate, evaluation of patient's response to treatment, examination of patient, interpretation of cardiac output measurements, ordering and performing treatments and interventions, ordering and review of laboratory studies, ordering and review of radiographic studies, re-evaluation of patient's condition and review of old charts  Comments:      Severe alcohol withdrawal with concern to progress to delirium tremens.  Treated with phenobarbital IV, Valium IV, thiamine and folate IV, IV fluids for renal failure.  Dextrose for elevated anion gap likely related to alcoholic ketoacidosis.  ECG 12 Lead Documentation Only    Date/Time: 12/4/2024 8:25 AM    Performed by: Drake Boston DO  Authorized by: Drake Boston DO    Indications / Diagnosis:  Alcohol withdrawal  ECG reviewed by me, the ED Provider: yes    Patient  location:  ED  Previous ECG:     Previous ECG:  Compared to current    Comparison ECG info:  7/28/24    Similarity:  Changes noted    Comparison to cardiac monitor: Yes    Interpretation:     Interpretation: abnormal    Rate:     ECG rate:  108    ECG rate assessment: tachycardic    Rhythm:     Rhythm: sinus tachycardia    Ectopy:     Ectopy: none    QRS:     QRS axis:  Normal    QRS intervals:  Normal  Conduction:     Conduction: normal    ST segments:     ST segments:  Non-specific  T waves:     T waves: non-specific    Other findings:     Other findings: prolonged qTc interval        ED Medication and Procedure Management   Prior to Admission Medications   Prescriptions Last Dose Informant Patient Reported? Taking?   QUEtiapine (SEROquel) 100 mg tablet Not Taking  No No   Sig: Take 1 tablet (100 mg total) by mouth daily at bedtime   Patient not taking: Reported on 12/4/2024   amLODIPine (NORVASC) 10 mg tablet Not Taking  No No   Sig: Take 1 tablet (10 mg total) by mouth daily   Patient not taking: Reported on 12/4/2024   famotidine (PEPCID) 10 mg tablet Not Taking  No No   Sig: Take 1 tablet (10 mg total) by mouth daily   Patient not taking: Reported on 12/4/2024   folic acid (FOLVITE) 1 mg tablet Not Taking  No No   Sig: Take 1 tablet (1 mg total) by mouth daily   Patient not taking: Reported on 12/4/2024   metoprolol tartrate (LOPRESSOR) 25 mg tablet   No No   Sig: Take 1 tablet (25 mg total) by mouth every 12 (twelve) hours   naloxone (NARCAN) 4 mg/0.1 mL nasal spray Not Taking  No No   Sig: Administer 1 spray into a nostril. If no response after 2-3 minutes, give another dose in the other nostril using a new spray.   Patient not taking: Reported on 12/4/2024   nicotine (NICODERM CQ) 21 mg/24 hr TD 24 hr patch Not Taking  No No   Sig: Place 1 patch on the skin over 24 hours daily   Patient not taking: Reported on 12/4/2024   thiamine 100 MG tablet Not Taking  No No   Sig: Take 1 tablet (100 mg total) by mouth  daily   Patient not taking: Reported on 12/4/2024      Facility-Administered Medications: None     Patient's Medications   Discharge Prescriptions    No medications on file     No discharge procedures on file.  ED SEPSIS DOCUMENTATION   Time reflects when diagnosis was documented in both MDM as applicable and the Disposition within this note       Time User Action Codes Description Comment    12/4/2024  8:47 AM Drake Boston [F10.939] Alcohol withdrawal (HCC)     12/4/2024  8:47 AM Drake Boston Add [R00.0] Tachycardia     12/4/2024  8:47 AM TraDrake tucker Add [I10] HTN (hypertension)     12/4/2024  8:48 AM TraDrake tucker Add [E87.1] Hyponatremia     12/4/2024  8:55 AM TraDrake tucker Remove [E87.1] Hyponatremia     12/4/2024  9:10 AM TraDrake tucker Add [E87.1] Hyponatremia     12/4/2024  9:11 AM Drake Boston Add [N17.9] HARRIET (acute kidney injury) (HCC)            Initial Sepsis Screening       Row Name 12/04/24 1145                Is the patient's history suggestive of a new or worsening infection? No  -JT                  User Key  (r) = Recorded By, (t) = Taken By, (c) = Cosigned By      Initials Name Provider Type    JT Drake Boston, DO Physician                       Drake Boston, DO  12/04/24 0956       Drake Boston, DO  12/04/24 1041       Drake Boston, DO  12/04/24 1119       Drake Boston, DO  12/04/24 1122       Drake Boston, DO  12/04/24 1146     No - the patient is unable to be screened due to medical condition

## 2024-12-04 NOTE — ASSESSMENT & PLAN NOTE
Creatinine elevated 1.41, baseline creatinine 0.95.  Avoid nephrotoxins.    Urinary retention protocol, continue IV fluid.

## 2024-12-05 ENCOUNTER — APPOINTMENT (INPATIENT)
Dept: RADIOLOGY | Facility: HOSPITAL | Age: 39
DRG: 469 | End: 2024-12-05
Payer: COMMERCIAL

## 2024-12-05 PROBLEM — R06.2 WHEEZING: Status: ACTIVE | Noted: 2024-12-05

## 2024-12-05 LAB
ALBUMIN SERPL BCG-MCNC: 3.7 G/DL (ref 3.5–5)
ALP SERPL-CCNC: 102 U/L (ref 34–104)
ALT SERPL W P-5'-P-CCNC: 26 U/L (ref 7–52)
ANION GAP SERPL CALCULATED.3IONS-SCNC: 10 MMOL/L (ref 4–13)
ANION GAP SERPL CALCULATED.3IONS-SCNC: 7 MMOL/L (ref 4–13)
AST SERPL W P-5'-P-CCNC: 48 U/L (ref 13–39)
BILIRUB SERPL-MCNC: 0.9 MG/DL (ref 0.2–1)
BUN SERPL-MCNC: 23 MG/DL (ref 5–25)
BUN SERPL-MCNC: 23 MG/DL (ref 5–25)
CALCIUM SERPL-MCNC: 8.3 MG/DL (ref 8.4–10.2)
CALCIUM SERPL-MCNC: 8.4 MG/DL (ref 8.4–10.2)
CHLORIDE SERPL-SCNC: 96 MMOL/L (ref 96–108)
CHLORIDE SERPL-SCNC: 98 MMOL/L (ref 96–108)
CO2 SERPL-SCNC: 28 MMOL/L (ref 21–32)
CO2 SERPL-SCNC: 31 MMOL/L (ref 21–32)
CREAT SERPL-MCNC: 0.87 MG/DL (ref 0.6–1.3)
CREAT SERPL-MCNC: 0.87 MG/DL (ref 0.6–1.3)
ERYTHROCYTE [DISTWIDTH] IN BLOOD BY AUTOMATED COUNT: 13.4 % (ref 11.6–15.1)
GFR SERPL CREATININE-BSD FRML MDRD: 110 ML/MIN/1.73SQ M
GFR SERPL CREATININE-BSD FRML MDRD: 110 ML/MIN/1.73SQ M
GLUCOSE SERPL-MCNC: 110 MG/DL (ref 65–140)
GLUCOSE SERPL-MCNC: 92 MG/DL (ref 65–140)
HCT VFR BLD AUTO: 37.2 % (ref 36.5–49.3)
HGB BLD-MCNC: 12.4 G/DL (ref 12–17)
INR PPP: 0.89 (ref 0.85–1.19)
MAGNESIUM SERPL-MCNC: 2.1 MG/DL (ref 1.9–2.7)
MCH RBC QN AUTO: 30.3 PG (ref 26.8–34.3)
MCHC RBC AUTO-ENTMCNC: 33.3 G/DL (ref 31.4–37.4)
MCV RBC AUTO: 91 FL (ref 82–98)
PHOSPHATE SERPL-MCNC: 3.1 MG/DL (ref 2.7–4.5)
PLATELET # BLD AUTO: 175 THOUSANDS/UL (ref 149–390)
PMV BLD AUTO: 10.7 FL (ref 8.9–12.7)
POTASSIUM SERPL-SCNC: 3.7 MMOL/L (ref 3.5–5.3)
POTASSIUM SERPL-SCNC: 3.9 MMOL/L (ref 3.5–5.3)
PROT SERPL-MCNC: 5.6 G/DL (ref 6.4–8.4)
PROTHROMBIN TIME: 12.5 SECONDS (ref 12.3–15)
RBC # BLD AUTO: 4.09 MILLION/UL (ref 3.88–5.62)
SODIUM SERPL-SCNC: 134 MMOL/L (ref 135–147)
SODIUM SERPL-SCNC: 136 MMOL/L (ref 135–147)
WBC # BLD AUTO: 4.16 THOUSAND/UL (ref 4.31–10.16)

## 2024-12-05 PROCEDURE — 80053 COMPREHEN METABOLIC PANEL: CPT | Performed by: INTERNAL MEDICINE

## 2024-12-05 PROCEDURE — 94668 MNPJ CHEST WALL SBSQ: CPT

## 2024-12-05 PROCEDURE — 94664 DEMO&/EVAL PT USE INHALER: CPT

## 2024-12-05 PROCEDURE — 71045 X-RAY EXAM CHEST 1 VIEW: CPT

## 2024-12-05 PROCEDURE — 84100 ASSAY OF PHOSPHORUS: CPT | Performed by: INTERNAL MEDICINE

## 2024-12-05 PROCEDURE — 83735 ASSAY OF MAGNESIUM: CPT | Performed by: INTERNAL MEDICINE

## 2024-12-05 PROCEDURE — 85027 COMPLETE CBC AUTOMATED: CPT | Performed by: INTERNAL MEDICINE

## 2024-12-05 PROCEDURE — 94640 AIRWAY INHALATION TREATMENT: CPT

## 2024-12-05 PROCEDURE — 85610 PROTHROMBIN TIME: CPT | Performed by: INTERNAL MEDICINE

## 2024-12-05 PROCEDURE — 99233 SBSQ HOSP IP/OBS HIGH 50: CPT | Performed by: FAMILY MEDICINE

## 2024-12-05 PROCEDURE — 94760 N-INVAS EAR/PLS OXIMETRY 1: CPT

## 2024-12-05 PROCEDURE — 80048 BASIC METABOLIC PNL TOTAL CA: CPT | Performed by: INTERNAL MEDICINE

## 2024-12-05 RX ORDER — CLONIDINE HYDROCHLORIDE 0.1 MG/1
0.1 TABLET ORAL EVERY 6 HOURS SCHEDULED
Status: DISCONTINUED | OUTPATIENT
Start: 2024-12-05 | End: 2024-12-05

## 2024-12-05 RX ORDER — IPRATROPIUM BROMIDE AND ALBUTEROL SULFATE 2.5; .5 MG/3ML; MG/3ML
3 SOLUTION RESPIRATORY (INHALATION)
Status: DISCONTINUED | OUTPATIENT
Start: 2024-12-05 | End: 2024-12-09 | Stop reason: HOSPADM

## 2024-12-05 RX ORDER — CLONIDINE HYDROCHLORIDE 0.1 MG/1
0.1 TABLET ORAL EVERY 6 HOURS PRN
Status: DISCONTINUED | OUTPATIENT
Start: 2024-12-05 | End: 2024-12-06

## 2024-12-05 RX ORDER — IPRATROPIUM BROMIDE AND ALBUTEROL SULFATE 2.5; .5 MG/3ML; MG/3ML
3 SOLUTION RESPIRATORY (INHALATION)
Status: DISCONTINUED | OUTPATIENT
Start: 2024-12-05 | End: 2024-12-05

## 2024-12-05 RX ORDER — IPRATROPIUM BROMIDE AND ALBUTEROL SULFATE 2.5; .5 MG/3ML; MG/3ML
SOLUTION RESPIRATORY (INHALATION)
Status: COMPLETED
Start: 2024-12-05 | End: 2024-12-05

## 2024-12-05 RX ORDER — NICOTINE 21 MG/24HR
21 PATCH, TRANSDERMAL 24 HOURS TRANSDERMAL DAILY
Status: DISCONTINUED | OUTPATIENT
Start: 2024-12-05 | End: 2024-12-09 | Stop reason: HOSPADM

## 2024-12-05 RX ORDER — PHENOBARBITAL SODIUM 130 MG/ML
65 INJECTION, SOLUTION INTRAMUSCULAR; INTRAVENOUS ONCE
Status: COMPLETED | OUTPATIENT
Start: 2024-12-05 | End: 2024-12-05

## 2024-12-05 RX ORDER — BUDESONIDE 0.5 MG/2ML
0.5 INHALANT ORAL
Status: DISCONTINUED | OUTPATIENT
Start: 2024-12-05 | End: 2024-12-09 | Stop reason: HOSPADM

## 2024-12-05 RX ORDER — ALBUTEROL SULFATE 0.83 MG/ML
2.5 SOLUTION RESPIRATORY (INHALATION) EVERY 4 HOURS PRN
Status: DISCONTINUED | OUTPATIENT
Start: 2024-12-05 | End: 2024-12-09 | Stop reason: HOSPADM

## 2024-12-05 RX ADMIN — NICOTINE 21 MG: 21 PATCH, EXTENDED RELEASE TRANSDERMAL at 09:46

## 2024-12-05 RX ADMIN — PANTOPRAZOLE SODIUM 40 MG: 40 INJECTION, POWDER, FOR SOLUTION INTRAVENOUS at 08:37

## 2024-12-05 RX ADMIN — CLONIDINE HYDROCHLORIDE 0.1 MG: 0.1 TABLET ORAL at 14:00

## 2024-12-05 RX ADMIN — BUDESONIDE 0.5 MG: 0.5 INHALANT RESPIRATORY (INHALATION) at 09:37

## 2024-12-05 RX ADMIN — IPRATROPIUM BROMIDE AND ALBUTEROL SULFATE 3 ML: 2.5; .5 SOLUTION RESPIRATORY (INHALATION) at 19:59

## 2024-12-05 RX ADMIN — BUPRENORPHINE AND NALOXONE 8 MG: 8; 2 FILM BUCCAL; SUBLINGUAL at 20:00

## 2024-12-05 RX ADMIN — QUETIAPINE FUMARATE 100 MG: 100 TABLET ORAL at 21:04

## 2024-12-05 RX ADMIN — DEXMEDETOMIDINE HYDROCHLORIDE 0.4 MCG/KG/HR: 400 INJECTION INTRAVENOUS at 02:48

## 2024-12-05 RX ADMIN — SODIUM CHLORIDE, SODIUM LACTATE, POTASSIUM CHLORIDE, AND CALCIUM CHLORIDE 125 ML/HR: .6; .31; .03; .02 INJECTION, SOLUTION INTRAVENOUS at 00:08

## 2024-12-05 RX ADMIN — IPRATROPIUM BROMIDE AND ALBUTEROL SULFATE 3 ML: .5; 3 SOLUTION RESPIRATORY (INHALATION) at 09:36

## 2024-12-05 RX ADMIN — BUPRENORPHINE AND NALOXONE 8 MG: 8; 2 FILM BUCCAL; SUBLINGUAL at 08:37

## 2024-12-05 RX ADMIN — PHENOBARBITAL SODIUM 65 MG: 130 INJECTION INTRAMUSCULAR at 14:00

## 2024-12-05 RX ADMIN — THIAMINE HYDROCHLORIDE: 100 INJECTION, SOLUTION INTRAMUSCULAR; INTRAVENOUS at 08:37

## 2024-12-05 RX ADMIN — IPRATROPIUM BROMIDE AND ALBUTEROL SULFATE 3 ML: 2.5; .5 SOLUTION RESPIRATORY (INHALATION) at 15:08

## 2024-12-05 RX ADMIN — BUDESONIDE 0.5 MG: 0.5 INHALANT RESPIRATORY (INHALATION) at 19:59

## 2024-12-05 RX ADMIN — DEXMEDETOMIDINE HYDROCHLORIDE 0.5 MCG/KG/HR: 400 INJECTION INTRAVENOUS at 14:23

## 2024-12-05 RX ADMIN — IPRATROPIUM BROMIDE AND ALBUTEROL SULFATE 3 ML: 2.5; .5 SOLUTION RESPIRATORY (INHALATION) at 09:36

## 2024-12-05 NOTE — ASSESSMENT & PLAN NOTE
Creatinine elevated 1.41 on admission, most likely prerenal with HARRIET resolving to baseline with IV fluids

## 2024-12-05 NOTE — ASSESSMENT & PLAN NOTE
This is a 39-year-old male patient with history of substance use disorder, on Sublocade, alcohol use disorder, hypertension, tobacco use who presents with alcohol withdrawal  Status post multiple doses of phenobarbital, initiated on Precedex overnight  Patient currently reports significant improvement in his symptoms, reports headache with mild nausea, no vomiting, denies hallucinations or significant tremulousness  Stable vital signs, no tachycardia  Appreciate toxicology input for ongoing recommendations  Patient is interested in alcohol rehab

## 2024-12-05 NOTE — NURSING NOTE
Pump found paused with rate increased. Patient reports that it would not stop beeping and he pushed a bunch of buttons to get it to stop. Rate corrected and pump data locked.

## 2024-12-05 NOTE — ASSESSMENT & PLAN NOTE
On Sublocade outpatient, apparently had run out 3 days ago   Appreciate toxicology input, patient is restarted on his outpatient dose of buprenorphine while here  Avoid opiates

## 2024-12-05 NOTE — CASE MANAGEMENT
Case Management Discharge Planning Note    Patient name Aniket Pace  Location / MRN [id number]  : 1985 Date 2024       Current Admission Date: 2024  Current Admission Diagnosis:Alcohol withdrawal delirium, acute, hyperactive (HCC)   Patient Active Problem List    Diagnosis Date Noted Date Diagnosed    Wheezing 2024     Hyponatremia 2024     Alcohol withdrawal delirium, acute, hyperactive (HCC) 2024     Alcohol withdrawal syndrome with complication (HCC) 2024     Alcohol withdrawal seizure (HCC) 2024     HARRIET (acute kidney injury) ([facility]) 2024     Non-traumatic rhabdomyolysis 2024     Hypotension due to hypovolemia 2024     Primary insomnia 2024     Alcohol withdrawal seizure without complication ([facility]) 2023     Bipolar I disorder with depression ([facility]) 2023     Primary hypertension 2023     Benzodiazepine dependence ([facility]) 2022     Unsteady gait 2022     Opioid use disorder, moderate, on maintenance therapy, dependence ([facility]) 2022     Alcohol use disorder, severe, dependence ([facility]) 2022     PTSD (post-traumatic stress disorder) 2022     Tobacco use disorder 2022     Transaminitis 2022     Anxiety 2022     Encounter for tobacco use cessation counseling 2022     Opioid dependence with opioid-induced mood disorder ([facility]) 2022     Seizure ([facility]) 2022     Opioid use disorder 2021     Tachycardia 2015       LOS (days): 1  Geometric Mean LOS (GMLOS) (days): 3  Days to GMLOS:1.7     OBJECTIVE:  Risk of Unplanned Readmission Score: 20.8         Current admission status: Inpatient   Preferred Pharmacy:   [id number] - [place] - [address]  [address] [facility] [address]  Phone: [phone] Fax: [phone]    [facility] #[id number] - [place], [place] - [address]  [address]  [facility] [address]  Phone:  [phone] Fax: [phone]    [id number] [address]  [facility] [address]  [address]  [facility] [address]  [facility] [address]  Phone: [phone] Fax: [phone]    [id number] - [place] - [address]  [address] [place] [address]  Phone: [phone] Fax: [phone]    Primary Care Provider: Edenilson Collier MD    Primary Insurance:   Secondary Insurance:     DISCHARGE DETAILS:        Patient initiated D&A in patient process with  [facility] D&A  Stacie Marcos visited patient at  bedside today.   Did assessment and will start bed search once patient is medically stable.

## 2024-12-05 NOTE — QUICK NOTE
Would wean precedex at this time.  Can load with 65 mg IV phenobarbital if anticipate difficulty weaning.  Can also transition to clonidine 0.1 mg every 6-8 hours PRN for anxiety as both precedex and clonidine are alpha2 agonists.    Provided there are no additional medical concerns, the patient may be cleared from a toxicological standpoint by the primary provider at the end of the recommended observation period, if the patient is asymptomatic, with normal mental status and vital signs, and otherwise normal exam. Please call medical  on call immediately to discuss any changes in clinical course or laboratory abnormalities.

## 2024-12-05 NOTE — PLAN OF CARE
Problem: Potential for Falls  Goal: Patient will remain free of falls  Description: INTERVENTIONS:  - Educate patient/family on patient safety including physical limitations  - Instruct patient to call for assistance with activity   - Consult OT/PT to assist with strengthening/mobility   - Keep Call bell within reach  - Keep bed low and locked with side rails adjusted as appropriate  - Keep care items and personal belongings within reach  - Initiate and maintain comfort rounds  - Make Fall Risk Sign visible to staff  - Offer Toileting every 2 Hours, in advance of need  - Initiate/Maintain fall alarm  - Obtain necessary fall risk management equipment: alarm, nonskid footwear, mobility equipment  - Apply yellow socks and bracelet for high fall risk patients  - Consider moving patient to room near nurses station  Outcome: Progressing     Problem: Nutrition/Hydration-ADULT  Goal: Nutrient/Hydration intake appropriate for improving, restoring or maintaining nutritional needs  Description: Monitor and assess patient's nutrition/hydration status for malnutrition. Collaborate with interdisciplinary team and initiate plan and interventions as ordered.  Monitor patient's weight and dietary intake as ordered or per policy. Utilize nutrition screening tool and intervene as necessary. Determine patient's food preferences and provide high-protein, high-caloric foods as appropriate.     INTERVENTIONS:  - Monitor oral intake, urinary output, labs, and treatment plans  - Assess nutrition and hydration status and recommend course of action  - Evaluate amount of meals eaten  - Assist patient with eating if necessary   - Allow adequate time for meals  - Recommend/ encourage appropriate diets, oral nutritional supplements, and vitamin/mineral supplements  - Order, calculate, and assess calorie counts as needed  - Recommend, monitor, and adjust tube feedings and TPN/PPN based on assessed needs  - Assess need for intravenous fluids  -  Provide specific nutrition/hydration education as appropriate  - Include patient/family/caregiver in decisions related to nutrition  Outcome: Progressing     Problem: PAIN - ADULT  Goal: Verbalizes/displays adequate comfort level or baseline comfort level  Description: Interventions:  - Encourage patient to monitor pain and request assistance  - Assess pain using appropriate pain scale  - Administer analgesics based on type and severity of pain and evaluate response  - Implement non-pharmacological measures as appropriate and evaluate response  - Consider cultural and social influences on pain and pain management  - Notify physician/advanced practitioner if interventions unsuccessful or patient reports new pain  Outcome: Progressing     Problem: INFECTION - ADULT  Goal: Absence or prevention of progression during hospitalization  Description: INTERVENTIONS:  - Assess and monitor for signs and symptoms of infection  - Monitor lab/diagnostic results  - Monitor all insertion sites, i.e. indwelling lines, tubes, and drains  - Monitor endotracheal if appropriate and nasal secretions for changes in amount and color  - [place] appropriate cooling/warming therapies per order  - Administer medications as ordered  - Instruct and encourage patient and family to use good hand hygiene technique  - Identify and instruct in appropriate isolation precautions for identified infection/condition  Outcome: Progressing  Goal: Absence of fever/infection during neutropenic period  Description: INTERVENTIONS:  - Monitor WBC    Outcome: Progressing     Problem: SAFETY ADULT  Goal: Maintain or return to baseline ADL function  Description: INTERVENTIONS:  -  Assess patient's ability to carry out ADLs; assess patient's baseline for ADL function and identify physical deficits which impact ability to perform ADLs (bathing, care of mouth/teeth, toileting, grooming, dressing, etc.)  - Assess/evaluate cause of self-care deficits   - Assess range  of motion  - Assess patient's mobility; develop plan if impaired  - Assess patient's need for assistive devices and provide as appropriate  - Encourage maximum independence but intervene and supervise when necessary  - Involve family in performance of ADLs  - Assess for home care needs following discharge   - Consider OT consult to assist with ADL evaluation and planning for discharge  - Provide patient education as appropriate  Outcome: Progressing  Goal: Maintains/Returns to pre admission functional level  Description: INTERVENTIONS:  - Perform AM-PAC 6 Click Basic Mobility/ Daily Activity assessment daily.  - Set and communicate daily mobility goal to care team and patient/family/caregiver.   - Collaborate with rehabilitation services on mobility goals if consulted  - Perform Range of Motion 4 times a day.  - Reposition patient every 2 hours.  - Dangle patient 3 times a day  - Stand patient 3 times a day  - Ambulate patient 3 times a day  - Out of bed to chair 3 times a day   - Out of bed for meals 3 times a day  - Out of bed for toileting  - Record patient progress and toleration of activity level   Outcome: Progressing     Problem: DISCHARGE PLANNING  Goal: Discharge to home or other facility with appropriate resources  Description: INTERVENTIONS:  - Identify barriers to discharge w/patient and caregiver  - Arrange for needed discharge resources and transportation as appropriate  - Identify discharge learning needs (meds, wound care, etc.)  - Arrange for interpretive services to assist at discharge as needed  - Refer to Case Management Department for coordinating discharge planning if the patient needs post-hospital services based on physician/advanced practitioner order or complex needs related to functional status, cognitive ability, or social support system  Outcome: Progressing     Problem: Knowledge Deficit  Goal: Patient/family/caregiver demonstrates understanding of disease process, treatment plan,  medications, and discharge instructions  Description: Complete learning assessment and assess knowledge base.  Interventions:  - Provide teaching at level of understanding  - Provide teaching via preferred learning methods  Outcome: Progressing     Problem: NEUROSENSORY - ADULT  Goal: Achieves stable or improved neurological status  Description: INTERVENTIONS  - Monitor and report changes in neurological status  - Monitor vital signs such as temperature, blood pressure, glucose, and any other labs ordered   - Initiate measures to prevent increased intracranial pressure  - Monitor for seizure activity and implement precautions if appropriate      Outcome: Progressing  Goal: Remains free of injury related to seizures activity  Description: INTERVENTIONS  - Maintain airway, patient safety  and administer oxygen as ordered  - Monitor patient for seizure activity, document and report duration and description of seizure to physician/advanced practitioner  - If seizure occurs,  ensure patient safety during seizure  - Reorient patient post seizure  - Seizure pads on all 4 side rails  - Instruct patient/family to notify RN of any seizure activity including if an aura is experienced  - Instruct patient/family to call for assistance with activity based on nursing assessment  - Administer anti-seizure medications if ordered    Outcome: Progressing  Goal: Achieves maximal functionality and self care  Description: INTERVENTIONS  - Monitor swallowing and airway patency with patient fatigue and changes in neurological status  - Encourage and assist patient to increase activity and self care.   - Encourage visually impaired, hearing impaired and aphasic patients to use assistive/communication devices  Outcome: Progressing     Problem: METABOLIC, FLUID AND ELECTROLYTES - ADULT  Goal: Electrolytes maintained within normal limits  Description: INTERVENTIONS:  - Monitor labs and assess patient for signs and symptoms of electrolyte  imbalances  - Administer electrolyte replacement as ordered  - Monitor response to electrolyte replacements, including repeat lab results as appropriate  - Instruct patient on fluid and nutrition as appropriate  Outcome: Progressing  Goal: Fluid balance maintained  Description: INTERVENTIONS:  - Monitor labs   - Monitor I/O and WT  - Instruct patient on fluid and nutrition as appropriate  - Assess for signs & symptoms of volume excess or deficit  Outcome: Progressing

## 2024-12-05 NOTE — PROGRESS NOTES
Progress Note - Hospitalist   Name: Aniket Pace 39 y.o. male I MRN: [id number]  Unit/Bed#:  I Date of Admission: 12/4/2024   Date of Service: 12/5/2024 I Hospital Day: 1    Assessment & Plan  Alcohol withdrawal delirium, acute, hyperactive (HCC)  This is a 39-year-old male patient with history of substance use disorder, on Sublocade, alcohol use disorder, hypertension, tobacco use who presents with alcohol withdrawal  Status post multiple doses of phenobarbital, initiated on Precedex overnight  Patient currently reports significant improvement in his symptoms, reports headache with mild nausea, no vomiting, denies hallucinations or significant tremulousness  Stable vital signs, no tachycardia  Appreciate toxicology input for ongoing recommendations  Patient is interested in alcohol rehab  HARRIET (acute kidney injury) (HCC)  Creatinine elevated 1.41 on admission, most likely prerenal with HARRIET resolving to baseline with IV fluids    Hyponatremia  Hypovolemic hyponatremia, resolved  Primary hypertension  Patient was initially with accelerated hypertension in setting of active alcohol withdrawal, blood pressure is now low normal  Previously on Norvasc and metoprolol, however noncompliant  Hold antihypertensive medications for now  Bipolar I disorder with depression (HCC)  The patient was previously prescribed Seroquel 100 mg daily  Restarted  Tobacco use disorder  Smoking cessation counseling  Patient with diffuse wheezing on exam, would recommend outpatient pulmonology evaluation, PFTs  Nicotine patch ordered  Opioid use disorder  On Sublocade outpatient, apparently had run out 3 days ago   Appreciate toxicology input, patient is restarted on his outpatient dose of buprenorphine while here  Avoid opiates  Alcohol withdrawal syndrome with complication (HCC)    Wheezing  Diffuse wheezing, considerations for undiagnosed COPD, patient with history of smoking  Will place on respiratory protocol, nebulizer treatments -  hold off from steroids for now with active alcohol withdrawal, normal O2 sats, no dyspnea  Recommend outpatient Pulmonology evaluation    VTE Pharmacologic Prophylaxis:    Lovenox    Mobility:   Basic Mobility Inpatient Raw Score: 22  JH-HLM Goal: 7: Walk 25 feet or more  JH-HLM Achieved: 6: Walk 10 steps or more  JH-HLM Goal achieved. Continue to encourage appropriate mobility.    Patient Centered Rounds: I performed bedside rounds with nursing staff today.   Discussions with Specialists or Other Care Team Provider: Toxicology, CM    Education and Discussions with Family / Patient: patient    Current Length of Stay: 1 day(s)  Current Patient Status: Inpatient   Certification Statement: The patient will continue to require additional inpatient hospital stay due to close monitoring  Discharge Plan: Anticipate discharge in 24-48 hrs to pending    Code Status: Level 1 - Full Code    Subjective   Patient was initiated on Precedex overnight, now reports feeling significantly improved in regards to his withdrawal symptoms.    Objective :  Temp:  [97.6 °F (36.4 °C)-98 °F (36.7 °C)] 98 °F (36.7 °C)  HR:  [] 57  BP: ()/(51-96) 96/54  Resp:  [10-26] 11  SpO2:  [91 %-98 %] 94 %  O2 Device: None (Room air)    Body mass index is 29.3 kg/m².     Input and Output Summary (last 24 hours):     Intake/Output Summary (Last 24 hours) at 12/5/2024 0937  Last data filed at 12/5/2024 0601  Gross per 24 hour   Intake 4731.25 ml   Output 1500 ml   Net 3231.25 ml       Physical Exam  Constitutional:       General: He is not in acute distress.  HENT:      Head: Normocephalic and atraumatic.   Eyes:      Conjunctiva/sclera: Conjunctivae normal.   Cardiovascular:      Rate and Rhythm: Normal rate and regular rhythm.   Pulmonary:      Effort: No respiratory distress.      Breath sounds: Wheezing present. No rales.   Abdominal:      General: There is no distension.      Tenderness: There is no abdominal tenderness. There is no  guarding.   Musculoskeletal:      Right lower leg: No edema.      Left lower leg: No edema.   Skin:     General: Skin is warm and dry.   Neurological:      Mental Status: He is oriented to person, place, and time.         Lines/Drains:              Lab Results: I have reviewed the following results:   Results from last 7 days   Lab Units 12/05/24 0528 12/04/24  0824   WBC Thousand/uL 4.16* 9.89   HEMOGLOBIN g/dL 12.4 18.2*   HEMATOCRIT % 37.2 51.4*   PLATELETS Thousands/uL 175 307   SEGS PCT %  --  69   LYMPHO PCT %  --  16   MONO PCT %  --  14*   EOS PCT %  --  0     Results from last 7 days   Lab Units 12/05/24 0528   SODIUM mmol/L 136   POTASSIUM mmol/L 3.9   CHLORIDE mmol/L 98   CO2 mmol/L 31   BUN mg/dL 23   CREATININE mg/dL 0.87   ANION GAP mmol/L 7   CALCIUM mg/dL 8.4   ALBUMIN g/dL 3.7   TOTAL BILIRUBIN mg/dL 0.90   ALK PHOS U/L 102   ALT U/L 26   AST U/L 48*   GLUCOSE RANDOM mg/dL 92     Results from last 7 days   Lab Units 12/05/24  0528   INR  0.89                   Recent Cultures (last 7 days):         Will review CXR imaging and report      Last 24 Hours Medication List:     Current Facility-Administered Medications:     acetaminophen (TYLENOL) tablet 650 mg, Q6H PRN    albuterol inhalation solution 2.5 mg, Q4H PRN    aluminum-magnesium hydroxide-simethicone (MAALOX) oral suspension 30 mL, Q6H PRN    budesonide (PULMICORT) inhalation solution 0.5 mg, Q12H    buprenorphine-naloxone (Suboxone) film 8 mg, BID    dexmedeTOMIDine (Precedex) 400 mcg in sodium chloride 0.9% 100 mL, Titrated, Last Rate: 0.4 mcg/kg/hr (12/05/24 0248)    enoxaparin (LOVENOX) subcutaneous injection 40 mg, Daily    folic acid 1 mg, thiamine (VITAMIN B1) 100 mg in sodium chloride 0.9 % 100 mL IV piggyback, Daily    ipratropium-albuterol (DUO-NEB) 0.5-2.5 mg/3 mL inhalation solution 3 mL, TID    lactated ringers infusion, Continuous, Last Rate: 125 mL/hr (12/05/24 0008)    LORazepam (ATIVAN) injection 1 mg, Q4H PRN    nicotine  (NICODERM CQ) 21 mg/24 hr TD 24 hr patch 21 mg, Daily    ondansetron (ZOFRAN) injection 4 mg, Q4H PRN    pantoprazole (PROTONIX) injection 40 mg, Q24H MARIUM    QUEtiapine (SEROquel) tablet 100 mg, HS    trimethobenzamide (TIGAN) IM injection 200 mg, Q6H PRN    Administrative Statements   Today, Patient Was Seen By: Sharifa Bennett MD  I have spent a total time of 52 minutes in caring for this patient on the day of the visit/encounter including Prognosis, Risks and benefits of tx options, Importance of tx compliance, Counseling / Coordination of care, Documenting in the medical record, Reviewing / ordering tests, medicine, procedures  , and Communicating with other healthcare professionals .    **Please Note: This note may have been constructed using a voice recognition system.**

## 2024-12-05 NOTE — NURSING NOTE
Patient visibly anxious/tremoring/sweating. Vs Hr 93; RR 26, /96.  Dr Thompson made aware & came to bedside to examine patient. New orders obtained for Precedex gtt. Medication discussed with patient by myself and Dr Thompson.

## 2024-12-05 NOTE — UTILIZATION REVIEW
Initial Clinical Review    Admission: Date/Time/Statement:   Admission Orders (From admission, onward)       Ordered        12/04/24 0937  INPATIENT ADMISSION  Once                          Orders Placed This Encounter   Procedures    INPATIENT ADMISSION     Standing Status:   Standing     Number of Occurrences:   1     Level of Care:   Critical Care [15]     Estimated length of stay:   More than 2 Midnights     Certification:   I certify that inpatient services are medically necessary for this patient for a duration of greater than two midnights. See H&P and MD Progress Notes for additional information about the patient's course of treatment.     ED Arrival Information       Expected   -    Arrival   12/4/2024 08:12    Acuity   Emergent              Means of arrival   Ambulance    Escorted by   [facility]    Service   Hospitalist    Admission type   Emergency              Arrival complaint   Withdrawal             Chief Complaint   Patient presents with    Delirium Tremens (DTS)     Patient reports that he drinks daily, last drink was 24 hours ago. Pt reports shaking, abdominal cramping, with nausea and vomiting.        Initial Presentation: 39 y.o. male to ED by ems admitted Inpatient d/t Alcohol withdrawal delirium, acute, hyperactive.significant withdrawal symptoms, received phenobarbital in the emergency room.Toxicology consult requested for assistance with acute withdrawal. last alcoholic drink on 12/3/2024 at approximately 7 PM, previous report of alcohol withdrawal seizures, patient reports generalized body cramps, patient also reports ongoing throbbing diffuse headache, nausea and vomiting, no hematemesis. reports ongoing poor p.o. intake for several days, endorses generalized tremors.PMH of as noted below, notable for history of depression, posttraumatic stress disorder, history of substance abuse, chronic tobacco use, chronic alcohol use. HARRIET (acute kidney injury)Creatinine elevated 1.41, baseline  creatinine 0.95.  Avoid nephrotoxins.Urinary retention protocol, continue IV fluid.Hyponatremia.Suspected hypovolemic hyponatremia, continue IV fluid, follow-up repeat sodium level.change IV fluid to LR at 125.Primary hypertension,not taking Norvasc, will hold Norvasc, start Lopressor 25 mg every 12.Bipolar I disorder with depression, previously prescribed Seroquel 100 mg daily.Hold Seroquel..CHLOR 84.ANION GAP 17.CREAT 1.41. CA 10.6.AST 87.ALK PHOS 183.TOTAL BILI 2.15.HGB 18.2, HCT 51.4.CIWA 28-30.'S. 'S.IV THIAMINE, IV PPI, IVF'S.    Anticipated Length of Stay/Certification Statement: Patient will be admitted on an inpatient basis with an anticipated length of stay of greater than 2 midnights secondary to acute alcohol withdrawal, acute kidney injury, hyponatremia..     12/4 MED TOXICOLOGY CONSULT:Opioid use disorder.maintained on buprenorphine MOUD for most of the year either via Sublocade or SL. He ran out 3 days ago from an Rx he had from [facility]. He denies using any other opioids. restart his buprenorphine at his outpatient dose.Morphine was ordered here. I would avoid opioids unless there is a clear indication for them. Involve warm nguyen off process for linkage to ongoing MOUD / LUKE treatment after discharge as he does not have a prescriber currently. A Suboxone bridge Rx shouls be sent upon discharge. Tobacco use disorder.Bipolar I disorder with depression.Primary hypertension.HARRIET (acute kidney injury).Hyponatremia.Alcohol withdrawal syndrome with complication. received 650 mg phenobarbital. Based on his IBW, he can tolerate more. I ordered an additional 260mg now.Overall his withdrawal is adequately controlled. Additional 130-260mg doses can be given for objective evidence of withdrawal (HTN, diaphoresis, tremor with tongue fasciculations, etc). Overall his w/d seems adequately controlled and I do not suspect he will progress to more severe manifestations at this point. However, if he does  and is approaching 2 grams of phenobarbital, adjuncts such as dexmedetomidine or ketamine can be used. Continue thiamine, folate, supportive care.     Date: 12/5   Day 2: Overnight more anxious and having tremors and sweating. Diffuse wheezing, considerations for undiagnosed COPD, patient with history of smoking.Nebulizer treatments - hold off from steroids for now with active alcohol withdrawal, normal O2 sats, no dyspnea. Recommend outpatient Pulmonology evaluation.Placed on precedeX drip, patient doing much better. [facility] 1-1-1-1.    ED Treatment-Medication Administration from 12/04/2024 0812 to 12/04/2024 1156         Date/Time Order Dose Route Action     12/04/2024 0824 multi-electrolyte (ISOLYTE-S PH 7.4) bolus 1,000 mL 1,000 mL Intravenous New Bag     12/04/2024 0824 PHENobarbital injection 130 mg 130 mg Intravenous Given     12/04/2024 0842 thiamine (VITAMIN B1) 100 mg in sodium chloride 0.9 % 50 mL IVPB 100 mg Intravenous New Bag     12/04/2024 0843 folic acid 1 mg in sodium chloride 0.9 % 50 mL IVPB 1 mg Intravenous New Bag     12/04/2024 0825 magnesium sulfate 2 g/50 mL IVPB (premix) 2 g 2 g Intravenous New Bag     12/04/2024 0858 dextrose 5 % in lactated Ringer's infusion 125 mL/hr Intravenous New Bag     12/04/2024 0920 PHENobarbital 260 mg in sodium chloride 0.9 % 100 mL IVPB 260 mg Intravenous New Bag     12/04/2024 0934 trimethobenzamide (TIGAN) IM injection 200 mg 200 mg Intramuscular Given     12/04/2024 0857 diazepam (VALIUM) injection 10 mg 10 mg Intravenous Given     12/04/2024 0945 potassium-sodium phosphateS (K-PHOS,PHOSPHA 250) -250 mg tablet 2 tablet 2 tablet Oral Given     12/04/2024 0944 multi-electrolyte (ISOLYTE-S PH 7.4) bolus 1,000 mL 1,000 mL Intravenous New Bag     12/04/2024 1136 PHENobarbital 260 mg in sodium chloride 0.9 % 100 mL IVPB 260 mg Intravenous New Bag     12/04/2024 1134 diazepam (VALIUM) injection 10 mg 10 mg Intravenous Given     12/04/2024 1134 nicotine (NICODERM  CQ) 21 mg/24 hr TD 24 hr patch 21 mg 21 mg Transdermal Medication Applied            Scheduled Medications:  budesonide, 0.5 mg, Nebulization, Q12H  buprenorphine-naloxone, 8 mg, Sublingual, BID  enoxaparin, 40 mg, Subcutaneous, Daily  folic acid 1 mg, thiamine (VITAMIN B1) 100 mg in sodium chloride 0.9 % 100 mL IV piggyback, , Intravenous, Daily  ipratropium-albuterol, 3 mL, Nebulization, TID  nicotine, 21 mg, Transdermal, Daily  pantoprazole, 40 mg, Intravenous, Q24H MARIUM  QUEtiapine, 100 mg, Oral, HS      Continuous IV Infusions:  dexmedetomidine, 0.1-0.7 mcg/kg/hr, Intravenous, Titrated  lactated ringers, 125 mL/hr, Intravenous, Continuous      PRN Meds:  acetaminophen, 650 mg, Oral, Q6H PRN 12/4 X 1  albuterol, 2.5 mg, Nebulization, Q4H PRN  aluminum-magnesium hydroxide-simethicone, 30 mL, Oral, Q6H PRN  LORazepam, 1 mg, Intravenous, Q4H PRN  ondansetron, 4 mg, Intravenous, Q4H PRN 12/4 X 1  trimethobenzamide, 200 mg, Intramuscular, Q6H PRN 12/4 X 1    morphine (MSIR) IR tablet 15 mg  Dose: 15 mg  Freq: Every 6 hours PRN Route: PO  PRN Reason: severe pain  Start: 12/04/24 1411 End: 12/04/24 1654   Admin Instructions:               1508     1654-D/C'd          IS  REG DIET  OOB  CIWA   I&O  CARDIO-PULM MONITORING      ED Triage Vitals [12/04/24 0815]   Temperature Pulse Respirations Blood Pressure SpO2 Pain Score   (!) 97.1 °F (36.2 °C) (!) 117 22 (!) 229/115 98 % 8     Weight (last 2 days)       Date/Time Weight    12/04/24 1205 92.6 (204.2)    12/04/24 0815 105 (232)            Vital Signs (last 3 days)       Date/Time Temp Pulse Resp BP MAP (mmHg) SpO2 O2 Device Patient Position - Orthostatic VS Saul Coma Scale Score CIWA-Ar Total Pain    12/05/24 0938 -- -- -- -- -- 98 % None (Room air) -- -- -- --    12/05/24 0800 -- -- -- -- -- -- -- -- 15 -- No Pain    12/05/24 0735 98 °F (36.7 °C) 57 11 96/54 68 94 % -- -- -- -- --    12/05/24 0600 -- 57 20 96/64 75 97 % -- -- -- -- --    12/05/24 0500 -- 60 10 96/54  72 93 % -- -- -- 1 --    12/05/24 0400 -- 59 11 96/54 70 93 % -- -- 15 1 --    12/05/24 0300 -- 61 12 95/53 66 93 % -- -- -- -- --    12/05/24 0200 -- 58 -- 98/55 -- -- -- -- -- 1 --    12/05/24 0100 -- 65 13 97/54 72 93 % -- -- -- -- --    12/05/24 0000 -- 65 13 88/52 68 96 % -- -- 15 1 No Pain    12/04/24 2300 -- 64 13 95/51 66 91 % -- -- -- -- --    12/04/24 2200 -- 93 25 123/68 88 -- -- -- -- 1 --    12/04/24 2100 -- 89 17 161/72 96 -- -- -- -- 5 --    12/04/24 2033 -- -- -- -- -- -- -- -- -- -- 7    12/04/24 2027 -- -- -- -- -- 95 % None (Room air) -- -- -- --    12/04/24 2000 97.7 °F (36.5 °C) 93 26 196/96 134 95 % -- Lying 15 25 --    12/04/24 1900 -- 89 15 154/85 112 -- -- -- -- -- --    12/04/24 1700 -- 92 17 142/94 113 95 % -- -- -- -- --    12/04/24 1600 -- -- -- -- -- -- -- -- 15 -- --    12/04/24 1520 97.6 °F (36.4 °C) 84 12 150/91 114 94 % None (Room air) Lying -- -- --    12/04/24 1508 -- -- -- -- -- -- -- -- -- -- 8    12/04/24 1205 97.8 °F (36.6 °C) 104 21 162/95 118 96 % None (Room air) Lying 15 30 No Pain    12/04/24 1130 -- 80 18 143/75 102 94 % None (Room air) Lying -- -- --    12/04/24 1100 -- 91 20 193/79 113 96 % None (Room air) Lying -- -- --    12/04/24 1030 -- 101 22 217/96 138 98 % None (Room air) Lying -- -- --    12/04/24 1000 -- 82 20 148/89 111 95 % None (Room air) Lying -- -- --    12/04/24 0930 -- 86 22 158/91 115 96 % None (Room air) Lying -- -- --    12/04/24 0900 -- 94 22 199/96 132 95 % None (Room air) Lying -- -- --    12/04/24 0838 -- -- -- -- -- -- -- -- 15 -- 8    12/04/24 0830 -- 117 -- 229/115 -- -- None (Room air) -- -- 28 --    12/04/24 0815 97.1 °F (36.2 °C) 117 22 229/115 -- 98 % None (Room air) Lying -- -- 8           CIWA-Ar Score       Row Name 12/05/24 0500 12/05/24 0400 12/05/24 0200       CIWA-Ar    BP -- -- 98/55    Pulse -- -- 58    Nausea and Vomiting 0 0 0    Tactile Disturbances 0 0 0    Tremor 1 1 1    Auditory Disturbances 0 0 0    Paroxysmal Sweats 0 0 0     Visual Disturbances 0 0 0    Anxiety 0 0 0    Headache, Fullness in Head 0 0 0    Agitation 0 0 0    Orientation and Clouding of Sensorium 0 0 0    CIWA-Ar Total 1 1 1      Row Name 12/05/24 0000 12/04/24 1205 12/04/24 0830       CIWA-Ar    BP -- -- 229/115    Pulse -- -- 117    Nausea and Vomiting 0 3 4    Tactile Disturbances 0 2 2    Tremor 1 7 7    Auditory Disturbances 0 0 0    Paroxysmal Sweats 0 5 4    Visual Disturbances 0 0 0    Anxiety 0 5 4    Headache, Fullness in Head 0 4 3    Agitation 0 4 4    Orientation and Clouding of Sensorium 0 0 0    CIWA-Ar Total 1 30 28                    Pertinent Labs/Diagnostic Test Results:   Radiology:  XR chest portable    (Results Pending)     Cardiology:  ECG 12 lead   Final Result by Abhi Howard DO (12/04 1432)   Sinus tachycardia   Nonspecific ST and T wave abnormality   Prolonged QT   Abnormal ECG   Confirmed by Abhi Howard ([id number]) on 12/4/2024 2:32:46 PM      Date/Time: 12/4/2024 8:25 AM     Performed by: Drake Boston DO   Authorized by: Drake Boston DO    Indications / Diagnosis:  Alcohol withdrawal   ECG reviewed by me, the ED Provider: yes    Patient location:  ED   Previous ECG:     Previous ECG:  Compared to current     Comparison ECG info:  7/28/24     Similarity:  Changes noted     Comparison to cardiac monitor: Yes    Interpretation:     Interpretation: abnormal    Rate:     ECG rate:  108     ECG rate assessment: tachycardic    Rhythm:     Rhythm: sinus tachycardia    Ectopy:     Ectopy: none    QRS:     QRS axis:  Normal     QRS intervals:  Normal   Conduction:     Conduction: normal    ST segments:     ST segments:  Non-specific   T waves:     T waves: non-specific    Other findings:     Other findings: prolonged qTc interval           Results from last 7 days   Lab Units 12/05/24  0528 12/04/24  0824   WBC Thousand/uL 4.16* 9.89   HEMOGLOBIN g/dL 12.4 18.2*   HEMATOCRIT % 37.2 51.4*   PLATELETS Thousands/uL 175 307    TOTAL NEUT ABS Thousands/µL  --  6.81         Results from last 7 days   Lab Units 12/05/24  0528 12/05/24  0016 12/04/24  1555 12/04/24  0824   SODIUM mmol/L 136 134* 128* 128*   POTASSIUM mmol/L 3.9 3.7 3.7 3.7   CHLORIDE mmol/L 98 96 90* 84*   CO2 mmol/L 31 28 29 27   ANION GAP mmol/L 7 10 9 17*   BUN mg/dL 23 23 18 22   CREATININE mg/dL 0.87 0.87 0.81 1.41*   EGFR ml/min/1.73sq m 110 110 113 63   CALCIUM mg/dL 8.4 8.3* 9.0 10.6*   MAGNESIUM mg/dL 2.1  --   --  2.0   PHOSPHORUS mg/dL 3.1  --   --  2.9     Results from last 7 days   Lab Units 12/05/24  0528 12/04/24  0824   AST U/L 48* 87*   ALT U/L 26 46   ALK PHOS U/L 102 183*   TOTAL PROTEIN g/dL 5.6* 9.4*   ALBUMIN g/dL 3.7 6.9*   TOTAL BILIRUBIN mg/dL 0.90 2.15*         Results from last 7 days   Lab Units 12/05/24  0528 12/05/24  0016 12/04/24  1555 12/04/24  0824   GLUCOSE RANDOM mg/dL 92 110 97 114                   Results from last 7 days   Lab Units 12/04/24  0824   CK TOTAL U/L 187             Results from last 7 days   Lab Units 12/05/24  0528   PROTIME seconds 12.5   INR  0.89       Past Medical History:   Diagnosis Date    Chronic pain     Depression     Pelvic fracture (HCC)     PTSD (post-traumatic stress disorder)     Substance abuse (HCC)      Present on Admission:   Opioid use disorder   Tobacco use disorder   HARRIET (acute kidney injury) (HCC)   Hyponatremia   Bipolar I disorder with depression (HCC)   Primary hypertension   Alcohol withdrawal delirium, acute, hyperactive (HCC)      Admitting Diagnosis: Alcohol withdrawal (HCC) [F10.939]  Delirium tremens (HCC) [F10.931]  Hyponatremia [E87.1]  HTN (hypertension) [I10]  Tachycardia [R00.0]  HARRIET (acute kidney injury) (HCC) [N17.9]  Age/Sex: 39 y.o. male    Network Utilization Review Department  ATTENTION: Please call with any questions or concerns to [phone] and carefully listen to the prompts so that you are directed to the right person. All voicemails are confidential.   For Discharge  needs, contact Care Management DC Support Team at [phone] opt. 2  Send all requests for admission clinical reviews, approved or denied determinations and any other requests to dedicated fax number below belonging to the campus where the patient is receiving treatment. List of dedicated fax numbers for the Facilities:  FACILITY NAME UR FAX NUMBER   ADMISSION DENIALS (Administrative/Medical Necessity) [phone]   DISCHARGE SUPPORT TEAM (NETWORK) [phone]   PARENT CHILD HEALTH (Maternity/NICU/Pediatrics) [phone]   [facility] [phone]   [facility] [phone]   [facility] [phone]   [facility] [phone]   [facility] [phone]   [facility] [phone]   [facility] [phone]   [facility] [phone]   [facility] [phone]   [facility] [phone]   [facility] [phone]   [facility] [place] [phone]

## 2024-12-05 NOTE — QUICK NOTE
Events Overnight  Patent more anxious and having tremors and sweating.   Placed patient on precede drip, patient doing much better.

## 2024-12-05 NOTE — RESPIRATORY THERAPY NOTE
RT Protocol Note  Aniket Pace 39 y.o. male MRN: [id number]  Unit/Bed#:  Encounter: [id number]    Assessment    Principal Problem:    Alcohol withdrawal delirium, acute, hyperactive (HCC)  Active Problems:    Opioid use disorder    Tobacco use disorder    Bipolar I disorder with depression (HCC)    Primary hypertension    HARRIET (acute kidney injury) (HCC)    Hyponatremia    Alcohol withdrawal syndrome with complication (HCC)    Wheezing      Home Pulmonary Medications:  Patient stated he does have a prn Ventolin MDI, he denies oxygen or pap therapy       Past Medical History:   Diagnosis Date    Chronic pain     Depression     Pelvic fracture (HCC)     PTSD (post-traumatic stress disorder)     Substance abuse ([facility])      Social History     Socioeconomic History    Marital status: Single     Spouse name: None    Number of children: None    Years of education: None    Highest education level: None   Occupational History    None   Tobacco Use    Smoking status: Every Day     Current packs/day: 1.00     Average packs/day: 1 pack/day for 5.4 years (5.4 ttl pk-yrs)     Types: Cigarettes     Start date: 7/29/2019     Passive exposure: Never    Smokeless tobacco: Never   Vaping Use    Vaping status: Former   Substance and Sexual Activity    Alcohol use: Yes     Alcohol/week: 20.0 standard drinks of alcohol     Types: 20 Shots of liquor per week     Comment: 20-30 shots per day    Drug use: Not Currently     Types: Methamphetamines, Marijuana, Cocaine     Comment: Marijuana current use    Sexual activity: Yes     Partners: Female   Other Topics Concern    None   Social History Narrative    None     Social Drivers of Health     Financial Resource Strain: Low Risk  (10/22/2024)    Received from [facility]    Overall Financial Resource Strain (CARDIA)     Difficulty of Paying Living Expenses: Not very hard   Food Insecurity: Food Insecurity Present (12/4/2024)    Nursing - Inadequate Food Risk  Classification     Worried About Running Out of Food in the Last Year: Never true     Ran Out of Food in the Last Year: Never true     Ran Out of Food in the Last Year: 2   Transportation Needs: Unmet Transportation Needs (2024)    Nursing - Transportation Risk Classification     Lack of Transportation: Not on file     Lack of Transportation: 1   Physical Activity: Inactive (8/15/2023)    [facility]    Exercise Vital Sign     Days of Exercise per Week: 0 days     Minutes of Exercise per Session: 0 min   Stress: Stress Concern Present (8/15/2023)    [facility]    [employer] - Occupational Stress Questionnaire     Feeling of Stress : To some extent   Social Connections: Unknown (2024)    Received from [facility]    Social [employer]     How often do you feel lonely or isolated from those around you? (Adult - for ages 18 years and over): Not on file   Intimate Partner Violence: Unknown (2024)    Nursing IPS     Feels Physically and Emotionally Safe: Not on file     Physically Hurt by Someone: Not on file     Humiliated or Emotionally Abused by Someone: Not on file     Physically Hurt by Someone: 2     Hurt or Threatened by Someone: 2   Housing Stability: At Risk (2024)    Nursing: Inadequate Housing Risk Classification     Has Housing: Not on file     Worried About Losing Housing: Not on file     Unable to Get Utilities: Not on file     Unable to Pay for Housing in the Last Year: 1     Has Housin       Subjective    Subjective Data: patient denies osb    Objective    Physical Exam:   Assessment Type: During-treatment  General Appearance: Alert, Awake  Respiratory Pattern: Normal  Chest Assessment: Chest expansion symmetrical, Trachea midline  Bilateral Breath Sounds: Expiratory wheezes  Cough:  "Non-productive, Congested, Moist, Strong  O2 Device: room air    Vitals:  Blood pressure 96/54, pulse 57, temperature 98 °F (36.7 °C), resp. rate (!) 11, height 5' 10\" (1.778 m), weight 92.6 kg (204 lb 3.2 oz), SpO2 98%.          Imaging and other studies:     O2 Device: room air     Plan    Respiratory Plan: Mild Distress pathway  Airway Clearance Plan: Incentive Spirometer, Flutter     Resp Comments: Patient received on room air. Patient stated he does have a rescue inhaler Albuteral MDI prn. He does not use any home oxygen or pap therapy. Patient denies any sob but he does have a coarse congested npc. Will instruct and educate patient on the use of the incentive spirometer and the flutter valve to be used Q1H to help promote better bronchial hygiene   "

## 2024-12-05 NOTE — ASSESSMENT & PLAN NOTE
Smoking cessation counseling  Patient with diffuse wheezing on exam, would recommend outpatient pulmonology evaluation, PFTs  Nicotine patch ordered

## 2024-12-05 NOTE — ASSESSMENT & PLAN NOTE
Diffuse wheezing, considerations for undiagnosed COPD, patient with history of smoking  Will place on respiratory protocol, nebulizer treatments - hold off from steroids for now with active alcohol withdrawal, normal O2 sats, no dyspnea  Recommend outpatient Pulmonology evaluation

## 2024-12-05 NOTE — ASSESSMENT & PLAN NOTE
Patient was initially with accelerated hypertension in setting of active alcohol withdrawal, blood pressure is now low normal  Previously on Norvasc and metoprolol, however noncompliant  Hold antihypertensive medications for now

## 2024-12-06 PROBLEM — E43 SEVERE PROTEIN-CALORIE MALNUTRITION (HCC): Status: ACTIVE | Noted: 2024-12-06

## 2024-12-06 PROBLEM — R79.89 ELEVATED LFTS: Status: ACTIVE | Noted: 2024-12-06

## 2024-12-06 LAB
ALBUMIN SERPL BCG-MCNC: 3.5 G/DL (ref 3.5–5)
ALP SERPL-CCNC: 112 U/L (ref 34–104)
ALT SERPL W P-5'-P-CCNC: 81 U/L (ref 7–52)
ANION GAP SERPL CALCULATED.3IONS-SCNC: 7 MMOL/L (ref 4–13)
AST SERPL W P-5'-P-CCNC: 190 U/L (ref 13–39)
BASOPHILS # BLD AUTO: 0.02 THOUSANDS/ÂΜL (ref 0–0.1)
BASOPHILS NFR BLD AUTO: 1 % (ref 0–1)
BILIRUB SERPL-MCNC: 0.55 MG/DL (ref 0.2–1)
BUN SERPL-MCNC: 21 MG/DL (ref 5–25)
CALCIUM SERPL-MCNC: 8.4 MG/DL (ref 8.4–10.2)
CHLORIDE SERPL-SCNC: 102 MMOL/L (ref 96–108)
CO2 SERPL-SCNC: 26 MMOL/L (ref 21–32)
CREAT SERPL-MCNC: 0.83 MG/DL (ref 0.6–1.3)
EOSINOPHIL # BLD AUTO: 0.07 THOUSAND/ÂΜL (ref 0–0.61)
EOSINOPHIL NFR BLD AUTO: 2 % (ref 0–6)
ERYTHROCYTE [DISTWIDTH] IN BLOOD BY AUTOMATED COUNT: 13.8 % (ref 11.6–15.1)
GFR SERPL CREATININE-BSD FRML MDRD: 112 ML/MIN/1.73SQ M
GLUCOSE SERPL-MCNC: 67 MG/DL (ref 65–140)
HCT VFR BLD AUTO: 36.5 % (ref 36.5–49.3)
HGB BLD-MCNC: 11.5 G/DL (ref 12–17)
IMM GRANULOCYTES # BLD AUTO: 0.01 THOUSAND/UL (ref 0–0.2)
IMM GRANULOCYTES NFR BLD AUTO: 0 % (ref 0–2)
LYMPHOCYTES # BLD AUTO: 1.2 THOUSANDS/ÂΜL (ref 0.6–4.47)
LYMPHOCYTES NFR BLD AUTO: 33 % (ref 14–44)
MAGNESIUM SERPL-MCNC: 2.1 MG/DL (ref 1.9–2.7)
MCH RBC QN AUTO: 30.4 PG (ref 26.8–34.3)
MCHC RBC AUTO-ENTMCNC: 31.5 G/DL (ref 31.4–37.4)
MCV RBC AUTO: 97 FL (ref 82–98)
MONOCYTES # BLD AUTO: 0.34 THOUSAND/ÂΜL (ref 0.17–1.22)
MONOCYTES NFR BLD AUTO: 9 % (ref 4–12)
NEUTROPHILS # BLD AUTO: 2.02 THOUSANDS/ÂΜL (ref 1.85–7.62)
NEUTS SEG NFR BLD AUTO: 55 % (ref 43–75)
NRBC BLD AUTO-RTO: 0 /100 WBCS
PLATELET # BLD AUTO: 136 THOUSANDS/UL (ref 149–390)
PMV BLD AUTO: 11.1 FL (ref 8.9–12.7)
POTASSIUM SERPL-SCNC: 4.4 MMOL/L (ref 3.5–5.3)
PROT SERPL-MCNC: 5.1 G/DL (ref 6.4–8.4)
RBC # BLD AUTO: 3.78 MILLION/UL (ref 3.88–5.62)
SODIUM SERPL-SCNC: 135 MMOL/L (ref 135–147)
WBC # BLD AUTO: 3.66 THOUSAND/UL (ref 4.31–10.16)

## 2024-12-06 PROCEDURE — 94760 N-INVAS EAR/PLS OXIMETRY 1: CPT

## 2024-12-06 PROCEDURE — 94640 AIRWAY INHALATION TREATMENT: CPT

## 2024-12-06 PROCEDURE — 94668 MNPJ CHEST WALL SBSQ: CPT

## 2024-12-06 PROCEDURE — 99232 SBSQ HOSP IP/OBS MODERATE 35: CPT | Performed by: FAMILY MEDICINE

## 2024-12-06 PROCEDURE — 83735 ASSAY OF MAGNESIUM: CPT | Performed by: FAMILY MEDICINE

## 2024-12-06 PROCEDURE — 80053 COMPREHEN METABOLIC PANEL: CPT | Performed by: FAMILY MEDICINE

## 2024-12-06 PROCEDURE — 85025 COMPLETE CBC W/AUTO DIFF WBC: CPT | Performed by: FAMILY MEDICINE

## 2024-12-06 PROCEDURE — 94664 DEMO&/EVAL PT USE INHALER: CPT

## 2024-12-06 RX ORDER — GABAPENTIN 300 MG/1
300 CAPSULE ORAL 3 TIMES DAILY PRN
Status: DISCONTINUED | OUTPATIENT
Start: 2024-12-06 | End: 2024-12-08

## 2024-12-06 RX ORDER — LORAZEPAM 1 MG/1
2 TABLET ORAL EVERY 4 HOURS PRN
Status: DISCONTINUED | OUTPATIENT
Start: 2024-12-06 | End: 2024-12-09 | Stop reason: HOSPADM

## 2024-12-06 RX ORDER — IBUPROFEN 600 MG/1
600 TABLET, FILM COATED ORAL EVERY 6 HOURS PRN
Status: DISCONTINUED | OUTPATIENT
Start: 2024-12-06 | End: 2024-12-07

## 2024-12-06 RX ORDER — SODIUM CHLORIDE, SODIUM LACTATE, POTASSIUM CHLORIDE, CALCIUM CHLORIDE 600; 310; 30; 20 MG/100ML; MG/100ML; MG/100ML; MG/100ML
100 INJECTION, SOLUTION INTRAVENOUS CONTINUOUS
Status: DISCONTINUED | OUTPATIENT
Start: 2024-12-06 | End: 2024-12-07

## 2024-12-06 RX ORDER — LORAZEPAM 1 MG/1
2 TABLET ORAL EVERY 6 HOURS PRN
Status: DISCONTINUED | OUTPATIENT
Start: 2024-12-06 | End: 2024-12-06

## 2024-12-06 RX ORDER — LORAZEPAM 1 MG/1
2 TABLET ORAL ONCE
Status: COMPLETED | OUTPATIENT
Start: 2024-12-06 | End: 2024-12-06

## 2024-12-06 RX ORDER — TRAZODONE HYDROCHLORIDE 50 MG/1
50 TABLET, FILM COATED ORAL
Status: DISCONTINUED | OUTPATIENT
Start: 2024-12-06 | End: 2024-12-09 | Stop reason: HOSPADM

## 2024-12-06 RX ORDER — CLONIDINE HYDROCHLORIDE 0.1 MG/1
0.1 TABLET ORAL EVERY 8 HOURS SCHEDULED
Status: DISCONTINUED | OUTPATIENT
Start: 2024-12-06 | End: 2024-12-09 | Stop reason: HOSPADM

## 2024-12-06 RX ORDER — DIAPER,BRIEF,INFANT-TODD,DISP
EACH MISCELLANEOUS 4 TIMES DAILY PRN
Status: DISCONTINUED | OUTPATIENT
Start: 2024-12-06 | End: 2024-12-09 | Stop reason: HOSPADM

## 2024-12-06 RX ORDER — DIAZEPAM 5 MG/1
5 TABLET ORAL EVERY 8 HOURS PRN
Status: DISCONTINUED | OUTPATIENT
Start: 2024-12-06 | End: 2024-12-06

## 2024-12-06 RX ORDER — LORAZEPAM 2 MG/ML
4 INJECTION INTRAMUSCULAR ONCE
Status: COMPLETED | OUTPATIENT
Start: 2024-12-06 | End: 2024-12-06

## 2024-12-06 RX ORDER — IBUPROFEN 600 MG/1
600 TABLET, FILM COATED ORAL EVERY 6 HOURS PRN
Status: DISCONTINUED | OUTPATIENT
Start: 2024-12-06 | End: 2024-12-06

## 2024-12-06 RX ORDER — ACETAMINOPHEN 325 MG/1
650 TABLET ORAL EVERY 6 HOURS PRN
Status: DISCONTINUED | OUTPATIENT
Start: 2024-12-06 | End: 2024-12-09 | Stop reason: HOSPADM

## 2024-12-06 RX ORDER — HYDROXYZINE HYDROCHLORIDE 25 MG/1
50 TABLET, FILM COATED ORAL EVERY 6 HOURS PRN
Status: DISCONTINUED | OUTPATIENT
Start: 2024-12-06 | End: 2024-12-08

## 2024-12-06 RX ADMIN — CLONIDINE HYDROCHLORIDE 0.1 MG: 0.1 TABLET ORAL at 14:17

## 2024-12-06 RX ADMIN — BUPRENORPHINE AND NALOXONE 8 MG: 8; 2 FILM BUCCAL; SUBLINGUAL at 08:38

## 2024-12-06 RX ADMIN — NICOTINE 21 MG: 21 PATCH, EXTENDED RELEASE TRANSDERMAL at 08:39

## 2024-12-06 RX ADMIN — BUDESONIDE 0.5 MG: 0.5 INHALANT RESPIRATORY (INHALATION) at 07:42

## 2024-12-06 RX ADMIN — LORAZEPAM 2 MG: 1 TABLET ORAL at 17:07

## 2024-12-06 RX ADMIN — IPRATROPIUM BROMIDE AND ALBUTEROL SULFATE 3 ML: 2.5; .5 SOLUTION RESPIRATORY (INHALATION) at 20:33

## 2024-12-06 RX ADMIN — CLONIDINE HYDROCHLORIDE 0.1 MG: 0.1 TABLET ORAL at 11:27

## 2024-12-06 RX ADMIN — THIAMINE HYDROCHLORIDE: 100 INJECTION, SOLUTION INTRAMUSCULAR; INTRAVENOUS at 08:47

## 2024-12-06 RX ADMIN — GABAPENTIN 300 MG: 300 CAPSULE ORAL at 11:50

## 2024-12-06 RX ADMIN — LORAZEPAM 1 MG: 2 INJECTION INTRAMUSCULAR; INTRAVENOUS at 09:33

## 2024-12-06 RX ADMIN — ACETAMINOPHEN 650 MG: 325 TABLET, FILM COATED ORAL at 22:11

## 2024-12-06 RX ADMIN — LORAZEPAM 4 MG: 2 INJECTION INTRAMUSCULAR; INTRAVENOUS at 22:11

## 2024-12-06 RX ADMIN — ACETAMINOPHEN 650 MG: 325 TABLET, FILM COATED ORAL at 05:15

## 2024-12-06 RX ADMIN — HYDROXYZINE HYDROCHLORIDE 50 MG: 25 TABLET ORAL at 15:57

## 2024-12-06 RX ADMIN — LORAZEPAM 2 MG: 1 TABLET ORAL at 13:28

## 2024-12-06 RX ADMIN — BUDESONIDE 0.5 MG: 0.5 INHALANT RESPIRATORY (INHALATION) at 20:33

## 2024-12-06 RX ADMIN — QUETIAPINE FUMARATE 100 MG: 100 TABLET ORAL at 22:11

## 2024-12-06 RX ADMIN — GABAPENTIN 300 MG: 300 CAPSULE ORAL at 21:03

## 2024-12-06 RX ADMIN — BUPRENORPHINE AND NALOXONE 8 MG: 8; 2 FILM BUCCAL; SUBLINGUAL at 20:24

## 2024-12-06 RX ADMIN — LORAZEPAM 2 MG: 1 TABLET ORAL at 19:45

## 2024-12-06 RX ADMIN — DEXMEDETOMIDINE HYDROCHLORIDE 0.4 MCG/KG/HR: 400 INJECTION INTRAVENOUS at 00:18

## 2024-12-06 RX ADMIN — IPRATROPIUM BROMIDE AND ALBUTEROL SULFATE 3 ML: 2.5; .5 SOLUTION RESPIRATORY (INHALATION) at 14:20

## 2024-12-06 RX ADMIN — PANTOPRAZOLE SODIUM 40 MG: 40 INJECTION, POWDER, FOR SOLUTION INTRAVENOUS at 08:47

## 2024-12-06 RX ADMIN — CLONIDINE HYDROCHLORIDE 0.1 MG: 0.1 TABLET ORAL at 21:00

## 2024-12-06 RX ADMIN — SODIUM CHLORIDE, SODIUM LACTATE, POTASSIUM CHLORIDE, AND CALCIUM CHLORIDE 125 ML/HR: .6; .31; .03; .02 INJECTION, SOLUTION INTRAVENOUS at 08:44

## 2024-12-06 RX ADMIN — SODIUM CHLORIDE, SODIUM LACTATE, POTASSIUM CHLORIDE, AND CALCIUM CHLORIDE 100 ML/HR: .6; .31; .03; .02 INJECTION, SOLUTION INTRAVENOUS at 18:12

## 2024-12-06 RX ADMIN — SODIUM CHLORIDE, SODIUM LACTATE, POTASSIUM CHLORIDE, AND CALCIUM CHLORIDE 100 ML/HR: .6; .31; .03; .02 INJECTION, SOLUTION INTRAVENOUS at 11:51

## 2024-12-06 RX ADMIN — CLONIDINE HYDROCHLORIDE 0.1 MG: 0.1 TABLET ORAL at 05:15

## 2024-12-06 RX ADMIN — IPRATROPIUM BROMIDE AND ALBUTEROL SULFATE 3 ML: 2.5; .5 SOLUTION RESPIRATORY (INHALATION) at 07:42

## 2024-12-06 NOTE — QUICK NOTE
Patient is on 8 mg suboxone maintenance BID  Received total since admission: 20 mg diazepam, 975 mg phenobarbital.  Encourage weaning of precedex and relying on adjuncts as this time.    Adjunctive medications administered as needed:  Clonidine 0.1 mg PO Q6 hours PRN anxiety or palpitations    Gabapentin 300mg PO Q8 hours PRN anxiety    Hydroxyzine 50 mg PO Q6 PRN anxiety   Diazepam 5 mg p.o. or IV q8 PRN refractory anxiety  Ibuprofen 600 mg PO Q6 hours PRN pain    Acetaminophen 1000mg PO Q8 hours PRN pain    Ondansetron 4 mg PO Q6 hours PRN N/V    Reglan 5-10 mg IV q8 p.r.n. refractory nausea vomiting  Nicotine patch 7, 14, 21 mg  PRN nicotine withdrawal   Trazodone 50 mg PO QHS PRN sleep    Loperamide 4 mg PO PRN diarrhea up to 16 mg/day

## 2024-12-06 NOTE — MALNUTRITION/BMI
This medical record reflects one or more clinical indicators suggestive of malnutrition.    Malnutrition Findings:   Adult Malnutrition type: Acute illness  Adult Degree of Malnutrition: Other severe protein calorie malnutrition  Malnutrition Characteristics: Inadequate energy, Weight loss                360 Statement: Severe PCM in light of significant weight loss x 2 months of 26#/11% due to increase alcohol intake and decrease po intake and poor nutrition per patient.  Treated with regular diet/double portions.    BMI Findings:           Body mass index is 29.3 kg/m².     See Nutrition note dated 12/6/2024 for additional details.  Completed nutrition assessment is viewable in the nutrition documentation.

## 2024-12-06 NOTE — ASSESSMENT & PLAN NOTE
Diffuse wheezing, considerations for undiagnosed COPD, patient with history of smoking  Improved with nebulizer treatments  Recommend outpatient Pulmonology evaluation

## 2024-12-06 NOTE — CASE MANAGEMENT
Case Management Assessment & Discharge Planning Note    Patient name Aniket Pace  Location / MRN [id number]  : 1985 Date 2024       Current Admission Date: 2024  Current Admission Diagnosis:Alcohol withdrawal delirium, acute, hyperactive (HCC)   Patient Active Problem List    Diagnosis Date Noted Date Diagnosed    Wheezing 2024     Hyponatremia 2024     Alcohol withdrawal delirium, acute, hyperactive (HCC) 2024     Alcohol withdrawal syndrome with complication (HCC) 2024     Alcohol withdrawal seizure (HCC) 2024     HARRIET (acute kidney injury) (formerly [facility]) 2024     Non-traumatic rhabdomyolysis 2024     Hypotension due to hypovolemia 2024     Primary insomnia 2024     Alcohol withdrawal seizure without complication (formerly [facility]) 2023     Bipolar I disorder with depression (formerly [facility]) 2023     Primary hypertension 2023     Benzodiazepine dependence (formerly [facility]) 2022     Unsteady gait 2022     Opioid use disorder, moderate, on maintenance therapy, dependence (formerly [facility]) 2022     Alcohol use disorder, severe, dependence (formerly [facility]) 2022     PTSD (post-traumatic stress disorder) 2022     Tobacco use disorder 2022     Transaminitis 2022     Anxiety 2022     Encounter for tobacco use cessation counseling 2022     Opioid dependence with opioid-induced mood disorder (formerly [facility]) 2022     Seizure (formerly [facility]) 2022     Opioid use disorder 2021     Tachycardia 2015       LOS (days): 2  Geometric Mean LOS (GMLOS) (days): 3  Days to GMLOS:1     OBJECTIVE:    Risk of Unplanned Readmission Score: 22.69         Current admission status: Inpatient  Referral Reason:  (discharge planning)    Preferred Pharmacy:   [id number] - [place] - [address]  [address] [facility] [address]  Phone: [phone] Fax: [phone]    [facility] [id number] [place] [address] - [facility] [place] [address]  [address] [facility] [address]  Phone: [phone] Fax: [phone]    [id number] [place] - [address]  [address]  [facility] [address]  Phone: [phone] Fax: [phone]    [id number] [place] - [address]  [address] [place] [address]  Phone: [phone] Fax: [phone]    Primary Care Provider: Edenilson Collier MD    Primary Insurance:   Secondary Insurance:     ASSESSMENT:    CM met with patient at the bedside,baseline information  was obtained. CM discussed the role of CM in helping the patient develop a discharge plan and assist the patient in carry out their plan.    Patient lives alone first floor apt.    Patient called [facility]  D&A yesterday himself and  Stacie DICKENS saw  patient at  bedside did assessment and is working on placement and MA insurance.    Once patient is medically stable Stacie will start bed search for D&A.        Active Health Care Proxies       SanjeevELIE Health Care Agent - Brother   Primary Phone: [phone] (Mobile)                   Patient Information  During Assessment patient was accompanied by: Not accompanied during assessment  Assessment information provided by:: Patient  Primary Caregiver: Self  County of Residence: [facility]  What city do you live in?: [facility]  Home entry access options. Select all that apply.: Stairs  Number of steps to enter home.: 4  Do the steps have railings?: Yes  Type of Current Residence: Apartment  Floor Level: 1  Upon entering residence, is there a bedroom on the main floor (no further steps)?: Yes  Upon entering residence, is there a bathroom on the main floor (no further steps)?: Yes  Living Arrangements: Lives Alone  Is patient a ?: No    Activities of Daily Living Prior to Admission  Functional Status: Independent  Completes ADLs independently?: Yes  Ambulates independently?: Yes  Does patient use assisted devices?: No  Does patient  currently own DME?: No  Does patient have a history of Outpatient Therapy (PT/OT)?: No  Does the patient have a history of Short-Term Rehab?: No  Does patient have a history of HHC?: No  Does patient currently have HHC?: No         Patient Information Continued  Income Source: Employed  Does patient have prescription coverage?: Yes  Does patient receive dialysis treatments?: No  Does patient have a history of substance abuse?: Yes  Historical substance use preference: Alcohol/ETOH  History of Withdrawal Symptoms: Other withdrawal symptoms (specify in comment)  Is patient currently in treatment for substance abuse?: Yes  Does patient have a history of Mental Health Diagnosis?: Yes (anxiety depression)  Is patient receiving treatment for mental health?: Yes  Has patient received inpatient treatment related to mental health in the last 2 years?: No (greater than 2 years)         Means of Transportation  Means of Transport to [facility]:: Drives Self          DISCHARGE DETAILS:    Discharge planning discussed with:: patient  Freedom of Choice: Yes  Comments - Freedom of Choice: patient in process of D&A in patient placement once medically stable  CM contacted family/caregiver?: No- see comments (declined)  Were Treatment Team discharge recommendations reviewed with patient/caregiver?: Yes  Did patient/caregiver verbalize understanding of patient care needs?: Yes  Were patient/caregiver advised of the risks associated with not following Treatment Team discharge recommendations?: Yes      Requested Home Health Care         Is the patient interested in HHC at discharge?: No    DME Referral Provided  Referral made for DME?: No    Would you like to participate in our [facility] service program?  : No - Declined    Treatment Team Recommendation: Substance Abuse Treatment  Discharge Destination Plan:: Substance Abuse Treatment  Transport at Discharge : Behavioral Health Transfer       Medical information emailed to Stacie  Hemant at [facility] D&A last evening for D&A placement.    CM to follow for discharge needs.

## 2024-12-06 NOTE — ASSESSMENT & PLAN NOTE
This is a 39-year-old male patient with history of substance use disorder, on Sublocade, alcohol use disorder, hypertension, tobacco use who presents with alcohol withdrawal  Status post multiple doses of phenobarbital, was initiated on Precedex overnight 12/4-12/5  Appreciate Toxicology recommendations  Wean off Precedex utilize supportive medication regimen  Patient is interested in alcohol rehab

## 2024-12-06 NOTE — ASSESSMENT & PLAN NOTE
Creatinine elevated 1.41 on admission, most likely prerenal with HARRIET resolving to baseline with IV fluids  Resolved with Cr of 0.8, decreased IVF rate

## 2024-12-06 NOTE — PROGRESS NOTES
Progress Note - Hospitalist   Name: Aniket Pace 39 y.o. male I MRN: [id number]  Unit/Bed#:  I Date of Admission: 12/4/2024   Date of Service: 12/6/2024 I Hospital Day: 2    Assessment & Plan  Alcohol withdrawal delirium, acute, hyperactive (HCC)  This is a 39-year-old male patient with history of substance use disorder, on Sublocade, alcohol use disorder, hypertension, tobacco use who presents with alcohol withdrawal  Status post multiple doses of phenobarbital, was initiated on Precedex overnight 12/4-12/5  Appreciate Toxicology recommendations  Wean off Precedex utilize supportive medication regimen  Patient is interested in alcohol rehab  HARRIET (acute kidney injury) (HCC)  Creatinine elevated 1.41 on admission, most likely prerenal with HARRIET resolving to baseline with IV fluids  Resolved with Cr of 0.8, decreased IVF rate    Hyponatremia  Hypovolemic hyponatremia, resolved  Primary hypertension  Patient was initially with accelerated hypertension in setting of active alcohol withdrawal, blood pressure is now low normal  Previously on Norvasc and metoprolol, however noncompliant  Hold antihypertensive medications for now  Bipolar I disorder with depression (HCC)  The patient was previously prescribed Seroquel 100 mg daily  Restarted  Tobacco use disorder  Smoking cessation counseling  Patient with diffuse wheezing on exam, would recommend outpatient pulmonology evaluation, PFTs  Nicotine patch ordered  Opioid use disorder  On Sublocade outpatient, apparently had run out 3 days ago   Appreciate toxicology input, patient is restarted on his outpatient dose of buprenorphine while here  Avoid opiates  Alcohol withdrawal syndrome with complication (HCC)    Wheezing  Diffuse wheezing, considerations for undiagnosed COPD, patient with history of smoking  Improved with nebulizer treatments  Recommend outpatient Pulmonology evaluation  Elevated LFTs  Due to alcohol use  Monitor liver function    VTE Pharmacologic  Prophylaxis:    Lovenox    Mobility:   Basic Mobility Inpatient Raw Score: 24  JH-HLM Goal: 8: Walk 250 feet or more  JH-HLM Achieved: 5: Stand (1 or more minutes)  JH-HLM Goal achieved. Continue to encourage appropriate mobility.    Patient Centered Rounds: I performed bedside rounds with nursing staff today.   Discussions with Specialists or Other Care Team Provider: Toxicology     Education and Discussions with Family / Patient:  patient.     Current Length of Stay: 2 day(s)  Current Patient Status: Inpatient   Certification Statement: The patient will continue to require additional inpatient hospital stay due to close monitoring  Discharge Plan: Anticipate discharge in 24-48 hrs to drug and alcohol rehab    Code Status: Level 1 - Full Code    Subjective   Patient is complaining of anxiety and tremulousness.    Objective :  Temp:  [95.5 °F (35.3 °C)-98.4 °F (36.9 °C)] 97.3 °F (36.3 °C)  HR:  [47-84] 83  BP: ()/(53-77) 122/75  Resp:  [10-22] 22  SpO2:  [95 %-99 %] 95 %  O2 Device: None (Room air)    Body mass index is 29.3 kg/m².     Input and Output Summary (last 24 hours):     Intake/Output Summary (Last 24 hours) at 12/6/2024 1144  Last data filed at 12/6/2024 1033  Gross per 24 hour   Intake 7566 ml   Output 3050 ml   Net 4516 ml       Physical Exam  Constitutional:       General: He is not in acute distress.  HENT:      Head: Normocephalic and atraumatic.   Eyes:      Conjunctiva/sclera: Conjunctivae normal.   Cardiovascular:      Rate and Rhythm: Normal rate and regular rhythm.   Pulmonary:      Effort: No respiratory distress.      Breath sounds: No wheezing or rales.   Abdominal:      General: There is no distension.      Tenderness: There is no abdominal tenderness. There is no guarding.   Musculoskeletal:      Right lower leg: No edema.      Left lower leg: No edema.   Skin:     General: Skin is warm and dry.   Neurological:      Mental Status: He is oriented to person, place, and time.       Comments: Tremulousness    Psychiatric:         Mood and Affect: Mood normal.         Lines/Drains:              Lab Results: I have reviewed the following results:   Results from last 7 days   Lab Units 12/06/24  0511   WBC Thousand/uL 3.66*   HEMOGLOBIN g/dL 11.5*   HEMATOCRIT % 36.5   PLATELETS Thousands/uL 136*   SEGS PCT % 55   LYMPHO PCT % 33   MONO PCT % 9   EOS PCT % 2     Results from last 7 days   Lab Units 12/06/24  0511   SODIUM mmol/L 135   POTASSIUM mmol/L 4.4   CHLORIDE mmol/L 102   CO2 mmol/L 26   BUN mg/dL 21   CREATININE mg/dL 0.83   ANION GAP mmol/L 7   CALCIUM mg/dL 8.4   ALBUMIN g/dL 3.5   TOTAL BILIRUBIN mg/dL 0.55   ALK PHOS U/L 112*   ALT U/L 81*   AST U/L 190*   GLUCOSE RANDOM mg/dL 67     Results from last 7 days   Lab Units 12/05/24  0528   INR  0.89                   Recent Cultures (last 7 days):               Last 24 Hours Medication List:     Current Facility-Administered Medications:     acetaminophen (TYLENOL) tablet 650 mg, Q6H PRN    albuterol inhalation solution 2.5 mg, Q4H PRN    aluminum-magnesium hydroxide-simethicone (MAALOX) oral suspension 30 mL, Q6H PRN    budesonide (PULMICORT) inhalation solution 0.5 mg, Q12H    buprenorphine-naloxone (Suboxone) film 8 mg, BID    cloNIDine (CATAPRES) tablet 0.1 mg, Q8H MARIUM    diazepam (VALIUM) tablet 5 mg, Q8H PRN    enoxaparin (LOVENOX) subcutaneous injection 40 mg, Daily    folic acid 1 mg, thiamine (VITAMIN B1) 100 mg in sodium chloride 0.9 % 100 mL IV piggyback, Daily    gabapentin (NEURONTIN) capsule 300 mg, TID PRN    hydrOXYzine HCL (ATARAX) tablet 50 mg, Q6H PRN    ibuprofen (MOTRIN) tablet 600 mg, Q6H PRN    ipratropium-albuterol (DUO-NEB) 0.5-2.5 mg/3 mL inhalation solution 3 mL, TID    lactated ringers infusion, Continuous    nicotine (NICODERM CQ) 21 mg/24 hr TD 24 hr patch 21 mg, Daily    ondansetron (ZOFRAN) injection 4 mg, Q4H PRN    pantoprazole (PROTONIX) injection 40 mg, Q24H MARIUM    QUEtiapine (SEROquel) tablet 100 mg,  HS    traZODone (DESYREL) tablet 50 mg, HS    trimethobenzamide (TIGAN) IM injection 200 mg, Q6H PRN    Administrative Statements   Today, Patient Was Seen By: Sharifa Bennett MD  I have spent a total time of 37 minutes in caring for this patient on the day of the visit/encounter including Instructions for management, Counseling / Coordination of care, Documenting in the medical record, and Communicating with other healthcare professionals .    **Please Note: This note may have been constructed using a voice recognition system.**

## 2024-12-06 NOTE — RESPIRATORY THERAPY NOTE
12/06/24 0742   Inhalation Therapy Tx   $ Inhalation Therapy Performed Yes   $ Pulse Oximetry Spot Check Charge Completed   SpO2 99 %   Pre-Treatment Pulse 48   Pre-Treatment Respirations 16   Duration 20   Breath Sounds Pre-Treatment Bilateral Diminished;Coarse;Expiratory wheeze   Delivery Source Air;UDN   Position Semi Suarez's   Treatment Tolerance Tolerated well   Resp Comments Patient has more coarse wheezing today, tx given followed by flutter valve, wheezing improved, npc on command, patient remains on room air

## 2024-12-06 NOTE — PLAN OF CARE
Problem: Potential for Falls  Goal: Patient will remain free of falls  Description: INTERVENTIONS:  - Educate patient/family on patient safety including physical limitations  - Instruct patient to call for assistance with activity   - Consult OT/PT to assist with strengthening/mobility   - Keep Call bell within reach  - Keep bed low and locked with side rails adjusted as appropriate  - Keep care items and personal belongings within reach  - Initiate and maintain comfort rounds  - Make Fall Risk Sign visible to staff  - Offer Toileting every 2 Hours, in advance of need  - Initiate/Maintain fall alarm  - Obtain necessary fall risk management equipment: alarm, nonskid footwear, mobility equipment  - Apply yellow socks and bracelet for high fall risk patients  - Consider moving patient to room near nurses station  Outcome: Progressing     Problem: Nutrition/Hydration-ADULT  Goal: Nutrient/Hydration intake appropriate for improving, restoring or maintaining nutritional needs  Description: Monitor and assess patient's nutrition/hydration status for malnutrition. Collaborate with interdisciplinary team and initiate plan and interventions as ordered.  Monitor patient's weight and dietary intake as ordered or per policy. Utilize nutrition screening tool and intervene as necessary. Determine patient's food preferences and provide high-protein, high-caloric foods as appropriate.     INTERVENTIONS:  - Monitor oral intake, urinary output, labs, and treatment plans  - Assess nutrition and hydration status and recommend course of action  - Evaluate amount of meals eaten  - Assist patient with eating if necessary   - Allow adequate time for meals  - Recommend/ encourage appropriate diets, oral nutritional supplements, and vitamin/mineral supplements  - Order, calculate, and assess calorie counts as needed  - Recommend, monitor, and adjust tube feedings and TPN/PPN based on assessed needs  - Assess need for intravenous fluids  -  Provide specific nutrition/hydration education as appropriate  - Include patient/family/caregiver in decisions related to nutrition  Outcome: Progressing     Problem: PAIN - ADULT  Goal: Verbalizes/displays adequate comfort level or baseline comfort level  Description: Interventions:  - Encourage patient to monitor pain and request assistance  - Assess pain using appropriate pain scale  - Administer analgesics based on type and severity of pain and evaluate response  - Implement non-pharmacological measures as appropriate and evaluate response  - Consider cultural and social influences on pain and pain management  - Notify physician/advanced practitioner if interventions unsuccessful or patient reports new pain  Outcome: Progressing     Problem: INFECTION - ADULT  Goal: Absence or prevention of progression during hospitalization  Description: INTERVENTIONS:  - Assess and monitor for signs and symptoms of infection  - Monitor lab/diagnostic results  - Monitor all insertion sites, i.e. indwelling lines, tubes, and drains  - Monitor endotracheal if appropriate and nasal secretions for changes in amount and color  - [place] appropriate cooling/warming therapies per order  - Administer medications as ordered  - Instruct and encourage patient and family to use good hand hygiene technique  - Identify and instruct in appropriate isolation precautions for identified infection/condition  Outcome: Progressing  Goal: Absence of fever/infection during neutropenic period  Description: INTERVENTIONS:  - Monitor WBC    Outcome: Progressing     Problem: SAFETY ADULT  Goal: Maintain or return to baseline ADL function  Description: INTERVENTIONS:  -  Assess patient's ability to carry out ADLs; assess patient's baseline for ADL function and identify physical deficits which impact ability to perform ADLs (bathing, care of mouth/teeth, toileting, grooming, dressing, etc.)  - Assess/evaluate cause of self-care deficits   - Assess range  of motion  - Assess patient's mobility; develop plan if impaired  - Assess patient's need for assistive devices and provide as appropriate  - Encourage maximum independence but intervene and supervise when necessary  - Involve family in performance of ADLs  - Assess for home care needs following discharge   - Consider OT consult to assist with ADL evaluation and planning for discharge  - Provide patient education as appropriate  Outcome: Progressing  Goal: Maintains/Returns to pre admission functional level  Description: INTERVENTIONS:  - Perform AM-PAC 6 Click Basic Mobility/ Daily Activity assessment daily.  - Set and communicate daily mobility goal to care team and patient/family/caregiver.   - Collaborate with rehabilitation services on mobility goals if consulted  - Perform Range of Motion 4 times a day.  - Reposition patient every 2 hours.  - Dangle patient 3 times a day  - Stand patient 3 times a day  - Ambulate patient 3 times a day  - Out of bed to chair 3 times a day   - Out of bed for meals 3 times a day  - Out of bed for toileting  - Record patient progress and toleration of activity level   Outcome: Progressing     Problem: DISCHARGE PLANNING  Goal: Discharge to home or other facility with appropriate resources  Description: INTERVENTIONS:  - Identify barriers to discharge w/patient and caregiver  - Arrange for needed discharge resources and transportation as appropriate  - Identify discharge learning needs (meds, wound care, etc.)  - Arrange for interpretive services to assist at discharge as needed  - Refer to Case Management Department for coordinating discharge planning if the patient needs post-hospital services based on physician/advanced practitioner order or complex needs related to functional status, cognitive ability, or social support system  Outcome: Progressing     Problem: Knowledge Deficit  Goal: Patient/family/caregiver demonstrates understanding of disease process, treatment plan,  medications, and discharge instructions  Description: Complete learning assessment and assess knowledge base.  Interventions:  - Provide teaching at level of understanding  - Provide teaching via preferred learning methods  Outcome: Progressing     Problem: NEUROSENSORY - ADULT  Goal: Achieves stable or improved neurological status  Description: INTERVENTIONS  - Monitor and report changes in neurological status  - Monitor vital signs such as temperature, blood pressure, glucose, and any other labs ordered   - Initiate measures to prevent increased intracranial pressure  - Monitor for seizure activity and implement precautions if appropriate      Outcome: Progressing  Goal: Remains free of injury related to seizures activity  Description: INTERVENTIONS  - Maintain airway, patient safety  and administer oxygen as ordered  - Monitor patient for seizure activity, document and report duration and description of seizure to physician/advanced practitioner  - If seizure occurs,  ensure patient safety during seizure  - Reorient patient post seizure  - Seizure pads on all 4 side rails  - Instruct patient/family to notify RN of any seizure activity including if an aura is experienced  - Instruct patient/family to call for assistance with activity based on nursing assessment  - Administer anti-seizure medications if ordered    Outcome: Progressing  Goal: Achieves maximal functionality and self care  Description: INTERVENTIONS  - Monitor swallowing and airway patency with patient fatigue and changes in neurological status  - Encourage and assist patient to increase activity and self care.   - Encourage visually impaired, hearing impaired and aphasic patients to use assistive/communication devices  Outcome: Progressing     Problem: METABOLIC, FLUID AND ELECTROLYTES - ADULT  Goal: Electrolytes maintained within normal limits  Description: INTERVENTIONS:  - Monitor labs and assess patient for signs and symptoms of electrolyte  imbalances  - Administer electrolyte replacement as ordered  - Monitor response to electrolyte replacements, including repeat lab results as appropriate  - Instruct patient on fluid and nutrition as appropriate  Outcome: Progressing  Goal: Fluid balance maintained  Description: INTERVENTIONS:  - Monitor labs   - Monitor I/O and WT  - Instruct patient on fluid and nutrition as appropriate  - Assess for signs & symptoms of volume excess or deficit  Outcome: Progressing

## 2024-12-06 NOTE — PLAN OF CARE
Problem: Potential for Falls  Goal: Patient will remain free of falls  Description: INTERVENTIONS:  - Educate patient/family on patient safety including physical limitations  - Instruct patient to call for assistance with activity   - Consult OT/PT to assist with strengthening/mobility   - Keep Call bell within reach  - Keep bed low and locked with side rails adjusted as appropriate  - Keep care items and personal belongings within reach  - Initiate and maintain comfort rounds  - Make Fall Risk Sign visible to staff  - Offer Toileting every 2 Hours, in advance of need  - Initiate/Maintain fall alarm  - Obtain necessary fall risk management equipment: alarm, nonskid footwear, mobility equipment  - Apply yellow socks and bracelet for high fall risk patients  - Consider moving patient to room near nurses station  Outcome: Progressing     Problem: Nutrition/Hydration-ADULT  Goal: Nutrient/Hydration intake appropriate for improving, restoring or maintaining nutritional needs  Description: Monitor and assess patient's nutrition/hydration status for malnutrition. Collaborate with interdisciplinary team and initiate plan and interventions as ordered.  Monitor patient's weight and dietary intake as ordered or per policy. Utilize nutrition screening tool and intervene as necessary. Determine patient's food preferences and provide high-protein, high-caloric foods as appropriate.     INTERVENTIONS:  - Monitor oral intake, urinary output, labs, and treatment plans  - Assess nutrition and hydration status and recommend course of action  - Evaluate amount of meals eaten  - Assist patient with eating if necessary   - Allow adequate time for meals  - Recommend/ encourage appropriate diets, oral nutritional supplements, and vitamin/mineral supplements  - Order, calculate, and assess calorie counts as needed  - Recommend, monitor, and adjust tube feedings and TPN/PPN based on assessed needs  - Assess need for intravenous fluids  -  Provide specific nutrition/hydration education as appropriate  - Include patient/family/caregiver in decisions related to nutrition  Outcome: Progressing     Problem: PAIN - ADULT  Goal: Verbalizes/displays adequate comfort level or baseline comfort level  Description: Interventions:  - Encourage patient to monitor pain and request assistance  - Assess pain using appropriate pain scale  - Administer analgesics based on type and severity of pain and evaluate response  - Implement non-pharmacological measures as appropriate and evaluate response  - Consider cultural and social influences on pain and pain management  - Notify physician/advanced practitioner if interventions unsuccessful or patient reports new pain  Outcome: Progressing     Problem: INFECTION - ADULT  Goal: Absence or prevention of progression during hospitalization  Description: INTERVENTIONS:  - Assess and monitor for signs and symptoms of infection  - Monitor lab/diagnostic results  - Monitor all insertion sites, i.e. indwelling lines, tubes, and drains  - Monitor endotracheal if appropriate and nasal secretions for changes in amount and color  - [place] appropriate cooling/warming therapies per order  - Administer medications as ordered  - Instruct and encourage patient and family to use good hand hygiene technique  - Identify and instruct in appropriate isolation precautions for identified infection/condition  Outcome: Progressing  Goal: Absence of fever/infection during neutropenic period  Description: INTERVENTIONS:  - Monitor WBC    Outcome: Progressing     Problem: SAFETY ADULT  Goal: Maintain or return to baseline ADL function  Description: INTERVENTIONS:  -  Assess patient's ability to carry out ADLs; assess patient's baseline for ADL function and identify physical deficits which impact ability to perform ADLs (bathing, care of mouth/teeth, toileting, grooming, dressing, etc.)  - Assess/evaluate cause of self-care deficits   - Assess range  of motion  - Assess patient's mobility; develop plan if impaired  - Assess patient's need for assistive devices and provide as appropriate  - Encourage maximum independence but intervene and supervise when necessary  - Involve family in performance of ADLs  - Assess for home care needs following discharge   - Consider OT consult to assist with ADL evaluation and planning for discharge  - Provide patient education as appropriate  Outcome: Progressing  Goal: Maintains/Returns to pre admission functional level  Description: INTERVENTIONS:  - Perform AM-PAC 6 Click Basic Mobility/ Daily Activity assessment daily.  - Set and communicate daily mobility goal to care team and patient/family/caregiver.   - Collaborate with rehabilitation services on mobility goals if consulted  - Perform Range of Motion 4 times a day.  - Reposition patient every 2 hours.  - Dangle patient 3 times a day  - Stand patient 3 times a day  - Ambulate patient 3 times a day  - Out of bed to chair 3 times a day   - Out of bed for meals 3 times a day  - Out of bed for toileting  - Record patient progress and toleration of activity level   Outcome: Progressing     Problem: DISCHARGE PLANNING  Goal: Discharge to home or other facility with appropriate resources  Description: INTERVENTIONS:  - Identify barriers to discharge w/patient and caregiver  - Arrange for needed discharge resources and transportation as appropriate  - Identify discharge learning needs (meds, wound care, etc.)  - Arrange for interpretive services to assist at discharge as needed  - Refer to Case Management Department for coordinating discharge planning if the patient needs post-hospital services based on physician/advanced practitioner order or complex needs related to functional status, cognitive ability, or social support system  Outcome: Progressing     Problem: Knowledge Deficit  Goal: Patient/family/caregiver demonstrates understanding of disease process, treatment plan,  medications, and discharge instructions  Description: Complete learning assessment and assess knowledge base.  Interventions:  - Provide teaching at level of understanding  - Provide teaching via preferred learning methods  Outcome: Progressing     Problem: NEUROSENSORY - ADULT  Goal: Achieves stable or improved neurological status  Description: INTERVENTIONS  - Monitor and report changes in neurological status  - Monitor vital signs such as temperature, blood pressure, glucose, and any other labs ordered   - Initiate measures to prevent increased intracranial pressure  - Monitor for seizure activity and implement precautions if appropriate      Outcome: Progressing  Goal: Remains free of injury related to seizures activity  Description: INTERVENTIONS  - Maintain airway, patient safety  and administer oxygen as ordered  - Monitor patient for seizure activity, document and report duration and description of seizure to physician/advanced practitioner  - If seizure occurs,  ensure patient safety during seizure  - Reorient patient post seizure  - Seizure pads on all 4 side rails  - Instruct patient/family to notify RN of any seizure activity including if an aura is experienced  - Instruct patient/family to call for assistance with activity based on nursing assessment  - Administer anti-seizure medications if ordered    Outcome: Progressing  Goal: Achieves maximal functionality and self care  Description: INTERVENTIONS  - Monitor swallowing and airway patency with patient fatigue and changes in neurological status  - Encourage and assist patient to increase activity and self care.   - Encourage visually impaired, hearing impaired and aphasic patients to use assistive/communication devices  Outcome: Progressing     Problem: METABOLIC, FLUID AND ELECTROLYTES - ADULT  Goal: Electrolytes maintained within normal limits  Description: INTERVENTIONS:  - Monitor labs and assess patient for signs and symptoms of electrolyte  imbalances  - Administer electrolyte replacement as ordered  - Monitor response to electrolyte replacements, including repeat lab results as appropriate  - Instruct patient on fluid and nutrition as appropriate  Outcome: Progressing  Goal: Fluid balance maintained  Description: INTERVENTIONS:  - Monitor labs   - Monitor I/O and WT  - Instruct patient on fluid and nutrition as appropriate  - Assess for signs & symptoms of volume excess or deficit  Outcome: Progressing

## 2024-12-07 LAB
ALBUMIN SERPL BCG-MCNC: 3.9 G/DL (ref 3.5–5)
ALP SERPL-CCNC: 187 U/L (ref 34–104)
ALT SERPL W P-5'-P-CCNC: 187 U/L (ref 7–52)
ANION GAP SERPL CALCULATED.3IONS-SCNC: 7 MMOL/L (ref 4–13)
AST SERPL W P-5'-P-CCNC: 223 U/L (ref 13–39)
BASOPHILS # BLD AUTO: 0.03 THOUSANDS/ÂΜL (ref 0–0.1)
BASOPHILS NFR BLD AUTO: 1 % (ref 0–1)
BILIRUB SERPL-MCNC: 0.59 MG/DL (ref 0.2–1)
BUN SERPL-MCNC: 19 MG/DL (ref 5–25)
CALCIUM SERPL-MCNC: 8.7 MG/DL (ref 8.4–10.2)
CHLORIDE SERPL-SCNC: 99 MMOL/L (ref 96–108)
CO2 SERPL-SCNC: 31 MMOL/L (ref 21–32)
CREAT SERPL-MCNC: 0.85 MG/DL (ref 0.6–1.3)
EOSINOPHIL # BLD AUTO: 0.12 THOUSAND/ÂΜL (ref 0–0.61)
EOSINOPHIL NFR BLD AUTO: 3 % (ref 0–6)
ERYTHROCYTE [DISTWIDTH] IN BLOOD BY AUTOMATED COUNT: 14 % (ref 11.6–15.1)
GFR SERPL CREATININE-BSD FRML MDRD: 111 ML/MIN/1.73SQ M
GLUCOSE SERPL-MCNC: 110 MG/DL (ref 65–140)
HCT VFR BLD AUTO: 40.5 % (ref 36.5–49.3)
HGB BLD-MCNC: 13 G/DL (ref 12–17)
IMM GRANULOCYTES # BLD AUTO: 0.02 THOUSAND/UL (ref 0–0.2)
IMM GRANULOCYTES NFR BLD AUTO: 1 % (ref 0–2)
INR PPP: 0.91 (ref 0.85–1.19)
LYMPHOCYTES # BLD AUTO: 1.1 THOUSANDS/ÂΜL (ref 0.6–4.47)
LYMPHOCYTES NFR BLD AUTO: 29 % (ref 14–44)
MCH RBC QN AUTO: 30.7 PG (ref 26.8–34.3)
MCHC RBC AUTO-ENTMCNC: 32.1 G/DL (ref 31.4–37.4)
MCV RBC AUTO: 96 FL (ref 82–98)
MONOCYTES # BLD AUTO: 0.3 THOUSAND/ÂΜL (ref 0.17–1.22)
MONOCYTES NFR BLD AUTO: 8 % (ref 4–12)
NEUTROPHILS # BLD AUTO: 2.26 THOUSANDS/ÂΜL (ref 1.85–7.62)
NEUTS SEG NFR BLD AUTO: 58 % (ref 43–75)
NRBC BLD AUTO-RTO: 0 /100 WBCS
PLATELET # BLD AUTO: 122 THOUSANDS/UL (ref 149–390)
PMV BLD AUTO: 12.4 FL (ref 8.9–12.7)
POTASSIUM SERPL-SCNC: 4.1 MMOL/L (ref 3.5–5.3)
PROT SERPL-MCNC: 5.9 G/DL (ref 6.4–8.4)
PROTHROMBIN TIME: 12.8 SECONDS (ref 12.3–15)
RBC # BLD AUTO: 4.23 MILLION/UL (ref 3.88–5.62)
SODIUM SERPL-SCNC: 137 MMOL/L (ref 135–147)
WBC # BLD AUTO: 3.83 THOUSAND/UL (ref 4.31–10.16)

## 2024-12-07 PROCEDURE — 85610 PROTHROMBIN TIME: CPT | Performed by: FAMILY MEDICINE

## 2024-12-07 PROCEDURE — 94760 N-INVAS EAR/PLS OXIMETRY 1: CPT

## 2024-12-07 PROCEDURE — 85025 COMPLETE CBC W/AUTO DIFF WBC: CPT | Performed by: FAMILY MEDICINE

## 2024-12-07 PROCEDURE — 94664 DEMO&/EVAL PT USE INHALER: CPT

## 2024-12-07 PROCEDURE — 80053 COMPREHEN METABOLIC PANEL: CPT | Performed by: FAMILY MEDICINE

## 2024-12-07 PROCEDURE — 94640 AIRWAY INHALATION TREATMENT: CPT

## 2024-12-07 PROCEDURE — 99232 SBSQ HOSP IP/OBS MODERATE 35: CPT | Performed by: FAMILY MEDICINE

## 2024-12-07 RX ORDER — LORAZEPAM 2 MG/ML
4 INJECTION INTRAMUSCULAR ONCE
Status: COMPLETED | OUTPATIENT
Start: 2024-12-07 | End: 2024-12-07

## 2024-12-07 RX ORDER — LORAZEPAM 1 MG/1
2 TABLET ORAL ONCE
Status: COMPLETED | OUTPATIENT
Start: 2024-12-07 | End: 2024-12-07

## 2024-12-07 RX ORDER — DIPHENOXYLATE HYDROCHLORIDE AND ATROPINE SULFATE 2.5; .025 MG/1; MG/1
1 TABLET ORAL ONCE
Status: COMPLETED | OUTPATIENT
Start: 2024-12-07 | End: 2024-12-07

## 2024-12-07 RX ADMIN — CLONIDINE HYDROCHLORIDE 0.1 MG: 0.1 TABLET ORAL at 05:43

## 2024-12-07 RX ADMIN — LORAZEPAM 2 MG: 1 TABLET ORAL at 11:22

## 2024-12-07 RX ADMIN — HYDROXYZINE HYDROCHLORIDE 50 MG: 25 TABLET ORAL at 16:41

## 2024-12-07 RX ADMIN — LORAZEPAM 2 MG: 1 TABLET ORAL at 13:54

## 2024-12-07 RX ADMIN — CLONIDINE HYDROCHLORIDE 0.1 MG: 0.1 TABLET ORAL at 13:10

## 2024-12-07 RX ADMIN — LORAZEPAM 4 MG: 2 INJECTION INTRAMUSCULAR; INTRAVENOUS at 04:21

## 2024-12-07 RX ADMIN — LORAZEPAM 4 MG: 2 INJECTION INTRAMUSCULAR; INTRAVENOUS at 08:50

## 2024-12-07 RX ADMIN — LORAZEPAM 4 MG: 2 INJECTION INTRAMUSCULAR; INTRAVENOUS at 22:28

## 2024-12-07 RX ADMIN — QUETIAPINE FUMARATE 100 MG: 100 TABLET ORAL at 22:56

## 2024-12-07 RX ADMIN — HYDROXYZINE HYDROCHLORIDE 50 MG: 25 TABLET ORAL at 08:13

## 2024-12-07 RX ADMIN — PANTOPRAZOLE SODIUM 40 MG: 40 INJECTION, POWDER, FOR SOLUTION INTRAVENOUS at 08:13

## 2024-12-07 RX ADMIN — LORAZEPAM 4 MG: 2 INJECTION INTRAMUSCULAR; INTRAVENOUS at 17:22

## 2024-12-07 RX ADMIN — CLONIDINE HYDROCHLORIDE 0.1 MG: 0.1 TABLET ORAL at 21:18

## 2024-12-07 RX ADMIN — IPRATROPIUM BROMIDE AND ALBUTEROL SULFATE 3 ML: 2.5; .5 SOLUTION RESPIRATORY (INHALATION) at 20:55

## 2024-12-07 RX ADMIN — PHENOBARBITAL SODIUM 260 MG: 130 INJECTION INTRAMUSCULAR; INTRAVENOUS at 18:21

## 2024-12-07 RX ADMIN — BUPRENORPHINE AND NALOXONE 8 MG: 8; 2 FILM BUCCAL; SUBLINGUAL at 21:18

## 2024-12-07 RX ADMIN — IPRATROPIUM BROMIDE AND ALBUTEROL SULFATE 3 ML: 2.5; .5 SOLUTION RESPIRATORY (INHALATION) at 09:02

## 2024-12-07 RX ADMIN — BUDESONIDE 0.5 MG: 0.5 INHALANT RESPIRATORY (INHALATION) at 20:55

## 2024-12-07 RX ADMIN — ONDANSETRON 4 MG: 2 INJECTION INTRAMUSCULAR; INTRAVENOUS at 08:13

## 2024-12-07 RX ADMIN — GABAPENTIN 300 MG: 300 CAPSULE ORAL at 07:39

## 2024-12-07 RX ADMIN — BUPRENORPHINE AND NALOXONE 8 MG: 8; 2 FILM BUCCAL; SUBLINGUAL at 08:05

## 2024-12-07 RX ADMIN — GABAPENTIN 300 MG: 300 CAPSULE ORAL at 16:20

## 2024-12-07 RX ADMIN — BUDESONIDE 0.5 MG: 0.5 INHALANT RESPIRATORY (INHALATION) at 09:02

## 2024-12-07 RX ADMIN — ACETAMINOPHEN 650 MG: 325 TABLET, FILM COATED ORAL at 04:20

## 2024-12-07 RX ADMIN — ONDANSETRON 4 MG: 2 INJECTION INTRAMUSCULAR; INTRAVENOUS at 04:21

## 2024-12-07 RX ADMIN — NICOTINE 21 MG: 21 PATCH, EXTENDED RELEASE TRANSDERMAL at 08:06

## 2024-12-07 RX ADMIN — THIAMINE HYDROCHLORIDE: 100 INJECTION, SOLUTION INTRAMUSCULAR; INTRAVENOUS at 08:05

## 2024-12-07 RX ADMIN — IPRATROPIUM BROMIDE AND ALBUTEROL SULFATE 3 ML: 2.5; .5 SOLUTION RESPIRATORY (INHALATION) at 14:12

## 2024-12-07 RX ADMIN — DIPHENOXYLATE HYDROCHLORIDE AND ATROPINE SULFATE 1 TABLET: 2.5; .025 TABLET ORAL at 22:54

## 2024-12-07 RX ADMIN — LORAZEPAM 4 MG: 2 INJECTION INTRAMUSCULAR; INTRAVENOUS at 21:18

## 2024-12-07 NOTE — PLAN OF CARE
Problem: Potential for Falls  Goal: Patient will remain free of falls  Description: INTERVENTIONS:  - Educate patient/family on patient safety including physical limitations  - Instruct patient to call for assistance with activity   - Consult OT/PT to assist with strengthening/mobility   - Keep Call bell within reach  - Keep bed low and locked with side rails adjusted as appropriate  - Keep care items and personal belongings within reach  - Initiate and maintain comfort rounds  - Make Fall Risk Sign visible to staff  - Offer Toileting every 2 Hours, in advance of need  - Initiate/Maintain fall alarm  - Obtain necessary fall risk management equipment: alarm, nonskid footwear, mobility equipment  - Apply yellow socks and bracelet for high fall risk patients  - Consider moving patient to room near nurses station  Outcome: Progressing     Problem: Nutrition/Hydration-ADULT  Goal: Nutrient/Hydration intake appropriate for improving, restoring or maintaining nutritional needs  Description: Monitor and assess patient's nutrition/hydration status for malnutrition. Collaborate with interdisciplinary team and initiate plan and interventions as ordered.  Monitor patient's weight and dietary intake as ordered or per policy. Utilize nutrition screening tool and intervene as necessary. Determine patient's food preferences and provide high-protein, high-caloric foods as appropriate.     INTERVENTIONS:  - Monitor oral intake, urinary output, labs, and treatment plans  - Assess nutrition and hydration status and recommend course of action  - Evaluate amount of meals eaten  - Assist patient with eating if necessary   - Allow adequate time for meals  - Recommend/ encourage appropriate diets, oral nutritional supplements, and vitamin/mineral supplements  - Order, calculate, and assess calorie counts as needed  - Recommend, monitor, and adjust tube feedings and TPN/PPN based on assessed needs  - Assess need for intravenous fluids  -  Provide specific nutrition/hydration education as appropriate  - Include patient/family/caregiver in decisions related to nutrition  Outcome: Progressing     Problem: PAIN - ADULT  Goal: Verbalizes/displays adequate comfort level or baseline comfort level  Description: Interventions:  - Encourage patient to monitor pain and request assistance  - Assess pain using appropriate pain scale  - Administer analgesics based on type and severity of pain and evaluate response  - Implement non-pharmacological measures as appropriate and evaluate response  - Consider cultural and social influences on pain and pain management  - Notify physician/advanced practitioner if interventions unsuccessful or patient reports new pain  Outcome: Progressing     Problem: INFECTION - ADULT  Goal: Absence or prevention of progression during hospitalization  Description: INTERVENTIONS:  - Assess and monitor for signs and symptoms of infection  - Monitor lab/diagnostic results  - Monitor all insertion sites, i.e. indwelling lines, tubes, and drains  - Monitor endotracheal if appropriate and nasal secretions for changes in amount and color  - [place] appropriate cooling/warming therapies per order  - Administer medications as ordered  - Instruct and encourage patient and family to use good hand hygiene technique  - Identify and instruct in appropriate isolation precautions for identified infection/condition  Outcome: Progressing  Goal: Absence of fever/infection during neutropenic period  Description: INTERVENTIONS:  - Monitor WBC    Outcome: Progressing     Problem: SAFETY ADULT  Goal: Maintain or return to baseline ADL function  Description: INTERVENTIONS:  -  Assess patient's ability to carry out ADLs; assess patient's baseline for ADL function and identify physical deficits which impact ability to perform ADLs (bathing, care of mouth/teeth, toileting, grooming, dressing, etc.)  - Assess/evaluate cause of self-care deficits   - Assess range  of motion  - Assess patient's mobility; develop plan if impaired  - Assess patient's need for assistive devices and provide as appropriate  - Encourage maximum independence but intervene and supervise when necessary  - Involve family in performance of ADLs  - Assess for home care needs following discharge   - Consider OT consult to assist with ADL evaluation and planning for discharge  - Provide patient education as appropriate  Outcome: Progressing  Goal: Maintains/Returns to pre admission functional level  Description: INTERVENTIONS:  - Perform AM-PAC 6 Click Basic Mobility/ Daily Activity assessment daily.  - Set and communicate daily mobility goal to care team and patient/family/caregiver.   - Collaborate with rehabilitation services on mobility goals if consulted  - Perform Range of Motion 4 times a day.  - Reposition patient every 2 hours.  - Dangle patient 3 times a day  - Stand patient 3 times a day  - Ambulate patient 3 times a day  - Out of bed to chair 3 times a day   - Out of bed for meals 3 times a day  - Out of bed for toileting  - Record patient progress and toleration of activity level   Outcome: Progressing     Problem: DISCHARGE PLANNING  Goal: Discharge to home or other facility with appropriate resources  Description: INTERVENTIONS:  - Identify barriers to discharge w/patient and caregiver  - Arrange for needed discharge resources and transportation as appropriate  - Identify discharge learning needs (meds, wound care, etc.)  - Arrange for interpretive services to assist at discharge as needed  - Refer to Case Management Department for coordinating discharge planning if the patient needs post-hospital services based on physician/advanced practitioner order or complex needs related to functional status, cognitive ability, or social support system  Outcome: Progressing     Problem: Knowledge Deficit  Goal: Patient/family/caregiver demonstrates understanding of disease process, treatment plan,  medications, and discharge instructions  Description: Complete learning assessment and assess knowledge base.  Interventions:  - Provide teaching at level of understanding  - Provide teaching via preferred learning methods  Outcome: Progressing     Problem: NEUROSENSORY - ADULT  Goal: Achieves stable or improved neurological status  Description: INTERVENTIONS  - Monitor and report changes in neurological status  - Monitor vital signs such as temperature, blood pressure, glucose, and any other labs ordered   - Initiate measures to prevent increased intracranial pressure  - Monitor for seizure activity and implement precautions if appropriate      Outcome: Progressing  Goal: Remains free of injury related to seizures activity  Description: INTERVENTIONS  - Maintain airway, patient safety  and administer oxygen as ordered  - Monitor patient for seizure activity, document and report duration and description of seizure to physician/advanced practitioner  - If seizure occurs,  ensure patient safety during seizure  - Reorient patient post seizure  - Seizure pads on all 4 side rails  - Instruct patient/family to notify RN of any seizure activity including if an aura is experienced  - Instruct patient/family to call for assistance with activity based on nursing assessment  - Administer anti-seizure medications if ordered    Outcome: Progressing  Goal: Achieves maximal functionality and self care  Description: INTERVENTIONS  - Monitor swallowing and airway patency with patient fatigue and changes in neurological status  - Encourage and assist patient to increase activity and self care.   - Encourage visually impaired, hearing impaired and aphasic patients to use assistive/communication devices  Outcome: Progressing     Problem: METABOLIC, FLUID AND ELECTROLYTES - ADULT  Goal: Electrolytes maintained within normal limits  Description: INTERVENTIONS:  - Monitor labs and assess patient for signs and symptoms of electrolyte  imbalances  - Administer electrolyte replacement as ordered  - Monitor response to electrolyte replacements, including repeat lab results as appropriate  - Instruct patient on fluid and nutrition as appropriate  Outcome: Progressing  Goal: Fluid balance maintained  Description: INTERVENTIONS:  - Monitor labs   - Monitor I/O and WT  - Instruct patient on fluid and nutrition as appropriate  - Assess for signs & symptoms of volume excess or deficit  Outcome: Progressing

## 2024-12-07 NOTE — PLAN OF CARE
Problem: Potential for Falls  Goal: Patient will remain free of falls  Description: INTERVENTIONS:  - Educate patient/family on patient safety including physical limitations  - Instruct patient to call for assistance with activity   - Consult OT/PT to assist with strengthening/mobility   - Keep Call bell within reach  - Keep bed low and locked with side rails adjusted as appropriate  - Keep care items and personal belongings within reach  - Initiate and maintain comfort rounds  - Make Fall Risk Sign visible to staff  - Offer Toileting every 2 Hours, in advance of need  - Initiate/Maintain fall alarm  - Obtain necessary fall risk management equipment: alarm, nonskid footwear, mobility equipment  - Apply yellow socks and bracelet for high fall risk patients  - Consider moving patient to room near nurses station  Outcome: Progressing     Problem: Nutrition/Hydration-ADULT  Goal: Nutrient/Hydration intake appropriate for improving, restoring or maintaining nutritional needs  Description: Monitor and assess patient's nutrition/hydration status for malnutrition. Collaborate with interdisciplinary team and initiate plan and interventions as ordered.  Monitor patient's weight and dietary intake as ordered or per policy. Utilize nutrition screening tool and intervene as necessary. Determine patient's food preferences and provide high-protein, high-caloric foods as appropriate.     INTERVENTIONS:  - Monitor oral intake, urinary output, labs, and treatment plans  - Assess nutrition and hydration status and recommend course of action  - Evaluate amount of meals eaten  - Assist patient with eating if necessary   - Allow adequate time for meals  - Recommend/ encourage appropriate diets, oral nutritional supplements, and vitamin/mineral supplements  - Order, calculate, and assess calorie counts as needed  - Recommend, monitor, and adjust tube feedings and TPN/PPN based on assessed needs  - Assess need for intravenous fluids  -  Provide specific nutrition/hydration education as appropriate  - Include patient/family/caregiver in decisions related to nutrition  Outcome: Progressing     Problem: PAIN - ADULT  Goal: Verbalizes/displays adequate comfort level or baseline comfort level  Description: Interventions:  - Encourage patient to monitor pain and request assistance  - Assess pain using appropriate pain scale  - Administer analgesics based on type and severity of pain and evaluate response  - Implement non-pharmacological measures as appropriate and evaluate response  - Consider cultural and social influences on pain and pain management  - Notify physician/advanced practitioner if interventions unsuccessful or patient reports new pain  Outcome: Progressing     Problem: INFECTION - ADULT  Goal: Absence or prevention of progression during hospitalization  Description: INTERVENTIONS:  - Assess and monitor for signs and symptoms of infection  - Monitor lab/diagnostic results  - Monitor all insertion sites, i.e. indwelling lines, tubes, and drains  - Monitor endotracheal if appropriate and nasal secretions for changes in amount and color  - [place] appropriate cooling/warming therapies per order  - Administer medications as ordered  - Instruct and encourage patient and family to use good hand hygiene technique  - Identify and instruct in appropriate isolation precautions for identified infection/condition  Outcome: Progressing  Goal: Absence of fever/infection during neutropenic period  Description: INTERVENTIONS:  - Monitor WBC    Outcome: Progressing     Problem: SAFETY ADULT  Goal: Maintain or return to baseline ADL function  Description: INTERVENTIONS:  -  Assess patient's ability to carry out ADLs; assess patient's baseline for ADL function and identify physical deficits which impact ability to perform ADLs (bathing, care of mouth/teeth, toileting, grooming, dressing, etc.)  - Assess/evaluate cause of self-care deficits   - Assess range  of motion  - Assess patient's mobility; develop plan if impaired  - Assess patient's need for assistive devices and provide as appropriate  - Encourage maximum independence but intervene and supervise when necessary  - Involve family in performance of ADLs  - Assess for home care needs following discharge   - Consider OT consult to assist with ADL evaluation and planning for discharge  - Provide patient education as appropriate  Outcome: Progressing  Goal: Maintains/Returns to pre admission functional level  Description: INTERVENTIONS:  - Perform AM-PAC 6 Click Basic Mobility/ Daily Activity assessment daily.  - Set and communicate daily mobility goal to care team and patient/family/caregiver.   - Collaborate with rehabilitation services on mobility goals if consulted  - Perform Range of Motion 4 times a day.  - Reposition patient every 2 hours.  - Dangle patient 3 times a day  - Stand patient 3 times a day  - Ambulate patient 3 times a day  - Out of bed to chair 3 times a day   - Out of bed for meals 3 times a day  - Out of bed for toileting  - Record patient progress and toleration of activity level   Outcome: Progressing     Problem: DISCHARGE PLANNING  Goal: Discharge to home or other facility with appropriate resources  Description: INTERVENTIONS:  - Identify barriers to discharge w/patient and caregiver  - Arrange for needed discharge resources and transportation as appropriate  - Identify discharge learning needs (meds, wound care, etc.)  - Arrange for interpretive services to assist at discharge as needed  - Refer to Case Management Department for coordinating discharge planning if the patient needs post-hospital services based on physician/advanced practitioner order or complex needs related to functional status, cognitive ability, or social support system  Outcome: Progressing     Problem: Knowledge Deficit  Goal: Patient/family/caregiver demonstrates understanding of disease process, treatment plan,  medications, and discharge instructions  Description: Complete learning assessment and assess knowledge base.  Interventions:  - Provide teaching at level of understanding  - Provide teaching via preferred learning methods  Outcome: Progressing     Problem: NEUROSENSORY - ADULT  Goal: Achieves stable or improved neurological status  Description: INTERVENTIONS  - Monitor and report changes in neurological status  - Monitor vital signs such as temperature, blood pressure, glucose, and any other labs ordered   - Initiate measures to prevent increased intracranial pressure  - Monitor for seizure activity and implement precautions if appropriate      Outcome: Progressing  Goal: Remains free of injury related to seizures activity  Description: INTERVENTIONS  - Maintain airway, patient safety  and administer oxygen as ordered  - Monitor patient for seizure activity, document and report duration and description of seizure to physician/advanced practitioner  - If seizure occurs,  ensure patient safety during seizure  - Reorient patient post seizure  - Seizure pads on all 4 side rails  - Instruct patient/family to notify RN of any seizure activity including if an aura is experienced  - Instruct patient/family to call for assistance with activity based on nursing assessment  - Administer anti-seizure medications if ordered    Outcome: Progressing  Goal: Achieves maximal functionality and self care  Description: INTERVENTIONS  - Monitor swallowing and airway patency with patient fatigue and changes in neurological status  - Encourage and assist patient to increase activity and self care.   - Encourage visually impaired, hearing impaired and aphasic patients to use assistive/communication devices  Outcome: Progressing     Problem: METABOLIC, FLUID AND ELECTROLYTES - ADULT  Goal: Electrolytes maintained within normal limits  Description: INTERVENTIONS:  - Monitor labs and assess patient for signs and symptoms of electrolyte  imbalances  - Administer electrolyte replacement as ordered  - Monitor response to electrolyte replacements, including repeat lab results as appropriate  - Instruct patient on fluid and nutrition as appropriate  Outcome: Progressing  Goal: Fluid balance maintained  Description: INTERVENTIONS:  - Monitor labs   - Monitor I/O and WT  - Instruct patient on fluid and nutrition as appropriate  - Assess for signs & symptoms of volume excess or deficit  Outcome: Progressing

## 2024-12-07 NOTE — ASSESSMENT & PLAN NOTE
This is a 39-year-old male patient with history of substance use disorder, on Sublocade, alcohol use disorder, hypertension, tobacco use who presents with alcohol withdrawal  Status post multiple doses of phenobarbital, was initiated on Precedex overnight 12/4-12/5  Appreciate Toxicology recommendations  Wean off Precedex, utilize supportive medication regimen  The patient is requesting Ativan in favor of Valium for symptom management, order changed  Patient is interested in alcohol rehab, potential discharge still alcohol rehab center on Monday

## 2024-12-07 NOTE — ASSESSMENT & PLAN NOTE
Due to alcohol use, trending up  Moderate EF of CLL, no steroids indicated  Check hepatitis panel  Monitor liver function  Strict alcohol cessation, plan for d/c to rehab

## 2024-12-07 NOTE — ASSESSMENT & PLAN NOTE
Malnutrition Findings:   Adult Malnutrition type: Acute illness  Adult Degree of Malnutrition: Other severe protein calorie malnutrition  Malnutrition Characteristics: Inadequate energy, Weight loss                  360 Statement: Severe PCM in light of significant weight loss x 2 months of 26#/11% due to increase alcohol intake and decrease po intake and poor nutrition per patient.  Treated with regular diet/double portions.    BMI Findings:           Body mass index is 29.3 kg/m².

## 2024-12-07 NOTE — PROGRESS NOTES
Progress Note - Hospitalist   Name: Aniket Pace 39 y.o. male I MRN: [id number]  Unit/Bed#:  I Date of Admission: 12/4/2024   Date of Service: 12/7/2024 I Hospital Day: 3    Assessment & Plan  Alcohol withdrawal delirium, acute, hyperactive (HCC)  This is a 39-year-old male patient with history of substance use disorder, on Sublocade, alcohol use disorder, hypertension, tobacco use who presents with alcohol withdrawal  Status post multiple doses of phenobarbital, was initiated on Precedex overnight 12/4-12/5  Appreciate Toxicology recommendations  Wean off Precedex, utilize supportive medication regimen  The patient is requesting Ativan in favor of Valium for symptom management, order changed  Patient is interested in alcohol rehab, potential discharge still alcohol rehab center on Monday  HARRIET (acute kidney injury) (HCC)  Creatinine elevated 1.41 on admission, most likely prerenal with HARRIET resolving to baseline with IV fluids  Resolved with Cr of 0.8, decreased IVF rate    Hyponatremia  Hypovolemic hyponatremia, resolved  Primary hypertension  Patient was initially with accelerated hypertension in setting of active alcohol withdrawal, blood pressure is now low normal  Previously on Norvasc and metoprolol, however noncompliant  Hold antihypertensive medications for now  Bipolar I disorder with depression (HCC)  The patient was previously prescribed Seroquel 100 mg daily  Restarted  Tobacco use disorder  Smoking cessation counseling  Patient with diffuse wheezing on exam, would recommend outpatient pulmonology evaluation, PFTs  Nicotine patch ordered  Opioid use disorder  On Sublocade outpatient, apparently had run out 3 days ago   Appreciate toxicology input, patient is restarted on his outpatient dose of buprenorphine while here  Avoid opiates  Alcohol withdrawal syndrome with complication (HCC)    Wheezing  Diffuse wheezing, considerations for undiagnosed COPD, patient with history of smoking  Improved with  "nebulizer treatments  Recommend outpatient Pulmonology evaluation  Elevated LFTs  Due to alcohol use, trending up  Moderate EF of CLL, no steroids indicated  Check hepatitis panel  Monitor liver function  Strict alcohol cessation, plan for d/c to rehab  Severe protein-calorie malnutrition (HCC)  Malnutrition Findings:   Adult Malnutrition type: Acute illness  Adult Degree of Malnutrition: Other severe protein calorie malnutrition  Malnutrition Characteristics: Inadequate energy, Weight loss                  360 Statement: Severe PCM in light of significant weight loss x 2 months of 26#/11% due to increase alcohol intake and decrease po intake and poor nutrition per patient.  Treated with regular diet/double portions.    BMI Findings:           Body mass index is 29.3 kg/m².       VTE Pharmacologic Prophylaxis:    Lovenox    Mobility:   Basic Mobility Inpatient Raw Score: 24  JH-HLM Goal: 8: Walk 250 feet or more  JH-HLM Achieved: 7: Walk 25 feet or more  JH-HLM Goal achieved. Continue to encourage appropriate mobility.    Patient Centered Rounds: I performed bedside rounds with nursing staff today.   Discussions with Specialists or Other Care Team Provider: CM    Education and Discussions with Family / Patient:  patient.     Current Length of Stay: 3 day(s)  Current Patient Status: Inpatient   Certification Statement: The patient will continue to require additional inpatient hospital stay due to close monitoring  Discharge Plan: Anticipate discharge in 24-48 hrs to alcohol and drug rehab    Code Status: Level 1 - Full Code    Subjective   Patient continues to complain of anxiety, \"shakes\", requiring multiple doses of Ativan.    Objective :  Temp:  [97.2 °F (36.2 °C)-98.8 °F (37.1 °C)] 97.4 °F (36.3 °C)  HR:  [] 69  BP: (121-168)/(67-98) 144/83  Resp:  [10-24] 13  SpO2:  [94 %-98 %] 95 %  O2 Device: None (Room air)    Body mass index is 29.3 kg/m².     Input and Output Summary (last 24 hours):     Intake/Output " Summary (Last 24 hours) at 12/7/2024 1504  Last data filed at 12/7/2024 1050  Gross per 24 hour   Intake 6538.33 ml   Output 1250 ml   Net 5288.33 ml       Physical Exam  Constitutional:       General: He is not in acute distress.  HENT:      Head: Normocephalic and atraumatic.   Eyes:      Conjunctiva/sclera: Conjunctivae normal.   Cardiovascular:      Rate and Rhythm: Normal rate and regular rhythm.   Pulmonary:      Effort: No respiratory distress.      Breath sounds: No wheezing or rales.   Abdominal:      General: There is no distension.      Tenderness: There is no abdominal tenderness. There is no guarding.   Musculoskeletal:      Right lower leg: No edema.      Left lower leg: No edema.   Skin:     General: Skin is warm and dry.   Neurological:      Mental Status: He is oriented to person, place, and time.         Lines/Drains:              Lab Results: I have reviewed the following results:   Results from last 7 days   Lab Units 12/07/24  0448   WBC Thousand/uL 3.83*   HEMOGLOBIN g/dL 13.0   HEMATOCRIT % 40.5   PLATELETS Thousands/uL 122*   SEGS PCT % 58   LYMPHO PCT % 29   MONO PCT % 8   EOS PCT % 3     Results from last 7 days   Lab Units 12/07/24  0448   SODIUM mmol/L 137   POTASSIUM mmol/L 4.1   CHLORIDE mmol/L 99   CO2 mmol/L 31   BUN mg/dL 19   CREATININE mg/dL 0.85   ANION GAP mmol/L 7   CALCIUM mg/dL 8.7   ALBUMIN g/dL 3.9   TOTAL BILIRUBIN mg/dL 0.59   ALK PHOS U/L 187*   ALT U/L 187*   AST U/L 223*   GLUCOSE RANDOM mg/dL 110     Results from last 7 days   Lab Units 12/07/24  0448   INR  0.91                   Recent Cultures (last 7 days):               Last 24 Hours Medication List:     Current Facility-Administered Medications:     acetaminophen (TYLENOL) tablet 650 mg, Q6H PRN    albuterol inhalation solution 2.5 mg, Q4H PRN    aluminum-magnesium hydroxide-simethicone (MAALOX) oral suspension 30 mL, Q6H PRN    budesonide (PULMICORT) inhalation solution 0.5 mg, Q12H    buprenorphine-naloxone  (Suboxone) film 8 mg, BID    cloNIDine (CATAPRES) tablet 0.1 mg, Q8H MARIUM    enoxaparin (LOVENOX) subcutaneous injection 40 mg, Daily    folic acid 1 mg, thiamine (VITAMIN B1) 100 mg in sodium chloride 0.9 % 100 mL IV piggyback, Daily    gabapentin (NEURONTIN) capsule 300 mg, TID PRN    hydrocortisone 1 % ointment, 4x Daily PRN    hydrOXYzine HCL (ATARAX) tablet 50 mg, Q6H PRN    ipratropium-albuterol (DUO-NEB) 0.5-2.5 mg/3 mL inhalation solution 3 mL, TID    LORazepam (ATIVAN) tablet 2 mg, Q4H PRN    nicotine (NICODERM CQ) 21 mg/24 hr TD 24 hr patch 21 mg, Daily    ondansetron (ZOFRAN) injection 4 mg, Q4H PRN    pantoprazole (PROTONIX) injection 40 mg, Q24H MARIUM    QUEtiapine (SEROquel) tablet 100 mg, HS    traZODone (DESYREL) tablet 50 mg, HS    trimethobenzamide (TIGAN) IM injection 200 mg, Q6H PRN    Administrative Statements   Today, Patient Was Seen By: Sharifa Bennett MD  I have spent a total time of 37 minutes in caring for this patient on the day of the visit/encounter including Diagnostic results, Patient and family education, Documenting in the medical record, Reviewing / ordering tests, medicine, procedures  , and Communicating with other healthcare professionals .    **Please Note: This note may have been constructed using a voice recognition system.**

## 2024-12-08 LAB
ALBUMIN SERPL BCG-MCNC: 4.4 G/DL (ref 3.5–5)
ALP SERPL-CCNC: 200 U/L (ref 34–104)
ALT SERPL W P-5'-P-CCNC: 163 U/L (ref 7–52)
ANION GAP SERPL CALCULATED.3IONS-SCNC: 7 MMOL/L (ref 4–13)
AST SERPL W P-5'-P-CCNC: 101 U/L (ref 13–39)
BASOPHILS # BLD AUTO: 0.03 THOUSANDS/ÂΜL (ref 0–0.1)
BASOPHILS NFR BLD AUTO: 1 % (ref 0–1)
BILIRUB DIRECT SERPL-MCNC: 0.16 MG/DL (ref 0–0.2)
BILIRUB SERPL-MCNC: 0.55 MG/DL (ref 0.2–1)
BUN SERPL-MCNC: 20 MG/DL (ref 5–25)
CALCIUM SERPL-MCNC: 9.3 MG/DL (ref 8.4–10.2)
CHLORIDE SERPL-SCNC: 97 MMOL/L (ref 96–108)
CO2 SERPL-SCNC: 32 MMOL/L (ref 21–32)
CREAT SERPL-MCNC: 0.88 MG/DL (ref 0.6–1.3)
EOSINOPHIL # BLD AUTO: 0.18 THOUSAND/ÂΜL (ref 0–0.61)
EOSINOPHIL NFR BLD AUTO: 4 % (ref 0–6)
ERYTHROCYTE [DISTWIDTH] IN BLOOD BY AUTOMATED COUNT: 13.8 % (ref 11.6–15.1)
GFR SERPL CREATININE-BSD FRML MDRD: 109 ML/MIN/1.73SQ M
GLUCOSE SERPL-MCNC: 98 MG/DL (ref 65–140)
HAV IGM SER QL: NORMAL
HBV CORE IGM SER QL: NORMAL
HBV SURFACE AG SER QL: NORMAL
HCT VFR BLD AUTO: 42.7 % (ref 36.5–49.3)
HCV AB SER QL: NORMAL
HGB BLD-MCNC: 13.7 G/DL (ref 12–17)
IMM GRANULOCYTES # BLD AUTO: 0.03 THOUSAND/UL (ref 0–0.2)
IMM GRANULOCYTES NFR BLD AUTO: 1 % (ref 0–2)
INR PPP: 0.85 (ref 0.85–1.19)
LYMPHOCYTES # BLD AUTO: 1.37 THOUSANDS/ÂΜL (ref 0.6–4.47)
LYMPHOCYTES NFR BLD AUTO: 29 % (ref 14–44)
MCH RBC QN AUTO: 30.2 PG (ref 26.8–34.3)
MCHC RBC AUTO-ENTMCNC: 32.1 G/DL (ref 31.4–37.4)
MCV RBC AUTO: 94 FL (ref 82–98)
MONOCYTES # BLD AUTO: 0.47 THOUSAND/ÂΜL (ref 0.17–1.22)
MONOCYTES NFR BLD AUTO: 10 % (ref 4–12)
NEUTROPHILS # BLD AUTO: 2.62 THOUSANDS/ÂΜL (ref 1.85–7.62)
NEUTS SEG NFR BLD AUTO: 55 % (ref 43–75)
NRBC BLD AUTO-RTO: 0 /100 WBCS
PLATELET # BLD AUTO: 168 THOUSANDS/UL (ref 149–390)
PMV BLD AUTO: 11.2 FL (ref 8.9–12.7)
POTASSIUM SERPL-SCNC: 4.4 MMOL/L (ref 3.5–5.3)
PROT SERPL-MCNC: 6.6 G/DL (ref 6.4–8.4)
PROTHROMBIN TIME: 12.2 SECONDS (ref 12.3–15)
RBC # BLD AUTO: 4.53 MILLION/UL (ref 3.88–5.62)
SODIUM SERPL-SCNC: 136 MMOL/L (ref 135–147)
WBC # BLD AUTO: 4.7 THOUSAND/UL (ref 4.31–10.16)

## 2024-12-08 PROCEDURE — 94760 N-INVAS EAR/PLS OXIMETRY 1: CPT

## 2024-12-08 PROCEDURE — 80048 BASIC METABOLIC PNL TOTAL CA: CPT | Performed by: FAMILY MEDICINE

## 2024-12-08 PROCEDURE — 94640 AIRWAY INHALATION TREATMENT: CPT

## 2024-12-08 PROCEDURE — 94664 DEMO&/EVAL PT USE INHALER: CPT

## 2024-12-08 PROCEDURE — 80074 ACUTE HEPATITIS PANEL: CPT | Performed by: FAMILY MEDICINE

## 2024-12-08 PROCEDURE — 85610 PROTHROMBIN TIME: CPT | Performed by: FAMILY MEDICINE

## 2024-12-08 PROCEDURE — 85025 COMPLETE CBC W/AUTO DIFF WBC: CPT | Performed by: FAMILY MEDICINE

## 2024-12-08 PROCEDURE — 99232 SBSQ HOSP IP/OBS MODERATE 35: CPT | Performed by: FAMILY MEDICINE

## 2024-12-08 PROCEDURE — 80076 HEPATIC FUNCTION PANEL: CPT | Performed by: FAMILY MEDICINE

## 2024-12-08 PROCEDURE — 94668 MNPJ CHEST WALL SBSQ: CPT

## 2024-12-08 RX ORDER — LORAZEPAM 2 MG/ML
4 INJECTION INTRAMUSCULAR ONCE
Status: COMPLETED | OUTPATIENT
Start: 2024-12-08 | End: 2024-12-08

## 2024-12-08 RX ORDER — GABAPENTIN 300 MG/1
300 CAPSULE ORAL 3 TIMES DAILY
Status: DISCONTINUED | OUTPATIENT
Start: 2024-12-08 | End: 2024-12-09 | Stop reason: HOSPADM

## 2024-12-08 RX ORDER — HYDROXYZINE HYDROCHLORIDE 25 MG/1
50 TABLET, FILM COATED ORAL 3 TIMES DAILY
Status: DISCONTINUED | OUTPATIENT
Start: 2024-12-08 | End: 2024-12-09 | Stop reason: HOSPADM

## 2024-12-08 RX ORDER — LORAZEPAM 1 MG/1
2 TABLET ORAL ONCE
Status: COMPLETED | OUTPATIENT
Start: 2024-12-08 | End: 2024-12-08

## 2024-12-08 RX ORDER — IBUPROFEN 600 MG/1
600 TABLET, FILM COATED ORAL ONCE
Status: COMPLETED | OUTPATIENT
Start: 2024-12-08 | End: 2024-12-08

## 2024-12-08 RX ORDER — LOPERAMIDE HYDROCHLORIDE 2 MG/1
4 CAPSULE ORAL 3 TIMES DAILY PRN
Status: DISCONTINUED | OUTPATIENT
Start: 2024-12-08 | End: 2024-12-09 | Stop reason: HOSPADM

## 2024-12-08 RX ADMIN — BUPRENORPHINE AND NALOXONE 8 MG: 8; 2 FILM BUCCAL; SUBLINGUAL at 20:21

## 2024-12-08 RX ADMIN — GABAPENTIN 300 MG: 300 CAPSULE ORAL at 15:36

## 2024-12-08 RX ADMIN — LORAZEPAM 2 MG: 1 TABLET ORAL at 16:26

## 2024-12-08 RX ADMIN — BUDESONIDE 0.5 MG: 0.5 INHALANT RESPIRATORY (INHALATION) at 20:13

## 2024-12-08 RX ADMIN — LORAZEPAM 4 MG: 2 INJECTION INTRAMUSCULAR; INTRAVENOUS at 19:33

## 2024-12-08 RX ADMIN — IPRATROPIUM BROMIDE AND ALBUTEROL SULFATE 3 ML: 2.5; .5 SOLUTION RESPIRATORY (INHALATION) at 13:54

## 2024-12-08 RX ADMIN — HYDROXYZINE HYDROCHLORIDE 50 MG: 25 TABLET ORAL at 15:35

## 2024-12-08 RX ADMIN — LOPERAMIDE HYDROCHLORIDE 4 MG: 2 CAPSULE ORAL at 11:12

## 2024-12-08 RX ADMIN — IBUPROFEN 600 MG: 600 TABLET, FILM COATED ORAL at 22:34

## 2024-12-08 RX ADMIN — IPRATROPIUM BROMIDE AND ALBUTEROL SULFATE 3 ML: 2.5; .5 SOLUTION RESPIRATORY (INHALATION) at 20:13

## 2024-12-08 RX ADMIN — LORAZEPAM 4 MG: 2 INJECTION INTRAMUSCULAR; INTRAVENOUS at 01:07

## 2024-12-08 RX ADMIN — GABAPENTIN 300 MG: 300 CAPSULE ORAL at 20:21

## 2024-12-08 RX ADMIN — LORAZEPAM 4 MG: 2 INJECTION INTRAMUSCULAR; INTRAVENOUS at 14:06

## 2024-12-08 RX ADMIN — BUPRENORPHINE AND NALOXONE 8 MG: 8; 2 FILM BUCCAL; SUBLINGUAL at 08:07

## 2024-12-08 RX ADMIN — LORAZEPAM 2 MG: 1 TABLET ORAL at 09:58

## 2024-12-08 RX ADMIN — LORAZEPAM 4 MG: 2 INJECTION INTRAMUSCULAR; INTRAVENOUS at 22:34

## 2024-12-08 RX ADMIN — NICOTINE 21 MG: 21 PATCH, EXTENDED RELEASE TRANSDERMAL at 08:12

## 2024-12-08 RX ADMIN — BUDESONIDE 0.5 MG: 0.5 INHALANT RESPIRATORY (INHALATION) at 07:42

## 2024-12-08 RX ADMIN — IPRATROPIUM BROMIDE AND ALBUTEROL SULFATE 3 ML: 2.5; .5 SOLUTION RESPIRATORY (INHALATION) at 07:42

## 2024-12-08 RX ADMIN — HYDROXYZINE HYDROCHLORIDE 50 MG: 25 TABLET ORAL at 20:21

## 2024-12-08 RX ADMIN — QUETIAPINE FUMARATE 100 MG: 100 TABLET ORAL at 22:34

## 2024-12-08 RX ADMIN — LORAZEPAM 2 MG: 1 TABLET ORAL at 12:27

## 2024-12-08 RX ADMIN — CLONIDINE HYDROCHLORIDE 0.1 MG: 0.1 TABLET ORAL at 13:10

## 2024-12-08 RX ADMIN — LORAZEPAM 4 MG: 2 INJECTION INTRAMUSCULAR; INTRAVENOUS at 05:20

## 2024-12-08 RX ADMIN — GABAPENTIN 300 MG: 300 CAPSULE ORAL at 08:52

## 2024-12-08 RX ADMIN — CLONIDINE HYDROCHLORIDE 0.1 MG: 0.1 TABLET ORAL at 05:20

## 2024-12-08 RX ADMIN — ACETAMINOPHEN 650 MG: 325 TABLET, FILM COATED ORAL at 19:33

## 2024-12-08 NOTE — ASSESSMENT & PLAN NOTE
This is a 39-year-old male patient with history of substance use disorder, on Sublocade, alcohol use disorder, hypertension, tobacco use who presents with alcohol withdrawal  Status post multiple doses of phenobarbital, was initiated on Precedex overnight 12/4-12/5  Appreciate Toxicology recommendations  Wean off Precedex, utilize supportive medication regimen  The patient is requesting Ativan in favor of Valium for symptom management, order changed  Patient is interested in alcohol rehab, plan for discharge to alcohol rehab center on Monday  Patient is medically cleared for discharge to drug and alcohol rehab facility

## 2024-12-08 NOTE — NURSING NOTE
Pt keeps unhooking himself from monitors and not putting them back on.  Leaving bp cuff off.  Pt educated to put back on or ring for us for help

## 2024-12-08 NOTE — ASSESSMENT & PLAN NOTE
Creatinine elevated 1.41 on admission, most likely prerenal with HARRIET resolving to baseline with IV fluids  Resolved with Cr of 0.8, IVF have been discontinued

## 2024-12-08 NOTE — ASSESSMENT & PLAN NOTE
Due to alcohol use, improving  Maddrey DF 0, no steroids indicated  Monitor liver function upon discharge  Strict alcohol cessation, plan for d/c to rehab

## 2024-12-08 NOTE — CASE MANAGEMENT
Case Management Progress Note    Patient name Aniket Pace  Location / MRN [id number]  : 1985 Date 2024       LOS (days): 4  Geometric Mean LOS (GMLOS) (days): 3  Days to GMLOS:-1.2        OBJECTIVE:        Current admission status: Inpatient  Preferred Pharmacy:   [id number] - [place] - [address]  [address] [address]  Phone: [phone] Fax: [phone]    [id number] [place], [place] - [address]  [address] [facility] [address] [facility] [address]  Phone: [phone] Fax: [phone]    [id number] - [place], [place] - [address] [address] [address]  [address] [address] [facility] [address]  Phone: [phone] Fax: [phone]    [id number] - [place] - [address] [place] [address]  Phone: [phone] Fax: [phone]    Primary Care Provider: Edenilson Collier MD    Primary Insurance:   Secondary Insurance:     PROGRESS NOTE:message left for juan at [facility] to call cm back. Pt is medically cleared for rehab. Per notes, pt is being picked up at 1030am tomorrow. Will wait to hear back from juan to confirm.

## 2024-12-08 NOTE — PROGRESS NOTES
Progress Note - Hospitalist   Name: Aniket Pace 39 y.o. male I MRN: [id number]  Unit/Bed#:  I Date of Admission: 12/4/2024   Date of Service: 12/8/2024 I Hospital Day: 4    Assessment & Plan  Alcohol withdrawal delirium, acute, hyperactive (HCC)  This is a 39-year-old male patient with history of substance use disorder, on Sublocade, alcohol use disorder, hypertension, tobacco use who presents with alcohol withdrawal  Status post multiple doses of phenobarbital, was initiated on Precedex overnight 12/4-12/5  Appreciate Toxicology recommendations  Wean off Precedex, utilize supportive medication regimen  The patient is requesting Ativan in favor of Valium for symptom management, order changed  Patient is interested in alcohol rehab, plan for discharge to alcohol rehab center on Monday  Patient is medically cleared for discharge to drug and alcohol rehab facility  HARRIET (acute kidney injury) (HCC)  Creatinine elevated 1.41 on admission, most likely prerenal with HARRIET resolving to baseline with IV fluids  Resolved with Cr of 0.8, IVF have been discontinued    Hyponatremia  Hypovolemic hyponatremia, resolved  Primary hypertension  Patient was initially with accelerated hypertension in setting of active alcohol withdrawal, blood pressure is now low normal  Previously on Norvasc and metoprolol, however noncompliant  Started on clonidine to treat withdrawal symptoms  Bipolar I disorder with depression (HCC)  The patient was previously prescribed Seroquel 100 mg daily  Restarted  Tobacco use disorder  Smoking cessation counseling  Patient with diffuse wheezing on exam, would recommend outpatient pulmonology evaluation, PFTs  Nicotine patch ordered  Opioid use disorder  On Sublocade outpatient, apparently had run out 3 days ago   Appreciate toxicology input, patient is restarted on his outpatient dose of buprenorphine while here  Avoid opiates  Alcohol withdrawal syndrome with complication (HCC)    Wheezing  Diffuse  wheezing, considerations for undiagnosed COPD, patient with history of smoking  Improved with nebulizer treatments  Recommend outpatient Pulmonology evaluation  Elevated LFTs  Due to alcohol use, improving  Maddrey DF 0, no steroids indicated  Monitor liver function upon discharge  Strict alcohol cessation, plan for d/c to rehab  Severe protein-calorie malnutrition (HCC)  Malnutrition Findings:   Adult Malnutrition type: Acute illness  Adult Degree of Malnutrition: Other severe protein calorie malnutrition  Malnutrition Characteristics: Inadequate energy, Weight loss                  360 Statement: Severe PCM in light of significant weight loss x 2 months of 26#/11% due to increase alcohol intake and decrease po intake and poor nutrition per patient.  Treated with regular diet/double portions.    BMI Findings:           Body mass index is 29.3 kg/m².       VTE Pharmacologic Prophylaxis:   Lovenox    Mobility:   Basic Mobility Inpatient Raw Score: 24  JH-HLM Goal: 8: Walk 250 feet or more  JH-HLM Achieved: 7: Walk 25 feet or more  JH-HLM Goal achieved. Continue to encourage appropriate mobility.    Patient Centered Rounds: I performed bedside rounds with nursing staff today.   Discussions with Specialists or Other Care Team Provider: Case management    Education and Discussions with Family / Patient: patient    Current Length of Stay: 4 day(s)  Current Patient Status: Inpatient   Certification Statement: The patient will continue to require additional inpatient hospital stay due to close monitoring  Discharge Plan: Anticipate discharge tomorrow to drug and alcohol rehab    Code Status: Level 1 - Full Code    Subjective   Patient continues to request Ativan for his anxiety.  Educated and advised to utilize other symptomatic medications as well.    Objective :  Temp:  [96.9 °F (36.1 °C)-97.9 °F (36.6 °C)] 97.9 °F (36.6 °C)  HR:  [] 72  BP: ()/(53-98) 139/66  Resp:  [11-22] 16  SpO2:  [93 %-100 %] 98 %  O2  Device: None (Room air)    Body mass index is 29.3 kg/m².     Input and Output Summary (last 24 hours):     Intake/Output Summary (Last 24 hours) at 12/8/2024 1210  Last data filed at 12/7/2024 1618  Gross per 24 hour   Intake 360 ml   Output 1000 ml   Net -640 ml       Physical Exam  Constitutional:       General: He is not in acute distress.  HENT:      Head: Normocephalic and atraumatic.   Cardiovascular:      Rate and Rhythm: Normal rate and regular rhythm.   Pulmonary:      Effort: No respiratory distress.      Breath sounds: No wheezing or rales.   Abdominal:      General: There is no distension.      Tenderness: There is no abdominal tenderness. There is no guarding.   Musculoskeletal:      Right lower leg: No edema.      Left lower leg: No edema.   Skin:     General: Skin is warm and dry.   Neurological:      Mental Status: He is oriented to person, place, and time.   Psychiatric:         Mood and Affect: Mood is anxious.         Lines/Drains:              Lab Results: I have reviewed the following results:   Results from last 7 days   Lab Units 12/08/24  0527   WBC Thousand/uL 4.70   HEMOGLOBIN g/dL 13.7   HEMATOCRIT % 42.7   PLATELETS Thousands/uL 168   SEGS PCT % 55   LYMPHO PCT % 29   MONO PCT % 10   EOS PCT % 4     Results from last 7 days   Lab Units 12/08/24  0527   SODIUM mmol/L 136   POTASSIUM mmol/L 4.4   CHLORIDE mmol/L 97   CO2 mmol/L 32   BUN mg/dL 20   CREATININE mg/dL 0.88   ANION GAP mmol/L 7   CALCIUM mg/dL 9.3   ALBUMIN g/dL 4.4   TOTAL BILIRUBIN mg/dL 0.55   ALK PHOS U/L 200*   ALT U/L 163*   AST U/L 101*   GLUCOSE RANDOM mg/dL 98     Results from last 7 days   Lab Units 12/08/24  0527   INR  0.85                   Recent Cultures (last 7 days):               Last 24 Hours Medication List:     Current Facility-Administered Medications:     acetaminophen (TYLENOL) tablet 650 mg, Q6H PRN    albuterol inhalation solution 2.5 mg, Q4H PRN    aluminum-magnesium hydroxide-simethicone (MAALOX)  oral suspension 30 mL, Q6H PRN    budesonide (PULMICORT) inhalation solution 0.5 mg, Q12H    buprenorphine-naloxone (Suboxone) film 8 mg, BID    cloNIDine (CATAPRES) tablet 0.1 mg, Q8H MARIUM    enoxaparin (LOVENOX) subcutaneous injection 40 mg, Daily    folic acid 1 mg, thiamine (VITAMIN B1) 100 mg in sodium chloride 0.9 % 100 mL IV piggyback, Daily    gabapentin (NEURONTIN) capsule 300 mg, TID    hydrocortisone 1 % ointment, 4x Daily PRN    hydrOXYzine HCL (ATARAX) tablet 50 mg, TID    ipratropium-albuterol (DUO-NEB) 0.5-2.5 mg/3 mL inhalation solution 3 mL, TID    loperamide (IMODIUM) capsule 4 mg, TID PRN    LORazepam (ATIVAN) tablet 2 mg, Q4H PRN    nicotine (NICODERM CQ) 21 mg/24 hr TD 24 hr patch 21 mg, Daily    ondansetron (ZOFRAN) injection 4 mg, Q4H PRN    pantoprazole (PROTONIX) injection 40 mg, Q24H MARIUM    QUEtiapine (SEROquel) tablet 100 mg, HS    traZODone (DESYREL) tablet 50 mg, HS    trimethobenzamide (TIGAN) IM injection 200 mg, Q6H PRN    Administrative Statements   Today, Patient Was Seen By: Sharifa Bennett MD  I have spent a total time of 38 minutes in caring for this patient on the day of the visit/encounter including Patient and family education, Documenting in the medical record, Reviewing / ordering tests, medicine, procedures  , and Communicating with other healthcare professionals .    **Please Note: This note may have been constructed using a voice recognition system.**

## 2024-12-08 NOTE — NURSING NOTE
Explained several times throughout 12/7 and today so far that we cannot just give him ativan every time he requests it. There is protocols we have to follow. Pt very nasty and refusing most of his medications at this time.  Pt yelling out obscenities and being very disruptive to the unit.  Offered gabapentin, tylenol, and zofran at this time, pt refused all meds to help relieve symptoms.

## 2024-12-08 NOTE — CASE MANAGEMENT
Case Management Progress Note    Patient name Aniket Pace  Location / MRN [id number]  : 1985 Date 2024       LOS (days): 4  Geometric Mean LOS (GMLOS) (days): 3  Days to GMLOS:-1.2        OBJECTIVE:        Current admission status: Inpatient  Preferred Pharmacy:   [id number] - [place] - [address]  [address] [address]  Phone: [phone] Fax: [phone]    [id number] [place], [place] - [address]  [address] [address]  Phone: [phone] Fax: [phone]    [id number] - [place], [place] - [address] [address] [address]  [address] [address] [facility] [address]  Phone: [phone] Fax: [phone]    [id number] - [place] [address] [place]  [address] [place] [address]  Phone: [phone] Fax: [phone]    Primary Care Provider: Edenilson Collier MD    Primary Insurance:   Secondary Insurance:     PROGRESS NOTE:another message left for juan at [facility] to inform her that pt is medically cleared for discharge.

## 2024-12-08 NOTE — ASSESSMENT & PLAN NOTE
Patient was initially with accelerated hypertension in setting of active alcohol withdrawal, blood pressure is now low normal  Previously on Norvasc and metoprolol, however noncompliant  Started on clonidine to treat withdrawal symptoms

## 2024-12-08 NOTE — RESPIRATORY THERAPY NOTE
12/08/24 0818   Chest Physiotherapy Tx   $ Chest Physiotherapy (CPT)  Yes  (pt elías he has been using on own, technique good, encouraged continued use, will discontinue order no longer needs coaching)   CPT Delivery Source Jesus   CPT Duration 10   CPT Chest Site Full range   Breath Sounds Pre-Treatment Bilateral Diminished   Breath Sounds Post-Treatment Bilateral Diminished   Cough Non-productive;Strong   Position High Suarez's   CPT Treatment Tolerance Tolerated well

## 2024-12-08 NOTE — PLAN OF CARE
Problem: Potential for Falls  Goal: Patient will remain free of falls  Description: INTERVENTIONS:  - Educate patient/family on patient safety including physical limitations  - Instruct patient to call for assistance with activity   - Consult OT/PT to assist with strengthening/mobility   - Keep Call bell within reach  - Keep bed low and locked with side rails adjusted as appropriate  - Keep care items and personal belongings within reach  - Initiate and maintain comfort rounds  - Make Fall Risk Sign visible to staff  - Offer Toileting every 2 Hours, in advance of need  - Initiate/Maintain fall alarm  - Obtain necessary fall risk management equipment: alarm, nonskid footwear, mobility equipment  - Apply yellow socks and bracelet for high fall risk patients  - Consider moving patient to room near nurses station  Outcome: Progressing     Problem: Nutrition/Hydration-ADULT  Goal: Nutrient/Hydration intake appropriate for improving, restoring or maintaining nutritional needs  Description: Monitor and assess patient's nutrition/hydration status for malnutrition. Collaborate with interdisciplinary team and initiate plan and interventions as ordered.  Monitor patient's weight and dietary intake as ordered or per policy. Utilize nutrition screening tool and intervene as necessary. Determine patient's food preferences and provide high-protein, high-caloric foods as appropriate.     INTERVENTIONS:  - Monitor oral intake, urinary output, labs, and treatment plans  - Assess nutrition and hydration status and recommend course of action  - Evaluate amount of meals eaten  - Assist patient with eating if necessary   - Allow adequate time for meals  - Recommend/ encourage appropriate diets, oral nutritional supplements, and vitamin/mineral supplements  - Order, calculate, and assess calorie counts as needed  - Recommend, monitor, and adjust tube feedings and TPN/PPN based on assessed needs  - Assess need for intravenous fluids  -  Provide specific nutrition/hydration education as appropriate  - Include patient/family/caregiver in decisions related to nutrition  Outcome: Progressing     Problem: PAIN - ADULT  Goal: Verbalizes/displays adequate comfort level or baseline comfort level  Description: Interventions:  - Encourage patient to monitor pain and request assistance  - Assess pain using appropriate pain scale  - Administer analgesics based on type and severity of pain and evaluate response  - Implement non-pharmacological measures as appropriate and evaluate response  - Consider cultural and social influences on pain and pain management  - Notify physician/advanced practitioner if interventions unsuccessful or patient reports new pain  Outcome: Progressing     Problem: INFECTION - ADULT  Goal: Absence or prevention of progression during hospitalization  Description: INTERVENTIONS:  - Assess and monitor for signs and symptoms of infection  - Monitor lab/diagnostic results  - Monitor all insertion sites, i.e. indwelling lines, tubes, and drains  - Monitor endotracheal if appropriate and nasal secretions for changes in amount and color  - Roberts appropriate cooling/warming therapies per order  - Administer medications as ordered  - Instruct and encourage patient and family to use good hand hygiene technique  - Identify and instruct in appropriate isolation precautions for identified infection/condition  Outcome: Progressing  Goal: Absence of fever/infection during neutropenic period  Description: INTERVENTIONS:  - Monitor WBC    Outcome: Progressing     Problem: SAFETY ADULT  Goal: Maintain or return to baseline ADL function  Description: INTERVENTIONS:  -  Assess patient's ability to carry out ADLs; assess patient's baseline for ADL function and identify physical deficits which impact ability to perform ADLs (bathing, care of mouth/teeth, toileting, grooming, dressing, etc.)  - Assess/evaluate cause of self-care deficits   - Assess range  of motion  - Assess patient's mobility; develop plan if impaired  - Assess patient's need for assistive devices and provide as appropriate  - Encourage maximum independence but intervene and supervise when necessary  - Involve family in performance of ADLs  - Assess for home care needs following discharge   - Consider OT consult to assist with ADL evaluation and planning for discharge  - Provide patient education as appropriate  Outcome: Progressing  Goal: Maintains/Returns to pre admission functional level  Description: INTERVENTIONS:  - Perform AM-PAC 6 Click Basic Mobility/ Daily Activity assessment daily.  - Set and communicate daily mobility goal to care team and patient/family/caregiver.   - Collaborate with rehabilitation services on mobility goals if consulted  - Perform Range of Motion 4 times a day.  - Reposition patient every 2 hours.  - Dangle patient 3 times a day  - Stand patient 3 times a day  - Ambulate patient 3 times a day  - Out of bed to chair 3 times a day   - Out of bed for meals 3 times a day  - Out of bed for toileting  - Record patient progress and toleration of activity level   Outcome: Progressing     Problem: DISCHARGE PLANNING  Goal: Discharge to home or other facility with appropriate resources  Description: INTERVENTIONS:  - Identify barriers to discharge w/patient and caregiver  - Arrange for needed discharge resources and transportation as appropriate  - Identify discharge learning needs (meds, wound care, etc.)  - Arrange for interpretive services to assist at discharge as needed  - Refer to Case Management Department for coordinating discharge planning if the patient needs post-hospital services based on physician/advanced practitioner order or complex needs related to functional status, cognitive ability, or social support system  Outcome: Progressing     Problem: Knowledge Deficit  Goal: Patient/family/caregiver demonstrates understanding of disease process, treatment plan,  medications, and discharge instructions  Description: Complete learning assessment and assess knowledge base.  Interventions:  - Provide teaching at level of understanding  - Provide teaching via preferred learning methods  Outcome: Progressing     Problem: NEUROSENSORY - ADULT  Goal: Achieves stable or improved neurological status  Description: INTERVENTIONS  - Monitor and report changes in neurological status  - Monitor vital signs such as temperature, blood pressure, glucose, and any other labs ordered   - Initiate measures to prevent increased intracranial pressure  - Monitor for seizure activity and implement precautions if appropriate      Outcome: Progressing  Goal: Remains free of injury related to seizures activity  Description: INTERVENTIONS  - Maintain airway, patient safety  and administer oxygen as ordered  - Monitor patient for seizure activity, document and report duration and description of seizure to physician/advanced practitioner  - If seizure occurs,  ensure patient safety during seizure  - Reorient patient post seizure  - Seizure pads on all 4 side rails  - Instruct patient/family to notify RN of any seizure activity including if an aura is experienced  - Instruct patient/family to call for assistance with activity based on nursing assessment  - Administer anti-seizure medications if ordered    Outcome: Progressing  Goal: Achieves maximal functionality and self care  Description: INTERVENTIONS  - Monitor swallowing and airway patency with patient fatigue and changes in neurological status  - Encourage and assist patient to increase activity and self care.   - Encourage visually impaired, hearing impaired and aphasic patients to use assistive/communication devices  Outcome: Progressing     Problem: METABOLIC, FLUID AND ELECTROLYTES - ADULT  Goal: Electrolytes maintained within normal limits  Description: INTERVENTIONS:  - Monitor labs and assess patient for signs and symptoms of electrolyte  imbalances  - Administer electrolyte replacement as ordered  - Monitor response to electrolyte replacements, including repeat lab results as appropriate  - Instruct patient on fluid and nutrition as appropriate  Outcome: Progressing  Goal: Fluid balance maintained  Description: INTERVENTIONS:  - Monitor labs   - Monitor I/O and WT  - Instruct patient on fluid and nutrition as appropriate  - Assess for signs & symptoms of volume excess or deficit  Outcome: Progressing

## 2024-12-08 NOTE — PLAN OF CARE
Problem: Potential for Falls  Goal: Patient will remain free of falls  Description: INTERVENTIONS:  - Educate patient/family on patient safety including physical limitations  - Instruct patient to call for assistance with activity   - Consult OT/PT to assist with strengthening/mobility   - Keep Call bell within reach  - Keep bed low and locked with side rails adjusted as appropriate  - Keep care items and personal belongings within reach  - Initiate and maintain comfort rounds  - Make Fall Risk Sign visible to staff  - Offer Toileting every 2 Hours, in advance of need  - Initiate/Maintain fall alarm  - Obtain necessary fall risk management equipment: alarm, nonskid footwear, mobility equipment  - Apply yellow socks and bracelet for high fall risk patients  - Consider moving patient to room near nurses station  Outcome: Progressing     Problem: Nutrition/Hydration-ADULT  Goal: Nutrient/Hydration intake appropriate for improving, restoring or maintaining nutritional needs  Description: Monitor and assess patient's nutrition/hydration status for malnutrition. Collaborate with interdisciplinary team and initiate plan and interventions as ordered.  Monitor patient's weight and dietary intake as ordered or per policy. Utilize nutrition screening tool and intervene as necessary. Determine patient's food preferences and provide high-protein, high-caloric foods as appropriate.     INTERVENTIONS:  - Monitor oral intake, urinary output, labs, and treatment plans  - Assess nutrition and hydration status and recommend course of action  - Evaluate amount of meals eaten  - Assist patient with eating if necessary   - Allow adequate time for meals  - Recommend/ encourage appropriate diets, oral nutritional supplements, and vitamin/mineral supplements  - Order, calculate, and assess calorie counts as needed  - Recommend, monitor, and adjust tube feedings and TPN/PPN based on assessed needs  - Assess need for intravenous fluids  -  Provide specific nutrition/hydration education as appropriate  - Include patient/family/caregiver in decisions related to nutrition  Outcome: Progressing     Problem: PAIN - ADULT  Goal: Verbalizes/displays adequate comfort level or baseline comfort level  Description: Interventions:  - Encourage patient to monitor pain and request assistance  - Assess pain using appropriate pain scale  - Administer analgesics based on type and severity of pain and evaluate response  - Implement non-pharmacological measures as appropriate and evaluate response  - Consider cultural and social influences on pain and pain management  - Notify physician/advanced practitioner if interventions unsuccessful or patient reports new pain  Outcome: Progressing     Problem: INFECTION - ADULT  Goal: Absence or prevention of progression during hospitalization  Description: INTERVENTIONS:  - Assess and monitor for signs and symptoms of infection  - Monitor lab/diagnostic results  - Monitor all insertion sites, i.e. indwelling lines, tubes, and drains  - Monitor endotracheal if appropriate and nasal secretions for changes in amount and color  - [place] appropriate cooling/warming therapies per order  - Administer medications as ordered  - Instruct and encourage patient and family to use good hand hygiene technique  - Identify and instruct in appropriate isolation precautions for identified infection/condition  Outcome: Progressing  Goal: Absence of fever/infection during neutropenic period  Description: INTERVENTIONS:  - Monitor WBC    Outcome: Progressing     Problem: SAFETY ADULT  Goal: Maintain or return to baseline ADL function  Description: INTERVENTIONS:  -  Assess patient's ability to carry out ADLs; assess patient's baseline for ADL function and identify physical deficits which impact ability to perform ADLs (bathing, care of mouth/teeth, toileting, grooming, dressing, etc.)  - Assess/evaluate cause of self-care deficits   - Assess range  of motion  - Assess patient's mobility; develop plan if impaired  - Assess patient's need for assistive devices and provide as appropriate  - Encourage maximum independence but intervene and supervise when necessary  - Involve family in performance of ADLs  - Assess for home care needs following discharge   - Consider OT consult to assist with ADL evaluation and planning for discharge  - Provide patient education as appropriate  Outcome: Progressing  Goal: Maintains/Returns to pre admission functional level  Description: INTERVENTIONS:  - Perform AM-PAC 6 Click Basic Mobility/ Daily Activity assessment daily.  - Set and communicate daily mobility goal to care team and patient/family/caregiver.   - Collaborate with rehabilitation services on mobility goals if consulted  - Perform Range of Motion 4 times a day.  - Reposition patient every 2 hours.  - Dangle patient 3 times a day  - Stand patient 3 times a day  - Ambulate patient 3 times a day  - Out of bed to chair 3 times a day   - Out of bed for meals 3 times a day  - Out of bed for toileting  - Record patient progress and toleration of activity level   Outcome: Progressing     Problem: DISCHARGE PLANNING  Goal: Discharge to home or other facility with appropriate resources  Description: INTERVENTIONS:  - Identify barriers to discharge w/patient and caregiver  - Arrange for needed discharge resources and transportation as appropriate  - Identify discharge learning needs (meds, wound care, etc.)  - Arrange for interpretive services to assist at discharge as needed  - Refer to Case Management Department for coordinating discharge planning if the patient needs post-hospital services based on physician/advanced practitioner order or complex needs related to functional status, cognitive ability, or social support system  Outcome: Progressing     Problem: Knowledge Deficit  Goal: Patient/family/caregiver demonstrates understanding of disease process, treatment plan,  medications, and discharge instructions  Description: Complete learning assessment and assess knowledge base.  Interventions:  - Provide teaching at level of understanding  - Provide teaching via preferred learning methods  Outcome: Progressing     Problem: NEUROSENSORY - ADULT  Goal: Achieves stable or improved neurological status  Description: INTERVENTIONS  - Monitor and report changes in neurological status  - Monitor vital signs such as temperature, blood pressure, glucose, and any other labs ordered   - Initiate measures to prevent increased intracranial pressure  - Monitor for seizure activity and implement precautions if appropriate      Outcome: Progressing  Goal: Remains free of injury related to seizures activity  Description: INTERVENTIONS  - Maintain airway, patient safety  and administer oxygen as ordered  - Monitor patient for seizure activity, document and report duration and description of seizure to physician/advanced practitioner  - If seizure occurs,  ensure patient safety during seizure  - Reorient patient post seizure  - Seizure pads on all 4 side rails  - Instruct patient/family to notify RN of any seizure activity including if an aura is experienced  - Instruct patient/family to call for assistance with activity based on nursing assessment  - Administer anti-seizure medications if ordered    Outcome: Progressing  Goal: Achieves maximal functionality and self care  Description: INTERVENTIONS  - Monitor swallowing and airway patency with patient fatigue and changes in neurological status  - Encourage and assist patient to increase activity and self care.   - Encourage visually impaired, hearing impaired and aphasic patients to use assistive/communication devices  Outcome: Progressing     Problem: METABOLIC, FLUID AND ELECTROLYTES - ADULT  Goal: Electrolytes maintained within normal limits  Description: INTERVENTIONS:  - Monitor labs and assess patient for signs and symptoms of electrolyte  imbalances  - Administer electrolyte replacement as ordered  - Monitor response to electrolyte replacements, including repeat lab results as appropriate  - Instruct patient on fluid and nutrition as appropriate  Outcome: Progressing  Goal: Fluid balance maintained  Description: INTERVENTIONS:  - Monitor labs   - Monitor I/O and WT  - Instruct patient on fluid and nutrition as appropriate  - Assess for signs & symptoms of volume excess or deficit  Outcome: Progressing

## 2024-12-09 VITALS
HEART RATE: 80 BPM | RESPIRATION RATE: 18 BRPM | TEMPERATURE: 97.6 F | WEIGHT: 204.2 LBS | HEIGHT: 70 IN | SYSTOLIC BLOOD PRESSURE: 106 MMHG | OXYGEN SATURATION: 95 % | BODY MASS INDEX: 29.23 KG/M2 | DIASTOLIC BLOOD PRESSURE: 57 MMHG

## 2024-12-09 PROCEDURE — 94640 AIRWAY INHALATION TREATMENT: CPT

## 2024-12-09 PROCEDURE — 99239 HOSP IP/OBS DSCHRG MGMT >30: CPT | Performed by: FAMILY MEDICINE

## 2024-12-09 PROCEDURE — 94664 DEMO&/EVAL PT USE INHALER: CPT

## 2024-12-09 RX ORDER — LOPERAMIDE HYDROCHLORIDE 2 MG/1
4 CAPSULE ORAL 3 TIMES DAILY PRN
Start: 2024-12-09

## 2024-12-09 RX ORDER — LORAZEPAM 2 MG/ML
4 INJECTION INTRAMUSCULAR ONCE
Status: DISCONTINUED | OUTPATIENT
Start: 2024-12-09 | End: 2024-12-09

## 2024-12-09 RX ORDER — BUPRENORPHINE AND NALOXONE 8; 2 MG/1; MG/1
8 FILM, SOLUBLE BUCCAL; SUBLINGUAL 2 TIMES DAILY
Start: 2024-12-09 | End: 2024-12-19

## 2024-12-09 RX ORDER — HYDROXYZINE HYDROCHLORIDE 50 MG/1
50 TABLET, FILM COATED ORAL 3 TIMES DAILY
Qty: 90 TABLET | Refills: 0 | Status: SHIPPED | OUTPATIENT
Start: 2024-12-09

## 2024-12-09 RX ORDER — CLONIDINE HYDROCHLORIDE 0.1 MG/1
0.1 TABLET ORAL EVERY 8 HOURS SCHEDULED
Start: 2024-12-09

## 2024-12-09 RX ORDER — LORAZEPAM 2 MG/ML
4 INJECTION INTRAMUSCULAR ONCE
Status: COMPLETED | OUTPATIENT
Start: 2024-12-09 | End: 2024-12-09

## 2024-12-09 RX ORDER — GABAPENTIN 300 MG/1
300 CAPSULE ORAL 3 TIMES DAILY
Start: 2024-12-09

## 2024-12-09 RX ORDER — GABAPENTIN 100 MG/1
100 CAPSULE ORAL ONCE
Status: COMPLETED | OUTPATIENT
Start: 2024-12-09 | End: 2024-12-09

## 2024-12-09 RX ORDER — TRAZODONE HYDROCHLORIDE 50 MG/1
50 TABLET, FILM COATED ORAL
Start: 2024-12-09

## 2024-12-09 RX ORDER — LORAZEPAM 1 MG/1
2 TABLET ORAL ONCE
Status: COMPLETED | OUTPATIENT
Start: 2024-12-09 | End: 2024-12-09

## 2024-12-09 RX ADMIN — LORAZEPAM 4 MG: 2 INJECTION INTRAMUSCULAR; INTRAVENOUS at 01:56

## 2024-12-09 RX ADMIN — LORAZEPAM 4 MG: 2 INJECTION INTRAMUSCULAR; INTRAVENOUS at 08:32

## 2024-12-09 RX ADMIN — LORAZEPAM 2 MG: 1 TABLET ORAL at 10:19

## 2024-12-09 RX ADMIN — PANTOPRAZOLE SODIUM 40 MG: 40 INJECTION, POWDER, FOR SOLUTION INTRAVENOUS at 08:04

## 2024-12-09 RX ADMIN — HYDROXYZINE HYDROCHLORIDE 50 MG: 25 TABLET ORAL at 08:04

## 2024-12-09 RX ADMIN — IPRATROPIUM BROMIDE AND ALBUTEROL SULFATE 3 ML: 2.5; .5 SOLUTION RESPIRATORY (INHALATION) at 08:02

## 2024-12-09 RX ADMIN — BUPRENORPHINE AND NALOXONE 8 MG: 8; 2 FILM BUCCAL; SUBLINGUAL at 08:05

## 2024-12-09 RX ADMIN — THIAMINE HYDROCHLORIDE: 100 INJECTION, SOLUTION INTRAMUSCULAR; INTRAVENOUS at 08:29

## 2024-12-09 RX ADMIN — NICOTINE 21 MG: 21 PATCH, EXTENDED RELEASE TRANSDERMAL at 08:25

## 2024-12-09 RX ADMIN — LORAZEPAM 2 MG: 1 TABLET ORAL at 12:10

## 2024-12-09 RX ADMIN — BUDESONIDE 0.5 MG: 0.5 INHALANT RESPIRATORY (INHALATION) at 08:02

## 2024-12-09 RX ADMIN — GABAPENTIN 300 MG: 300 CAPSULE ORAL at 08:04

## 2024-12-09 RX ADMIN — GABAPENTIN 100 MG: 100 CAPSULE ORAL at 12:10

## 2024-12-09 NOTE — CASE MANAGEMENT
Case Management Discharge Planning Note    Patient name Aniket Pace  Location / MRN [id number]  : 1985 Date 2024       Current Admission Date: 2024  Current Admission Diagnosis:Alcohol withdrawal delirium, acute, hyperactive (HCC)   Patient Active Problem List    Diagnosis Date Noted Date Diagnosed    Elevated LFTs 2024     Severe protein-calorie malnutrition (HCC) 2024     Wheezing 2024     Hyponatremia 2024     Alcohol withdrawal delirium, acute, hyperactive (HCC) 2024     Alcohol withdrawal syndrome with complication ([facility]) 2024     Alcohol withdrawal seizure ([facility]) 2024     HARRIET (acute kidney injury) ([facility]) 2024     Non-traumatic rhabdomyolysis 2024     Hypotension due to hypovolemia 2024     Primary insomnia 2024     Alcohol withdrawal seizure without complication ([facility]) 2023     Bipolar I disorder with depression ([facility]) 2023     Primary hypertension 2023     Benzodiazepine dependence ([facility]) 2022     Unsteady gait 2022     Opioid use disorder, moderate, on maintenance therapy, dependence ([facility]) 2022     Alcohol use disorder, severe, dependence ([facility]) 2022     PTSD (post-traumatic stress disorder) 2022     Tobacco use disorder 2022     Transaminitis 2022     Anxiety 2022     Encounter for tobacco use cessation counseling 2022     Opioid dependence with opioid-induced mood disorder ([facility]) 2022     Seizure ([facility]) 2022     Opioid use disorder 2021     Tachycardia 2015       LOS (days): 5  Geometric Mean LOS (GMLOS) (days): 3  Days to GMLOS:-2     OBJECTIVE:  Risk of Unplanned Readmission Score: 17.48         Current admission status: Inpatient   Preferred Pharmacy:   [id number] - [place] - [address]  [address] [facility] [address]  Phone: [phone] Fax: [phone]    [id number] -  [place], [place] - [address]  [address]  [facility] [address]  Phone: [phone] Fax: [phone]    [id number] - [place] - [address] [facility] [address]  [address] [facility] [address] [facility] [address]  Phone: [phone] Fax: [phone]    [id number] - [place] - [address]  [address] [place] [address] [place] [address]  Phone: [phone] Fax: [phone]    Primary Care Provider: Edenilson Collier MD    Primary Insurance: [employer]  Secondary Insurance:     DISCHARGE DETAILS:  Spoke with Stacie at [facility] who stated pt is all set up for [facility] to pick him up at 10:30 am.

## 2024-12-09 NOTE — PLAN OF CARE
Problem: Potential for Falls  Goal: Patient will remain free of falls  Description: INTERVENTIONS:  - Educate patient/family on patient safety including physical limitations  - Instruct patient to call for assistance with activity   - Consult OT/PT to assist with strengthening/mobility   - Keep Call bell within reach  - Keep bed low and locked with side rails adjusted as appropriate  - Keep care items and personal belongings within reach  - Initiate and maintain comfort rounds  - Make Fall Risk Sign visible to staff  - Offer Toileting every 2 Hours, in advance of need  - Initiate/Maintain fall alarm  - Obtain necessary fall risk management equipment: alarm, nonskid footwear, mobility equipment  - Apply yellow socks and bracelet for high fall risk patients  - Consider moving patient to room near nurses station  Outcome: Progressing     Problem: Nutrition/Hydration-ADULT  Goal: Nutrient/Hydration intake appropriate for improving, restoring or maintaining nutritional needs  Description: Monitor and assess patient's nutrition/hydration status for malnutrition. Collaborate with interdisciplinary team and initiate plan and interventions as ordered.  Monitor patient's weight and dietary intake as ordered or per policy. Utilize nutrition screening tool and intervene as necessary. Determine patient's food preferences and provide high-protein, high-caloric foods as appropriate.     INTERVENTIONS:  - Monitor oral intake, urinary output, labs, and treatment plans  - Assess nutrition and hydration status and recommend course of action  - Evaluate amount of meals eaten  - Assist patient with eating if necessary   - Allow adequate time for meals  - Recommend/ encourage appropriate diets, oral nutritional supplements, and vitamin/mineral supplements  - Order, calculate, and assess calorie counts as needed  - Recommend, monitor, and adjust tube feedings and TPN/PPN based on assessed needs  - Assess need for intravenous fluids  -  Provide specific nutrition/hydration education as appropriate  - Include patient/family/caregiver in decisions related to nutrition  Outcome: Progressing     Problem: PAIN - ADULT  Goal: Verbalizes/displays adequate comfort level or baseline comfort level  Description: Interventions:  - Encourage patient to monitor pain and request assistance  - Assess pain using appropriate pain scale  - Administer analgesics based on type and severity of pain and evaluate response  - Implement non-pharmacological measures as appropriate and evaluate response  - Consider cultural and social influences on pain and pain management  - Notify physician/advanced practitioner if interventions unsuccessful or patient reports new pain  Outcome: Progressing     Problem: INFECTION - ADULT  Goal: Absence or prevention of progression during hospitalization  Description: INTERVENTIONS:  - Assess and monitor for signs and symptoms of infection  - Monitor lab/diagnostic results  - Monitor all insertion sites, i.e. indwelling lines, tubes, and drains  - Monitor endotracheal if appropriate and nasal secretions for changes in amount and color  - [place] appropriate cooling/warming therapies per order  - Administer medications as ordered  - Instruct and encourage patient and family to use good hand hygiene technique  - Identify and instruct in appropriate isolation precautions for identified infection/condition  Outcome: Progressing  Goal: Absence of fever/infection during neutropenic period  Description: INTERVENTIONS:  - Monitor WBC    Outcome: Progressing     Problem: SAFETY ADULT  Goal: Maintain or return to baseline ADL function  Description: INTERVENTIONS:  -  Assess patient's ability to carry out ADLs; assess patient's baseline for ADL function and identify physical deficits which impact ability to perform ADLs (bathing, care of mouth/teeth, toileting, grooming, dressing, etc.)  - Assess/evaluate cause of self-care deficits   - Assess range  of motion  - Assess patient's mobility; develop plan if impaired  - Assess patient's need for assistive devices and provide as appropriate  - Encourage maximum independence but intervene and supervise when necessary  - Involve family in performance of ADLs  - Assess for home care needs following discharge   - Consider OT consult to assist with ADL evaluation and planning for discharge  - Provide patient education as appropriate  Outcome: Progressing  Goal: Maintains/Returns to pre admission functional level  Description: INTERVENTIONS:  - Perform AM-PAC 6 Click Basic Mobility/ Daily Activity assessment daily.  - Set and communicate daily mobility goal to care team and patient/family/caregiver.   - Collaborate with rehabilitation services on mobility goals if consulted  - Perform Range of Motion 4 times a day.  - Reposition patient every 2 hours.  - Dangle patient 3 times a day  - Stand patient 3 times a day  - Ambulate patient 3 times a day  - Out of bed to chair 3 times a day   - Out of bed for meals 3 times a day  - Out of bed for toileting  - Record patient progress and toleration of activity level   Outcome: Progressing     Problem: DISCHARGE PLANNING  Goal: Discharge to home or other facility with appropriate resources  Description: INTERVENTIONS:  - Identify barriers to discharge w/patient and caregiver  - Arrange for needed discharge resources and transportation as appropriate  - Identify discharge learning needs (meds, wound care, etc.)  - Arrange for interpretive services to assist at discharge as needed  - Refer to Case Management Department for coordinating discharge planning if the patient needs post-hospital services based on physician/advanced practitioner order or complex needs related to functional status, cognitive ability, or social support system  Outcome: Progressing     Problem: Knowledge Deficit  Goal: Patient/family/caregiver demonstrates understanding of disease process, treatment plan,  medications, and discharge instructions  Description: Complete learning assessment and assess knowledge base.  Interventions:  - Provide teaching at level of understanding  - Provide teaching via preferred learning methods  Outcome: Progressing     Problem: NEUROSENSORY - ADULT  Goal: Achieves stable or improved neurological status  Description: INTERVENTIONS  - Monitor and report changes in neurological status  - Monitor vital signs such as temperature, blood pressure, glucose, and any other labs ordered   - Initiate measures to prevent increased intracranial pressure  - Monitor for seizure activity and implement precautions if appropriate      Outcome: Progressing  Goal: Remains free of injury related to seizures activity  Description: INTERVENTIONS  - Maintain airway, patient safety  and administer oxygen as ordered  - Monitor patient for seizure activity, document and report duration and description of seizure to physician/advanced practitioner  - If seizure occurs,  ensure patient safety during seizure  - Reorient patient post seizure  - Seizure pads on all 4 side rails  - Instruct patient/family to notify RN of any seizure activity including if an aura is experienced  - Instruct patient/family to call for assistance with activity based on nursing assessment  - Administer anti-seizure medications if ordered    Outcome: Progressing  Goal: Achieves maximal functionality and self care  Description: INTERVENTIONS  - Monitor swallowing and airway patency with patient fatigue and changes in neurological status  - Encourage and assist patient to increase activity and self care.   - Encourage visually impaired, hearing impaired and aphasic patients to use assistive/communication devices  Outcome: Progressing     Problem: METABOLIC, FLUID AND ELECTROLYTES - ADULT  Goal: Electrolytes maintained within normal limits  Description: INTERVENTIONS:  - Monitor labs and assess patient for signs and symptoms of electrolyte  imbalances  - Administer electrolyte replacement as ordered  - Monitor response to electrolyte replacements, including repeat lab results as appropriate  - Instruct patient on fluid and nutrition as appropriate  Outcome: Progressing  Goal: Fluid balance maintained  Description: INTERVENTIONS:  - Monitor labs   - Monitor I/O and WT  - Instruct patient on fluid and nutrition as appropriate  - Assess for signs & symptoms of volume excess or deficit  Outcome: Progressing

## 2024-12-09 NOTE — ASSESSMENT & PLAN NOTE
Creatinine elevated 1.41 on admission, most likely prerenal with HARRIET resolving to baseline with IV fluids  Resolved with Cr of 0.8, IVF have been discontinued  Stable for discharge to alcohol treatment [place]

## 2024-12-09 NOTE — RESPIRATORY THERAPY NOTE
12/09/24 0700   Inhalation Therapy Tx   $ Demo/Eval of Moni, MDI, IPPB, CPT Yes   Duration 20   Breath Sounds Pre-Treatment Bilateral Clear   Delivery Source Air;UDN   Position Semi Suarez's   Treatment Tolerance Tolerated well   Resp Comments Patient up and walking around, he is anxious about leaving. Patient lungs are clear of wheezing at this time

## 2024-12-09 NOTE — ASSESSMENT & PLAN NOTE
Patient was initially with accelerated hypertension in setting of active alcohol withdrawal, blood pressure is now low normal  Previously on Norvasc and metoprolol, however noncompliant  Started on clonidine to treat withdrawal symptoms, continue on discharge

## 2024-12-09 NOTE — ASSESSMENT & PLAN NOTE
Due to alcohol use, improving  Monitor liver function upon discharge  Strict alcohol cessation, plan for d/c to rehab

## 2024-12-09 NOTE — PLAN OF CARE
Problem: Potential for Falls  Goal: Patient will remain free of falls  Description: INTERVENTIONS:  - Educate patient/family on patient safety including physical limitations  - Instruct patient to call for assistance with activity   - Consult OT/PT to assist with strengthening/mobility   - Keep Call bell within reach  - Keep bed low and locked with side rails adjusted as appropriate  - Keep care items and personal belongings within reach  - Initiate and maintain comfort rounds  - Make Fall Risk Sign visible to staff  - Offer Toileting every 2 Hours, in advance of need  - Initiate/Maintain fall alarm  - Obtain necessary fall risk management equipment: alarm, nonskid footwear, mobility equipment  - Apply yellow socks and bracelet for high fall risk patients  - Consider moving patient to room near nurses station  Outcome: Progressing     Problem: Nutrition/Hydration-ADULT  Goal: Nutrient/Hydration intake appropriate for improving, restoring or maintaining nutritional needs  Description: Monitor and assess patient's nutrition/hydration status for malnutrition. Collaborate with interdisciplinary team and initiate plan and interventions as ordered.  Monitor patient's weight and dietary intake as ordered or per policy. Utilize nutrition screening tool and intervene as necessary. Determine patient's food preferences and provide high-protein, high-caloric foods as appropriate.     INTERVENTIONS:  - Monitor oral intake, urinary output, labs, and treatment plans  - Assess nutrition and hydration status and recommend course of action  - Evaluate amount of meals eaten  - Assist patient with eating if necessary   - Allow adequate time for meals  - Recommend/ encourage appropriate diets, oral nutritional supplements, and vitamin/mineral supplements  - Order, calculate, and assess calorie counts as needed  - Recommend, monitor, and adjust tube feedings and TPN/PPN based on assessed needs  - Assess need for intravenous fluids  -  Provide specific nutrition/hydration education as appropriate  - Include patient/family/caregiver in decisions related to nutrition  Outcome: Progressing     Problem: PAIN - ADULT  Goal: Verbalizes/displays adequate comfort level or baseline comfort level  Description: Interventions:  - Encourage patient to monitor pain and request assistance  - Assess pain using appropriate pain scale  - Administer analgesics based on type and severity of pain and evaluate response  - Implement non-pharmacological measures as appropriate and evaluate response  - Consider cultural and social influences on pain and pain management  - Notify physician/advanced practitioner if interventions unsuccessful or patient reports new pain  Outcome: Progressing     Problem: INFECTION - ADULT  Goal: Absence or prevention of progression during hospitalization  Description: INTERVENTIONS:  - Assess and monitor for signs and symptoms of infection  - Monitor lab/diagnostic results  - Monitor all insertion sites, i.e. indwelling lines, tubes, and drains  - Monitor endotracheal if appropriate and nasal secretions for changes in amount and color  - [place] appropriate cooling/warming therapies per order  - Administer medications as ordered  - Instruct and encourage patient and family to use good hand hygiene technique  - Identify and instruct in appropriate isolation precautions for identified infection/condition  Outcome: Progressing  Goal: Absence of fever/infection during neutropenic period  Description: INTERVENTIONS:  - Monitor WBC    Outcome: Progressing     Problem: SAFETY ADULT  Goal: Maintain or return to baseline ADL function  Description: INTERVENTIONS:  -  Assess patient's ability to carry out ADLs; assess patient's baseline for ADL function and identify physical deficits which impact ability to perform ADLs (bathing, care of mouth/teeth, toileting, grooming, dressing, etc.)  - Assess/evaluate cause of self-care deficits   - Assess range  of motion  - Assess patient's mobility; develop plan if impaired  - Assess patient's need for assistive devices and provide as appropriate  - Encourage maximum independence but intervene and supervise when necessary  - Involve family in performance of ADLs  - Assess for home care needs following discharge   - Consider OT consult to assist with ADL evaluation and planning for discharge  - Provide patient education as appropriate  Outcome: Progressing  Goal: Maintains/Returns to pre admission functional level  Description: INTERVENTIONS:  - Perform AM-PAC 6 Click Basic Mobility/ Daily Activity assessment daily.  - Set and communicate daily mobility goal to care team and patient/family/caregiver.   - Collaborate with rehabilitation services on mobility goals if consulted  - Perform Range of Motion 4 times a day.  - Reposition patient every 2 hours.  - Dangle patient 3 times a day  - Stand patient 3 times a day  - Ambulate patient 3 times a day  - Out of bed to chair 3 times a day   - Out of bed for meals 3 times a day  - Out of bed for toileting  - Record patient progress and toleration of activity level   Outcome: Progressing     Problem: DISCHARGE PLANNING  Goal: Discharge to home or other facility with appropriate resources  Description: INTERVENTIONS:  - Identify barriers to discharge w/patient and caregiver  - Arrange for needed discharge resources and transportation as appropriate  - Identify discharge learning needs (meds, wound care, etc.)  - Arrange for interpretive services to assist at discharge as needed  - Refer to Case Management Department for coordinating discharge planning if the patient needs post-hospital services based on physician/advanced practitioner order or complex needs related to functional status, cognitive ability, or social support system  Outcome: Progressing     Problem: Knowledge Deficit  Goal: Patient/family/caregiver demonstrates understanding of disease process, treatment plan,  medications, and discharge instructions  Description: Complete learning assessment and assess knowledge base.  Interventions:  - Provide teaching at level of understanding  - Provide teaching via preferred learning methods  Outcome: Progressing     Problem: NEUROSENSORY - ADULT  Goal: Achieves stable or improved neurological status  Description: INTERVENTIONS  - Monitor and report changes in neurological status  - Monitor vital signs such as temperature, blood pressure, glucose, and any other labs ordered   - Initiate measures to prevent increased intracranial pressure  - Monitor for seizure activity and implement precautions if appropriate      Outcome: Progressing  Goal: Remains free of injury related to seizures activity  Description: INTERVENTIONS  - Maintain airway, patient safety  and administer oxygen as ordered  - Monitor patient for seizure activity, document and report duration and description of seizure to physician/advanced practitioner  - If seizure occurs,  ensure patient safety during seizure  - Reorient patient post seizure  - Seizure pads on all 4 side rails  - Instruct patient/family to notify RN of any seizure activity including if an aura is experienced  - Instruct patient/family to call for assistance with activity based on nursing assessment  - Administer anti-seizure medications if ordered    Outcome: Progressing  Goal: Achieves maximal functionality and self care  Description: INTERVENTIONS  - Monitor swallowing and airway patency with patient fatigue and changes in neurological status  - Encourage and assist patient to increase activity and self care.   - Encourage visually impaired, hearing impaired and aphasic patients to use assistive/communication devices  Outcome: Progressing     Problem: METABOLIC, FLUID AND ELECTROLYTES - ADULT  Goal: Electrolytes maintained within normal limits  Description: INTERVENTIONS:  - Monitor labs and assess patient for signs and symptoms of electrolyte  imbalances  - Administer electrolyte replacement as ordered  - Monitor response to electrolyte replacements, including repeat lab results as appropriate  - Instruct patient on fluid and nutrition as appropriate  Outcome: Progressing  Goal: Fluid balance maintained  Description: INTERVENTIONS:  - Monitor labs   - Monitor I/O and WT  - Instruct patient on fluid and nutrition as appropriate  - Assess for signs & symptoms of volume excess or deficit  Outcome: Progressing

## 2024-12-09 NOTE — NURSING NOTE
Eric from [facility] transporting patient and belongings.  Attempted to give report twice, unable to.  Line I was transferred to had no mailbox. Eric given paperwork for facility.

## 2024-12-09 NOTE — DISCHARGE SUMMARY
Discharge Summary - Hospitalist   Name: Aniket Pace 39 y.o. male I MRN: [id number]  Unit/Bed#:  I Date of Admission: 12/4/2024   Date of Service: 12/9/2024 I Hospital Day: 5     Assessment & Plan  Alcohol withdrawal delirium, acute, hyperactive (HCC)  This is a 39-year-old male patient with history of substance use disorder, on Sublocade, alcohol use disorder, hypertension, tobacco use who presents with alcohol withdrawal  Status post multiple doses of phenobarbital, was initiated on Precedex overnight 12/4-12/5  Appreciate Toxicology recommendations  Wean off Precedex, utilize supportive medication regimen  The patient is requesting Ativan in favor of Valium for symptom management, order changed  Patient is interested in alcohol rehab, plan for discharge to alcohol rehab center on Monday  Patient is medically cleared for discharge to drug and alcohol rehab facility  HARRIET (acute kidney injury) (HCC)  Creatinine elevated 1.41 on admission, most likely prerenal with HARRIET resolving to baseline with IV fluids  Resolved with Cr of 0.8, IVF have been discontinued  Stable for discharge to alcohol treatment center    Hyponatremia  Hypovolemic hyponatremia, resolved  Primary hypertension  Patient was initially with accelerated hypertension in setting of active alcohol withdrawal, blood pressure is now low normal  Previously on Norvasc and metoprolol, however noncompliant  Started on clonidine to treat withdrawal symptoms, continue on discharge  Bipolar I disorder with depression (HCC)  The patient was previously prescribed Seroquel 100 mg daily  Restarted  Tobacco use disorder  Smoking cessation counseling  Patient with diffuse wheezing on exam, would recommend outpatient pulmonology evaluation, PFTs  Nicotine patch ordered  Opioid use disorder  On Sublocade outpatient, apparently had run out 3 days ago   Appreciate toxicology input, patient is restarted on his outpatient dose of buprenorphine while here  Avoid  opiates  Alcohol withdrawal syndrome with complication (HCC)    Wheezing  Diffuse wheezing, considerations for undiagnosed COPD, patient with history of smoking  Resolved with nebulizer treatments  Recommend outpatient Pulmonology evaluation  Elevated LFTs  Due to alcohol use, improving  Monitor liver function upon discharge  Strict alcohol cessation, plan for d/c to rehab  Severe protein-calorie malnutrition (HCC)  Malnutrition Findings:   Adult Malnutrition type: Acute illness  Adult Degree of Malnutrition: Other severe protein calorie malnutrition  Malnutrition Characteristics: Inadequate energy, Weight loss                  360 Statement: Severe PCM in light of significant weight loss x 2 months of 26#/11% due to increase alcohol intake and decrease po intake and poor nutrition per patient.  Treated with regular diet/double portions.    BMI Findings:           Body mass index is 29.3 kg/m².        Medical Problems       Resolved Problems  Date Reviewed: 7/29/2024   None       Discharging Physician / Practitioner: Sharifa Bennett MD  PCP: Edenilson Collier MD  Admission Date:   Admission Orders (From admission, onward)       Ordered        12/04/24 0937  INPATIENT ADMISSION  Once                          Discharge Date: 12/09/24    Consultations During Hospital Stay:  Toxicology    Procedures Performed:   XR chest portable   Final Result by Fabricio Ruth MD (12/05 3808)      No acute cardiopulmonary disease.            Workstation performed: [id number]               Significant Findings / Test Results:   Results from last 7 days   Lab Units 12/08/24  0527   WBC Thousand/uL 4.70   HEMOGLOBIN g/dL 13.7   HEMATOCRIT % 42.7   PLATELETS Thousands/uL 168     Results from last 7 days   Lab Units 12/08/24  0527   SODIUM mmol/L 136   CHLORIDE mmol/L 97   CO2 mmol/L 32   BUN mg/dL 20   CREATININE mg/dL 0.88   CALCIUM mg/dL 9.3   ALK PHOS U/L 200*   ALT U/L 163*   AST U/L 101*         Test Results Pending at  "Discharge (will require follow up):   None     Outpatient Tests Requested:  Recommend monitoring LFTs    Complications:  None    Reason for Admission: alcohol use disorder    Hospital Course:   Aniket Pace is a 39 y.o. male patient who originally presented to the hospital on 12/4/2024 due to ongoing alcohol use and to seek alcohol detox and rehabilitation treatments.  The patient received multiple doses of phenobarbital, was temporarily on Precedex infusion followed by Ativan treatments according to [facility] protocol, other supportive management as well.  The patient was noted for an elevation in LFTs improved prior to discharge with remainder of liver function testing negative, no evidence of hepatitis with a negative hepatitis panel.  The patient was discharged to alcohol rehab in improved and stable condition.    Please see above list of diagnoses and related plan for additional information.     Condition at Discharge: stable    Discharge Day Visit / Exam:   Subjective: Patient continues to complain of anxiety and tremulousness.  He is awaiting discharge to alcohol rehab facility.  Vitals: Blood Pressure: 136/96 (12/09/24 0800)  Pulse: 80 (12/09/24 0700)  Temperature: 97.6 °F (36.4 °C) (12/09/24 0658)  Temp Source: Temporal (12/09/24 0658)  Respirations: 18 (12/08/24 2103)  Height: 5' 10\" (177.8 cm) (12/04/24 1205)  Weight - Scale: 92.6 kg (204 lb 3.2 oz) (12/04/24 1205)  SpO2: 95 % (12/08/24 2224)    Physical Exam  Constitutional:       General: He is not in acute distress.  HENT:      Head: Normocephalic and atraumatic.   Eyes:      Conjunctiva/sclera: Conjunctivae normal.   Cardiovascular:      Rate and Rhythm: Normal rate and regular rhythm.   Pulmonary:      Effort: No respiratory distress.   Abdominal:      General: There is no distension.      Tenderness: There is no abdominal tenderness. There is no guarding.   Musculoskeletal:      Right lower leg: No edema.      Left lower leg: No edema.   Neurological: "      Mental Status: He is oriented to person, place, and time.      Comments: Generalized tremor          Discussion with Family: patient    Discharge instructions/Information to patient and family:   See after visit summary for information provided to patient and family.      Provisions for Follow-Up Care:  See after visit summary for information related to follow-up care and any pertinent home health orders.      Mobility at time of Discharge:   Basic Mobility Inpatient Raw Score: 24  JH-HLM Goal: 8: Walk 250 feet or more  JH-HLM Achieved: 7: Walk 25 feet or more  HLM Goal achieved. Continue to encourage appropriate mobility.     Disposition:   Other: [facility]    Planned Readmission: No    Discharge Medications:  See after visit summary for reconciled discharge medications provided to patient and/or family.      Administrative Statements   Discharge Statement:  I have spent a total time of 35 minutes in caring for this patient on the day of the visit/encounter. >30 minutes of time was spent on: Prognosis, Importance of tx compliance, Counseling / Coordination of care, Documenting in the medical record, Communicating with other healthcare professionals , and email sent to patient's work per his request stating dates of his current hospitalization .    **Please Note: This note may have been constructed using a voice recognition system**

## 2024-12-09 NOTE — ASSESSMENT & PLAN NOTE
Diffuse wheezing, considerations for undiagnosed COPD, patient with history of smoking  Resolved with nebulizer treatments  Recommend outpatient Pulmonology evaluation

## 2025-01-15 ENCOUNTER — HOSPITAL ENCOUNTER (INPATIENT)
Facility: HOSPITAL | Age: 40
LOS: 6 days | Discharge: LEFT BEFORE DISCHARGE | End: 2025-01-21
Admitting: INTERNAL MEDICINE
Payer: COMMERCIAL

## 2025-01-15 DIAGNOSIS — F10.939 ALCOHOL WITHDRAWAL SYNDROME WITH COMPLICATION (HCC): ICD-10-CM

## 2025-01-15 DIAGNOSIS — E87.6 HYPOKALEMIA: ICD-10-CM

## 2025-01-15 DIAGNOSIS — R94.31 PROLONGED QT INTERVAL: Primary | ICD-10-CM

## 2025-01-15 LAB
ALBUMIN SERPL BCG-MCNC: 4.4 G/DL (ref 3.5–5)
ALP SERPL-CCNC: 158 U/L (ref 34–104)
ALT SERPL W P-5'-P-CCNC: 41 U/L (ref 7–52)
AMPHETAMINES SERPL QL SCN: NEGATIVE
ANION GAP SERPL CALCULATED.3IONS-SCNC: 13 MMOL/L (ref 4–13)
APAP SERPL-MCNC: <2 UG/ML (ref 10–20)
AST SERPL W P-5'-P-CCNC: 42 U/L (ref 13–39)
BARBITURATES UR QL: NEGATIVE
BASOPHILS # BLD AUTO: 0.04 THOUSANDS/ΜL (ref 0–0.1)
BASOPHILS NFR BLD AUTO: 1 % (ref 0–1)
BENZODIAZ UR QL: NEGATIVE
BILIRUB SERPL-MCNC: 0.29 MG/DL (ref 0.2–1)
BNP SERPL-MCNC: 11 PG/ML (ref 0–100)
BUN SERPL-MCNC: 9 MG/DL (ref 5–25)
CALCIUM SERPL-MCNC: 8.9 MG/DL (ref 8.4–10.2)
CARDIAC TROPONIN I PNL SERPL HS: 3 NG/L (ref ?–50)
CHLORIDE SERPL-SCNC: 102 MMOL/L (ref 96–108)
CK SERPL-CCNC: 121 U/L (ref 39–308)
CO2 SERPL-SCNC: 28 MMOL/L (ref 21–32)
COCAINE UR QL: NEGATIVE
CREAT SERPL-MCNC: 0.84 MG/DL (ref 0.6–1.3)
EOSINOPHIL # BLD AUTO: 0.27 THOUSAND/ΜL (ref 0–0.61)
EOSINOPHIL NFR BLD AUTO: 5 % (ref 0–6)
ERYTHROCYTE [DISTWIDTH] IN BLOOD BY AUTOMATED COUNT: 12.7 % (ref 11.6–15.1)
ETHANOL SERPL-MCNC: 323 MG/DL
FENTANYL UR QL SCN: NEGATIVE
GFR SERPL CREATININE-BSD FRML MDRD: 111 ML/MIN/1.73SQ M
GLUCOSE SERPL-MCNC: 118 MG/DL (ref 65–140)
HCT VFR BLD AUTO: 42.3 % (ref 36.5–49.3)
HGB BLD-MCNC: 14.5 G/DL (ref 12–17)
HYDROCODONE UR QL SCN: NEGATIVE
IMM GRANULOCYTES # BLD AUTO: 0.02 THOUSAND/UL (ref 0–0.2)
IMM GRANULOCYTES NFR BLD AUTO: 0 % (ref 0–2)
LIPASE SERPL-CCNC: 118 U/L (ref 11–82)
LYMPHOCYTES # BLD AUTO: 2.43 THOUSANDS/ΜL (ref 0.6–4.47)
LYMPHOCYTES NFR BLD AUTO: 46 % (ref 14–44)
MAGNESIUM SERPL-MCNC: 1.8 MG/DL (ref 1.9–2.7)
MCH RBC QN AUTO: 30.5 PG (ref 26.8–34.3)
MCHC RBC AUTO-ENTMCNC: 34.3 G/DL (ref 31.4–37.4)
MCV RBC AUTO: 89 FL (ref 82–98)
METHADONE UR QL: NEGATIVE
MONOCYTES # BLD AUTO: 0.48 THOUSAND/ΜL (ref 0.17–1.22)
MONOCYTES NFR BLD AUTO: 9 % (ref 4–12)
NEUTROPHILS # BLD AUTO: 2.06 THOUSANDS/ΜL (ref 1.85–7.62)
NEUTS SEG NFR BLD AUTO: 39 % (ref 43–75)
NRBC BLD AUTO-RTO: 0 /100 WBCS
OPIATES UR QL SCN: NEGATIVE
OXYCODONE+OXYMORPHONE UR QL SCN: NEGATIVE
PCP UR QL: NEGATIVE
PHOSPHATE SERPL-MCNC: 3 MG/DL (ref 2.7–4.5)
PLATELET # BLD AUTO: 198 THOUSANDS/UL (ref 149–390)
PMV BLD AUTO: 10.1 FL (ref 8.9–12.7)
POTASSIUM SERPL-SCNC: 3.2 MMOL/L (ref 3.5–5.3)
PROT SERPL-MCNC: 6.5 G/DL (ref 6.4–8.4)
RBC # BLD AUTO: 4.76 MILLION/UL (ref 3.88–5.62)
SALICYLATES SERPL-MCNC: <5 MG/DL (ref 3–20)
SODIUM SERPL-SCNC: 143 MMOL/L (ref 135–147)
THC UR QL: NEGATIVE
WBC # BLD AUTO: 5.3 THOUSAND/UL (ref 4.31–10.16)

## 2025-01-15 PROCEDURE — 82550 ASSAY OF CK (CPK): CPT

## 2025-01-15 PROCEDURE — 83690 ASSAY OF LIPASE: CPT

## 2025-01-15 PROCEDURE — 83735 ASSAY OF MAGNESIUM: CPT

## 2025-01-15 PROCEDURE — 36415 COLL VENOUS BLD VENIPUNCTURE: CPT

## 2025-01-15 PROCEDURE — 99291 CRITICAL CARE FIRST HOUR: CPT

## 2025-01-15 PROCEDURE — 80179 DRUG ASSAY SALICYLATE: CPT

## 2025-01-15 PROCEDURE — 96361 HYDRATE IV INFUSION ADD-ON: CPT

## 2025-01-15 PROCEDURE — 85025 COMPLETE CBC W/AUTO DIFF WBC: CPT

## 2025-01-15 PROCEDURE — 96375 TX/PRO/DX INJ NEW DRUG ADDON: CPT

## 2025-01-15 PROCEDURE — 99284 EMERGENCY DEPT VISIT MOD MDM: CPT

## 2025-01-15 PROCEDURE — 82077 ASSAY SPEC XCP UR&BREATH IA: CPT

## 2025-01-15 PROCEDURE — 93005 ELECTROCARDIOGRAM TRACING: CPT

## 2025-01-15 PROCEDURE — 80053 COMPREHEN METABOLIC PANEL: CPT

## 2025-01-15 PROCEDURE — HZ2ZZZZ DETOXIFICATION SERVICES FOR SUBSTANCE ABUSE TREATMENT: ICD-10-PCS | Performed by: FAMILY MEDICINE

## 2025-01-15 PROCEDURE — 96365 THER/PROPH/DIAG IV INF INIT: CPT

## 2025-01-15 PROCEDURE — 84484 ASSAY OF TROPONIN QUANT: CPT

## 2025-01-15 PROCEDURE — 83880 ASSAY OF NATRIURETIC PEPTIDE: CPT

## 2025-01-15 PROCEDURE — 80143 DRUG ASSAY ACETAMINOPHEN: CPT

## 2025-01-15 PROCEDURE — 80307 DRUG TEST PRSMV CHEM ANLYZR: CPT

## 2025-01-15 PROCEDURE — 84100 ASSAY OF PHOSPHORUS: CPT

## 2025-01-15 RX ORDER — MAGNESIUM SULFATE HEPTAHYDRATE 40 MG/ML
2 INJECTION, SOLUTION INTRAVENOUS ONCE
Status: COMPLETED | OUTPATIENT
Start: 2025-01-15 | End: 2025-01-16

## 2025-01-15 RX ORDER — ASPIRIN 81 MG/1
4 TABLET, CHEWABLE ORAL ONCE
Status: COMPLETED | OUTPATIENT
Start: 2025-01-15 | End: 2025-01-15

## 2025-01-15 RX ORDER — PHENOBARBITAL SODIUM 130 MG/ML
INJECTION, SOLUTION INTRAMUSCULAR; INTRAVENOUS
Status: COMPLETED
Start: 2025-01-15 | End: 2025-01-16

## 2025-01-15 RX ORDER — FOLIC ACID 5 MG/ML
INJECTION, SOLUTION INTRAMUSCULAR; INTRAVENOUS; SUBCUTANEOUS
Status: COMPLETED
Start: 2025-01-15 | End: 2025-01-15

## 2025-01-15 RX ORDER — THIAMINE HYDROCHLORIDE 100 MG/ML
INJECTION, SOLUTION INTRAMUSCULAR; INTRAVENOUS
Status: COMPLETED
Start: 2025-01-15 | End: 2025-01-15

## 2025-01-15 RX ORDER — LORAZEPAM 2 MG/ML
2 INJECTION INTRAMUSCULAR ONCE
Status: COMPLETED | OUTPATIENT
Start: 2025-01-15 | End: 2025-01-15

## 2025-01-15 RX ORDER — LORAZEPAM 1 MG/1
2 TABLET ORAL ONCE
Status: COMPLETED | OUTPATIENT
Start: 2025-01-15 | End: 2025-01-15

## 2025-01-15 RX ORDER — POTASSIUM CHLORIDE 1500 MG/1
40 TABLET, EXTENDED RELEASE ORAL ONCE
Status: COMPLETED | OUTPATIENT
Start: 2025-01-15 | End: 2025-01-15

## 2025-01-15 RX ORDER — ONDANSETRON 2 MG/ML
4 INJECTION INTRAMUSCULAR; INTRAVENOUS ONCE
Status: COMPLETED | OUTPATIENT
Start: 2025-01-15 | End: 2025-01-15

## 2025-01-15 RX ADMIN — LORAZEPAM 2 MG: 1 TABLET ORAL at 23:40

## 2025-01-15 RX ADMIN — LORAZEPAM 2 MG: 2 INJECTION INTRAMUSCULAR; INTRAVENOUS at 22:45

## 2025-01-15 RX ADMIN — POTASSIUM CHLORIDE 40 MEQ: 1500 TABLET, EXTENDED RELEASE ORAL at 23:40

## 2025-01-15 RX ADMIN — SODIUM CHLORIDE 1000 ML: 0.9 INJECTION, SOLUTION INTRAVENOUS at 22:29

## 2025-01-15 RX ADMIN — SODIUM CHLORIDE 1000 ML: 0.9 INJECTION, SOLUTION INTRAVENOUS at 23:44

## 2025-01-15 RX ADMIN — ONDANSETRON 4 MG: 2 INJECTION INTRAMUSCULAR; INTRAVENOUS at 22:49

## 2025-01-15 RX ADMIN — THIAMINE HYDROCHLORIDE 100 MG: 100 INJECTION, SOLUTION INTRAMUSCULAR; INTRAVENOUS at 23:48

## 2025-01-15 RX ADMIN — FOLIC ACID 1 MG: 5 INJECTION, SOLUTION INTRAMUSCULAR; INTRAVENOUS; SUBCUTANEOUS at 23:13

## 2025-01-16 ENCOUNTER — APPOINTMENT (INPATIENT)
Dept: RADIOLOGY | Facility: HOSPITAL | Age: 40
End: 2025-01-16
Payer: COMMERCIAL

## 2025-01-16 PROBLEM — G89.29 CHRONIC PAIN: Status: ACTIVE | Noted: 2025-01-16

## 2025-01-16 PROBLEM — E83.42 HYPOMAGNESEMIA: Status: RESOLVED | Noted: 2025-01-16 | Resolved: 2025-01-16

## 2025-01-16 PROBLEM — Z72.0 TOBACCO ABUSE: Status: ACTIVE | Noted: 2022-06-13

## 2025-01-16 PROBLEM — E87.6 HYPOKALEMIA: Status: RESOLVED | Noted: 2025-01-16 | Resolved: 2025-01-16

## 2025-01-16 PROBLEM — E87.6 HYPOKALEMIA: Status: ACTIVE | Noted: 2025-01-16

## 2025-01-16 PROBLEM — E83.42 HYPOMAGNESEMIA: Status: ACTIVE | Noted: 2025-01-16

## 2025-01-16 LAB
2HR DELTA HS TROPONIN: 0 NG/L
4HR DELTA HS TROPONIN: 0 NG/L
ALBUMIN SERPL BCG-MCNC: 3.8 G/DL (ref 3.5–5)
ALP SERPL-CCNC: 128 U/L (ref 34–104)
ALT SERPL W P-5'-P-CCNC: 33 U/L (ref 7–52)
ANION GAP SERPL CALCULATED.3IONS-SCNC: 6 MMOL/L (ref 4–13)
ARTERIAL PATENCY WRIST A: YES
AST SERPL W P-5'-P-CCNC: 36 U/L (ref 13–39)
BASE EX.OXY STD BLDV CALC-SCNC: 96.2 % (ref 60–80)
BASE EXCESS BLDV CALC-SCNC: 2.6 MMOL/L
BILIRUB SERPL-MCNC: 0.37 MG/DL (ref 0.2–1)
BUN SERPL-MCNC: 11 MG/DL (ref 5–25)
CALCIUM SERPL-MCNC: 7.8 MG/DL (ref 8.4–10.2)
CARDIAC TROPONIN I PNL SERPL HS: 3 NG/L (ref ?–50)
CARDIAC TROPONIN I PNL SERPL HS: 3 NG/L (ref ?–50)
CHLORIDE SERPL-SCNC: 108 MMOL/L (ref 96–108)
CO2 SERPL-SCNC: 29 MMOL/L (ref 21–32)
CREAT SERPL-MCNC: 0.93 MG/DL (ref 0.6–1.3)
ERYTHROCYTE [DISTWIDTH] IN BLOOD BY AUTOMATED COUNT: 13 % (ref 11.6–15.1)
GFR SERPL CREATININE-BSD FRML MDRD: 104 ML/MIN/1.73SQ M
GLUCOSE SERPL-MCNC: 97 MG/DL (ref 65–140)
HCO3 BLDV-SCNC: 27.4 MMOL/L (ref 24–30)
HCT VFR BLD AUTO: 35.2 % (ref 36.5–49.3)
HGB BLD-MCNC: 11.8 G/DL (ref 12–17)
INR PPP: 1 (ref 0.85–1.19)
MAGNESIUM SERPL-MCNC: 2.4 MG/DL (ref 1.9–2.7)
MCH RBC QN AUTO: 30.5 PG (ref 26.8–34.3)
MCHC RBC AUTO-ENTMCNC: 33.5 G/DL (ref 31.4–37.4)
MCV RBC AUTO: 91 FL (ref 82–98)
NASAL CANNULA: 2
O2 CT BLDV-SCNC: 17.2 ML/DL
PCO2 BLDV: 42.8 MM HG (ref 42–50)
PH BLDV: 7.42 [PH] (ref 7.3–7.4)
PHOSPHATE SERPL-MCNC: 2.8 MG/DL (ref 2.7–4.5)
PLATELET # BLD AUTO: 165 THOUSANDS/UL (ref 149–390)
PMV BLD AUTO: 10.3 FL (ref 8.9–12.7)
PO2 BLDV: 150 MM HG (ref 35–45)
POTASSIUM SERPL-SCNC: 4 MMOL/L (ref 3.5–5.3)
PROT SERPL-MCNC: 5.5 G/DL (ref 6.4–8.4)
PROTHROMBIN TIME: 13.7 SECONDS (ref 12.3–15)
RBC # BLD AUTO: 3.87 MILLION/UL (ref 3.88–5.62)
SODIUM SERPL-SCNC: 143 MMOL/L (ref 135–147)
WBC # BLD AUTO: 3.93 THOUSAND/UL (ref 4.31–10.16)

## 2025-01-16 PROCEDURE — 84484 ASSAY OF TROPONIN QUANT: CPT | Performed by: INTERNAL MEDICINE

## 2025-01-16 PROCEDURE — 36415 COLL VENOUS BLD VENIPUNCTURE: CPT

## 2025-01-16 PROCEDURE — 82805 BLOOD GASES W/O2 SATURATION: CPT | Performed by: INTERNAL MEDICINE

## 2025-01-16 PROCEDURE — 71045 X-RAY EXAM CHEST 1 VIEW: CPT

## 2025-01-16 PROCEDURE — 83735 ASSAY OF MAGNESIUM: CPT | Performed by: INTERNAL MEDICINE

## 2025-01-16 PROCEDURE — 99223 1ST HOSP IP/OBS HIGH 75: CPT | Performed by: INTERNAL MEDICINE

## 2025-01-16 PROCEDURE — 85610 PROTHROMBIN TIME: CPT | Performed by: INTERNAL MEDICINE

## 2025-01-16 PROCEDURE — 84100 ASSAY OF PHOSPHORUS: CPT | Performed by: INTERNAL MEDICINE

## 2025-01-16 PROCEDURE — 84484 ASSAY OF TROPONIN QUANT: CPT

## 2025-01-16 PROCEDURE — 85027 COMPLETE CBC AUTOMATED: CPT | Performed by: INTERNAL MEDICINE

## 2025-01-16 PROCEDURE — 80184 ASSAY OF PHENOBARBITAL: CPT | Performed by: INTERNAL MEDICINE

## 2025-01-16 PROCEDURE — 80053 COMPREHEN METABOLIC PANEL: CPT | Performed by: INTERNAL MEDICINE

## 2025-01-16 RX ORDER — GABAPENTIN 300 MG/1
300 CAPSULE ORAL 3 TIMES DAILY
Status: DISCONTINUED | OUTPATIENT
Start: 2025-01-16 | End: 2025-01-21 | Stop reason: HOSPADM

## 2025-01-16 RX ORDER — FOLIC ACID 1 MG/1
1 TABLET ORAL DAILY
Status: DISCONTINUED | OUTPATIENT
Start: 2025-01-16 | End: 2025-01-16

## 2025-01-16 RX ORDER — ENOXAPARIN SODIUM 100 MG/ML
40 INJECTION SUBCUTANEOUS DAILY
Status: DISCONTINUED | OUTPATIENT
Start: 2025-01-16 | End: 2025-01-21 | Stop reason: HOSPADM

## 2025-01-16 RX ORDER — PHENOBARBITAL 32.4 MG/1
64.8 TABLET ORAL 2 TIMES DAILY
Status: DISCONTINUED | OUTPATIENT
Start: 2025-01-16 | End: 2025-01-16

## 2025-01-16 RX ORDER — CLONIDINE HYDROCHLORIDE 0.1 MG/1
0.1 TABLET ORAL EVERY 12 HOURS SCHEDULED
Status: DISCONTINUED | OUTPATIENT
Start: 2025-01-16 | End: 2025-01-21 | Stop reason: HOSPADM

## 2025-01-16 RX ORDER — LORAZEPAM 2 MG/ML
4 INJECTION INTRAMUSCULAR ONCE
Status: DISCONTINUED | OUTPATIENT
Start: 2025-01-16 | End: 2025-01-18

## 2025-01-16 RX ORDER — ONDANSETRON 2 MG/ML
4 INJECTION INTRAMUSCULAR; INTRAVENOUS EVERY 4 HOURS PRN
Status: DISCONTINUED | OUTPATIENT
Start: 2025-01-16 | End: 2025-01-21 | Stop reason: HOSPADM

## 2025-01-16 RX ORDER — LORAZEPAM 2 MG/ML
1 INJECTION INTRAMUSCULAR EVERY 4 HOURS PRN
Status: DISCONTINUED | OUTPATIENT
Start: 2025-01-16 | End: 2025-01-16

## 2025-01-16 RX ORDER — LOPERAMIDE HYDROCHLORIDE 2 MG/1
4 CAPSULE ORAL 3 TIMES DAILY PRN
Status: DISCONTINUED | OUTPATIENT
Start: 2025-01-16 | End: 2025-01-21 | Stop reason: HOSPADM

## 2025-01-16 RX ORDER — PHENOBARBITAL 32.4 MG/1
64.8 TABLET ORAL 2 TIMES DAILY
Status: COMPLETED | OUTPATIENT
Start: 2025-01-16 | End: 2025-01-17

## 2025-01-16 RX ORDER — NICOTINE 21 MG/24HR
14 PATCH, TRANSDERMAL 24 HOURS TRANSDERMAL DAILY
Status: DISCONTINUED | OUTPATIENT
Start: 2025-01-16 | End: 2025-01-21 | Stop reason: HOSPADM

## 2025-01-16 RX ORDER — LORAZEPAM 2 MG/ML
4 INJECTION INTRAMUSCULAR ONCE
Status: COMPLETED | OUTPATIENT
Start: 2025-01-16 | End: 2025-01-16

## 2025-01-16 RX ORDER — HYDROXYZINE HYDROCHLORIDE 25 MG/1
50 TABLET, FILM COATED ORAL 3 TIMES DAILY
Status: DISCONTINUED | OUTPATIENT
Start: 2025-01-16 | End: 2025-01-21 | Stop reason: HOSPADM

## 2025-01-16 RX ORDER — LANOLIN ALCOHOL/MO/W.PET/CERES
100 CREAM (GRAM) TOPICAL DAILY
Status: DISCONTINUED | OUTPATIENT
Start: 2025-01-16 | End: 2025-01-16

## 2025-01-16 RX ORDER — SODIUM CHLORIDE AND POTASSIUM CHLORIDE 300; 900 MG/100ML; MG/100ML
125 INJECTION, SOLUTION INTRAVENOUS CONTINUOUS
Status: DISCONTINUED | OUTPATIENT
Start: 2025-01-16 | End: 2025-01-16

## 2025-01-16 RX ORDER — LORAZEPAM 2 MG/ML
2 INJECTION INTRAMUSCULAR EVERY 2 HOUR PRN
Status: DISCONTINUED | OUTPATIENT
Start: 2025-01-16 | End: 2025-01-17

## 2025-01-16 RX ORDER — TRAZODONE HYDROCHLORIDE 50 MG/1
50 TABLET, FILM COATED ORAL
Status: DISCONTINUED | OUTPATIENT
Start: 2025-01-16 | End: 2025-01-21 | Stop reason: HOSPADM

## 2025-01-16 RX ORDER — PHENOBARBITAL SODIUM 130 MG/ML
INJECTION, SOLUTION INTRAMUSCULAR; INTRAVENOUS
Status: COMPLETED
Start: 2025-01-16 | End: 2025-01-16

## 2025-01-16 RX ORDER — QUETIAPINE FUMARATE 100 MG/1
100 TABLET, FILM COATED ORAL
Status: DISCONTINUED | OUTPATIENT
Start: 2025-01-16 | End: 2025-01-21 | Stop reason: HOSPADM

## 2025-01-16 RX ORDER — LORAZEPAM 2 MG/ML
1 INJECTION INTRAMUSCULAR EVERY 2 HOUR PRN
Status: DISCONTINUED | OUTPATIENT
Start: 2025-01-16 | End: 2025-01-16

## 2025-01-16 RX ORDER — LORAZEPAM 2 MG/ML
2 INJECTION INTRAMUSCULAR ONCE
Status: COMPLETED | OUTPATIENT
Start: 2025-01-16 | End: 2025-01-16

## 2025-01-16 RX ORDER — PANTOPRAZOLE SODIUM 40 MG/10ML
40 INJECTION, POWDER, LYOPHILIZED, FOR SOLUTION INTRAVENOUS EVERY 12 HOURS SCHEDULED
Status: DISCONTINUED | OUTPATIENT
Start: 2025-01-16 | End: 2025-01-20

## 2025-01-16 RX ORDER — ACETAMINOPHEN 325 MG/1
650 TABLET ORAL EVERY 6 HOURS PRN
Status: DISCONTINUED | OUTPATIENT
Start: 2025-01-16 | End: 2025-01-20

## 2025-01-16 RX ADMIN — NICOTINE 14 MG: 14 PATCH, EXTENDED RELEASE TRANSDERMAL at 08:34

## 2025-01-16 RX ADMIN — PHENOBARBITAL SODIUM 260 MG: 65 INJECTION INTRAMUSCULAR; INTRAVENOUS at 00:10

## 2025-01-16 RX ADMIN — LORAZEPAM 2 MG: 2 INJECTION INTRAMUSCULAR; INTRAVENOUS at 14:25

## 2025-01-16 RX ADMIN — TRAZODONE HYDROCHLORIDE 50 MG: 50 TABLET ORAL at 21:12

## 2025-01-16 RX ADMIN — ONDANSETRON 4 MG: 2 INJECTION INTRAMUSCULAR; INTRAVENOUS at 20:00

## 2025-01-16 RX ADMIN — PANTOPRAZOLE SODIUM 40 MG: 40 INJECTION, POWDER, FOR SOLUTION INTRAVENOUS at 21:04

## 2025-01-16 RX ADMIN — PHENOBARBITAL 64.8 MG: 32.4 TABLET ORAL at 09:21

## 2025-01-16 RX ADMIN — PANTOPRAZOLE SODIUM 40 MG: 40 INJECTION, POWDER, FOR SOLUTION INTRAVENOUS at 09:23

## 2025-01-16 RX ADMIN — PHENOBARBITAL 64.8 MG: 32.4 TABLET ORAL at 17:09

## 2025-01-16 RX ADMIN — LORAZEPAM 1 MG: 2 INJECTION INTRAMUSCULAR; INTRAVENOUS at 11:28

## 2025-01-16 RX ADMIN — QUETIAPINE FUMARATE 100 MG: 100 TABLET ORAL at 21:13

## 2025-01-16 RX ADMIN — CLONIDINE HYDROCHLORIDE 0.1 MG: 0.1 TABLET ORAL at 01:10

## 2025-01-16 RX ADMIN — MAGNESIUM SULFATE HEPTAHYDRATE 2 G: 40 INJECTION, SOLUTION INTRAVENOUS at 01:10

## 2025-01-16 RX ADMIN — GABAPENTIN 300 MG: 300 CAPSULE ORAL at 21:13

## 2025-01-16 RX ADMIN — GABAPENTIN 300 MG: 300 CAPSULE ORAL at 10:16

## 2025-01-16 RX ADMIN — ENOXAPARIN SODIUM 40 MG: 40 INJECTION SUBCUTANEOUS at 08:34

## 2025-01-16 RX ADMIN — HYDROXYZINE HYDROCHLORIDE 50 MG: 25 TABLET ORAL at 10:16

## 2025-01-16 RX ADMIN — THIAMINE HYDROCHLORIDE: 100 INJECTION, SOLUTION INTRAMUSCULAR; INTRAVENOUS at 10:17

## 2025-01-16 RX ADMIN — ONDANSETRON 4 MG: 2 INJECTION INTRAMUSCULAR; INTRAVENOUS at 11:00

## 2025-01-16 RX ADMIN — GABAPENTIN 300 MG: 300 CAPSULE ORAL at 15:11

## 2025-01-16 RX ADMIN — ACETAMINOPHEN 650 MG: 325 TABLET, FILM COATED ORAL at 16:00

## 2025-01-16 RX ADMIN — LORAZEPAM 4 MG: 2 INJECTION INTRAMUSCULAR; INTRAVENOUS at 20:00

## 2025-01-16 RX ADMIN — HYDROXYZINE HYDROCHLORIDE 50 MG: 25 TABLET ORAL at 21:12

## 2025-01-16 RX ADMIN — LORAZEPAM 4 MG: 2 INJECTION INTRAMUSCULAR; INTRAVENOUS at 01:19

## 2025-01-16 RX ADMIN — CLONIDINE HYDROCHLORIDE 0.1 MG: 0.1 TABLET ORAL at 21:12

## 2025-01-16 RX ADMIN — HYDROXYZINE HYDROCHLORIDE 50 MG: 25 TABLET ORAL at 15:11

## 2025-01-16 RX ADMIN — LORAZEPAM 2 MG: 2 INJECTION INTRAMUSCULAR; INTRAVENOUS at 18:25

## 2025-01-16 RX ADMIN — LORAZEPAM 1 MG: 2 INJECTION INTRAMUSCULAR; INTRAVENOUS at 16:31

## 2025-01-16 RX ADMIN — PANTOPRAZOLE SODIUM 40 MG: 40 INJECTION, POWDER, FOR SOLUTION INTRAVENOUS at 01:09

## 2025-01-16 RX ADMIN — PHENOBARBITAL SODIUM 260 MG: 65 INJECTION INTRAMUSCULAR; INTRAVENOUS at 05:17

## 2025-01-16 RX ADMIN — POTASSIUM CHLORIDE AND SODIUM CHLORIDE 125 ML/HR: 900; 300 INJECTION, SOLUTION INTRAVENOUS at 01:33

## 2025-01-16 NOTE — NURSING NOTE
Patient c\o nausea.  Messaged DR Grissom for zofran.   What Type Of Note Output Would You Prefer (Optional)?: Standard Output How Severe Is Your Acne?: mild Is This A New Presentation, Or A Follow-Up?: Acne Additional Comments (Use Complete Sentences): Patient has never been seen before for pimples she never has been treated before she does not try any over the counter face wash.

## 2025-01-16 NOTE — ED PROVIDER NOTES
Time reflects when diagnosis was documented in both MDM as applicable and the Disposition within this note       Time User Action Codes Description Comment    1/15/2025 11:30 PM [address] [R94.31] Prolonged QT interval     1/15/2025 11:30 PM Louis Amilcar Botello [F10.930] Alcohol withdrawal syndrome without complication (HCC)     1/15/2025 11:31 PM MyersAmilcar Remove [F10.930] Alcohol withdrawal syndrome without complication (HCC)     1/15/2025 11:31 PM Louis Amilcar Add [F10.939] Alcohol withdrawal syndrome with complication (HCC)     1/15/2025 11:31 PM Amilcar Myers [E87.6] Hypokalemia           ED Disposition       ED Disposition   Admit    Condition   Stable    Date/Time   Wed Mello 15, 2025 11:58 PM    Comment   Case was discussed with Dr. Calderon and the patient's admission status was agreed to be Admission Status: inpatient status to the service of Dr. Calderon .               Assessment & Plan       Medical Decision Making  Medical complexity: This is a 39-year-old alcoholic male who is presenting today with early signs of alcohol withdrawal.  Patient is a daily alcohol drinker, has been noted in the past to have alcohol withdrawal seizures.  He has required several ICU admissions over the past year for alcohol withdrawal related complications.  Patient is requesting detox from alcohol today.  On initial examination, he is tachycardic, he is tremulous, he has feelings of impending doom, a slightly diaphoretic, and is endorsing some nausea without vomiting.  Patient has secondary complaint of some chest or epigastric discomfort.  Will evaluate for possible pancreatitis with lipase, will evaluate for possible atypical ACS or volume overload with BNP, serum troponin biomarker, and ECG.  Will keep patient on cardiac telemetry monitoring.  Given the severe nature of patient's prior withdrawal symptoms, patient will require ICU/stepdown level of care depending on his clinical course here in  the ED.  Patient initially was amenable to transfer to our [facility] detox facility, however, however after reaching out to the provider on call, the patient's withdrew his consent for transfer and would prefer to stay at this hospital for detox in the ICU.    Reassessment/disposition: Patient continues to show worsening signs of alcohol withdrawal.  His symptoms are responsive to benzodiazepines and phenobarbital.  I find no your lab abnormalities, though there were some metabolic derangements evident on his workup so far.  As patient continues to show worsening signs of alcohol withdrawal, the patient has a very high risk for potential critical decompensation.  He will need critical care while undergoing detox from alcohol.  Case was discussed with internal medicine/critical care provider on-call and patient was accepted for admission to the critical care unit for alcohol withdrawal with complication.  Secondarily, patient was diagnosed with prolonged QT based on his abnormal ECG, and atypical chest pain.  Patient was admitted with ongoing severe range tachycardia, but maintained normotension after fluid boluses.    Amount and/or Complexity of Data Reviewed  Labs: ordered.    Risk  Prescription drug management.  Decision regarding hospitalization.        ED Course as of 01/16/25 0208   Thu Jan 16, 2025   0207 ECG interpreted by myself.  Date: 1/15/2025.  Rate: 137 beats per no.  Axis: Normal.  Rhythm: Regular.  This is sinus tachycardia with P waves preceding every QRS.  There is some evidence of a prolonged QTc at approximately 564 ms.  There are diffuse ST depressions in the inferolateral leads as well as an ST elevation in lead aVR, suspicious for subendocardial ischemia secondary to demand.  There is also some evidence of a short AR interval without obvious delta wave.  Interpretation: Abnormal ECG, and diffuse ST depressions, no reciprocal ST elevations, no STEMI evident.       Medications   magnesium  sulfate 2 g/50 mL IVPB (premix) 2 g (has no administration in time range)   PHENobarbital 260 mg in sodium chloride 0.9 % 100 mL IVPB (has no administration in time range)   thiamine tablet 100 mg (has no administration in time range)   folic acid (FOLVITE) tablet 1 mg (has no administration in time range)   multivitamin-minerals (CENTRUM) tablet 1 tablet (has no administration in time range)   cloNIDine (CATAPRES) tablet 0.1 mg (has no administration in time range)   PHENobarbital tablet 64.8 mg (has no administration in time range)   pantoprazole (PROTONIX) injection 40 mg (has no administration in time range)   aspirin chewable tablet 324 mg (0 mg Does not apply Given to EMS 1/15/25 2233)   sodium chloride 0.9 % bolus 1,000 mL (0 mL Intravenous Stopped 1/15/25 2314)   thiamine (VITAMIN B1) 100 mg in sodium chloride 0.9 % 50 mL IVPB (0 mg Intravenous Stopped 1/16/25 0008)   folic acid 1 mg in sodium chloride 0.9 % 50 mL IVPB (0 mg Intravenous Stopped 1/15/25 2343)   LORazepam (ATIVAN) injection 2 mg (2 mg Intravenous Given 1/15/25 2245)   ondansetron (ZOFRAN) injection 4 mg (4 mg Intravenous Given 1/15/25 2249)   thiamine (VITAMIN B1) 100 mg/mL injection **ADS Override Pull** (  Override Pull 1/15/25 2348)   folic acid 5 mg/mL injection **ADS Override Pull** (  Override Pull 1/15/25 2313)   PHENobarbital 260 mg in sodium chloride 0.9 % 100 mL IVPB (260 mg Intravenous New Bag 1/16/25 0010)   potassium chloride (Klor-Con M20) CR tablet 40 mEq (40 mEq Oral Given 1/15/25 2340)   sodium chloride 0.9 % bolus 1,000 mL (1,000 mL Intravenous New Bag 1/15/25 2344)   LORazepam (ATIVAN) tablet 2 mg (2 mg Oral Given 1/15/25 2340)   PHENobarbital 130 mg/mL injection **ADS Override Pull** (  Override Pull 1/16/25 0003)       ED Risk Strat Scores   HEART Risk Score      Flowsheet Row Most Recent Value   Heart Score Risk Calculator    History 0 Filed at: 01/16/2025 0201   ECG 2 Filed at: 01/16/2025 0201   Age 0 Filed at:  01/16/2025 0201   Risk Factors 1 Filed at: 01/16/2025 0201   Troponin 0 Filed at: 01/16/2025 0201   HEART Score 3 Filed at: 01/16/2025 0201          HEART Risk Score      Flowsheet Row Most Recent Value   Heart Score Risk Calculator    History 0 Filed at: 01/16/2025 0201   ECG 2 Filed at: 01/16/2025 0201   Age 0 Filed at: 01/16/2025 0201   Risk Factors 1 Filed at: 01/16/2025 0201   Troponin 0 Filed at: 01/16/2025 0201   HEART Score 3 Filed at: 01/16/2025 0201                 CIWA-Ar Score       Row Name 01/16/25 0100 01/16/25 0009          CIWA-Ar    Blood Pressure 151/90 144/109     Pulse 127 114     Nausea and Vomiting 0 1     Tactile Disturbances 0 0     Tremor 7 5     Auditory Disturbances 0 0     Paroxysmal Sweats 1 0     Visual Disturbances 0 0     Anxiety 6 3     Headache, Fullness in Head 3 3     Agitation 5 4     Orientation and Clouding of Sensorium 2 0     CIWA-Ar Total 24 16                             SBIRT 20yo+      Flowsheet Row Most Recent Value   Initial Alcohol Screen: US AUDIT-C     1. How often do you have a drink containing alcohol? 6 Filed at: 01/15/2025 2234   2. How many drinks containing alcohol do you have on a typical day you are drinking?  6 Filed at: 01/15/2025 2234   3a. Male UNDER 65: How often do you have five or more drinks on one occasion? 6 Filed at: 01/15/2025 2234   Audit-C Score 18 Filed at: 01/15/2025 2234   Full Alcohol Screen: US AUDIT    4. How often during the last year have you found that you were not able to stop drinking once you had started? 4 Filed at: 01/15/2025 2234   5. How often during past year have you failed to do what was normally expected of you because of drinking?  4 Filed at: 01/15/2025 2234   6. How often in past year have you needed a first drink in the morning to get yourself going after a heavy drinking session?  4 Filed at: 01/15/2025 2234   7. How often in past year have you had feeling of guilt or remorse after drinking?  4 Filed at: 01/15/2025 2308    8. How often in past year have you been unable to remember what happened night before because you had been drinking?  4 Filed at: 01/15/2025 2234   9. Have you or someone else been injured as a result of your drinking?  4 Filed at: 01/15/2025 2234   10. Has a relative, friend, doctor or other health worker been concerned about your drinking and suggested you cut down?  4 Filed at: 01/15/2025 2234   AUDIT Total Score 46 Filed at: 01/15/2025 2234   [place]: How many times in the past year have you...    Used an illegal drug or used a prescription medication for non-medical reasons? Never Filed at: 01/15/2025 2234                            History of Present Illness       Chief Complaint   Patient presents with    Medical Problem     Pt states that he is detoxing from alcohol and that his last sip of alcohol was 6 hours ago. Pt states that he usually drinks half a gallon of vodka a day. Pt complains of chest pain and nausea.        Past Medical History:   Diagnosis Date    Chronic pain     Depression     Pelvic fracture (HCC)     PTSD (post-traumatic stress disorder)     Substance abuse (HCC)       Past Surgical History:   Procedure Laterality Date    BONY PELVIS SURGERY        History reviewed. No pertinent family history.   Social History     Tobacco Use    Smoking status: Every Day     Current packs/day: 1.00     Average packs/day: 1 pack/day for 5.5 years (5.5 ttl pk-yrs)     Types: Cigarettes     Start date: 7/29/2019     Passive exposure: Never    Smokeless tobacco: Never   Vaping Use    Vaping status: Former   Substance Use Topics    Alcohol use: Yes     Alcohol/week: 20.0 standard drinks of alcohol     Types: 20 Shots of liquor per week     Comment: 20-30 shots per day    Drug use: Not Currently     Types: Methamphetamines, Marijuana, Cocaine     Comment: Marijuana current use      E-Cigarette/Vaping    E-Cigarette Use Former User     Comments THC, medical        E-Cigarette/Vaping Substances    Nicotine No      THC No     CBD No     Flavoring No     Other No     Unknown No       I have reviewed and agree with the history as documented.     This is a 39-year-old male with a known history of alcoholism and prior history of severe alcohol withdrawal symptoms including alcohol withdrawal seizures, and a prior history of remote opioid use who is presenting today requesting help with alcohol cessation.  Patient reports that for the past several years he has been drinking up to half a gallon of vodka daily.  He states that today he drank only 1/5 of vodka with last drink approximately 5 to 6 hours prior to his arrival here in the ED.  He states that he currently is beginning to feel early withdrawal symptoms.  His feelings of impending doom, and feels clammy, somewhat nauseous but without vomiting, and is mildly tremulous.  Patient cannot recall the last time that he was in detox but states that he has been to professional detox facilities in the past.  Patient states that he has required medical detox therapy in the past.  Patient is denying any falls or trauma recently, he states that his mental status seems to be at or near his baseline, and he denies any concurrent drug use at this time including any opioid use, cocaine use, or methamphetamine use/abuse.  Patient does continue to smoke cigarettes daily.  And route to the hospital, patient was complaining of some epigastric/lower chest pain to EMS.  He states that this pain is not that abnormal for him, and is denying any concurrent shortness of breath or palpitations.         Review of Systems   Constitutional:  Positive for diaphoresis. Negative for chills, fatigue and fever.   HENT:  Negative for congestion and sore throat.    Eyes:  Negative for pain and visual disturbance.   Respiratory:  Negative for cough, chest tightness and shortness of breath.    Cardiovascular:  Positive for chest pain. Negative for palpitations.   Gastrointestinal:  Positive for nausea. Negative  for abdominal pain, blood in stool, constipation, diarrhea and vomiting.   Genitourinary:  Negative for dysuria, flank pain and hematuria.   Musculoskeletal:  Negative for arthralgias, back pain and neck pain.   Skin:  Negative for color change and rash.   Neurological:  Positive for tremors. Negative for dizziness, seizures, syncope and light-headedness.   Hematological:  Negative for adenopathy. Does not bruise/bleed easily.   Psychiatric/Behavioral:  Positive for behavioral problems.    All other systems reviewed and are negative.          Objective       ED Triage Vitals [01/15/25 2217]   Temperature Pulse Blood Pressure Respirations SpO2 Patient Position - Orthostatic VS   98.3 °F (36.8 °C) (!) 137 138/99 18 96 % Lying      Temp Source Heart Rate Source BP Location FiO2 (%) Pain Score    Temporal Monitor Right arm -- 6      Vitals      Date and Time Temp Pulse SpO2 Resp BP Pain Score FACES Pain Rating User   01/16/25 0100 -- 127 -- -- 151/90 -- -- [place]   01/16/25 0030 98.2 °F (36.8 °C) 118 97 % 18 157/130 6 -- AB   01/16/25 0009 -- 114 -- -- 144/109 -- -- AB   01/16/25 0000 98.2 °F (36.8 °C) 122 96 % 18 144/109 6 -- AB   01/15/25 2315 98.2 °F (36.8 °C) 123 96 % 18 141/79 6 -- LC   01/15/25 2217 98.3 °F (36.8 °C) 137 96 % 18 138/99 6 -- AB            Physical Exam  Vitals and nursing note reviewed.   Constitutional:       General: He is in acute distress.      Appearance: He is well-developed. He is ill-appearing. He is not toxic-appearing or diaphoretic.   HENT:      Head: Normocephalic and atraumatic.      Right Ear: External ear normal.      Left Ear: External ear normal.      Nose: Nose normal. No congestion or rhinorrhea.      Mouth/Throat:      Mouth: Mucous membranes are moist.      Pharynx: No oropharyngeal exudate or posterior oropharyngeal erythema.   Eyes:      General: No scleral icterus.     Extraocular Movements: Extraocular movements intact.      Conjunctiva/sclera: Conjunctivae normal.      Pupils:  Pupils are equal, round, and reactive to light.   Cardiovascular:      Rate and Rhythm: Regular rhythm. Tachycardia present.      Pulses: Normal pulses.      Heart sounds: No murmur heard.  Pulmonary:      Effort: Pulmonary effort is normal. No respiratory distress.      Breath sounds: Normal breath sounds. No wheezing or rales.   Abdominal:      Palpations: Abdomen is soft. There is no mass.      Tenderness: There is no abdominal tenderness. There is no right CVA tenderness, left CVA tenderness or guarding.      Hernia: No hernia is present.   Musculoskeletal:         General: No swelling. Normal range of motion.      Cervical back: Normal range of motion and neck supple.      Right lower leg: No edema.      Left lower leg: No edema.   Skin:     General: Skin is warm and dry.      Capillary Refill: Capillary refill takes less than 2 seconds.   Neurological:      General: No focal deficit present.      Mental Status: He is alert and oriented to person, place, and time.      Cranial Nerves: No cranial nerve deficit or dysarthria.      Sensory: No sensory deficit.      Motor: Tremor present. No abnormal muscle tone, seizure activity or pronator drift.      Coordination: Romberg sign negative. Finger-Nose-Finger Test and Heel to Shin Test normal.      Gait: Gait abnormal (slight ataxia).   Psychiatric:         Attention and Perception: Attention normal.         Mood and Affect: Mood is anxious. Affect is labile.         Behavior: Behavior normal.         Judgment: Judgment is impulsive.         Results Reviewed       Procedure Component Value Units Date/Time    HS Troponin I 2hr [[id number]]  (Normal) Collected: 01/16/25 0032    Lab Status: Final result Specimen: Blood from Arm, Left Updated: 01/16/25 0110     hs TnI 2hr 3 ng/L      Delta 2hr hsTnI 0 ng/L     HS Troponin I 4hr [[id number]]     Lab Status: No result Specimen: Blood     HS Troponin 0hr (reflex protocol) [[id number]]  (Normal) Collected: 01/15/25 2228    Lab  Status: Final result Specimen: Blood from Arm, Left Updated: 01/15/25 2306     hs TnI 0hr 3 ng/L     B-Type Natriuretic Peptide(BNP) [[id number]]  (Normal) Collected: 01/15/25 2228    Lab Status: Final result Specimen: Blood from Arm, Left Updated: 01/15/25 2305     BNP 11 pg/mL     Rapid drug screen, urine [[id number]]  (Normal) Collected: 01/15/25 2239    Lab Status: Final result Specimen: Urine, Clean Catch Updated: 01/15/25 2304     Amph/Meth UR Negative     Barbiturate Ur Negative     Benzodiazepine Urine Negative     Cocaine Urine Negative     Methadone Urine Negative     Opiate Urine Negative     PCP Ur Negative     THC Urine Negative     Oxycodone Urine Negative     Fentanyl Urine Negative     HYDROCODONE URINE Negative    Narrative:      FOR MEDICAL PURPOSES ONLY.   IF CONFIRMATION NEEDED PLEASE CONTACT THE LAB WITHIN 5 DAYS.    Drug Screen Cutoff Levels:  AMPHETAMINE/METHAMPHETAMINES  1000 ng/mL  BARBITURATES     200 ng/mL  BENZODIAZEPINES     200 ng/mL  COCAINE      300 ng/mL  METHADONE      300 ng/mL  OPIATES      300 ng/mL  PHENCYCLIDINE     25 ng/mL  THC       50 ng/mL  OXYCODONE      100 ng/mL  FENTANYL      5 ng/mL  HYDROCODONE     300 ng/mL    Ethanol [[id number]]  (Abnormal) Collected: 01/15/25 2228    Lab Status: Final result Specimen: Blood from Arm, Left Updated: 01/15/25 2302     Ethanol Lvl 323 mg/dL     Lipase [[id number]]  (Abnormal) Collected: 01/15/25 2228    Lab Status: Final result Specimen: Blood from Arm, Left Updated: 01/15/25 2300     Lipase 118 u/L     CK [[id number]]  (Normal) Collected: 01/15/25 2228    Lab Status: Final result Specimen: Blood from Arm, Left Updated: 01/15/25 2300     Total  U/L     Salicylate level [[id number]]  (Normal) Collected: 01/15/25 2228    Lab Status: Final result Specimen: Blood from Arm, Left Updated: 01/15/25 2300     Salicylate Lvl <5 mg/dL     Acetaminophen level-If concentration is detectable, please discuss with medical  on call.  [[id number]]  (Abnormal) Collected: 01/15/25 2228    Lab Status: Final result Specimen: Blood from Arm, Left Updated: 01/15/25 2300     Acetaminophen Level <2 ug/mL     Comprehensive metabolic panel [[id number]]  (Abnormal) Collected: 01/15/25 2228    Lab Status: Final result Specimen: Blood from Arm, Left Updated: 01/15/25 2300     Sodium 143 mmol/L      Potassium 3.2 mmol/L      Chloride 102 mmol/L      CO2 28 mmol/L      ANION GAP 13 mmol/L      BUN 9 mg/dL      Creatinine 0.84 mg/dL      Glucose 118 mg/dL      Calcium 8.9 mg/dL      AST 42 U/L      ALT 41 U/L      Alkaline Phosphatase 158 U/L      Total Protein 6.5 g/dL      Albumin 4.4 g/dL      Total Bilirubin 0.29 mg/dL      eGFR 111 ml/min/1.73sq m     Narrative:      [employer] guidelines for Chronic Kidney Disease (CKD):     Stage 1 with normal or high GFR (GFR > 90 mL/min/1.73 square meters)    Stage 2 Mild CKD (GFR = 60-89 mL/min/1.73 square meters)    Stage 3A Moderate CKD (GFR = 45-59 mL/min/1.73 square meters)    Stage 3B Moderate CKD (GFR = 30-44 mL/min/1.73 square meters)    Stage 4 Severe CKD (GFR = 15-29 mL/min/1.73 square meters)    Stage 5 End Stage CKD (GFR <15 mL/min/1.73 square meters)  Note: GFR calculation is accurate only with a steady state creatinine    Magnesium [[id number]]  (Abnormal) Collected: 01/15/25 2228    Lab Status: Final result Specimen: Blood from Arm, Left Updated: 01/15/25 2300     Magnesium 1.8 mg/dL     Phosphorus [[id number]]  (Normal) Collected: 01/15/25 2228    Lab Status: Final result Specimen: Blood from Arm, Left Updated: 01/15/25 2300     Phosphorus 3.0 mg/dL     CBC and differential [[id number]]  (Abnormal) Collected: 01/15/25 2228    Lab Status: Final result Specimen: Blood from Arm, Left Updated: 01/15/25 2245     WBC 5.30 Thousand/uL      RBC 4.76 Million/uL      Hemoglobin 14.5 g/dL      Hematocrit 42.3 %      MCV 89 fL      MCH 30.5 pg      MCHC 34.3 g/dL      RDW 12.7 %      MPV 10.1 fL       Platelets 198 Thousands/uL      nRBC 0 /100 WBCs      Segmented % 39 %      Immature Grans % 0 %      Lymphocytes % 46 %      Monocytes % 9 %      Eosinophils Relative 5 %      Basophils Relative 1 %      Absolute Neutrophils 2.06 Thousands/µL      Absolute Immature Grans 0.02 Thousand/uL      Absolute Lymphocytes 2.43 Thousands/µL      Absolute Monocytes 0.48 Thousand/µL      Eosinophils Absolute 0.27 Thousand/µL      Basophils Absolute 0.04 Thousands/µL             XR chest portable    (Results Pending)       CriticalCare Time    Date/Time: 1/15/2025 11:58 PM    Performed by: Amilcar Myers MD  Authorized by: Amilcar Myers MD    Critical care provider statement:     Critical care time (minutes):  37    Critical care start time:  1/15/2025 10:10 PM    Critical care end time:  1/15/2025 11:58 PM    Critical care time was exclusive of:  Separately billable procedures and treating other patients and teaching time    Critical care was necessary to treat or prevent imminent or life-threatening deterioration of the following conditions:  Toxidrome    Critical care was time spent personally by me on the following activities:  Obtaining history from patient or surrogate, ordering and performing treatments and interventions, development of treatment plan with patient or surrogate, ordering and review of laboratory studies, discussions with consultants, re-evaluation of patient's condition, review of old charts, evaluation of patient's response to treatment and examination of patient    I assumed direction of critical care for this patient from another provider in my specialty: no        ED Medication and Procedure Management   Prior to Admission Medications   Prescriptions Last Dose Informant Patient Reported? Taking?   QUEtiapine (SEROquel) 100 mg tablet   No No   Sig: Take 1 tablet (100 mg total) by mouth daily at bedtime   cloNIDine (CATAPRES) 0.1 mg tablet   No No   Sig: Take 1 tablet (0.1 mg total) by mouth  every 8 (eight) hours   folic acid (FOLVITE) 1 mg tablet   No No   Sig: Take 1 tablet (1 mg total) by mouth daily   gabapentin (NEURONTIN) 300 mg capsule   No No   Sig: Take 1 capsule (300 mg total) by mouth 3 (three) times a day   hydrOXYzine HCL (ATARAX) 50 mg tablet   No No   Sig: Take 1 tablet (50 mg total) by mouth 3 (three) times a day   loperamide (IMODIUM) 2 mg capsule   No No   Sig: Take 2 capsules (4 mg total) by mouth 3 (three) times a day as needed for diarrhea   nicotine (NICODERM CQ) 21 mg/24 hr TD 24 hr patch   No No   Sig: Place 1 patch on the skin over 24 hours daily   thiamine 100 MG tablet   No No   Sig: Take 1 tablet (100 mg total) by mouth daily   traZODone (DESYREL) 50 mg tablet   No No   Sig: Take 1 tablet (50 mg total) by mouth daily at bedtime      Facility-Administered Medications: None     Current Discharge Medication List        CONTINUE these medications which have NOT CHANGED    Details   cloNIDine (CATAPRES) 0.1 mg tablet Take 1 tablet (0.1 mg total) by mouth every 8 (eight) hours    Associated Diagnoses: Alcohol withdrawal syndrome with complication (HCC)      folic acid (FOLVITE) 1 mg tablet Take 1 tablet (1 mg total) by mouth daily  Qty: 30 tablet, Refills: 0    Associated Diagnoses: Alcohol use disorder, severe, dependence (HCC)      gabapentin (NEURONTIN) 300 mg capsule Take 1 capsule (300 mg total) by mouth 3 (three) times a day    Associated Diagnoses: Alcohol withdrawal syndrome with complication (HCC)      hydrOXYzine HCL (ATARAX) 50 mg tablet Take 1 tablet (50 mg total) by mouth 3 (three) times a day  Qty: 90 tablet, Refills: 0    Associated Diagnoses: Alcohol withdrawal syndrome with complication (HCC)      loperamide (IMODIUM) 2 mg capsule Take 2 capsules (4 mg total) by mouth 3 (three) times a day as needed for diarrhea    Associated Diagnoses: Alcohol withdrawal syndrome with complication (HCC)      nicotine (NICODERM CQ) 21 mg/24 hr TD 24 hr patch Place 1 patch on the  skin over 24 hours daily  Qty: 28 patch, Refills: 0    Associated Diagnoses: Tobacco use disorder      QUEtiapine (SEROquel) 100 mg tablet Take 1 tablet (100 mg total) by mouth daily at bedtime  Qty: 90 tablet, Refills: 1    Associated Diagnoses: PTSD (post-traumatic stress disorder)      thiamine 100 MG tablet Take 1 tablet (100 mg total) by mouth daily  Qty: 30 tablet, Refills: 0    Associated Diagnoses: Alcohol use disorder, severe, dependence (HCC)      traZODone (DESYREL) 50 mg tablet Take 1 tablet (50 mg total) by mouth daily at bedtime    Associated Diagnoses: Alcohol withdrawal syndrome with complication (HCC)           No discharge procedures on file.  ED SEPSIS DOCUMENTATION   Time reflects when diagnosis was documented in both MDM as applicable and the Disposition within this note       Time User Action Codes Description Comment    1/15/2025 11:30 PM Amilcar Myers [R94.31] Prolonged QT interval     1/15/2025 11:30 PM Amilcar Myers [F10.930] Alcohol withdrawal syndrome without complication (HCC)     1/15/2025 11:31 PM Amilcar Myers Remove [F10.930] Alcohol withdrawal syndrome without complication (HCC)     1/15/2025 11:31 PM Amilcar Myers [F10.939] Alcohol withdrawal syndrome with complication (HCC)     1/15/2025 11:31 PM Amilcar Myers [E87.6] Hypokalemia                  Amilcar Myers MD  01/16/25 020

## 2025-01-16 NOTE — CASE MANAGEMENT
Case Management Assessment & Discharge Planning Note    Patient name Aniket Pace  Location / MRN [id number]  : 1985 Date 2025       Current Admission Date: 1/15/2025  Current Admission Diagnosis:Alcohol withdrawal syndrome (HCC)   Patient Active Problem List    Diagnosis Date Noted Date Diagnosed    Chronic pain 2025     Elevated LFTs 2024     Severe protein-calorie malnutrition (HCC) 2024     Wheezing 2024     Hyponatremia 2024     Alcohol withdrawal delirium, acute, hyperactive (HCC) 2024     Alcohol withdrawal syndrome (HCC) 2024     Alcohol withdrawal seizure ([facility]) 2024     HARRIET (acute kidney injury) ([facility]) 2024     Non-traumatic rhabdomyolysis 2024     Hypotension due to hypovolemia 2024     Primary insomnia 2024     Alcohol withdrawal seizure without complication ([facility]) 2023     Bipolar 1 disorder ([facility]) 2023     Hypertension 2023     Benzodiazepine dependence ([facility]) 2022     Unsteady gait 2022     Opioid use disorder, moderate, on maintenance therapy, dependence ([facility]) 2022     Continuous chronic alcoholism ([facility]) 2022     PTSD (post-traumatic stress disorder) 2022     Tobacco abuse 2022     Transaminitis 2022     Anxiety 2022     Encounter for tobacco use cessation counseling 2022     Opioid dependence with opioid-induced mood disorder ([facility]) 2022     Seizure ([facility]) 2022     Opioid use disorder 2021     Tachycardia 2015       LOS (days): 1  Geometric Mean LOS (GMLOS) (days): 2.6  Days to GMLOS:1.9     OBJECTIVE:    Risk of Unplanned Readmission Score: 33.63         Current admission status: Inpatient  Referral Reason:  (discharge planning)    Preferred Pharmacy:   [id number] - [place] - [address]  [address] [facility] [address]  Phone: [phone] Fax: [phone]    [facility]  #[id number] - [place], [place] - [address]  [address]  [facility] [address]  Phone: [phone] Fax: [phone]    [id number] [place] - [address]  [address]  [facility] [address]  Phone: [phone] Fax: [phone]    [id number] - [place] - [address] [facility] [place]  [address] [place] [address]  Phone: [phone] Fax: [phone]    Primary Care Provider: Edenilson Collier MD    Primary Insurance: [employer]  Secondary Insurance:     ASSESSMENT:    CM met with patient at the bedside,baseline information  was obtained. CM discussed the role of CM in helping the patient develop a discharge plan and assist the patient in carry out their plan.    Patient stated he lives alone in [facility] [place] .    Patient stated he was discharged from [facility]  D&[facility] 6 weeks ago.    Baseline patient is independent. Currently not working stated he wants to get his job back.        Active Health Care Proxies       ELIE Pace Health Care Agent - Brother   Primary Phone: [phone] (Mobile)                 Readmission Root Cause  30 Day Readmission: No    Patient Information  Admitted from:: Home  Mental Status: Alert  During Assessment patient was accompanied by: Not accompanied during assessment  Assessment information provided by:: Patient  Primary Caregiver: Self  Support Systems: Family members  [place] Residence: [facility]  What city do you live in?: [facility]  Type of Current Residence: Apartment  Upon entering residence, is there a bedroom on the main floor (no further steps)?: Yes  Upon entering residence, is there a bathroom on the main floor (no further steps)?: Yes  Living Arrangements: Lives Alone  Is patient a ?: No    Activities of Daily Living Prior to Admission  Functional Status: Independent  Completes ADLs independently?: Yes  Ambulates independently?: Yes  Does patient use assisted devices?: No  Does patient currently own  DME?: No  Does patient have a history of Outpatient Therapy (PT/OT)?: No  Does the patient have a history of Short-Term Rehab?: No  Does patient have a history of HHC?: No  Does patient currently have HHC?: No    Patient Information Continued  Income Source: Unemployed  Does patient have prescription coverage?: Yes  Does patient receive dialysis treatments?: No  Does patient have a history of substance abuse?: Yes  Historical substance use preference: Alcohol/ETOH  History of Withdrawal Symptoms: Elevated BP, Seizures  Is patient currently in treatment for substance abuse?: Yes (patient stated he was discharged from [facility]  [place] 6 weeks ago . CM encouraged patient to return or go to another D&A patient is declining at this time. provided warm handoff for community group meeting.)  Does patient have a history of Mental Health Diagnosis?: No    Means of Transportation  Means of Transport to Appts:: Drives Self          DISCHARGE DETAILS:    Discharge planning discussed with:: patient  Freedom of Choice: Yes  Comments - Freedom of Choice: CM discussed FOC for D&A placement.  patient stated he was just discharged from [facility] 6 weeks ago. CM encouraging patient to return to inpatient D&A patient is declining at this time. CM provided warm hand off. CM to speak with patient again tomorrow concerning inpatient D&A  CM contacted family/caregiver?: No- see comments  Were Treatment Team discharge recommendations reviewed with patient/caregiver?: Yes  Did patient/caregiver verbalize understanding of patient care needs?: Yes  Were patient/caregiver advised of the risks associated with not following Treatment Team discharge recommendations?: Yes      Warm hand off paper was provided at bedside for outpatient group D&A meetings.    CM providing 211 info for patient as he is currently not working but has a home to return to on discharge. [address] in [place].    CM to speak with patient tomorrow  concerning inpatient D&A treatment.

## 2025-01-16 NOTE — NURSING NOTE
Dr Grissom made aware patient is asking for more ativan.  Also asked to speak to Dr Grissom about his care.   General Surgery Discharge Instructions:    1. Apply ice pack to neck through today. Then no further ice pack needed.    2. No yoga neck like extensions that would put tension on the incision for the next 6-8 weeks.     3. You have surgical glue on your neck incision. Do not pick the glue off. The glue will come off on its own in several weeks.    4. May shower daily with soap and water.    5. Keep the incision out of the sun for 6 months to reduce scarring.     6. Home on oral Oxycodone or Tylenol as needed and continue stool softener while taking narcotic for pain to decrease constipation side effects of narcotic pain medication.     7. No driving while taking narcotics.    8. No lifting >10 lbs for two weeks.    9. Call our office with any questions or concerns at [phone].    HYPOCALCEMIA (Adult)  Hypocalcemia is too little calcium in the blood. Calcium is a mineral. It helps the heart and other muscles function properly. It’s also needed to develop and maintain strong bones and teeth. Hypocalcemia may be caused by lack of calcium or vitamin D in your diet. Or it may be due to digestive problems, gland problems, kidney or pancreas disease, or low magnesium levels. Too much phosphate in the blood and certain medications can also cause hypocalcemia.  Hypocalcemia can cause the muscles of the face, hands, and feet to spasm (twitch involuntarily). It can also cause numbness or tingling around the mouth or in the hands and feet. Other problems, such as depression and memory loss, can result as well.  A sample of your blood will be taken to check the level of calcium in your body. The test also helps determine if a problem with your parathyroid gland (gland that controls your calcium level) or kidneys is causing hypocalcemia. Depending on the cause, you may be given an oral calcium supplement. In severe cases, an injection of calcium gluconate may be required. You may also receive a vitamin D supplement or  injection. If the cause of your problem is low magnesium, you will have treatment to raise your body’s level of this mineral.    HOME CARE  Medications: The doctor may prescribe calcium and vitamin D supplements or other medications or minerals. Follow the doctor’s instructions for taking these supplements.  If patient has perioral numbness around mouth or lips, or numbness and tingling in the arms, legs, hands, or feet, please take an additional 2 TUMS tablets,wait 30 minutes to see if symptoms go away, and call our office for an update at [phone].    General Care:  Take any medications or supplements as instructed.  Increase diet slowly as tolerated.   Make diet changes as instructed by your doctor. You may be asked to eat more dairy products, such as milk, cheese, and yogurt.  Avoid soft drinks (soda pop). These contain phosphates, which can interfere with your ability to absorb calcium.   Avoid salty foods. Salt makes you lose calcium.  Try to get out in the sun for at least 20 minutes each day. Exposure to the sun helps your body make vitamin D, which in turn helps you absorb calcium.    FOLLOW UP as advised by the doctor or our staff.    GET PROMPT MEDICAL ATTENTION if any of the following occurs:  Extreme fatigue  Irregular heartbeat  Depression  Hallucinations (seeing or hearing things that aren’t there)  Muscle cramps, spasms, or twitching  Numbness and tingling in the arms, legs, hands, or feet  Seizures

## 2025-01-16 NOTE — PLAN OF CARE
Problem: Potential for Falls  Goal: Patient will remain free of falls  Description: INTERVENTIONS:  - Educate patient/family on patient safety including physical limitations  - Instruct patient to call for assistance with activity   - Consult OT/PT to assist with strengthening/mobility   - Keep Call bell within reach  - Keep bed low and locked with side rails adjusted as appropriate  - Keep care items and personal belongings within reach  - Initiate and maintain comfort rounds  - Make Fall Risk Sign visible to staff  - Offer Toileting every 2 Hours, in advance of need  - Initiate/Maintain bedalarm  - Obtain necessary fall risk management equipment: bed alarm/socks  - Apply yellow socks and bracelet for high fall risk patients  - Consider moving patient to room near nurses station  Outcome: Progressing     Problem: INFECTION - ADULT  Goal: Absence or prevention of progression during hospitalization  Description: INTERVENTIONS:  - Assess and monitor for signs and symptoms of infection  - Monitor lab/diagnostic results  - Monitor all insertion sites, i.e. indwelling lines, tubes, and drains  - Monitor endotracheal if appropriate and nasal secretions for changes in amount and color  - [place] appropriate cooling/warming therapies per order  - Administer medications as ordered  - Instruct and encourage patient and family to use good hand hygiene technique  - Identify and instruct in appropriate isolation precautions for identified infection/condition  Outcome: Progressing     Problem: INFECTION - ADULT  Goal: Absence or prevention of progression during hospitalization  Description: INTERVENTIONS:  - Assess and monitor for signs and symptoms of infection  - Monitor lab/diagnostic results  - Monitor all insertion sites, i.e. indwelling lines, tubes, and drains  - Monitor endotracheal if appropriate and nasal secretions for changes in amount and color  - [place] appropriate cooling/warming therapies per order  -  Administer medications as ordered  - Instruct and encourage patient and family to use good hand hygiene technique  - Identify and instruct in appropriate isolation precautions for identified infection/condition  Outcome: Progressing  Goal: Absence of fever/infection during neutropenic period  Description: INTERVENTIONS:  - Monitor WBC    Outcome: Progressing     Problem: SAFETY ADULT  Goal: Patient will remain free of falls  Description: INTERVENTIONS:  - Educate patient/family on patient safety including physical limitations  - Instruct patient to call for assistance with activity   - Consult OT/PT to assist with strengthening/mobility   - Keep Call bell within reach  - Keep bed low and locked with side rails adjusted as appropriate  - Keep care items and personal belongings within reach  - Initiate and maintain comfort rounds  - Make Fall Risk Sign visible to staff  - Offer Toileting every 2 Hours, in advance of need  - Initiate/Maintain bed alarm  - Obtain necessary fall risk management equipment: alarms/socks  - Apply yellow socks and bracelet for high fall risk patients  - Consider moving patient to room near nurses station  Outcome: Progressing  Goal: Maintain or return to baseline ADL function  Description: INTERVENTIONS:  -  Assess patient's ability to carry out ADLs; assess patient's baseline for ADL function and identify physical deficits which impact ability to perform ADLs (bathing, care of mouth/teeth, toileting, grooming, dressing, etc.)  - Assess/evaluate cause of self-care deficits   - Assess range of motion  - Assess patient's mobility; develop plan if impaired  - Assess patient's need for assistive devices and provide as appropriate  - Encourage maximum independence but intervene and supervise when necessary  - Involve family in performance of ADLs  - Assess for home care needs following discharge   - Consider OT consult to assist with ADL evaluation and planning for discharge  - Provide patient  education as appropriate  Outcome: Progressing  Goal: Maintains/Returns to pre admission functional level  Description: INTERVENTIONS:  - Perform AM-PAC 6 Click Basic Mobility/ Daily Activity assessment daily.  - Set and communicate daily mobility goal to care team and patient/family/caregiver.   - Collaborate with rehabilitation services on mobility goals if consulted  - Perform Range of Motion 3 times a day.  - Reposition patient every 2 hours.  - Dangle patient 3 times a day  - Stand patient 3 times a day  - Ambulate patient 3 times a day  - Out of bed to chair 3 times a day   - Out of bed for meals 3 times a day  - Out of bed for toileting  - Record patient progress and toleration of activity level   Outcome: Progressing     Problem: DISCHARGE PLANNING  Goal: Discharge to home or other facility with appropriate resources  Description: INTERVENTIONS:  - Identify barriers to discharge w/patient and caregiver  - Arrange for needed discharge resources and transportation as appropriate  - Identify discharge learning needs (meds, wound care, etc.)  - Arrange for interpretive services to assist at discharge as needed  - Refer to Case Management Department for coordinating discharge planning if the patient needs post-hospital services based on physician/advanced practitioner order or complex needs related to functional status, cognitive ability, or social support system  Outcome: Progressing     Problem: Knowledge Deficit  Goal: Patient/family/caregiver demonstrates understanding of disease process, treatment plan, medications, and discharge instructions  Description: Complete learning assessment and assess knowledge base.  Interventions:  - Provide teaching at level of understanding  - Provide teaching via preferred learning methods  Outcome: Progressing

## 2025-01-16 NOTE — PLAN OF CARE
Problem: Potential for Falls  Goal: Patient will remain free of falls  Description: INTERVENTIONS:  - Educate patient/family on patient safety including physical limitations  - Instruct patient to call for assistance with activity   - Consult OT/PT to assist with strengthening/mobility   - Keep Call bell within reach  - Keep bed low and locked with side rails adjusted as appropriate  - Keep care items and personal belongings within reach  - Initiate and maintain comfort rounds  - Make Fall Risk Sign visible to staff  - Offer Toileting every 2 Hours, in advance of need  - Initiate/Maintain bedalarm  - Obtain necessary fall risk management equipment:   - Apply yellow socks and bracelet for high fall risk patients  - Consider moving patient to room near nurses station  Outcome: Progressing     Problem: PAIN - ADULT  Goal: Verbalizes/displays adequate comfort level or baseline comfort level  Description: Interventions:  - Encourage patient to monitor pain and request assistance  - Assess pain using appropriate pain scale  - Administer analgesics based on type and severity of pain and evaluate response  - Implement non-pharmacological measures as appropriate and evaluate response  - Consider cultural and social influences on pain and pain management  - Notify physician/advanced practitioner if interventions unsuccessful or patient reports new pain  Outcome: Progressing     Problem: INFECTION - ADULT  Goal: Absence or prevention of progression during hospitalization  Description: INTERVENTIONS:  - Assess and monitor for signs and symptoms of infection  - Monitor lab/diagnostic results  - Monitor all insertion sites, i.e. indwelling lines, tubes, and drains  - Monitor endotracheal if appropriate and nasal secretions for changes in amount and color  - [place] appropriate cooling/warming therapies per order  - Administer medications as ordered  - Instruct and encourage patient and family to use good hand hygiene  technique  - Identify and instruct in appropriate isolation precautions for identified infection/condition  Outcome: Progressing  Goal: Absence of fever/infection during neutropenic period  Description: INTERVENTIONS:  - Monitor WBC    Outcome: Progressing     Problem: SAFETY ADULT  Goal: Patient will remain free of falls  Description: INTERVENTIONS:  - Educate patient/family on patient safety including physical limitations  - Instruct patient to call for assistance with activity   - Consult OT/PT to assist with strengthening/mobility   - Keep Call bell within reach  - Keep bed low and locked with side rails adjusted as appropriate  - Keep care items and personal belongings within reach  - Initiate and maintain comfort rounds  - Make Fall Risk Sign visible to staff  - Offer Toileting every 2 Hours, in advance of need  - Initiate/Maintain bedalarm  - Obtain necessary fall risk management equipment:   - Apply yellow socks and bracelet for high fall risk patients  - Consider moving patient to room near nurses station  Outcome: Progressing  Goal: Maintain or return to baseline ADL function  Description: INTERVENTIONS:  -  Assess patient's ability to carry out ADLs; assess patient's baseline for ADL function and identify physical deficits which impact ability to perform ADLs (bathing, care of mouth/teeth, toileting, grooming, dressing, etc.)  - Assess/evaluate cause of self-care deficits   - Assess range of motion  - Assess patient's mobility; develop plan if impaired  - Assess patient's need for assistive devices and provide as appropriate  - Encourage maximum independence but intervene and supervise when necessary  - Involve family in performance of ADLs  - Assess for home care needs following discharge   - Consider OT consult to assist with ADL evaluation and planning for discharge  - Provide patient education as appropriate  Outcome: Progressing  Goal: Maintains/Returns to pre admission functional level  Description:  INTERVENTIONS:  - Perform AM-PAC 6 Click Basic Mobility/ Daily Activity assessment daily.  - Set and communicate daily mobility goal to care team and patient/family/caregiver.   - Collaborate with rehabilitation services on mobility goals if consulted  - Perform Range of Motion 3 times a day.  - Reposition patient every 3 hours.  - Dangle patient 3 times a day  - Stand patient 3 times a day  - Ambulate patient 3 times a day  - Out of bed to chair 3 times a day   - Out of bed for meals 3 times a day  - Out of bed for toileting  - Record patient progress and toleration of activity level   Outcome: Progressing     Problem: DISCHARGE PLANNING  Goal: Discharge to home or other facility with appropriate resources  Description: INTERVENTIONS:  - Identify barriers to discharge w/patient and caregiver  - Arrange for needed discharge resources and transportation as appropriate  - Identify discharge learning needs (meds, wound care, etc.)  - Arrange for interpretive services to assist at discharge as needed  - Refer to Case Management Department for coordinating discharge planning if the patient needs post-hospital services based on physician/advanced practitioner order or complex needs related to functional status, cognitive ability, or social support system  Outcome: Progressing     Problem: Knowledge Deficit  Goal: Patient/family/caregiver demonstrates understanding of disease process, treatment plan, medications, and discharge instructions  Description: Complete learning assessment and assess knowledge base.  Interventions:  - Provide teaching at level of understanding  - Provide teaching via preferred learning methods  Outcome: Progressing     Problem: Prexisting or High Potential for Compromised Skin Integrity  Goal: Skin integrity is maintained or improved  Description: INTERVENTIONS:  - Identify patients at risk for skin breakdown  - Assess and monitor skin integrity  - Assess and monitor nutrition and hydration  status  - Monitor labs   - Assess for incontinence   - Turn and reposition patient  - Assist with mobility/ambulation  - Relieve pressure over bony prominences  - Avoid friction and shearing  - Provide appropriate hygiene as needed including keeping skin clean and dry  - Evaluate need for skin moisturizer/barrier cream  - Collaborate with interdisciplinary team   - Patient/family teaching  - Consider wound care consult   Outcome: Progressing

## 2025-01-16 NOTE — ASSESSMENT & PLAN NOTE
Most likely aggravated by alcohol withdrawal syndrome.  Resume clonidine and optimize accordingly.

## 2025-01-16 NOTE — PLAN OF CARE
Problem: Potential for Falls  Goal: Patient will remain free of falls  Description: INTERVENTIONS:  - Educate patient/family on patient safety including physical limitations  - Instruct patient to call for assistance with activity   - Consult OT/PT to assist with strengthening/mobility   - Keep Call bell within reach  - Keep bed low and locked with side rails adjusted as appropriate  - Keep care items and personal belongings within reach  - Initiate and maintain comfort rounds  - Make Fall Risk Sign visible to staff  - Offer Toileting every 2 Hours, in advance of need  - Initiate/Maintain bedalarm  - Obtain necessary fall risk management equipment:   - Apply yellow socks and bracelet for high fall risk patients  - Consider moving patient to room near nurses station  1/16/2025 1025 by Karen Ingram RN  Outcome: Progressing  1/16/2025 0959 by Karen Ingram RN  Outcome: Progressing     Problem: PAIN - ADULT  Goal: Verbalizes/displays adequate comfort level or baseline comfort level  Description: Interventions:  - Encourage patient to monitor pain and request assistance  - Assess pain using appropriate pain scale  - Administer analgesics based on type and severity of pain and evaluate response  - Implement non-pharmacological measures as appropriate and evaluate response  - Consider cultural and social influences on pain and pain management  - Notify physician/advanced practitioner if interventions unsuccessful or patient reports new pain  1/16/2025 1025 by Karen Ingram RN  Outcome: Progressing  1/16/2025 0959 by Karen Ingram RN  Outcome: Progressing     Problem: INFECTION - ADULT  Goal: Absence or prevention of progression during hospitalization  Description: INTERVENTIONS:  - Assess and monitor for signs and symptoms of infection  - Monitor lab/diagnostic results  - Monitor all insertion sites, i.e. indwelling lines, tubes, and drains  - Monitor endotracheal if appropriate and nasal secretions for  changes in amount and color  - [place] appropriate cooling/warming therapies per order  - Administer medications as ordered  - Instruct and encourage patient and family to use good hand hygiene technique  - Identify and instruct in appropriate isolation precautions for identified infection/condition  1/16/2025 1025 by Karen Ingram RN  Outcome: Progressing  1/16/2025 0959 by Karen Ingram RN  Outcome: Progressing  Goal: Absence of fever/infection during neutropenic period  Description: INTERVENTIONS:  - Monitor WBC    1/16/2025 1025 by Karen Ingram RN  Outcome: Progressing  1/16/2025 0959 by Karen Ingram RN  Outcome: Progressing     Problem: SAFETY ADULT  Goal: Patient will remain free of falls  Description: INTERVENTIONS:  - Educate patient/family on patient safety including physical limitations  - Instruct patient to call for assistance with activity   - Consult OT/PT to assist with strengthening/mobility   - Keep Call bell within reach  - Keep bed low and locked with side rails adjusted as appropriate  - Keep care items and personal belongings within reach  - Initiate and maintain comfort rounds  - Make Fall Risk Sign visible to staff  - Offer Toileting every 2 Hours, in advance of need  - Initiate/Maintain bedalarm  - Obtain necessary fall risk management equipment:   - Apply yellow socks and bracelet for high fall risk patients  - Consider moving patient to room near nurses station  1/16/2025 1025 by Karen Ingram RN  Outcome: Progressing  1/16/2025 0959 by Karen Ingram RN  Outcome: Progressing  Goal: Maintain or return to baseline ADL function  Description: INTERVENTIONS:  -  Assess patient's ability to carry out ADLs; assess patient's baseline for ADL function and identify physical deficits which impact ability to perform ADLs (bathing, care of mouth/teeth, toileting, grooming, dressing, etc.)  - Assess/evaluate cause of self-care deficits   - Assess range of motion  - Assess patient's  mobility; develop plan if impaired  - Assess patient's need for assistive devices and provide as appropriate  - Encourage maximum independence but intervene and supervise when necessary  - Involve family in performance of ADLs  - Assess for home care needs following discharge   - Consider OT consult to assist with ADL evaluation and planning for discharge  - Provide patient education as appropriate  1/16/2025 1025 by Karen Ingram RN  Outcome: Progressing  1/16/2025 0959 by Karen Ingram RN  Outcome: Progressing  Goal: Maintains/Returns to pre admission functional level  Description: INTERVENTIONS:  - Perform AM-PAC 6 Click Basic Mobility/ Daily Activity assessment daily.  - Set and communicate daily mobility goal to care team and patient/family/caregiver.   - Collaborate with rehabilitation services on mobility goals if consulted  - Perform Range of Motion 3 times a day.  - Reposition patient every 3 hours.  - Dangle patient 3 times a day  - Stand patient 3 times a day  - Ambulate patient 3 times a day  - Out of bed to chair 3 times a day   - Out of bed for meals 3 times a day  - Out of bed for toileting  - Record patient progress and toleration of activity level   1/16/2025 1025 by Karen Ingram RN  Outcome: Progressing  1/16/2025 0959 by Karen Ingram RN  Outcome: Progressing     Problem: DISCHARGE PLANNING  Goal: Discharge to home or other facility with appropriate resources  Description: INTERVENTIONS:  - Identify barriers to discharge w/patient and caregiver  - Arrange for needed discharge resources and transportation as appropriate  - Identify discharge learning needs (meds, wound care, etc.)  - Arrange for interpretive services to assist at discharge as needed  - Refer to Case Management Department for coordinating discharge planning if the patient needs post-hospital services based on physician/advanced practitioner order or complex needs related to functional status, cognitive ability, or  social support system  1/16/2025 1025 by Karen Ingram RN  Outcome: Progressing  1/16/2025 0959 by Karen Ingram RN  Outcome: Progressing     Problem: Knowledge Deficit  Goal: Patient/family/caregiver demonstrates understanding of disease process, treatment plan, medications, and discharge instructions  Description: Complete learning assessment and assess knowledge base.  Interventions:  - Provide teaching at level of understanding  - Provide teaching via preferred learning methods  1/16/2025 1025 by Karen Ingram RN  Outcome: Progressing  1/16/2025 0959 by Karen Ingram RN  Outcome: Progressing     Problem: Prexisting or High Potential for Compromised Skin Integrity  Goal: Skin integrity is maintained or improved  Description: INTERVENTIONS:  - Identify patients at risk for skin breakdown  - Assess and monitor skin integrity  - Assess and monitor nutrition and hydration status  - Monitor labs   - Assess for incontinence   - Turn and reposition patient  - Assist with mobility/ambulation  - Relieve pressure over bony prominences  - Avoid friction and shearing  - Provide appropriate hygiene as needed including keeping skin clean and dry  - Evaluate need for skin moisturizer/barrier cream  - Collaborate with interdisciplinary team   - Patient/family teaching  - Consider wound care consult   1/16/2025 1025 by Karen Ingram RN  Outcome: Progressing  1/16/2025 0959 by Karen Ingram RN  Outcome: Progressing

## 2025-01-16 NOTE — ASSESSMENT & PLAN NOTE
ICU admission for frequent vital sign checks while completing IV phenobarbital load.  Sedation with Precedex BRYN if still exhibiting signs of alcohol withdrawal syndrome despite IV phenobarbital load.  Subsequent phenobarbital doses based on serum phenobarbital level 8hrs after third IV phenobarbital loading dose.  Avoid concomitant benzodiazepine while administering phenobarbital.  Immediate intubation for airway protection if any signs of respiratory compromise. NPO for now.  IV fluid resuscitation with crystalloid.  Aggressive IV serum electrolyte replenishment, goal serum K and Mg at least 4 and 2 or greater respectively.

## 2025-01-16 NOTE — ASSESSMENT & PLAN NOTE
Presented with withdrawal seizures and delirium tremens in ED  Last consumption of alcohol on 01/15/2025 afternoon  Labs on admission with normal-mildly elevated LFTs, albumin 4.4, T bili 0.29  Negative serial troponins and UDS  IV ativan 2mg followed by IV phenobarb loading dose and IV fluids given once in ED  S/p IV phenobarb 2 doses  CIWA score 4  - transition to PO phenobarb 64.8mg BID  - transition to regular diet  - imodium prn  - ativan prn for withdrawal symptom  - zofran prn for nausea

## 2025-01-16 NOTE — H&P
H&P - Internal Medicine   Name: Aniket Pace 39 y.o. male I MRN: [id number]  Unit/Bed#:  I Date of Admission: 1/15/2025   Date of Service: 1/16/2025 I Hospital Day: 1     Assessment & Plan  Alcohol withdrawal syndrome (HCC)    ICU admission for frequent vital sign checks while completing IV phenobarbital load.  Sedation with Precedex BRYN if still exhibiting signs of alcohol withdrawal syndrome despite IV phenobarbital load.  Subsequent phenobarbital doses based on serum phenobarbital level 8hrs after third IV phenobarbital loading dose.  Avoid concomitant benzodiazepine while administering phenobarbital.  Immediate intubation for airway protection if any signs of respiratory compromise. NPO for now.  IV fluid resuscitation with crystalloid.  Aggressive IV serum electrolyte replenishment, goal serum K and Mg at least 4 and 2 or greater respectively.  Continuous chronic alcoholism (HCC)    I strongly advised the patient to totally abstain from further alcohol consumption.  Tobacco abuse    I emphasized the importance of smoking cessation to the patient.  Uncontrolled hypertension    Most likely aggravated by alcohol withdrawal syndrome.  Resume clonidine and optimize accordingly.  Hypokalemia    See above  Hypomagnesemia    See above    Code Status: Prior  Admission Status: INPATIENT   Disposition: Patient requires Critical care    Admit to team: SOD TEAM A    History of Present Illness   39-year-old inveterate alcoholic who reports daily consumption of at least a gallon of vodka.  He smokes cigarettes but denies illicit drug use.  Past medical history also remarkable for hypertension & previous hospitalizations due to alcohol withdrawal syndrome incl alcohol withdrawal seizures & ICU admission due to delirium tremens.  Patient's last consumption of alcohol was about 8 hours prior to presenting to this facility's ED tonight seeking detoxification from alcohol.  In addition to palpitations, tremulousness and  "marked anxiety, he was also complaining of \"burning\" epigastric discomfort radiating towards the substernal region and nausea without vomiting.  He reports a chronic smoker's cough but denied any shortness of breath, exertional chest pain, lightheadedness, near syncope, headache, neck stiffness, acute confusion, fever, chills, malaise, hematemesis, hematochezia, melena, abdominal pain, dysuria or hematuria.  His vitals upon ED arrival included /99 with  and RA sats 96%.  Pertinent ED labs included serum sodium 143, potassium 3.2, magnesium 1.8, BUN 9, creatinine 0.84, glucose 118, AST 42, ALT 41, alkaline phosphatase 158, albumin 4.4, T. bili 0.29, serial troponins negative x2, , WBC 5.3, hemoglobin 14.5, platelet count 198, serum ethanol level 323, urine drug screen negative.  Patient received at the ED 2 mg IV lorazepam followed by phenobarbital IV load, as well as NS IV fluid bolus and relief of nausea with IV Zofran.  His hospitalization at the ICU for further management of alcohol withdrawal with continued IV phenobarbital has been sought.    A 10+ point systems review was obtained and is as above, otherwise negative.    Review of Systems  I have reviewed the patient's PMH, PSH, Social History, Family History, Meds, and Allergies    Objective :  Temp:  [98.2 °F (36.8 °C)-98.3 °F (36.8 °C)] 98.2 °F (36.8 °C)  HR:  [114-137] 127  BP: (138-157)/() 151/90  Resp:  [18] 18  SpO2:  [96 %-97 %] 97 %  O2 Device: None (Room air)    Intake & Output:  I/O         01/14 0701  01/15 0700 01/15 0701  01/16 0700    IV Piggyback  1100    Total Intake(mL/kg)  1100 (11.3)    Net  +1100                Weights:        Body mass index is 30.91 kg/m².  Weight (last 2 days)       Date/Time Weight    01/15/25 2217 97.7 (215.39)          Physical exam:  Constitutional: Obese gentleman, in no overt respiratory distress  HEENT: Oral mucosa dry, not pale or jaundiced  Neck: Supple, no JVD  Respiratory: Clear " "lungs  Cardiovascular: S1-S2 audible tachycardic  GI: Soft nontender abdomen, bowel sounds present  Musculoskeletal: No pedal edema  Skin: No suspicious rash  Psych: Blunted affect but appropriately oriented in all spheres  Neuro: Awake, alert, verbally responsive and follows commands, moves all extremities equally, asterixis positive    Lab Results: I have reviewed the following results:  Recent Labs     01/15/25  2228 01/16/25  0032   WBC 5.30  --    HGB 14.5  --    HCT 42.3  --      --    SODIUM 143  --    K 3.2*  --      --    CO2 28  --    BUN 9  --    CREATININE 0.84  --    GLUC 118  --    MG 1.8*  --    PHOS 3.0  --    AST 42*  --    ALT 41  --    ALB 4.4  --    TBILI 0.29  --    ALKPHOS 158*  --    HSTNI0 3  --    HSTNI2  --  3   BNP 11  --      Micro:  No results found for: \"BLOODCX\", \"URINECX\", \"WOUNDCULT\", \"SPUTUMCULTUR\"    Imaging Results Review: No pertinent imaging studies reviewed.  Other Study Results Review: No additional pertinent studies reviewed.    Currently Ordered Meds:   Current Facility-Administered Medications:     cloNIDine (CATAPRES) tablet 0.1 mg, Q12H MARIUM    folic acid (FOLVITE) tablet 1 mg, Daily    LORazepam (ATIVAN) injection 4 mg, Once    magnesium sulfate 2 g/50 mL IVPB (premix) 2 g, Once, Last Rate: 2 g (01/16/25 0110)    multivitamin-minerals (CENTRUM) tablet 1 tablet, Daily    nicotine (NICODERM CQ) 14 mg/24hr TD 24 hr patch 14 mg, Daily    pantoprazole (PROTONIX) injection 40 mg, Q12H MARIUM    PHENobarbital 260 mg in sodium chloride 0.9 % 100 mL IVPB, Q3H    PHENobarbital tablet 64.8 mg, BID    sodium chloride 0.9 % with KCl 40 mEq/L infusion (premix), Continuous    thiamine tablet 100 mg, Daily  VTE Pharmacologic Prophylaxis: Enoxaparin (Lovenox)  VTE Mechanical Prophylaxis: sequential compression device    Administrative Statements   I have spent a total time of 75 minutes in caring for this patient on the day of the visit/encounter including Impressions.  Portions " "of the record may have been created with voice recognition software.  Occasional wrong word or \"sound a like\" substitutions may have occurred due to the inherent limitations of voice recognition software.  Read the chart carefully and recognize, using context, where substitutions have occurred.  ==  Jann Calderon MD  [facility]    "

## 2025-01-16 NOTE — CASE MANAGEMENT
Case Management Discharge Planning Note    Patient name Aniket Pace  Location / MRN [id number]  : 1985 Date 2025       Current Admission Date: 1/15/2025  Current Admission Diagnosis:Alcohol withdrawal syndrome (HCC)   Patient Active Problem List    Diagnosis Date Noted Date Diagnosed    Chronic pain 2025     Elevated LFTs 2024     Severe protein-calorie malnutrition (HCC) 2024     Wheezing 2024     Hyponatremia 2024     Alcohol withdrawal delirium, acute, hyperactive (HCC) 2024     Alcohol withdrawal syndrome (HCC) 2024     Alcohol withdrawal seizure ([facility]) 2024     HARRIET (acute kidney injury) ([facility]) 2024     Non-traumatic rhabdomyolysis 2024     Hypotension due to hypovolemia 2024     Primary insomnia 2024     Alcohol withdrawal seizure without complication ([facility]) 2023     Bipolar I disorder with depression ([facility]) 2023     Hypertension 2023     Benzodiazepine dependence ([facility]) 2022     Unsteady gait 2022     Opioid use disorder, moderate, on maintenance therapy, dependence ([facility]) 2022     Continuous chronic alcoholism ([facility]) 2022     PTSD (post-traumatic stress disorder) 2022     Tobacco abuse 2022     Transaminitis 2022     Anxiety 2022     Encounter for tobacco use cessation counseling 2022     Opioid dependence with opioid-induced mood disorder ([facility]) 2022     Seizure ([facility]) 2022     Opioid use disorder 2021     Tachycardia 2015       LOS (days): 1  Geometric Mean LOS (GMLOS) (days): 2.6  Days to GMLOS:2.1     OBJECTIVE:  Risk of Unplanned Readmission Score: 33.01         Current admission status: Inpatient   Preferred Pharmacy:   [id number] - [place] - [address]  [address] [facility] [place] [address]  Phone: [phone] Fax: [phone]    [id number] [place], [place] - [facility] [address]    [address] [facility] [address]  Phone: [phone] Fax: [phone]    [id number] [place] [facility] [address]  [facility] [address]  [address] [facility] [address]  [facility] [address]  Phone: [phone] Fax: [phone]    [id number] - [place] - [address]  [address] [place] [address] [place] [address]  Phone: [phone] Fax: [phone]    Primary Care Provider: Edenilson Collier MD    Primary Insurance: [id number]  Secondary Insurance:     DISCHARGE DETAILS:     Chart review completed.  Warm hand off provided at bedside.  CM to follow for discharge needs.

## 2025-01-16 NOTE — UTILIZATION REVIEW
Initial Clinical Review    Admission: Date/Time/Statement:   Admission Orders (From admission, onward)       Ordered        01/15/25 2359  INPATIENT ADMISSION  Once                          Orders Placed This Encounter   Procedures    INPATIENT ADMISSION     Standing Status:   Standing     Number of Occurrences:   1     Level of Care:   Critical Care [15]     Estimated length of stay:   More than 2 Midnights     Certification:   I certify that inpatient services are medically necessary for this patient for a duration of greater than two midnights. See H&P and MD Progress Notes for additional information about the patient's course of treatment.     ED Arrival Information       Expected   1/15/2025     Arrival   1/15/2025 22:10    Acuity   Emergent              Means of arrival   Ambulance    Escorted by   [facility]    Service   Hospitalist    Admission type   Emergency              Arrival complaint   -             Chief Complaint   Patient presents with    Medical Problem     Pt states that he is detoxing from alcohol and that his last sip of alcohol was 6 hours ago. Pt states that he usually drinks half a gallon of vodka a day. Pt complains of chest pain and nausea.        Initial Presentation: 39 y.o. male presents to ED via  EMS  from home  with  palpitations, tremulousness, markerd anxiety, burning epigastric discomfort radiating to substernal region and nausea, requests  detox from alcohol.  Consumes  1 gallon  of vodka daily, + tobacco use, denies  illicit drugs.  PMH is  Htn, previous admissions for  alcohol withdrawal, alcohol withdrawal seizures and  TD's.  Last  alcohol consumption  about  8  hours prior to ED arrival.   Has chronic smoker cough, denies  any other symptoms.  BP  138/99,   HR  137.  BAL   323, UDS negative.    Other labs  show  Mg  1.8,   K  3.2, CK  121.   Given   IV ativan, IV  phenobarb, IV zofran and  IV  NS  in ED.  Admit  Ip ICU LOC  with  Alcohol withdrawal  syndrome, Chronic  alcoholism,  Hypokalemia and  Hypomagnesemia and plan is  monitor labs, replace electrolytes, NPO for now,  IVF, IV MVI  and phenobarb.      Date:   1/`16     Day 2:   CIWA  score  4.    Transition to po phenobarb  64.8  mg   BID.  Mild tremors noted  both hands.   Unable to tell location or  date.  On O2  2L  NC  with sats   9  %.  Continue current meds.     ED Treatment-Medication Administration from 01/15/2025 2210 to 01/16/2025 0044         Date/Time Order Dose Route Action     01/15/2025 2233 aspirin chewable tablet 324 mg 0 mg Does not apply Given to EMS     01/15/2025 2229 sodium chloride 0.9 % bolus 1,000 mL 1,000 mL Intravenous New Bag     01/15/2025 2348 thiamine (VITAMIN B1) 100 mg in sodium chloride 0.9 % 50 mL IVPB 100 mg Intravenous New Bag     01/15/2025 2313 folic acid 1 mg in sodium chloride 0.9 % 50 mL IVPB 1 mg Intravenous New Bag     01/15/2025 2245 LORazepam (ATIVAN) injection 2 mg 2 mg Intravenous Given     01/15/2025 2249 ondansetron (ZOFRAN) injection 4 mg 4 mg Intravenous Given     01/15/2025 2348 thiamine (VITAMIN B1) 100 mg/mL injection **ADS Override Pull** --  Override Pull     01/15/2025 2313 folic acid 5 mg/mL injection **ADS Override Pull** --  Override Pull     01/16/2025 0010 PHENobarbital 260 mg in sodium chloride 0.9 % 100 mL IVPB 260 mg Intravenous New Bag     01/15/2025 2340 potassium chloride (Klor-Con M20) CR tablet 40 mEq 40 mEq Oral Given     01/15/2025 2344 sodium chloride 0.9 % bolus 1,000 mL 1,000 mL Intravenous New Bag     01/15/2025 2340 LORazepam (ATIVAN) tablet 2 mg 2 mg Oral Given     01/16/2025 0003 PHENobarbital 130 mg/mL injection **ADS Override Pull** --  Override Pull            Scheduled Medications:  cloNIDine, 0.1 mg, Oral, Q12H MARIUM  enoxaparin, 40 mg, Subcutaneous, Daily  folic acid 1 mg, thiamine (VITAMIN B1) 100 mg in sodium chloride 0.9 % 100 mL IV piggyback, , Intravenous, Daily  nicotine, 14 mg, Transdermal, Daily  pantoprazole, 40  mg, Intravenous, Q12H MARIUM  PHENobarbital, 64.8 mg, Oral, BID      Continuous IV Infusions:  IVF   125/ hr  - d/c  1/16     PRN Meds:     ED Triage Vitals [01/15/25 2217]   Temperature Pulse Respirations Blood Pressure SpO2 Pain Score   98.3 °F (36.8 °C) (!) 137 18 138/99 96 % 6     Weight (last 2 days)       Date/Time Weight    01/16/25 0600 97.7 (215.39)    01/16/25 0231 97.7 (215.39)    01/16/25 0147 97.7 (215.39)    01/15/25 2217 97.7 (215.39)            Vital Signs (last 3 days)       Date/Time Temp Pulse Resp BP MAP (mmHg) SpO2 Calculated FIO2 (%) - Nasal Cannula Nasal Cannula O2 Flow Rate (L/min) O2 Device Patient Position - Orthostatic VS Saul Coma Scale Score CIWA-Ar Total Pain    01/16/25 0745 -- 93 14 113/55 74 97 % -- -- -- -- -- -- --    01/16/25 0730 98.1 °F (36.7 °C) 95 15 117/58 75 95 % 28 2 L/min Nasal cannula Lying -- -- --    01/16/25 0530 -- 107 15 126/77 94 90 % -- -- -- -- -- -- --    01/16/25 0515 -- 131 33 -- -- 81 % 28 2 L/min Nasal cannula -- -- -- --    01/16/25 0500 -- 107 6 107/60 78 96 % -- -- -- -- -- -- --    01/16/25 0445 -- 124 16 133/61 90 93 % -- -- -- -- -- -- --    01/16/25 0430 -- 124 -- 145/76 104 -- -- -- -- -- -- -- --    01/16/25 0415 -- 113 14 116/63 83 97 % -- -- -- -- -- -- --    01/16/25 0400 -- 128 16 124/64 89 97 % -- -- -- -- 15 -- --    01/16/25 0345 -- 122 15 126/66 87 96 % -- -- -- -- -- -- --    01/16/25 0330 -- 114 16 89/39 56 92 % -- -- -- -- -- -- --    01/16/25 0315 -- 118 16 102/54 75 93 % -- -- -- -- -- -- --    01/16/25 0245 -- 121 69 112/66 86 93 % -- -- -- -- -- -- --    01/16/25 0231 -- -- -- -- -- -- -- -- None (Room air) Lying -- -- --    01/16/25 0230 -- 122 17 122/81 96 91 % -- -- -- -- -- -- --    01/16/25 0200 -- 149 31 136/72 98 94 % -- -- -- -- -- -- --    01/16/25 0145 -- 141 24 137/78 101 95 % -- -- -- -- -- -- --    01/16/25 0100 -- 127 40 151/90 110 97 % -- -- -- -- 15 24 --    01/16/25 0030 98.2 °F (36.8 °C) 118 18 157/130 139 97 % -- --  None (Room air) Lying -- -- 6    01/16/25 0015 -- 120 35 146/108 123 97 % -- -- -- -- -- -- --    01/16/25 0009 -- 114 -- 144/109 -- -- -- -- -- -- -- 16 --    01/16/25 0000 98.2 °F (36.8 °C) 122 18 144/109 123 96 % -- -- None (Room air) Lying -- -- 6    01/15/25 2315 98.2 °F (36.8 °C) 123 18 141/79 104 96 % -- -- None (Room air) -- -- -- 6    01/15/25 2217 98.3 °F (36.8 °C) 137 18 138/99 115 96 % -- -- None (Room air) Lying -- -- 6           CIWA-Ar Score       Row Name 01/16/25 0100 01/16/25 0009          CIWA-Ar    /90 144/109     Pulse 127 114     Nausea and Vomiting 0 1     Tactile Disturbances 0 0     Tremor 7 5     Auditory Disturbances 0 0     Paroxysmal Sweats 1 0     Visual Disturbances 0 0     Anxiety 6 3     Headache, Fullness in Head 3 3     Agitation 5 4     Orientation and Clouding of Sensorium 2 0     CIWA-Ar Total 24 16                     Pertinent Labs/Diagnostic Test Results:   Radiology:  XR chest portable   Final Interpretation by Louis Orantes MD (01/16 0805)      No acute cardiopulmonary disease.            Workstation performed: [id number]           Cardiology:  ECG 12 lead    by Interface, Ris Results In (01/15 2220)            Results from last 7 days   Lab Units 01/16/25  0610 01/15/25  2228   WBC Thousand/uL 3.93* 5.30   HEMOGLOBIN g/dL 11.8* 14.5   HEMATOCRIT % 35.2* 42.3   PLATELETS Thousands/uL 165 198   TOTAL NEUT ABS Thousands/µL  --  2.06         Results from last 7 days   Lab Units 01/16/25  0610 01/15/25  2228   SODIUM mmol/L 143 143   POTASSIUM mmol/L 4.0 3.2*   CHLORIDE mmol/L 108 102   CO2 mmol/L 29 28   ANION GAP mmol/L 6 13   BUN mg/dL 11 9   CREATININE mg/dL 0.93 0.84   EGFR ml/min/1.73sq m 104 111   CALCIUM mg/dL 7.8* 8.9   MAGNESIUM mg/dL 2.4 1.8*   PHOSPHORUS mg/dL 2.8 3.0     Results from last 7 days   Lab Units 01/16/25  0610 01/15/25  2228   AST U/L 36 42*   ALT U/L 33 41   ALK PHOS U/L 128* 158*   TOTAL PROTEIN g/dL 5.5* 6.5   ALBUMIN g/dL 3.8 4.4   TOTAL  BILIRUBIN mg/dL 0.37 0.29         Results from last 7 days   Lab Units 01/16/25  0610 01/15/25  2228   GLUCOSE RANDOM mg/dL 97 118           Results from last 7 days   Lab Units 01/16/25  0655   PH RAMÓN  7.424*   PCO2 RAMÓN mm Hg 42.8   PO2 RAMÓN mm Hg 150.0*   HCO3 RAMÓN mmol/L 27.4   BASE EXC RAMÓN mmol/L 2.6   O2 CONTENT RAMÓN ml/dL 17.2   O2 HGB, VENOUS % 96.2*         Results from last 7 days   Lab Units 01/15/25  2228   CK TOTAL U/L 121     Results from last 7 days   Lab Units 01/16/25  0254 01/16/25  0032 01/15/25  2228   HS TNI 0HR ng/L  --   --  3   HS TNI 2HR ng/L  --  3  --    HSTNI D2 ng/L  --  0  --    HS TNI 4HR ng/L 3  --   --    HSTNI D4 ng/L 0  --   --          Results from last 7 days   Lab Units 01/16/25  0610   PROTIME seconds 13.7   INR  1.00                         Results from last 7 days   Lab Units 01/15/25  2228   BNP pg/mL 11                     Results from last 7 days   Lab Units 01/15/25  2228   LIPASE u/L 118*           Results from last 7 days   Lab Units 01/15/25  2239   AMPH/METH  Negative   BARBITURATE UR  Negative   BENZODIAZEPINE UR  Negative   COCAINE UR  Negative   METHADONE URINE  Negative   OPIATE UR  Negative   PCP UR  Negative   THC UR  Negative     Results from last 7 days   Lab Units 01/15/25  2228   ETHANOL LVL mg/dL 323*   ACETAMINOPHEN LVL ug/mL <2*   SALICYLATE LVL mg/dL <5               Present on Admission:   Alcohol withdrawal syndrome (HCC)   Uncontrolled hypertension   Hypokalemia   Hypomagnesemia   Continuous chronic alcoholism (HCC)   Tobacco abuse      Admitting Diagnosis: Hypokalemia [E87.6]  Prolonged QT interval [R94.31]  Alcohol withdrawal syndrome with complication (HCC) [F10.939]  Age/Sex: 39 y.o. male    Network Utilization Review Department  ATTENTION: Please call with any questions or concerns to [phone] and carefully listen to the prompts so that you are directed to the right person. All voicemails are confidential.   For Discharge needs, contact Care  Management DC Support Team at [phone] opt. 2  Send all requests for admission clinical reviews, approved or denied determinations and any other requests to dedicated fax number below belonging to the campus where the patient is receiving treatment. List of dedicated fax numbers for the Facilities:  FACILITY NAME UR FAX NUMBER   ADMISSION DENIALS (Administrative/Medical Necessity) [phone]   DISCHARGE SUPPORT TEAM (NETWORK) [phone]   PARENT CHILD HEALTH (Maternity/NICU/Pediatrics) [phone]   [facility] [phone]   [facility] [phone]   [facility] [phone]   [facility] [phone]   [facility] [phone]   [facility] [phone]   [facility] [phone]   [facility] [phone]   [facility] [phone]   [facility] [phone]   [facility] [phone]   [facility] [phone]

## 2025-01-16 NOTE — PROGRESS NOTES
Progress Note - Hospitalist   Name: Aniket Pace 39 y.o. male I MRN: [id number]  Unit/Bed#:  I Date of Admission: 1/15/2025   Date of Service: 1/16/2025 I Hospital Day: 1    Assessment & Plan  Alcohol withdrawal syndrome (HCC)  Presented with withdrawal seizures and delirium tremens in ED  Last consumption of alcohol on 01/15/2025 afternoon  Labs on admission with normal-mildly elevated LFTs, albumin 4.4, T bili 0.29  Negative serial troponins and UDS  IV ativan 2mg followed by IV phenobarb loading dose and IV fluids given once in ED  S/p IV phenobarb 2 doses  CIWA score 4  - transition to PO phenobarb 64.8mg BID  - transition to regular diet  - imodium prn  - ativan prn for withdrawal symptom  - zofran prn for nausea  Continuous chronic alcoholism (HCC)  Encourage alcohol abstinence   Tobacco abuse  Smoking cessation counseling  Nicotine patch ordered  Hypertension  BP stable today  - continue on clonidine 0.1mg q12hr  Hypokalemia (Resolved: 1/16/2025)  Potassium normal today, resolved  Hypomagnesemia (Resolved: 1/16/2025)  Magnesium normal today, resolved  Chronic pain  Continue home med neurontin   Primary insomnia  Continue home med trazodone prn for sleep  Bipolar 1 disorder (HCC)  Continue home med seroquel 100mg QHS    VTE Pharmacologic Prophylaxis:   ordered lovenox and SCD or food pumps    Mobility:   KAUSHAL-DEWEY Achieved: 1: Laying in bed      Patient Centered Rounds: I performed bedside rounds with nursing staff today.   Discussions with Specialists or Other Care Team Provider: care management    Current Length of Stay: 1 day(s)  Current Patient Status: Inpatient   Certification Statement: The patient will continue to require additional inpatient hospital stay due to alcohol withdrawal  Discharge Plan: Anticipate discharge in 24-48 hrs to home.    Code Status: Level 1 - Full Code    Subjective   Patient examined bedside. Able to wake patient. He denied headache, N/V, CP, abdominal pain. Unable to tell  the location or date. Mild tremors of b/l hands.     Objective :  Temp:  [98.1 °F (36.7 °C)-98.3 °F (36.8 °C)] 98.1 °F (36.7 °C)  HR:  [] 93  BP: ()/() 113/55  Resp:  [6-69] 14  SpO2:  [81 %-97 %] 97 %  O2 Device: Nasal cannula  Nasal Cannula O2 Flow Rate (L/min):  [2 L/min] 2 L/min    Body mass index is 34.76 kg/m².     Input and Output Summary (last 24 hours):     Intake/Output Summary (Last 24 hours) at 1/16/2025 0943  Last data filed at 1/16/2025 0828  Gross per 24 hour   Intake 1906.25 ml   Output 300 ml   Net 1606.25 ml       Physical Exam  Vitals reviewed.   Constitutional:       Appearance: He is not diaphoretic.   HENT:      Head: Normocephalic and atraumatic.      Right Ear: External ear normal.      Left Ear: External ear normal.      Nose: Nose normal. No congestion.      Mouth/Throat:      Mouth: Mucous membranes are moist.      Pharynx: Oropharynx is clear. No oropharyngeal exudate or posterior oropharyngeal erythema.   Eyes:      General:         Right eye: No discharge.      Extraocular Movements: Extraocular movements intact.      Conjunctiva/sclera: Conjunctivae normal.   Cardiovascular:      Rate and Rhythm: Normal rate and regular rhythm.      Pulses: Normal pulses.      Heart sounds: Normal heart sounds. No murmur heard.  Pulmonary:      Effort: Pulmonary effort is normal. No respiratory distress.      Breath sounds: Normal breath sounds. No wheezing.   Abdominal:      General: Abdomen is flat. Bowel sounds are normal. There is no distension.      Palpations: Abdomen is soft.      Tenderness: There is no abdominal tenderness. There is no guarding or rebound.   Musculoskeletal:         General: No swelling. Normal range of motion.      Cervical back: Normal range of motion and neck supple. No rigidity.      Right lower leg: No edema.      Left lower leg: No edema.   Skin:     General: Skin is warm.      Capillary Refill: Capillary refill takes less than 2 seconds.      Findings:  No rash.   Neurological:      General: No focal deficit present.      Mental Status: He is alert. He is disoriented.      Sensory: No sensory deficit.      Comments: Mild tremors of b/l hands   Psychiatric:         Mood and Affect: Mood normal.             Lab Results: I have reviewed the following results:   Results from last 7 days   Lab Units 01/16/25  0610 01/15/25  2228   WBC Thousand/uL 3.93* 5.30   HEMOGLOBIN g/dL 11.8* 14.5   HEMATOCRIT % 35.2* 42.3   PLATELETS Thousands/uL 165 198   SEGS PCT %  --  39*   LYMPHO PCT %  --  46*   MONO PCT %  --  9   EOS PCT %  --  5     Results from last 7 days   Lab Units 01/16/25  0610   SODIUM mmol/L 143   POTASSIUM mmol/L 4.0   CHLORIDE mmol/L 108   CO2 mmol/L 29   BUN mg/dL 11   CREATININE mg/dL 0.93   ANION GAP mmol/L 6   CALCIUM mg/dL 7.8*   ALBUMIN g/dL 3.8   TOTAL BILIRUBIN mg/dL 0.37   ALK PHOS U/L 128*   ALT U/L 33   AST U/L 36   GLUCOSE RANDOM mg/dL 97     Results from last 7 days   Lab Units 01/16/25  0610   INR  1.00                   Recent Cultures (last 7 days):         Imaging Results Review: No pertinent imaging studies reviewed.  Other Study Results Review: No additional pertinent studies reviewed.    Last 24 Hours Medication List:     Current Facility-Administered Medications:     cloNIDine (CATAPRES) tablet 0.1 mg, Q12H MARIUM    enoxaparin (LOVENOX) subcutaneous injection 40 mg, Daily    folic acid 1 mg, thiamine (VITAMIN B1) 100 mg in sodium chloride 0.9 % 100 mL IV piggyback, Daily    nicotine (NICODERM CQ) 14 mg/24hr TD 24 hr patch 14 mg, Daily    pantoprazole (PROTONIX) injection 40 mg, Q12H MARIUM    PHENobarbital tablet 64.8 mg, BID    Administrative Statements   Today, Patient Was Seen By: Chacho Garrett, DO  I have spent a total time of 20 minutes in caring for this patient on the day of the visit/encounter including Impressions, Counseling / Coordination of care, Documenting in the medical record, Reviewing / ordering tests, medicine, procedures  ,  Obtaining or reviewing history  , and Communicating with other healthcare professionals .    **Please Note: This note may have been constructed using a voice recognition system.**

## 2025-01-17 LAB
ALBUMIN SERPL BCG-MCNC: 3.8 G/DL (ref 3.5–5)
ALP SERPL-CCNC: 151 U/L (ref 34–104)
ALT SERPL W P-5'-P-CCNC: 40 U/L (ref 7–52)
ANION GAP SERPL CALCULATED.3IONS-SCNC: 8 MMOL/L (ref 4–13)
AST SERPL W P-5'-P-CCNC: 46 U/L (ref 13–39)
ATRIAL RATE: 137 BPM
BASOPHILS # BLD AUTO: 0.02 THOUSANDS/ΜL (ref 0–0.1)
BASOPHILS NFR BLD AUTO: 0 % (ref 0–1)
BILIRUB SERPL-MCNC: 0.93 MG/DL (ref 0.2–1)
BUN SERPL-MCNC: 12 MG/DL (ref 5–25)
CALCIUM SERPL-MCNC: 8.4 MG/DL (ref 8.4–10.2)
CARDIAC TROPONIN I PNL SERPL HS: <2 NG/L (ref 8–18)
CHLORIDE SERPL-SCNC: 103 MMOL/L (ref 96–108)
CO2 SERPL-SCNC: 25 MMOL/L (ref 21–32)
CREAT SERPL-MCNC: 0.85 MG/DL (ref 0.6–1.3)
EOSINOPHIL # BLD AUTO: 0.38 THOUSAND/ΜL (ref 0–0.61)
EOSINOPHIL NFR BLD AUTO: 9 % (ref 0–6)
ERYTHROCYTE [DISTWIDTH] IN BLOOD BY AUTOMATED COUNT: 12.6 % (ref 11.6–15.1)
GFR SERPL CREATININE-BSD FRML MDRD: 111 ML/MIN/1.73SQ M
GLUCOSE SERPL-MCNC: 119 MG/DL (ref 65–140)
HCT VFR BLD AUTO: 39.9 % (ref 36.5–49.3)
HGB BLD-MCNC: 13 G/DL (ref 12–17)
IMM GRANULOCYTES # BLD AUTO: 0.02 THOUSAND/UL (ref 0–0.2)
IMM GRANULOCYTES NFR BLD AUTO: 0 % (ref 0–2)
LYMPHOCYTES # BLD AUTO: 1.33 THOUSANDS/ΜL (ref 0.6–4.47)
LYMPHOCYTES NFR BLD AUTO: 30 % (ref 14–44)
MAGNESIUM SERPL-MCNC: 2.2 MG/DL (ref 1.9–2.7)
MCH RBC QN AUTO: 30.2 PG (ref 26.8–34.3)
MCHC RBC AUTO-ENTMCNC: 32.6 G/DL (ref 31.4–37.4)
MCV RBC AUTO: 93 FL (ref 82–98)
MONOCYTES # BLD AUTO: 0.3 THOUSAND/ΜL (ref 0.17–1.22)
MONOCYTES NFR BLD AUTO: 7 % (ref 4–12)
NEUTROPHILS # BLD AUTO: 2.41 THOUSANDS/ΜL (ref 1.85–7.62)
NEUTS SEG NFR BLD AUTO: 54 % (ref 43–75)
NRBC BLD AUTO-RTO: 0 /100 WBCS
PHENOBARB SERPL-MCNC: 13.5 UG/ML (ref 10–40)
PHOSPHATE SERPL-MCNC: 3.7 MG/DL (ref 2.7–4.5)
PLATELET # BLD AUTO: 148 THOUSANDS/UL (ref 149–390)
PMV BLD AUTO: 10.3 FL (ref 8.9–12.7)
POTASSIUM SERPL-SCNC: 4.2 MMOL/L (ref 3.5–5.3)
PR INTERVAL: 96 MS
PROT SERPL-MCNC: 5.8 G/DL (ref 6.4–8.4)
QRS AXIS: 48 DEGREES
QRSD INTERVAL: 82 MS
QT INTERVAL: 374 MS
QTC INTERVAL: 564 MS
RBC # BLD AUTO: 4.31 MILLION/UL (ref 3.88–5.62)
SODIUM SERPL-SCNC: 136 MMOL/L (ref 135–147)
T WAVE AXIS: 73 DEGREES
VENTRICULAR RATE: 137 BPM
WBC # BLD AUTO: 4.46 THOUSAND/UL (ref 4.31–10.16)

## 2025-01-17 PROCEDURE — 99232 SBSQ HOSP IP/OBS MODERATE 35: CPT | Performed by: INTERNAL MEDICINE

## 2025-01-17 PROCEDURE — 85025 COMPLETE CBC W/AUTO DIFF WBC: CPT

## 2025-01-17 PROCEDURE — 84100 ASSAY OF PHOSPHORUS: CPT

## 2025-01-17 PROCEDURE — 93010 ELECTROCARDIOGRAM REPORT: CPT | Performed by: INTERNAL MEDICINE

## 2025-01-17 PROCEDURE — 83735 ASSAY OF MAGNESIUM: CPT

## 2025-01-17 PROCEDURE — 80053 COMPREHEN METABOLIC PANEL: CPT

## 2025-01-17 PROCEDURE — 84484 ASSAY OF TROPONIN QUANT: CPT | Performed by: FAMILY MEDICINE

## 2025-01-17 RX ORDER — LORAZEPAM 2 MG/ML
2 INJECTION INTRAMUSCULAR EVERY 4 HOURS PRN
Status: DISCONTINUED | OUTPATIENT
Start: 2025-01-17 | End: 2025-01-21

## 2025-01-17 RX ORDER — LORAZEPAM 2 MG/ML
8 INJECTION INTRAMUSCULAR ONCE
Status: COMPLETED | OUTPATIENT
Start: 2025-01-17 | End: 2025-01-17

## 2025-01-17 RX ORDER — LORAZEPAM 2 MG/ML
4 INJECTION INTRAMUSCULAR ONCE
Status: COMPLETED | OUTPATIENT
Start: 2025-01-17 | End: 2025-01-17

## 2025-01-17 RX ORDER — PHENOBARBITAL SODIUM 130 MG/ML
65 INJECTION, SOLUTION INTRAMUSCULAR; INTRAVENOUS ONCE
Status: COMPLETED | OUTPATIENT
Start: 2025-01-17 | End: 2025-01-17

## 2025-01-17 RX ADMIN — THIAMINE HYDROCHLORIDE: 100 INJECTION, SOLUTION INTRAMUSCULAR; INTRAVENOUS at 08:05

## 2025-01-17 RX ADMIN — NICOTINE 14 MG: 14 PATCH, EXTENDED RELEASE TRANSDERMAL at 08:16

## 2025-01-17 RX ADMIN — CLONIDINE HYDROCHLORIDE 0.1 MG: 0.1 TABLET ORAL at 08:06

## 2025-01-17 RX ADMIN — LORAZEPAM 2 MG: 2 INJECTION INTRAMUSCULAR; INTRAVENOUS at 10:16

## 2025-01-17 RX ADMIN — PHENOBARBITAL 64.8 MG: 32.4 TABLET ORAL at 17:05

## 2025-01-17 RX ADMIN — HYDROXYZINE HYDROCHLORIDE 50 MG: 25 TABLET ORAL at 17:06

## 2025-01-17 RX ADMIN — LORAZEPAM 4 MG: 2 INJECTION INTRAMUSCULAR; INTRAVENOUS at 14:48

## 2025-01-17 RX ADMIN — PHENOBARBITAL 64.8 MG: 32.4 TABLET ORAL at 08:06

## 2025-01-17 RX ADMIN — GABAPENTIN 300 MG: 300 CAPSULE ORAL at 17:06

## 2025-01-17 RX ADMIN — LORAZEPAM 4 MG: 2 INJECTION INTRAMUSCULAR; INTRAVENOUS at 19:34

## 2025-01-17 RX ADMIN — PANTOPRAZOLE SODIUM 40 MG: 40 INJECTION, POWDER, FOR SOLUTION INTRAVENOUS at 08:05

## 2025-01-17 RX ADMIN — LORAZEPAM 2 MG: 2 INJECTION INTRAMUSCULAR; INTRAVENOUS at 12:14

## 2025-01-17 RX ADMIN — LORAZEPAM 4 MG: 2 INJECTION INTRAMUSCULAR; INTRAVENOUS at 22:15

## 2025-01-17 RX ADMIN — ONDANSETRON 4 MG: 2 INJECTION INTRAMUSCULAR; INTRAVENOUS at 14:48

## 2025-01-17 RX ADMIN — TRAZODONE HYDROCHLORIDE 50 MG: 50 TABLET ORAL at 21:51

## 2025-01-17 RX ADMIN — HYDROXYZINE HYDROCHLORIDE 50 MG: 25 TABLET ORAL at 21:50

## 2025-01-17 RX ADMIN — LORAZEPAM 2 MG: 2 INJECTION INTRAMUSCULAR; INTRAVENOUS at 08:05

## 2025-01-17 RX ADMIN — GABAPENTIN 300 MG: 300 CAPSULE ORAL at 21:50

## 2025-01-17 RX ADMIN — PHENOBARBITAL SODIUM 65 MG: 130 INJECTION INTRAMUSCULAR; INTRAVENOUS at 17:57

## 2025-01-17 RX ADMIN — CLONIDINE HYDROCHLORIDE 0.1 MG: 0.1 TABLET ORAL at 21:51

## 2025-01-17 RX ADMIN — PANTOPRAZOLE SODIUM 40 MG: 40 INJECTION, POWDER, FOR SOLUTION INTRAVENOUS at 21:52

## 2025-01-17 RX ADMIN — GABAPENTIN 300 MG: 300 CAPSULE ORAL at 08:06

## 2025-01-17 RX ADMIN — LORAZEPAM 8 MG: 2 INJECTION INTRAMUSCULAR; INTRAVENOUS at 23:23

## 2025-01-17 RX ADMIN — HYDROXYZINE HYDROCHLORIDE 50 MG: 25 TABLET ORAL at 08:06

## 2025-01-17 RX ADMIN — LORAZEPAM 2 MG: 2 INJECTION INTRAMUSCULAR; INTRAVENOUS at 17:13

## 2025-01-17 RX ADMIN — LORAZEPAM 4 MG: 2 INJECTION INTRAMUSCULAR; INTRAVENOUS at 20:17

## 2025-01-17 RX ADMIN — QUETIAPINE FUMARATE 100 MG: 100 TABLET ORAL at 21:50

## 2025-01-17 NOTE — PLAN OF CARE
Problem: Potential for Falls  Goal: Patient will remain free of falls  Description: INTERVENTIONS:  - Educate patient/family on patient safety including physical limitations  - Instruct patient to call for assistance with activity   - Consult OT/PT to assist with strengthening/mobility   - Keep Call bell within reach  - Keep bed low and locked with side rails adjusted as appropriate  - Keep care items and personal belongings within reach  - Initiate and maintain comfort rounds  - Make Fall Risk Sign visible to staff  - Offer Toileting every 2 Hours, in advance of need  - Initiate/Maintain bedalarm  - Obtain necessary fall risk management equipment:   - Apply yellow socks and bracelet for high fall risk patients  - Consider moving patient to room near nurses station  Outcome: Progressing     Problem: PAIN - ADULT  Goal: Verbalizes/displays adequate comfort level or baseline comfort level  Description: Interventions:  - Encourage patient to monitor pain and request assistance  - Assess pain using appropriate pain scale  - Administer analgesics based on type and severity of pain and evaluate response  - Implement non-pharmacological measures as appropriate and evaluate response  - Consider cultural and social influences on pain and pain management  - Notify physician/advanced practitioner if interventions unsuccessful or patient reports new pain  Outcome: Progressing     Problem: INFECTION - ADULT  Goal: Absence or prevention of progression during hospitalization  Description: INTERVENTIONS:  - Assess and monitor for signs and symptoms of infection  - Monitor lab/diagnostic results  - Monitor all insertion sites, i.e. indwelling lines, tubes, and drains  - Monitor endotracheal if appropriate and nasal secretions for changes in amount and color  - [place] appropriate cooling/warming therapies per order  - Administer medications as ordered  - Instruct and encourage patient and family to use good hand hygiene  technique  - Identify and instruct in appropriate isolation precautions for identified infection/condition  Outcome: Progressing  Goal: Absence of fever/infection during neutropenic period  Description: INTERVENTIONS:  - Monitor WBC    Outcome: Progressing     Problem: SAFETY ADULT  Goal: Patient will remain free of falls  Description: INTERVENTIONS:  - Educate patient/family on patient safety including physical limitations  - Instruct patient to call for assistance with activity   - Consult OT/PT to assist with strengthening/mobility   - Keep Call bell within reach  - Keep bed low and locked with side rails adjusted as appropriate  - Keep care items and personal belongings within reach  - Initiate and maintain comfort rounds  - Make Fall Risk Sign visible to staff  - Offer Toileting every 2 Hours, in advance of need  - Initiate/Maintain bedalarm  - Obtain necessary fall risk management equipment:   - Apply yellow socks and bracelet for high fall risk patients  - Consider moving patient to room near nurses station  Outcome: Progressing  Goal: Maintain or return to baseline ADL function  Description: INTERVENTIONS:  -  Assess patient's ability to carry out ADLs; assess patient's baseline for ADL function and identify physical deficits which impact ability to perform ADLs (bathing, care of mouth/teeth, toileting, grooming, dressing, etc.)  - Assess/evaluate cause of self-care deficits   - Assess range of motion  - Assess patient's mobility; develop plan if impaired  - Assess patient's need for assistive devices and provide as appropriate  - Encourage maximum independence but intervene and supervise when necessary  - Involve family in performance of ADLs  - Assess for home care needs following discharge   - Consider OT consult to assist with ADL evaluation and planning for discharge  - Provide patient education as appropriate  Outcome: Progressing  Goal: Maintains/Returns to pre admission functional level  Description:  INTERVENTIONS:  - Perform AM-PAC 6 Click Basic Mobility/ Daily Activity assessment daily.  - Set and communicate daily mobility goal to care team and patient/family/caregiver.   - Collaborate with rehabilitation services on mobility goals if consulted  - Perform Range of Motion 3 times a day.  - Reposition patient every 3 hours.  - Dangle patient 3 times a day  - Stand patient 3 times a day  - Ambulate patient 3 times a day  - Out of bed to chair 3 times a day   - Out of bed for meals 3 times a day  - Out of bed for toileting  - Record patient progress and toleration of activity level   Outcome: Progressing     Problem: DISCHARGE PLANNING  Goal: Discharge to home or other facility with appropriate resources  Description: INTERVENTIONS:  - Identify barriers to discharge w/patient and caregiver  - Arrange for needed discharge resources and transportation as appropriate  - Identify discharge learning needs (meds, wound care, etc.)  - Arrange for interpretive services to assist at discharge as needed  - Refer to Case Management Department for coordinating discharge planning if the patient needs post-hospital services based on physician/advanced practitioner order or complex needs related to functional status, cognitive ability, or social support system  Outcome: Progressing     Problem: Knowledge Deficit  Goal: Patient/family/caregiver demonstrates understanding of disease process, treatment plan, medications, and discharge instructions  Description: Complete learning assessment and assess knowledge base.  Interventions:  - Provide teaching at level of understanding  - Provide teaching via preferred learning methods  Outcome: Progressing     Problem: Prexisting or High Potential for Compromised Skin Integrity  Goal: Skin integrity is maintained or improved  Description: INTERVENTIONS:  - Identify patients at risk for skin breakdown  - Assess and monitor skin integrity  - Assess and monitor nutrition and hydration  status  - Monitor labs   - Assess for incontinence   - Turn and reposition patient  - Assist with mobility/ambulation  - Relieve pressure over bony prominences  - Avoid friction and shearing  - Provide appropriate hygiene as needed including keeping skin clean and dry  - Evaluate need for skin moisturizer/barrier cream  - Collaborate with interdisciplinary team   - Patient/family teaching  - Consider wound care consult   Outcome: Progressing

## 2025-01-17 NOTE — ASSESSMENT & PLAN NOTE
Presented with withdrawal seizures and delirium tremens in ED  Last consumption of alcohol on 01/15/2025 afternoon  Labs on admission with normal-mildly elevated LFTs, albumin 4.4, T bili 0.29  Negative serial troponins and UDS  IV ativan 2mg followed by IV phenobarb loading dose and IV fluids given once in ED  S/p IV phenobarb 2 doses  CIWA score 8 today, require 16mg Ativan for past 24 hours  - continue PO phenobarb 64.8mg BID  - discontinue ativan prn for withdrawal symptom  - on regular diet  - imodium prn  - zofran prn for nausea

## 2025-01-17 NOTE — PROGRESS NOTES
Progress Note - Hospitalist   Name: Aniket Pace 39 y.o. male I MRN: [id number]  Unit/Bed#:  I Date of Admission: 1/15/2025   Date of Service: 1/17/2025 I Hospital Day: 2    Assessment & Plan  Alcohol withdrawal syndrome (HCC)  Presented with withdrawal seizures and delirium tremens in ED  Last consumption of alcohol on 01/15/2025 afternoon  Labs on admission with normal-mildly elevated LFTs, albumin 4.4, T bili 0.29  Negative serial troponins and UDS  IV ativan 2mg followed by IV phenobarb loading dose and IV fluids given once in ED  S/p IV phenobarb 2 doses  CIWA score 8 today, require 16mg Ativan for past 24 hours  - continue PO phenobarb 64.8mg BID  - discontinue ativan prn for withdrawal symptom  - on regular diet  - imodium prn  - zofran prn for nausea  Continuous chronic alcoholism (HCC)  Encourage alcohol abstinence   Tobacco abuse  Smoking cessation counseling  Nicotine patch ordered  Hypertension  BP stable today  - continue on clonidine 0.1mg q12hr  Chronic pain  Continue home med neurontin   Primary insomnia  Continue home med trazodone prn for sleep  Bipolar 1 disorder (HCC)  Continue home med seroquel 100mg QHS  Anxiety  Continue home med Trazodone 50mg once at night for insomnia and Atarax 50mg TID for anxiety    VTE Pharmacologic Prophylaxis:   ordered lovenox and SCD or food pumps     Mobility:   KAUSHAL-DEWEY Achieved: 1: Laying in bed       Patient Centered Rounds: I performed bedside rounds with nursing staff today.   Discussions with Specialists or Other Care Team Provider: care management     Current Length of Stay: 2 day(s)  Current Patient Status: Inpatient   Certification Statement: The patient will continue to require additional inpatient hospital stay due to alcohol withdrawal  Discharge Plan: Anticipate discharge in 24-48 hrs to home or inpatient D&A, pending.     Code Status: Level 1 - Full Code       Subjective   Patient examined bedside. He reported mild frontal headache, mild nausea  but no vomiting, sweating, and mild tremors in b/l hands this morning. Eating regular diet has been fine. No bowel movement yet. Denied fever, CP, SOB, numbness in hands or feet.     Objective :  Temp:  [97.6 °F (36.4 °C)-98.5 °F (36.9 °C)] 97.6 °F (36.4 °C)  HR:  [] 78  BP: (117-169)/() 117/57  Resp:  [12-33] 12  SpO2:  [92 %-97 %] 95 %  O2 Device: None (Room air)    Body mass index is 34.87 kg/m².     Input and Output Summary (last 24 hours):     Intake/Output Summary (Last 24 hours) at 1/17/2025 1010  Last data filed at 1/16/2025 1945  Gross per 24 hour   Intake 1200 ml   Output 1125 ml   Net 75 ml       Physical Exam  Vitals reviewed.   Constitutional:       General: He is not in acute distress.     Appearance: Normal appearance.   HENT:      Head: Normocephalic and atraumatic.      Right Ear: External ear normal.      Left Ear: External ear normal.      Nose: Nose normal. No congestion.      Mouth/Throat:      Mouth: Mucous membranes are moist.      Pharynx: Oropharynx is clear.   Eyes:      General:         Right eye: No discharge.         Left eye: No discharge.      Extraocular Movements: Extraocular movements intact.      Conjunctiva/sclera: Conjunctivae normal.   Cardiovascular:      Rate and Rhythm: Normal rate and regular rhythm.      Pulses: Normal pulses.      Heart sounds: Normal heart sounds. No murmur heard.  Pulmonary:      Effort: Pulmonary effort is normal. No respiratory distress.      Breath sounds: Normal breath sounds. No wheezing.   Abdominal:      General: Abdomen is flat. Bowel sounds are normal. There is no distension.      Palpations: Abdomen is soft.      Tenderness: There is no abdominal tenderness. There is no guarding or rebound.   Musculoskeletal:         General: Normal range of motion.      Cervical back: Normal range of motion and neck supple. No rigidity.      Right lower leg: No edema.      Left lower leg: No edema.   Skin:     General: Skin is warm.      Capillary  Refill: Capillary refill takes less than 2 seconds.   Neurological:      General: No focal deficit present.      Mental Status: He is alert and oriented to person, place, and time.      Comments: Mild tremors of b/l hands/arms   Psychiatric:         Mood and Affect: Mood normal.             Lab Results: I have reviewed the following results:   Results from last 7 days   Lab Units 01/17/25  0705   WBC Thousand/uL 4.46   HEMOGLOBIN g/dL 13.0   HEMATOCRIT % 39.9   PLATELETS Thousands/uL 148*   SEGS PCT % 54   LYMPHO PCT % 30   MONO PCT % 7   EOS PCT % 9*     Results from last 7 days   Lab Units 01/17/25  0705   SODIUM mmol/L 136   POTASSIUM mmol/L 4.2   CHLORIDE mmol/L 103   CO2 mmol/L 25   BUN mg/dL 12   CREATININE mg/dL 0.85   ANION GAP mmol/L 8   CALCIUM mg/dL 8.4   ALBUMIN g/dL 3.8   TOTAL BILIRUBIN mg/dL 0.93   ALK PHOS U/L 151*   ALT U/L 40   AST U/L 46*   GLUCOSE RANDOM mg/dL 119     Results from last 7 days   Lab Units 01/16/25  0610   INR  1.00                   Recent Cultures (last 7 days):         Imaging Results Review: No pertinent imaging studies reviewed.  Other Study Results Review: No additional pertinent studies reviewed.    Last 24 Hours Medication List:     Current Facility-Administered Medications:     acetaminophen (TYLENOL) tablet 650 mg, Q6H PRN    cloNIDine (CATAPRES) tablet 0.1 mg, Q12H MARIUM    enoxaparin (LOVENOX) subcutaneous injection 40 mg, Daily    folic acid 1 mg, thiamine (VITAMIN B1) 100 mg in sodium chloride 0.9 % 100 mL IV piggyback, Daily    gabapentin (NEURONTIN) capsule 300 mg, TID    hydrOXYzine HCL (ATARAX) tablet 50 mg, TID    loperamide (IMODIUM) capsule 4 mg, TID PRN    LORazepam (ATIVAN) injection 2 mg, Q2H PRN    LORazepam (ATIVAN) injection 4 mg, Once    nicotine (NICODERM CQ) 14 mg/24hr TD 24 hr patch 14 mg, Daily    ondansetron (ZOFRAN) injection 4 mg, Q4H PRN    pantoprazole (PROTONIX) injection 40 mg, Q12H MARIUM    PHENobarbital tablet 64.8 mg, BID    QUEtiapine  (SEROquel) tablet 100 mg, HS    traZODone (DESYREL) tablet 50 mg, HS    Administrative Statements   Today, Patient Was Seen By: Chacho Garrett, DO  I have spent a total time of 20 minutes in caring for this patient on the day of the visit/encounter including Impressions, Counseling / Coordination of care, Documenting in the medical record, Reviewing / ordering tests, medicine, procedures  , Obtaining or reviewing history  , and Communicating with other healthcare professionals .    **Please Note: This note may have been constructed using a voice recognition system.**

## 2025-01-17 NOTE — PLAN OF CARE
Problem: Potential for Falls  Goal: Patient will remain free of falls  Description: INTERVENTIONS:  - Educate patient/family on patient safety including physical limitations  - Instruct patient to call for assistance with activity   - Consult OT/PT to assist with strengthening/mobility   - Keep Call bell within reach  - Keep bed low and locked with side rails adjusted as appropriate  - Keep care items and personal belongings within reach  - Initiate and maintain comfort rounds  - Make Fall Risk Sign visible to staff  - Offer Toileting every 2 Hours, in advance of need  - Initiate/Maintain bedalarm  - Obtain necessary fall risk management equipment:   - Apply yellow socks and bracelet for high fall risk patients  - Consider moving patient to room near nurses station  Outcome: Progressing     Problem: PAIN - ADULT  Goal: Verbalizes/displays adequate comfort level or baseline comfort level  Description: Interventions:  - Encourage patient to monitor pain and request assistance  - Assess pain using appropriate pain scale  - Administer analgesics based on type and severity of pain and evaluate response  - Implement non-pharmacological measures as appropriate and evaluate response  - Consider cultural and social influences on pain and pain management  - Notify physician/advanced practitioner if interventions unsuccessful or patient reports new pain  Outcome: Progressing     Problem: INFECTION - ADULT  Goal: Absence or prevention of progression during hospitalization  Description: INTERVENTIONS:  - Assess and monitor for signs and symptoms of infection  - Monitor lab/diagnostic results  - Monitor all insertion sites, i.e. indwelling lines, tubes, and drains  - Monitor endotracheal if appropriate and nasal secretions for changes in amount and color  - [place] appropriate cooling/warming therapies per order  - Administer medications as ordered  - Instruct and encourage patient and family to use good hand hygiene  technique  - Identify and instruct in appropriate isolation precautions for identified infection/condition  Outcome: Progressing  Goal: Absence of fever/infection during neutropenic period  Description: INTERVENTIONS:  - Monitor WBC    Outcome: Progressing     Problem: SAFETY ADULT  Goal: Patient will remain free of falls  Description: INTERVENTIONS:  - Educate patient/family on patient safety including physical limitations  - Instruct patient to call for assistance with activity   - Consult OT/PT to assist with strengthening/mobility   - Keep Call bell within reach  - Keep bed low and locked with side rails adjusted as appropriate  - Keep care items and personal belongings within reach  - Initiate and maintain comfort rounds  - Make Fall Risk Sign visible to staff  - Offer Toileting every 2 Hours, in advance of need  - Initiate/Maintain bedalarm  - Obtain necessary fall risk management equipment:   - Apply yellow socks and bracelet for high fall risk patients  - Consider moving patient to room near nurses station  Outcome: Progressing  Goal: Maintain or return to baseline ADL function  Description: INTERVENTIONS:  -  Assess patient's ability to carry out ADLs; assess patient's baseline for ADL function and identify physical deficits which impact ability to perform ADLs (bathing, care of mouth/teeth, toileting, grooming, dressing, etc.)  - Assess/evaluate cause of self-care deficits   - Assess range of motion  - Assess patient's mobility; develop plan if impaired  - Assess patient's need for assistive devices and provide as appropriate  - Encourage maximum independence but intervene and supervise when necessary  - Involve family in performance of ADLs  - Assess for home care needs following discharge   - Consider OT consult to assist with ADL evaluation and planning for discharge  - Provide patient education as appropriate  Outcome: Progressing  Goal: Maintains/Returns to pre admission functional level  Description:  INTERVENTIONS:  - Perform AM-PAC 6 Click Basic Mobility/ Daily Activity assessment daily.  - Set and communicate daily mobility goal to care team and patient/family/caregiver.   - Collaborate with rehabilitation services on mobility goals if consulted  - Perform Range of Motion 3 times a day.  - Reposition patient every 3 hours.  - Dangle patient 3 times a day  - Stand patient 3 times a day  - Ambulate patient 3 times a day  - Out of bed to chair 3 times a day   - Out of bed for meals 3 times a day  - Out of bed for toileting  - Record patient progress and toleration of activity level   Outcome: Progressing     Problem: DISCHARGE PLANNING  Goal: Discharge to home or other facility with appropriate resources  Description: INTERVENTIONS:  - Identify barriers to discharge w/patient and caregiver  - Arrange for needed discharge resources and transportation as appropriate  - Identify discharge learning needs (meds, wound care, etc.)  - Arrange for interpretive services to assist at discharge as needed  - Refer to Case Management Department for coordinating discharge planning if the patient needs post-hospital services based on physician/advanced practitioner order or complex needs related to functional status, cognitive ability, or social support system  Outcome: Not Progressing     Problem: Knowledge Deficit  Goal: Patient/family/caregiver demonstrates understanding of disease process, treatment plan, medications, and discharge instructions  Description: Complete learning assessment and assess knowledge base.  Interventions:  - Provide teaching at level of understanding  - Provide teaching via preferred learning methods  Outcome: Not Progressing     Problem: Prexisting or High Potential for Compromised Skin Integrity  Goal: Skin integrity is maintained or improved  Description: INTERVENTIONS:  - Identify patients at risk for skin breakdown  - Assess and monitor skin integrity  - Assess and monitor nutrition and  hydration status  - Monitor labs   - Assess for incontinence   - Turn and reposition patient  - Assist with mobility/ambulation  - Relieve pressure over bony prominences  - Avoid friction and shearing  - Provide appropriate hygiene as needed including keeping skin clean and dry  - Evaluate need for skin moisturizer/barrier cream  - Collaborate with interdisciplinary team   - Patient/family teaching  - Consider wound care consult   Outcome: Progressing

## 2025-01-17 NOTE — CASE MANAGEMENT
Case Management Progress Note    Patient name Aniket Pace  Location / MRN [id number]  : 1985 Date 2025       LOS (days): 2  Geometric Mean LOS (GMLOS) (days): 2.6  Days to GMLOS:1        OBJECTIVE:        Current admission status: Inpatient  Preferred Pharmacy:   [id number] - [place] - [address]  [address] [address]  Phone: [phone] Fax: [phone]    [id number] [place], [place] - [address]  [address] [address]  Phone: [phone] Fax: [phone]    [id number] - [place], [place] - [address] [address] [address]  [address] [address] [facility] [address]  Phone: [phone] Fax: [phone]    [id number] [place] [address] [place] [address]  Phone: [phone] Fax: [phone]    Primary Care Provider: Edenilson Collier MD    Primary Insurance: [employer]  Secondary Insurance:     PROGRESS NOTE:    Cm discussed inpatient D&A with patient at bedside.   Patient pleasant , hands shaking.  Patient stated he will think about it and let us know this evening or tomorrow.  Nursing updated on same.  CM to follow for discharge needs.

## 2025-01-18 LAB
ALBUMIN SERPL BCG-MCNC: 3.9 G/DL (ref 3.5–5)
ALP SERPL-CCNC: 161 U/L (ref 34–104)
ALT SERPL W P-5'-P-CCNC: 36 U/L (ref 7–52)
ANION GAP SERPL CALCULATED.3IONS-SCNC: 7 MMOL/L (ref 4–13)
AST SERPL W P-5'-P-CCNC: 35 U/L (ref 13–39)
BASOPHILS # BLD AUTO: 0.03 THOUSANDS/ΜL (ref 0–0.1)
BASOPHILS NFR BLD AUTO: 1 % (ref 0–1)
BILIRUB SERPL-MCNC: 0.48 MG/DL (ref 0.2–1)
BUN SERPL-MCNC: 18 MG/DL (ref 5–25)
CALCIUM SERPL-MCNC: 8.5 MG/DL (ref 8.4–10.2)
CHLORIDE SERPL-SCNC: 104 MMOL/L (ref 96–108)
CO2 SERPL-SCNC: 29 MMOL/L (ref 21–32)
CREAT SERPL-MCNC: 1.01 MG/DL (ref 0.6–1.3)
EOSINOPHIL # BLD AUTO: 0.44 THOUSAND/ΜL (ref 0–0.61)
EOSINOPHIL NFR BLD AUTO: 8 % (ref 0–6)
ERYTHROCYTE [DISTWIDTH] IN BLOOD BY AUTOMATED COUNT: 12.7 % (ref 11.6–15.1)
GFR SERPL CREATININE-BSD FRML MDRD: 94 ML/MIN/1.73SQ M
GLUCOSE SERPL-MCNC: 75 MG/DL (ref 65–140)
HCT VFR BLD AUTO: 37.9 % (ref 36.5–49.3)
HGB BLD-MCNC: 12.8 G/DL (ref 12–17)
IMM GRANULOCYTES # BLD AUTO: 0.02 THOUSAND/UL (ref 0–0.2)
IMM GRANULOCYTES NFR BLD AUTO: 0 % (ref 0–2)
LYMPHOCYTES # BLD AUTO: 1.53 THOUSANDS/ΜL (ref 0.6–4.47)
LYMPHOCYTES NFR BLD AUTO: 27 % (ref 14–44)
MAGNESIUM SERPL-MCNC: 2 MG/DL (ref 1.9–2.7)
MCH RBC QN AUTO: 30.4 PG (ref 26.8–34.3)
MCHC RBC AUTO-ENTMCNC: 33.8 G/DL (ref 31.4–37.4)
MCV RBC AUTO: 90 FL (ref 82–98)
MONOCYTES # BLD AUTO: 0.43 THOUSAND/ΜL (ref 0.17–1.22)
MONOCYTES NFR BLD AUTO: 8 % (ref 4–12)
NEUTROPHILS # BLD AUTO: 3.24 THOUSANDS/ΜL (ref 1.85–7.62)
NEUTS SEG NFR BLD AUTO: 56 % (ref 43–75)
NRBC BLD AUTO-RTO: 0 /100 WBCS
PHOSPHATE SERPL-MCNC: 4.5 MG/DL (ref 2.7–4.5)
PLATELET # BLD AUTO: 159 THOUSANDS/UL (ref 149–390)
PMV BLD AUTO: 10.8 FL (ref 8.9–12.7)
POTASSIUM SERPL-SCNC: 4 MMOL/L (ref 3.5–5.3)
PROT SERPL-MCNC: 6 G/DL (ref 6.4–8.4)
RBC # BLD AUTO: 4.21 MILLION/UL (ref 3.88–5.62)
SODIUM SERPL-SCNC: 140 MMOL/L (ref 135–147)
WBC # BLD AUTO: 5.69 THOUSAND/UL (ref 4.31–10.16)

## 2025-01-18 PROCEDURE — 99233 SBSQ HOSP IP/OBS HIGH 50: CPT | Performed by: INTERNAL MEDICINE

## 2025-01-18 PROCEDURE — 85025 COMPLETE CBC W/AUTO DIFF WBC: CPT

## 2025-01-18 PROCEDURE — 80053 COMPREHEN METABOLIC PANEL: CPT

## 2025-01-18 PROCEDURE — 83735 ASSAY OF MAGNESIUM: CPT

## 2025-01-18 PROCEDURE — 84100 ASSAY OF PHOSPHORUS: CPT

## 2025-01-18 RX ORDER — LORAZEPAM 2 MG/ML
INJECTION INTRAMUSCULAR
Status: DISPENSED
Start: 2025-01-18 | End: 2025-01-18

## 2025-01-18 RX ORDER — LORAZEPAM 2 MG/ML
2 INJECTION INTRAMUSCULAR ONCE
Status: COMPLETED | OUTPATIENT
Start: 2025-01-18 | End: 2025-01-18

## 2025-01-18 RX ORDER — LORAZEPAM 2 MG/ML
8 INJECTION INTRAMUSCULAR ONCE
Status: COMPLETED | OUTPATIENT
Start: 2025-01-18 | End: 2025-01-18

## 2025-01-18 RX ORDER — LORAZEPAM 2 MG/ML
4 INJECTION INTRAMUSCULAR ONCE
Status: COMPLETED | OUTPATIENT
Start: 2025-01-18 | End: 2025-01-18

## 2025-01-18 RX ORDER — CLONAZEPAM 0.5 MG/1
1 TABLET ORAL 3 TIMES DAILY
Status: DISCONTINUED | OUTPATIENT
Start: 2025-01-18 | End: 2025-01-20

## 2025-01-18 RX ORDER — LORAZEPAM 2 MG/ML
4 INJECTION INTRAMUSCULAR ONCE
Status: COMPLETED | OUTPATIENT
Start: 2025-01-19 | End: 2025-01-19

## 2025-01-18 RX ADMIN — GABAPENTIN 300 MG: 300 CAPSULE ORAL at 08:24

## 2025-01-18 RX ADMIN — LORAZEPAM 2 MG: 2 INJECTION INTRAMUSCULAR; INTRAVENOUS at 19:37

## 2025-01-18 RX ADMIN — LORAZEPAM 4 MG: 2 INJECTION INTRAMUSCULAR; INTRAVENOUS at 20:05

## 2025-01-18 RX ADMIN — LORAZEPAM 4 MG: 2 INJECTION INTRAMUSCULAR; INTRAVENOUS at 18:37

## 2025-01-18 RX ADMIN — CLONIDINE HYDROCHLORIDE 0.1 MG: 0.1 TABLET ORAL at 08:22

## 2025-01-18 RX ADMIN — HYDROXYZINE HYDROCHLORIDE 50 MG: 25 TABLET ORAL at 08:22

## 2025-01-18 RX ADMIN — PANTOPRAZOLE SODIUM 40 MG: 40 INJECTION, POWDER, FOR SOLUTION INTRAVENOUS at 08:25

## 2025-01-18 RX ADMIN — LORAZEPAM 2 MG: 2 INJECTION INTRAMUSCULAR; INTRAVENOUS at 08:26

## 2025-01-18 RX ADMIN — TRAZODONE HYDROCHLORIDE 50 MG: 50 TABLET ORAL at 21:02

## 2025-01-18 RX ADMIN — LORAZEPAM 8 MG: 2 INJECTION INTRAMUSCULAR; INTRAVENOUS at 05:22

## 2025-01-18 RX ADMIN — CLONIDINE HYDROCHLORIDE 0.1 MG: 0.1 TABLET ORAL at 21:01

## 2025-01-18 RX ADMIN — QUETIAPINE FUMARATE 100 MG: 100 TABLET ORAL at 21:02

## 2025-01-18 RX ADMIN — HYDROXYZINE HYDROCHLORIDE 50 MG: 25 TABLET ORAL at 17:19

## 2025-01-18 RX ADMIN — LORAZEPAM 2 MG: 2 INJECTION INTRAMUSCULAR; INTRAVENOUS at 17:20

## 2025-01-18 RX ADMIN — LORAZEPAM 2 MG: 2 INJECTION INTRAMUSCULAR; INTRAVENOUS at 23:37

## 2025-01-18 RX ADMIN — GABAPENTIN 300 MG: 300 CAPSULE ORAL at 17:19

## 2025-01-18 RX ADMIN — PHENOBARBITAL SODIUM 260 MG: 130 INJECTION INTRAMUSCULAR; INTRAVENOUS at 08:36

## 2025-01-18 RX ADMIN — LORAZEPAM 2 MG: 2 INJECTION INTRAMUSCULAR; INTRAVENOUS at 14:45

## 2025-01-18 RX ADMIN — PANTOPRAZOLE SODIUM 40 MG: 40 INJECTION, POWDER, FOR SOLUTION INTRAVENOUS at 21:00

## 2025-01-18 RX ADMIN — LORAZEPAM 2 MG: 2 INJECTION INTRAMUSCULAR; INTRAVENOUS at 14:12

## 2025-01-18 RX ADMIN — GABAPENTIN 300 MG: 300 CAPSULE ORAL at 21:00

## 2025-01-18 RX ADMIN — NICOTINE 14 MG: 14 PATCH, EXTENDED RELEASE TRANSDERMAL at 08:25

## 2025-01-18 RX ADMIN — THIAMINE HYDROCHLORIDE: 100 INJECTION, SOLUTION INTRAMUSCULAR; INTRAVENOUS at 09:55

## 2025-01-18 RX ADMIN — LORAZEPAM 2 MG: 2 INJECTION INTRAMUSCULAR; INTRAVENOUS at 09:53

## 2025-01-18 RX ADMIN — CLONAZEPAM 1 MG: 0.5 TABLET ORAL at 21:01

## 2025-01-18 RX ADMIN — HYDROXYZINE HYDROCHLORIDE 50 MG: 25 TABLET ORAL at 21:00

## 2025-01-18 NOTE — NURSING NOTE
Pt crawling on the floor banging his fist against the ground, demanding more ativan. Dr hu was in the pts room speaking to him prior. Next ciwa due at 1820.

## 2025-01-18 NOTE — ASSESSMENT & PLAN NOTE
Presented with withdrawal seizures and delirium tremens in ED  Last consumption of alcohol on 01/15/2025 afternoon  Labs on admission with normal-mildly elevated LFTs, albumin 4.4, T bili 0.29  Negative serial troponins and UDS  Continue CIWA protocol, phenobarbital also given          - on regular diet  - imodium prn  - zofran prn for nausea

## 2025-01-18 NOTE — QUICK NOTE
Patient is still agitated.  Difficult to control.  Cannot assess vital signs due to his agitation.  Will try 8 mg Ativan IV.

## 2025-01-18 NOTE — NURSING NOTE
Pt refusing to leave pulse ox on.Spoke to the pt about the safety of having it. Pt still refused .

## 2025-01-18 NOTE — PLAN OF CARE
Problem: Potential for Falls  Goal: Patient will remain free of falls  Description: INTERVENTIONS:  - Educate patient/family on patient safety including physical limitations  - Instruct patient to call for assistance with activity   - Consult OT/PT to assist with strengthening/mobility   - Keep Call bell within reach  - Keep bed low and locked with side rails adjusted as appropriate  - Keep care items and personal belongings within reach  - Initiate and maintain comfort rounds  - Make Fall Risk Sign visible to staff  - Offer Toileting every 2 Hours, in advance of need  - Initiate/Maintain bedalarm  - Obtain necessary fall risk management equipment:   - Apply yellow socks and bracelet for high fall risk patients  - Consider moving patient to room near nurses station  Outcome: Progressing     Problem: PAIN - ADULT  Goal: Verbalizes/displays adequate comfort level or baseline comfort level  Description: Interventions:  - Encourage patient to monitor pain and request assistance  - Assess pain using appropriate pain scale  - Administer analgesics based on type and severity of pain and evaluate response  - Implement non-pharmacological measures as appropriate and evaluate response  - Consider cultural and social influences on pain and pain management  - Notify physician/advanced practitioner if interventions unsuccessful or patient reports new pain  Outcome: Progressing     Problem: INFECTION - ADULT  Goal: Absence or prevention of progression during hospitalization  Description: INTERVENTIONS:  - Assess and monitor for signs and symptoms of infection  - Monitor lab/diagnostic results  - Monitor all insertion sites, i.e. indwelling lines, tubes, and drains  - Monitor endotracheal if appropriate and nasal secretions for changes in amount and color  - [place] appropriate cooling/warming therapies per order  - Administer medications as ordered  - Instruct and encourage patient and family to use good hand hygiene  technique  - Identify and instruct in appropriate isolation precautions for identified infection/condition  Outcome: Progressing  Goal: Absence of fever/infection during neutropenic period  Description: INTERVENTIONS:  - Monitor WBC    Outcome: Progressing     Problem: SAFETY ADULT  Goal: Patient will remain free of falls  Description: INTERVENTIONS:  - Educate patient/family on patient safety including physical limitations  - Instruct patient to call for assistance with activity   - Consult OT/PT to assist with strengthening/mobility   - Keep Call bell within reach  - Keep bed low and locked with side rails adjusted as appropriate  - Keep care items and personal belongings within reach  - Initiate and maintain comfort rounds  - Make Fall Risk Sign visible to staff  - Offer Toileting every 2 Hours, in advance of need  - Initiate/Maintain bedalarm  - Obtain necessary fall risk management equipment:   - Apply yellow socks and bracelet for high fall risk patients  - Consider moving patient to room near nurses station  Outcome: Progressing  Goal: Maintain or return to baseline ADL function  Description: INTERVENTIONS:  -  Assess patient's ability to carry out ADLs; assess patient's baseline for ADL function and identify physical deficits which impact ability to perform ADLs (bathing, care of mouth/teeth, toileting, grooming, dressing, etc.)  - Assess/evaluate cause of self-care deficits   - Assess range of motion  - Assess patient's mobility; develop plan if impaired  - Assess patient's need for assistive devices and provide as appropriate  - Encourage maximum independence but intervene and supervise when necessary  - Involve family in performance of ADLs  - Assess for home care needs following discharge   - Consider OT consult to assist with ADL evaluation and planning for discharge  - Provide patient education as appropriate  Outcome: Progressing  Goal: Maintains/Returns to pre admission functional level  Description:  INTERVENTIONS:  - Perform AM-PAC 6 Click Basic Mobility/ Daily Activity assessment daily.  - Set and communicate daily mobility goal to care team and patient/family/caregiver.   - Collaborate with rehabilitation services on mobility goals if consulted  - Perform Range of Motion 3 times a day.  - Reposition patient every 3 hours.  - Dangle patient 3 times a day  - Stand patient 3 times a day  - Ambulate patient 3 times a day  - Out of bed to chair 3 times a day   - Out of bed for meals 3 times a day  - Out of bed for toileting  - Record patient progress and toleration of activity level   Outcome: Progressing     Problem: DISCHARGE PLANNING  Goal: Discharge to home or other facility with appropriate resources  Description: INTERVENTIONS:  - Identify barriers to discharge w/patient and caregiver  - Arrange for needed discharge resources and transportation as appropriate  - Identify discharge learning needs (meds, wound care, etc.)  - Arrange for interpretive services to assist at discharge as needed  - Refer to Case Management Department for coordinating discharge planning if the patient needs post-hospital services based on physician/advanced practitioner order or complex needs related to functional status, cognitive ability, or social support system  Outcome: Progressing     Problem: Knowledge Deficit  Goal: Patient/family/caregiver demonstrates understanding of disease process, treatment plan, medications, and discharge instructions  Description: Complete learning assessment and assess knowledge base.  Interventions:  - Provide teaching at level of understanding  - Provide teaching via preferred learning methods  Outcome: Progressing     Problem: Prexisting or High Potential for Compromised Skin Integrity  Goal: Skin integrity is maintained or improved  Description: INTERVENTIONS:  - Identify patients at risk for skin breakdown  - Assess and monitor skin integrity  - Assess and monitor nutrition and hydration  status  - Monitor labs   - Assess for incontinence   - Turn and reposition patient  - Assist with mobility/ambulation  - Relieve pressure over bony prominences  - Avoid friction and shearing  - Provide appropriate hygiene as needed including keeping skin clean and dry  - Evaluate need for skin moisturizer/barrier cream  - Collaborate with interdisciplinary team   - Patient/family teaching  - Consider wound care consult   Outcome: Progressing

## 2025-01-18 NOTE — QUICK NOTE
CTSP due to chest pain.  Pt reports chest pain- very anxious.  Pulse 68 bp 169/90 rr 12 pox 98 ra   EKG no ischemic changes  HR RRR  Lung CTA  Gen conversant and tearful  A/p chest pain, agiation emotional liability  Troponin  Ativan 4 mg iv once  Cont ciwa

## 2025-01-18 NOTE — NURSING NOTE
Patient awake and dressed & pacing in room and hallways. Patient is confused to time and place and reorients poorly.  Patient was crawling on floor looking for items, talking to the television. Came into the hallway yelling for various things. CIWA score 39- Dr. Frey made aware and stat orders for ativan 8mg iv obtained. Patient was assisted back to bed & ativan given as ordered.

## 2025-01-18 NOTE — NURSING NOTE
Gave Pt ativan 2mg injection @953 am . Pt yelling out saying he did not receive medication that it was the wrong medication. Explained all the medications I gave the pt including the the dose of ativan at 826 along with the one at 953. 'Pt is yelling obscenities and turning off his bed alarm. Pt wants to sign out ama, Dr Grissom made aware and will come to see the pt at bedside.

## 2025-01-18 NOTE — PROGRESS NOTES
Progress Note - Hospitalist   Name: Aniket Pace 39 y.o. male I MRN: [id number]  Unit/Bed#:  I Date of Admission: 1/15/2025   Date of Service: 1/18/2025 I Hospital Day: 3    Assessment & Plan  Alcohol withdrawal syndrome (HCC)  Presented with withdrawal seizures and delirium tremens in ED  Last consumption of alcohol on 01/15/2025 afternoon  Labs on admission with normal-mildly elevated LFTs, albumin 4.4, T bili 0.29  Negative serial troponins and UDS  Continue CIWA protocol, phenobarbital also given          - on regular diet  - imodium prn  - zofran prn for nausea  Continuous chronic alcoholism (HCC)  Encourage alcohol abstinence   Tobacco abuse  Smoking cessation counseling  Nicotine patch ordered  Hypertension  BP stable today  - continue on clonidine 0.1mg q12hr  Chronic pain  Continue home med neurontin   Primary insomnia  Continue home med trazodone prn for sleep  Bipolar 1 disorder (HCC)  Continue home med seroquel 100mg QHS  Anxiety  Continue home med Trazodone 50mg once at night for insomnia and Atarax 50mg TID for anxiety    Code Status: Level 1 - Full Code    Subjective   Patient is agitated at times    Objective :  Temp:  [97.6 °F (36.4 °C)-98.7 °F (37.1 °C)] 98.7 °F (37.1 °C)  HR:  [] 77  BP: (128-173)/() 136/81  Resp:  [12-24] 18  SpO2:  [93 %-99 %] 96 %  O2 Device: None (Room air)    Body mass index is 35.73 kg/m².     Input and Output Summary (last 24 hours):     Intake/Output Summary (Last 24 hours) at 1/18/2025 0853  Last data filed at 1/18/2025 0846  Gross per 24 hour   Intake 780 ml   Output --   Net 780 ml       Physical Exam  Vitals and nursing note reviewed.   HENT:      Head: Normocephalic and atraumatic.   Cardiovascular:      Rate and Rhythm: Normal rate.      Pulses: Normal pulses.   Pulmonary:      Effort: Pulmonary effort is normal.   Abdominal:      Palpations: Abdomen is soft.   Skin:     General: Skin is warm.   Neurological:      Mental Status: He is alert.    Psychiatric:      Comments: Patient tearful, anxious, case discussed with nursing team, patient was extremely agitated overnight                     Lab Results: I have reviewed the following results:   Results from last 7 days   Lab Units 01/18/25  0501   WBC Thousand/uL 5.69   HEMOGLOBIN g/dL 12.8   HEMATOCRIT % 37.9   PLATELETS Thousands/uL 159   SEGS PCT % 56   LYMPHO PCT % 27   MONO PCT % 8   EOS PCT % 8*     Results from last 7 days   Lab Units 01/18/25  0501   SODIUM mmol/L 140   POTASSIUM mmol/L 4.0   CHLORIDE mmol/L 104   CO2 mmol/L 29   BUN mg/dL 18   CREATININE mg/dL 1.01   ANION GAP mmol/L 7   CALCIUM mg/dL 8.5   ALBUMIN g/dL 3.9   TOTAL BILIRUBIN mg/dL 0.48   ALK PHOS U/L 161*   ALT U/L 36   AST U/L 35   GLUCOSE RANDOM mg/dL 75     Results from last 7 days   Lab Units 01/16/25  0610   INR  1.00                   Recent Cultures (last 7 days):         Last 24 Hours Medication List:     Current Facility-Administered Medications:     acetaminophen (TYLENOL) tablet 650 mg, Q6H PRN    cloNIDine (CATAPRES) tablet 0.1 mg, Q12H MARIUM    enoxaparin (LOVENOX) subcutaneous injection 40 mg, Daily    folic acid 1 mg, thiamine (VITAMIN B1) 100 mg in sodium chloride 0.9 % 100 mL IV piggyback, Daily    gabapentin (NEURONTIN) capsule 300 mg, TID    hydrOXYzine HCL (ATARAX) tablet 50 mg, TID    loperamide (IMODIUM) capsule 4 mg, TID PRN    LORazepam (ATIVAN) injection 2 mg, Q4H PRN    LORazepam (ATIVAN) injection 4 mg, Once    nicotine (NICODERM CQ) 14 mg/24hr TD 24 hr patch 14 mg, Daily    ondansetron (ZOFRAN) injection 4 mg, Q4H PRN    pantoprazole (PROTONIX) injection 40 mg, Q12H MARIUM    PHENobarbital 260 mg in sodium chloride 0.9 % 100 mL IVPB, Once, Last Rate: 260 mg (01/18/25 0836)    QUEtiapine (SEROquel) tablet 100 mg, HS    traZODone (DESYREL) tablet 50 mg, HS    Administrative Statements   Today, Patient Was Seen By: Abelino Grissom DO      **Please Note: This note may have been constructed using a voice  recognition system.**

## 2025-01-19 LAB
ANION GAP SERPL CALCULATED.3IONS-SCNC: 8 MMOL/L (ref 4–13)
BASOPHILS # BLD AUTO: 0.03 THOUSANDS/ΜL (ref 0–0.1)
BASOPHILS NFR BLD AUTO: 1 % (ref 0–1)
BUN SERPL-MCNC: 21 MG/DL (ref 5–25)
CALCIUM SERPL-MCNC: 9 MG/DL (ref 8.4–10.2)
CHLORIDE SERPL-SCNC: 102 MMOL/L (ref 96–108)
CO2 SERPL-SCNC: 30 MMOL/L (ref 21–32)
CREAT SERPL-MCNC: 0.93 MG/DL (ref 0.6–1.3)
EOSINOPHIL # BLD AUTO: 0.47 THOUSAND/ΜL (ref 0–0.61)
EOSINOPHIL NFR BLD AUTO: 8 % (ref 0–6)
ERYTHROCYTE [DISTWIDTH] IN BLOOD BY AUTOMATED COUNT: 12.8 % (ref 11.6–15.1)
GFR SERPL CREATININE-BSD FRML MDRD: 104 ML/MIN/1.73SQ M
GLUCOSE SERPL-MCNC: 90 MG/DL (ref 65–140)
HCT VFR BLD AUTO: 41.5 % (ref 36.5–49.3)
HGB BLD-MCNC: 13.7 G/DL (ref 12–17)
IMM GRANULOCYTES # BLD AUTO: 0.04 THOUSAND/UL (ref 0–0.2)
IMM GRANULOCYTES NFR BLD AUTO: 1 % (ref 0–2)
LYMPHOCYTES # BLD AUTO: 1.55 THOUSANDS/ΜL (ref 0.6–4.47)
LYMPHOCYTES NFR BLD AUTO: 26 % (ref 14–44)
MAGNESIUM SERPL-MCNC: 2.2 MG/DL (ref 1.9–2.7)
MCH RBC QN AUTO: 30.6 PG (ref 26.8–34.3)
MCHC RBC AUTO-ENTMCNC: 33 G/DL (ref 31.4–37.4)
MCV RBC AUTO: 93 FL (ref 82–98)
MONOCYTES # BLD AUTO: 0.41 THOUSAND/ΜL (ref 0.17–1.22)
MONOCYTES NFR BLD AUTO: 7 % (ref 4–12)
NEUTROPHILS # BLD AUTO: 3.42 THOUSANDS/ΜL (ref 1.85–7.62)
NEUTS SEG NFR BLD AUTO: 57 % (ref 43–75)
NRBC BLD AUTO-RTO: 0 /100 WBCS
PHOSPHATE SERPL-MCNC: 5.3 MG/DL (ref 2.7–4.5)
PLATELET # BLD AUTO: 160 THOUSANDS/UL (ref 149–390)
PMV BLD AUTO: 11 FL (ref 8.9–12.7)
POTASSIUM SERPL-SCNC: 4.3 MMOL/L (ref 3.5–5.3)
RBC # BLD AUTO: 4.48 MILLION/UL (ref 3.88–5.62)
SODIUM SERPL-SCNC: 140 MMOL/L (ref 135–147)
WBC # BLD AUTO: 5.92 THOUSAND/UL (ref 4.31–10.16)

## 2025-01-19 PROCEDURE — 83735 ASSAY OF MAGNESIUM: CPT | Performed by: INTERNAL MEDICINE

## 2025-01-19 PROCEDURE — 85025 COMPLETE CBC W/AUTO DIFF WBC: CPT | Performed by: INTERNAL MEDICINE

## 2025-01-19 PROCEDURE — 84100 ASSAY OF PHOSPHORUS: CPT | Performed by: INTERNAL MEDICINE

## 2025-01-19 PROCEDURE — 80048 BASIC METABOLIC PNL TOTAL CA: CPT | Performed by: INTERNAL MEDICINE

## 2025-01-19 PROCEDURE — 99232 SBSQ HOSP IP/OBS MODERATE 35: CPT | Performed by: INTERNAL MEDICINE

## 2025-01-19 RX ORDER — LORAZEPAM 2 MG/ML
4 INJECTION INTRAMUSCULAR ONCE
Status: COMPLETED | OUTPATIENT
Start: 2025-01-19 | End: 2025-01-19

## 2025-01-19 RX ADMIN — HYDROXYZINE HYDROCHLORIDE 50 MG: 25 TABLET ORAL at 20:10

## 2025-01-19 RX ADMIN — CLONAZEPAM 1 MG: 0.5 TABLET ORAL at 16:05

## 2025-01-19 RX ADMIN — THIAMINE HYDROCHLORIDE: 100 INJECTION, SOLUTION INTRAMUSCULAR; INTRAVENOUS at 10:39

## 2025-01-19 RX ADMIN — LORAZEPAM 4 MG: 2 INJECTION INTRAMUSCULAR; INTRAVENOUS at 00:03

## 2025-01-19 RX ADMIN — QUETIAPINE FUMARATE 100 MG: 100 TABLET ORAL at 20:10

## 2025-01-19 RX ADMIN — CLONAZEPAM 1 MG: 0.5 TABLET ORAL at 09:26

## 2025-01-19 RX ADMIN — CLONIDINE HYDROCHLORIDE 0.1 MG: 0.1 TABLET ORAL at 20:10

## 2025-01-19 RX ADMIN — TRAZODONE HYDROCHLORIDE 50 MG: 50 TABLET ORAL at 20:10

## 2025-01-19 RX ADMIN — PANTOPRAZOLE SODIUM 40 MG: 40 INJECTION, POWDER, FOR SOLUTION INTRAVENOUS at 20:11

## 2025-01-19 RX ADMIN — HYDROXYZINE HYDROCHLORIDE 50 MG: 25 TABLET ORAL at 16:05

## 2025-01-19 RX ADMIN — HYDROXYZINE HYDROCHLORIDE 50 MG: 25 TABLET ORAL at 09:25

## 2025-01-19 RX ADMIN — GABAPENTIN 300 MG: 300 CAPSULE ORAL at 16:06

## 2025-01-19 RX ADMIN — GABAPENTIN 300 MG: 300 CAPSULE ORAL at 09:26

## 2025-01-19 RX ADMIN — LORAZEPAM 4 MG: 2 INJECTION INTRAMUSCULAR; INTRAVENOUS at 16:06

## 2025-01-19 RX ADMIN — PANTOPRAZOLE SODIUM 40 MG: 40 INJECTION, POWDER, FOR SOLUTION INTRAVENOUS at 09:28

## 2025-01-19 RX ADMIN — LORAZEPAM 4 MG: 2 INJECTION INTRAMUSCULAR; INTRAVENOUS at 21:11

## 2025-01-19 RX ADMIN — LORAZEPAM 2 MG: 2 INJECTION INTRAMUSCULAR; INTRAVENOUS at 06:06

## 2025-01-19 RX ADMIN — LORAZEPAM 2 MG: 2 INJECTION INTRAMUSCULAR; INTRAVENOUS at 13:12

## 2025-01-19 RX ADMIN — LORAZEPAM 4 MG: 2 INJECTION INTRAMUSCULAR; INTRAVENOUS at 09:31

## 2025-01-19 RX ADMIN — CLONAZEPAM 1 MG: 0.5 TABLET ORAL at 20:11

## 2025-01-19 RX ADMIN — CLONIDINE HYDROCHLORIDE 0.1 MG: 0.1 TABLET ORAL at 09:25

## 2025-01-19 RX ADMIN — LORAZEPAM 4 MG: 2 INJECTION INTRAMUSCULAR; INTRAVENOUS at 05:40

## 2025-01-19 RX ADMIN — LORAZEPAM 2 MG: 2 INJECTION INTRAMUSCULAR; INTRAVENOUS at 20:11

## 2025-01-19 RX ADMIN — LORAZEPAM 4 MG: 2 INJECTION INTRAMUSCULAR; INTRAVENOUS at 14:16

## 2025-01-19 RX ADMIN — GABAPENTIN 300 MG: 300 CAPSULE ORAL at 20:10

## 2025-01-19 NOTE — PROGRESS NOTES
Progress Note - Hospitalist   Name: Aniket Pace 39 y.o. male I MRN: [id number]  Unit/Bed#:  I Date of Admission: 1/15/2025   Date of Service: 1/19/2025 I Hospital Day: 4    Assessment & Plan  Alcohol withdrawal syndrome (HCC)  Presented with withdrawal seizures and delirium tremens in ED  Last consumption of alcohol on 01/15/2025 afternoon  Labs on admission with normal-mildly elevated LFTs, albumin 4.4, T bili 0.29  Negative serial troponins and UDS  Continue CIWA protocol, transition from phenobarb to scheduled clonazepam on 01/18/2025  Required 22mg of Ativan in past 24hr (from 18:00 - 6:00)    - continue clonazepam 1mg TID  - on regular diet  - ativan prn for agitation  - imodium prn  - zofran prn for nausea  Continuous chronic alcoholism (HCC)  Encourage alcohol abstinence   Tobacco abuse  Smoking cessation counseling  Nicotine patch ordered  Hypertension  BP stable today  - continue on clonidine 0.1mg q12hr  Chronic pain  Continue home med neurontin   Primary insomnia  Continue home med trazodone prn for sleep  Bipolar 1 disorder (HCC)  Continue home med seroquel 100mg QHS  Anxiety  Continue home med Trazodone 50mg once at night for insomnia and Atarax 50mg TID for anxiety    VTE Pharmacologic Prophylaxis:  ordered lovenox and SCD or food pumps     Mobility:   JH-HLM Achieved: 1: Laying in bed        Patient Centered Rounds: I performed bedside rounds with nursing staff today.   Discussions with Specialists or Other Care Team Provider: care management     Current Length of Stay: 4 day(s)  Current Patient Status: Inpatient   Certification Statement: The patient will continue to require additional inpatient hospital stay due to alcohol withdrawal  Discharge Plan: Anticipate discharge in 24-48 hrs to home or inpatient D&A, pending.     Code Status: Level 1 - Full Code    Subjective   Patient examined bedside. Unable to assess CIWA score as patient is sleeping comfortably. Per nurse, patient was agitated  when awake.     Objective :  Temp:  [97.7 °F (36.5 °C)-98.2 °F (36.8 °C)] 97.7 °F (36.5 °C)  HR:  [] 70  BP: (104-151)/(56-98) 104/56  Resp:  [16-18] 16  SpO2:  [92 %-98 %] 92 %  O2 Device: None (Room air)    Body mass index is 35.76 kg/m².     Input and Output Summary (last 24 hours):     Intake/Output Summary (Last 24 hours) at 1/19/2025 0803  Last data filed at 1/18/2025 2020  Gross per 24 hour   Intake 430 ml   Output 1425 ml   Net -995 ml       Physical Exam  Vitals reviewed.   Constitutional:       General: He is not in acute distress.     Appearance: Normal appearance.   HENT:      Head: Normocephalic and atraumatic.      Right Ear: External ear normal.      Left Ear: External ear normal.      Nose: Nose normal. No congestion.   Cardiovascular:      Rate and Rhythm: Normal rate and regular rhythm.      Pulses: Normal pulses.      Heart sounds: Normal heart sounds. No murmur heard.  Pulmonary:      Effort: Pulmonary effort is normal. No respiratory distress.      Breath sounds: Normal breath sounds. No wheezing.   Abdominal:      General: Abdomen is flat. Bowel sounds are normal. There is no distension.      Palpations: Abdomen is soft.   Musculoskeletal:         General: Normal range of motion.      Cervical back: Normal range of motion and neck supple. No rigidity.      Right lower leg: No edema.      Left lower leg: No edema.   Skin:     General: Skin is warm.      Capillary Refill: Capillary refill takes less than 2 seconds.   Neurological:      Mental Status: He is alert.             Lab Results: I have reviewed the following results:   Results from last 7 days   Lab Units 01/19/25  0602   WBC Thousand/uL 5.92   HEMOGLOBIN g/dL 13.7   HEMATOCRIT % 41.5   PLATELETS Thousands/uL 160   SEGS PCT % 57   LYMPHO PCT % 26   MONO PCT % 7   EOS PCT % 8*     Results from last 7 days   Lab Units 01/19/25  0602 01/18/25  0501   SODIUM mmol/L 140 140   POTASSIUM mmol/L 4.3 4.0   CHLORIDE mmol/L 102 104   CO2  mmol/L 30 29   BUN mg/dL 21 18   CREATININE mg/dL 0.93 1.01   ANION GAP mmol/L 8 7   CALCIUM mg/dL 9.0 8.5   ALBUMIN g/dL  --  3.9   TOTAL BILIRUBIN mg/dL  --  0.48   ALK PHOS U/L  --  161*   ALT U/L  --  36   AST U/L  --  35   GLUCOSE RANDOM mg/dL 90 75     Results from last 7 days   Lab Units 01/16/25  0610   INR  1.00                   Recent Cultures (last 7 days):         Imaging Results Review: No pertinent imaging studies reviewed.  Other Study Results Review: No additional pertinent studies reviewed.    Last 24 Hours Medication List:     Current Facility-Administered Medications:     acetaminophen (TYLENOL) tablet 650 mg, Q6H PRN    clonazePAM (KlonoPIN) tablet 1 mg, TID    cloNIDine (CATAPRES) tablet 0.1 mg, Q12H MARIUM    enoxaparin (LOVENOX) subcutaneous injection 40 mg, Daily    folic acid 1 mg, thiamine (VITAMIN B1) 100 mg in sodium chloride 0.9 % 100 mL IV piggyback, Daily    gabapentin (NEURONTIN) capsule 300 mg, TID    hydrOXYzine HCL (ATARAX) tablet 50 mg, TID    loperamide (IMODIUM) capsule 4 mg, TID PRN    LORazepam (ATIVAN) injection 2 mg, Q4H PRN    nicotine (NICODERM CQ) 14 mg/24hr TD 24 hr patch 14 mg, Daily    ondansetron (ZOFRAN) injection 4 mg, Q4H PRN    pantoprazole (PROTONIX) injection 40 mg, Q12H MARIUM    QUEtiapine (SEROquel) tablet 100 mg, HS    traZODone (DESYREL) tablet 50 mg, HS    Administrative Statements   Today, Patient Was Seen By: Chacho Garrett, DO  I have spent a total time of 20 minutes in caring for this patient on the day of the visit/encounter including Impressions, Counseling / Coordination of care, Documenting in the medical record, Reviewing / ordering tests, medicine, procedures  , Obtaining or reviewing history  , and Communicating with other healthcare professionals .    **Please Note: This note may have been constructed using a voice recognition system.**

## 2025-01-19 NOTE — PLAN OF CARE
Problem: Potential for Falls  Goal: Patient will remain free of falls  Description: INTERVENTIONS:  - Educate patient/family on patient safety including physical limitations  - Instruct patient to call for assistance with activity   - Consult OT/PT to assist with strengthening/mobility   - Keep Call bell within reach  - Keep bed low and locked with side rails adjusted as appropriate  - Keep care items and personal belongings within reach  - Initiate and maintain comfort rounds  - Make Fall Risk Sign visible to staff  - Offer Toileting every 2 Hours, in advance of need  - Initiate/Maintain bedalarm  - Obtain necessary fall risk management equipment:   - Apply yellow socks and bracelet for high fall risk patients  - Consider moving patient to room near nurses station  Outcome: Progressing     Problem: PAIN - ADULT  Goal: Verbalizes/displays adequate comfort level or baseline comfort level  Description: Interventions:  - Encourage patient to monitor pain and request assistance  - Assess pain using appropriate pain scale  - Administer analgesics based on type and severity of pain and evaluate response  - Implement non-pharmacological measures as appropriate and evaluate response  - Consider cultural and social influences on pain and pain management  - Notify physician/advanced practitioner if interventions unsuccessful or patient reports new pain  Outcome: Progressing     Problem: INFECTION - ADULT  Goal: Absence or prevention of progression during hospitalization  Description: INTERVENTIONS:  - Assess and monitor for signs and symptoms of infection  - Monitor lab/diagnostic results  - Monitor all insertion sites, i.e. indwelling lines, tubes, and drains  - Monitor endotracheal if appropriate and nasal secretions for changes in amount and color  - [place] appropriate cooling/warming therapies per order  - Administer medications as ordered  - Instruct and encourage patient and family to use good hand hygiene  technique  - Identify and instruct in appropriate isolation precautions for identified infection/condition  Outcome: Progressing  Goal: Absence of fever/infection during neutropenic period  Description: INTERVENTIONS:  - Monitor WBC    Outcome: Progressing     Problem: SAFETY ADULT  Goal: Patient will remain free of falls  Description: INTERVENTIONS:  - Educate patient/family on patient safety including physical limitations  - Instruct patient to call for assistance with activity   - Consult OT/PT to assist with strengthening/mobility   - Keep Call bell within reach  - Keep bed low and locked with side rails adjusted as appropriate  - Keep care items and personal belongings within reach  - Initiate and maintain comfort rounds  - Make Fall Risk Sign visible to staff  - Offer Toileting every 2 Hours, in advance of need  - Initiate/Maintain bedalarm  - Obtain necessary fall risk management equipment:   - Apply yellow socks and bracelet for high fall risk patients  - Consider moving patient to room near nurses station  Outcome: Progressing  Goal: Maintain or return to baseline ADL function  Description: INTERVENTIONS:  -  Assess patient's ability to carry out ADLs; assess patient's baseline for ADL function and identify physical deficits which impact ability to perform ADLs (bathing, care of mouth/teeth, toileting, grooming, dressing, etc.)  - Assess/evaluate cause of self-care deficits   - Assess range of motion  - Assess patient's mobility; develop plan if impaired  - Assess patient's need for assistive devices and provide as appropriate  - Encourage maximum independence but intervene and supervise when necessary  - Involve family in performance of ADLs  - Assess for home care needs following discharge   - Consider OT consult to assist with ADL evaluation and planning for discharge  - Provide patient education as appropriate  Outcome: Progressing  Goal: Maintains/Returns to pre admission functional level  Description:  INTERVENTIONS:  - Perform AM-PAC 6 Click Basic Mobility/ Daily Activity assessment daily.  - Set and communicate daily mobility goal to care team and patient/family/caregiver.   - Collaborate with rehabilitation services on mobility goals if consulted  - Perform Range of Motion 3 times a day.  - Reposition patient every 3 hours.  - Dangle patient 3 times a day  - Stand patient 3 times a day  - Ambulate patient 3 times a day  - Out of bed to chair 3 times a day   - Out of bed for meals 3 times a day  - Out of bed for toileting  - Record patient progress and toleration of activity level   Outcome: Progressing     Problem: DISCHARGE PLANNING  Goal: Discharge to home or other facility with appropriate resources  Description: INTERVENTIONS:  - Identify barriers to discharge w/patient and caregiver  - Arrange for needed discharge resources and transportation as appropriate  - Identify discharge learning needs (meds, wound care, etc.)  - Arrange for interpretive services to assist at discharge as needed  - Refer to Case Management Department for coordinating discharge planning if the patient needs post-hospital services based on physician/advanced practitioner order or complex needs related to functional status, cognitive ability, or social support system  Outcome: Progressing     Problem: Knowledge Deficit  Goal: Patient/family/caregiver demonstrates understanding of disease process, treatment plan, medications, and discharge instructions  Description: Complete learning assessment and assess knowledge base.  Interventions:  - Provide teaching at level of understanding  - Provide teaching via preferred learning methods  Outcome: Progressing     Problem: Prexisting or High Potential for Compromised Skin Integrity  Goal: Skin integrity is maintained or improved  Description: INTERVENTIONS:  - Identify patients at risk for skin breakdown  - Assess and monitor skin integrity  - Assess and monitor nutrition and hydration  status  - Monitor labs   - Assess for incontinence   - Turn and reposition patient  - Assist with mobility/ambulation  - Relieve pressure over bony prominences  - Avoid friction and shearing  - Provide appropriate hygiene as needed including keeping skin clean and dry  - Evaluate need for skin moisturizer/barrier cream  - Collaborate with interdisciplinary team   - Patient/family teaching  - Consider wound care consult   Outcome: Progressing

## 2025-01-19 NOTE — ASSESSMENT & PLAN NOTE
Presented with withdrawal seizures and delirium tremens in ED  Last consumption of alcohol on 01/15/2025 afternoon  Labs on admission with normal-mildly elevated LFTs, albumin 4.4, T bili 0.29  Negative serial troponins and UDS  Continue CIWA protocol, transition from phenobarb to scheduled clonazepam on 01/18/2025  Required 22mg of Ativan in past 24hr (from 18:00 - 6:00)    - continue clonazepam 1mg TID  - on regular diet  - ativan prn for agitation  - imodium prn  - zofran prn for nausea

## 2025-01-19 NOTE — QUICK NOTE
Patient is 40 yo old , in active withdrawal from alcohol use, on CIWA protocol .    Noted that he is requesting Ativan very frequently, and up to 8:50 PM today he used total Ativan today 28 mg and still when I saw him as per his nurse request, patient was anxious, standing talking fast, his hands were shaking while he is sitting and eating after, and requesting more Ativan.     To help with the above and given the evidence that active withdrawal and increasing request for Ativan more frequently, will transition to scheduled clonazepam, longer acting benzodiazepine, ordered as 1 mg every 8 hours scheduled hopefully that will decrease the need for more frequent Ativan and provide better control of patient's withdrawal symptoms.     Discussed with RN at bedside, and explained to the patient, and will continue to follow closely and adjust if needed.     Rafael Espinal, DO

## 2025-01-20 LAB
ANION GAP SERPL CALCULATED.3IONS-SCNC: 8 MMOL/L (ref 4–13)
BASOPHILS # BLD AUTO: 0.03 THOUSANDS/ΜL (ref 0–0.1)
BASOPHILS NFR BLD AUTO: 0 % (ref 0–1)
BUN SERPL-MCNC: 19 MG/DL (ref 5–25)
CALCIUM SERPL-MCNC: 9.1 MG/DL (ref 8.4–10.2)
CHLORIDE SERPL-SCNC: 100 MMOL/L (ref 96–108)
CO2 SERPL-SCNC: 28 MMOL/L (ref 21–32)
CREAT SERPL-MCNC: 0.87 MG/DL (ref 0.6–1.3)
EOSINOPHIL # BLD AUTO: 0.46 THOUSAND/ΜL (ref 0–0.61)
EOSINOPHIL NFR BLD AUTO: 7 % (ref 0–6)
ERYTHROCYTE [DISTWIDTH] IN BLOOD BY AUTOMATED COUNT: 12.6 % (ref 11.6–15.1)
GFR SERPL CREATININE-BSD FRML MDRD: 110 ML/MIN/1.73SQ M
GLUCOSE SERPL-MCNC: 100 MG/DL (ref 65–140)
HCT VFR BLD AUTO: 40.3 % (ref 36.5–49.3)
HGB BLD-MCNC: 13.7 G/DL (ref 12–17)
IMM GRANULOCYTES # BLD AUTO: 0.04 THOUSAND/UL (ref 0–0.2)
IMM GRANULOCYTES NFR BLD AUTO: 1 % (ref 0–2)
LYMPHOCYTES # BLD AUTO: 1.66 THOUSANDS/ΜL (ref 0.6–4.47)
LYMPHOCYTES NFR BLD AUTO: 25 % (ref 14–44)
MCH RBC QN AUTO: 30.5 PG (ref 26.8–34.3)
MCHC RBC AUTO-ENTMCNC: 34 G/DL (ref 31.4–37.4)
MCV RBC AUTO: 90 FL (ref 82–98)
MONOCYTES # BLD AUTO: 0.51 THOUSAND/ΜL (ref 0.17–1.22)
MONOCYTES NFR BLD AUTO: 8 % (ref 4–12)
NEUTROPHILS # BLD AUTO: 4.01 THOUSANDS/ΜL (ref 1.85–7.62)
NEUTS SEG NFR BLD AUTO: 59 % (ref 43–75)
NRBC BLD AUTO-RTO: 0 /100 WBCS
PLATELET # BLD AUTO: 158 THOUSANDS/UL (ref 149–390)
PMV BLD AUTO: 11 FL (ref 8.9–12.7)
POTASSIUM SERPL-SCNC: 4.4 MMOL/L (ref 3.5–5.3)
RBC # BLD AUTO: 4.49 MILLION/UL (ref 3.88–5.62)
SODIUM SERPL-SCNC: 136 MMOL/L (ref 135–147)
WBC # BLD AUTO: 6.71 THOUSAND/UL (ref 4.31–10.16)

## 2025-01-20 PROCEDURE — 99232 SBSQ HOSP IP/OBS MODERATE 35: CPT | Performed by: FAMILY MEDICINE

## 2025-01-20 PROCEDURE — 80048 BASIC METABOLIC PNL TOTAL CA: CPT | Performed by: INTERNAL MEDICINE

## 2025-01-20 PROCEDURE — 85025 COMPLETE CBC W/AUTO DIFF WBC: CPT | Performed by: INTERNAL MEDICINE

## 2025-01-20 RX ORDER — LORAZEPAM 1 MG/1
2 TABLET ORAL ONCE
Status: COMPLETED | OUTPATIENT
Start: 2025-01-20 | End: 2025-01-20

## 2025-01-20 RX ORDER — ACETAMINOPHEN 325 MG/1
650 TABLET ORAL EVERY 6 HOURS PRN
Status: DISCONTINUED | OUTPATIENT
Start: 2025-01-20 | End: 2025-01-21 | Stop reason: HOSPADM

## 2025-01-20 RX ORDER — LORAZEPAM 2 MG/ML
4 INJECTION INTRAMUSCULAR ONCE
Status: COMPLETED | OUTPATIENT
Start: 2025-01-20 | End: 2025-01-20

## 2025-01-20 RX ORDER — CHLORDIAZEPOXIDE HYDROCHLORIDE 25 MG/1
25 CAPSULE, GELATIN COATED ORAL EVERY 6 HOURS SCHEDULED
Status: DISCONTINUED | OUTPATIENT
Start: 2025-01-20 | End: 2025-01-21

## 2025-01-20 RX ORDER — PANTOPRAZOLE SODIUM 40 MG/1
40 TABLET, DELAYED RELEASE ORAL
Status: DISCONTINUED | OUTPATIENT
Start: 2025-01-21 | End: 2025-01-21 | Stop reason: HOSPADM

## 2025-01-20 RX ORDER — IBUPROFEN 600 MG/1
600 TABLET, FILM COATED ORAL EVERY 6 HOURS PRN
Status: DISCONTINUED | OUTPATIENT
Start: 2025-01-20 | End: 2025-01-21 | Stop reason: HOSPADM

## 2025-01-20 RX ORDER — LORAZEPAM 2 MG/ML
INJECTION INTRAMUSCULAR
Status: DISPENSED
Start: 2025-01-20 | End: 2025-01-20

## 2025-01-20 RX ADMIN — HYDROXYZINE HYDROCHLORIDE 50 MG: 25 TABLET ORAL at 08:15

## 2025-01-20 RX ADMIN — LORAZEPAM 4 MG: 2 INJECTION INTRAMUSCULAR; INTRAVENOUS at 10:43

## 2025-01-20 RX ADMIN — LORAZEPAM 2 MG: 1 TABLET ORAL at 20:38

## 2025-01-20 RX ADMIN — LORAZEPAM 2 MG: 2 INJECTION INTRAMUSCULAR; INTRAVENOUS at 13:02

## 2025-01-20 RX ADMIN — GABAPENTIN 300 MG: 300 CAPSULE ORAL at 15:36

## 2025-01-20 RX ADMIN — GABAPENTIN 300 MG: 300 CAPSULE ORAL at 08:15

## 2025-01-20 RX ADMIN — LORAZEPAM 2 MG: 1 TABLET ORAL at 15:36

## 2025-01-20 RX ADMIN — LORAZEPAM 4 MG: 2 INJECTION INTRAMUSCULAR; INTRAVENOUS at 00:47

## 2025-01-20 RX ADMIN — CLONAZEPAM 1 MG: 0.5 TABLET ORAL at 08:15

## 2025-01-20 RX ADMIN — HYDROXYZINE HYDROCHLORIDE 50 MG: 25 TABLET ORAL at 15:36

## 2025-01-20 RX ADMIN — TRAZODONE HYDROCHLORIDE 50 MG: 50 TABLET ORAL at 21:17

## 2025-01-20 RX ADMIN — LORAZEPAM 2 MG: 2 INJECTION INTRAMUSCULAR; INTRAVENOUS at 17:19

## 2025-01-20 RX ADMIN — LORAZEPAM 2 MG: 2 INJECTION INTRAMUSCULAR; INTRAVENOUS at 22:37

## 2025-01-20 RX ADMIN — CLONAZEPAM 1 MG: 0.5 TABLET ORAL at 15:36

## 2025-01-20 RX ADMIN — NICOTINE 14 MG: 14 PATCH, EXTENDED RELEASE TRANSDERMAL at 08:18

## 2025-01-20 RX ADMIN — CLONIDINE HYDROCHLORIDE 0.1 MG: 0.1 TABLET ORAL at 08:14

## 2025-01-20 RX ADMIN — LORAZEPAM 4 MG: 2 INJECTION INTRAMUSCULAR; INTRAVENOUS at 01:34

## 2025-01-20 RX ADMIN — GABAPENTIN 300 MG: 300 CAPSULE ORAL at 21:17

## 2025-01-20 RX ADMIN — LORAZEPAM 4 MG: 2 INJECTION INTRAMUSCULAR; INTRAVENOUS at 06:20

## 2025-01-20 RX ADMIN — LORAZEPAM 2 MG: 2 INJECTION INTRAMUSCULAR; INTRAVENOUS at 00:04

## 2025-01-20 RX ADMIN — PANTOPRAZOLE SODIUM 40 MG: 40 INJECTION, POWDER, FOR SOLUTION INTRAVENOUS at 08:18

## 2025-01-20 RX ADMIN — THIAMINE HYDROCHLORIDE: 100 INJECTION, SOLUTION INTRAMUSCULAR; INTRAVENOUS at 10:43

## 2025-01-20 RX ADMIN — HYDROXYZINE HYDROCHLORIDE 50 MG: 25 TABLET ORAL at 21:17

## 2025-01-20 RX ADMIN — IBUPROFEN 600 MG: 600 TABLET, FILM COATED ORAL at 15:36

## 2025-01-20 RX ADMIN — CHLORDIAZEPOXIDE HYDROCHLORIDE 25 MG: 25 CAPSULE ORAL at 23:34

## 2025-01-20 RX ADMIN — LORAZEPAM 2 MG: 2 INJECTION INTRAMUSCULAR; INTRAVENOUS at 04:01

## 2025-01-20 RX ADMIN — LORAZEPAM 2 MG: 2 INJECTION INTRAMUSCULAR; INTRAVENOUS at 08:31

## 2025-01-20 RX ADMIN — QUETIAPINE FUMARATE 100 MG: 100 TABLET ORAL at 21:17

## 2025-01-20 NOTE — PLAN OF CARE
Problem: Potential for Falls  Goal: Patient will remain free of falls  Description: INTERVENTIONS:  - Educate patient/family on patient safety including physical limitations  - Instruct patient to call for assistance with activity   - Consult OT/PT to assist with strengthening/mobility   - Keep Call bell within reach  - Keep bed low and locked with side rails adjusted as appropriate  - Keep care items and personal belongings within reach  - Initiate and maintain comfort rounds  - Make Fall Risk Sign visible to staff  - Offer Toileting every 2 Hours, in advance of need  - Initiate/Maintain bedalarm  - Obtain necessary fall risk management equipment:   - Apply yellow socks and bracelet for high fall risk patients  - Consider moving patient to room near nurses station  Outcome: Progressing     Problem: PAIN - ADULT  Goal: Verbalizes/displays adequate comfort level or baseline comfort level  Description: Interventions:  - Encourage patient to monitor pain and request assistance  - Assess pain using appropriate pain scale  - Administer analgesics based on type and severity of pain and evaluate response  - Implement non-pharmacological measures as appropriate and evaluate response  - Consider cultural and social influences on pain and pain management  - Notify physician/advanced practitioner if interventions unsuccessful or patient reports new pain  Outcome: Progressing     Problem: INFECTION - ADULT  Goal: Absence or prevention of progression during hospitalization  Description: INTERVENTIONS:  - Assess and monitor for signs and symptoms of infection  - Monitor lab/diagnostic results  - Monitor all insertion sites, i.e. indwelling lines, tubes, and drains  - Monitor endotracheal if appropriate and nasal secretions for changes in amount and color  - [place] appropriate cooling/warming therapies per order  - Administer medications as ordered  - Instruct and encourage patient and family to use good hand hygiene  technique  - Identify and instruct in appropriate isolation precautions for identified infection/condition  Outcome: Progressing  Goal: Absence of fever/infection during neutropenic period  Description: INTERVENTIONS:  - Monitor WBC    Outcome: Progressing     Problem: SAFETY ADULT  Goal: Patient will remain free of falls  Description: INTERVENTIONS:  - Educate patient/family on patient safety including physical limitations  - Instruct patient to call for assistance with activity   - Consult OT/PT to assist with strengthening/mobility   - Keep Call bell within reach  - Keep bed low and locked with side rails adjusted as appropriate  - Keep care items and personal belongings within reach  - Initiate and maintain comfort rounds  - Make Fall Risk Sign visible to staff  - Offer Toileting every 2 Hours, in advance of need  - Initiate/Maintain bedalarm  - Obtain necessary fall risk management equipment:   - Apply yellow socks and bracelet for high fall risk patients  - Consider moving patient to room near nurses station  Outcome: Progressing  Goal: Maintain or return to baseline ADL function  Description: INTERVENTIONS:  -  Assess patient's ability to carry out ADLs; assess patient's baseline for ADL function and identify physical deficits which impact ability to perform ADLs (bathing, care of mouth/teeth, toileting, grooming, dressing, etc.)  - Assess/evaluate cause of self-care deficits   - Assess range of motion  - Assess patient's mobility; develop plan if impaired  - Assess patient's need for assistive devices and provide as appropriate  - Encourage maximum independence but intervene and supervise when necessary  - Involve family in performance of ADLs  - Assess for home care needs following discharge   - Consider OT consult to assist with ADL evaluation and planning for discharge  - Provide patient education as appropriate  Outcome: Progressing  Goal: Maintains/Returns to pre admission functional level  Description:  INTERVENTIONS:  - Perform AM-PAC 6 Click Basic Mobility/ Daily Activity assessment daily.  - Set and communicate daily mobility goal to care team and patient/family/caregiver.   - Collaborate with rehabilitation services on mobility goals if consulted  - Perform Range of Motion 3 times a day.  - Reposition patient every 3 hours.  - Dangle patient 3 times a day  - Stand patient 3 times a day  - Ambulate patient 3 times a day  - Out of bed to chair 3 times a day   - Out of bed for meals 3 times a day  - Out of bed for toileting  - Record patient progress and toleration of activity level   Outcome: Progressing     Problem: DISCHARGE PLANNING  Goal: Discharge to home or other facility with appropriate resources  Description: INTERVENTIONS:  - Identify barriers to discharge w/patient and caregiver  - Arrange for needed discharge resources and transportation as appropriate  - Identify discharge learning needs (meds, wound care, etc.)  - Arrange for interpretive services to assist at discharge as needed  - Refer to Case Management Department for coordinating discharge planning if the patient needs post-hospital services based on physician/advanced practitioner order or complex needs related to functional status, cognitive ability, or social support system  Outcome: Progressing     Problem: Knowledge Deficit  Goal: Patient/family/caregiver demonstrates understanding of disease process, treatment plan, medications, and discharge instructions  Description: Complete learning assessment and assess knowledge base.  Interventions:  - Provide teaching at level of understanding  - Provide teaching via preferred learning methods  Outcome: Progressing     Problem: Prexisting or High Potential for Compromised Skin Integrity  Goal: Skin integrity is maintained or improved  Description: INTERVENTIONS:  - Identify patients at risk for skin breakdown  - Assess and monitor skin integrity  - Assess and monitor nutrition and hydration  status  - Monitor labs   - Assess for incontinence   - Turn and reposition patient  - Assist with mobility/ambulation  - Relieve pressure over bony prominences  - Avoid friction and shearing  - Provide appropriate hygiene as needed including keeping skin clean and dry  - Evaluate need for skin moisturizer/barrier cream  - Collaborate with interdisciplinary team   - Patient/family teaching  - Consider wound care consult   Outcome: Progressing

## 2025-01-20 NOTE — NURSING NOTE
Pt having increased agitation and confusion upon waking up. Nurse able to give pt prn 2mg ativan. 2mg ativan was given, pt than began to ask nurse if ativan injection was given. Nurse explained that injection was given, pt still stating that he did not get his injection. Nurse explained again it was given, pt calming down but still agitated.

## 2025-01-20 NOTE — PROGRESS NOTES
Progress Note - Hospitalist   Name: Aniket Pace 39 y.o. male I MRN: [id number]  Unit/Bed#:  I Date of Admission: 1/15/2025   Date of Service: 1/20/2025 I Hospital Day: 5    Assessment & Plan  Alcohol withdrawal syndrome (HCC)  Presented with withdrawal seizures and delirium tremens in ED  Last consumption of alcohol on 01/15/2025 afternoon  Labs on admission with normal-mildly elevated LFTs, albumin 4.4, T bili 0.29  Negative serial troponins and UDS  Continue CIWA protocol, transition from phenobarb to scheduled clonazepam on 01/18/2025    - continue clonazepam 1mg TID  - on regular diet  - ativan prn for agitation  - imodium prn  - zofran prn for nausea  Continuous chronic alcoholism (HCC)  Encourage alcohol abstinence   Tobacco abuse  Smoking cessation counseling  Nicotine patch ordered  Hypertension  BP stable today  - continue on clonidine 0.1mg q12hr  Chronic pain  Continue home med neurontin   Primary insomnia  Continue home med trazodone prn for sleep  Bipolar 1 disorder (HCC)  Continue home med seroquel 100mg QHS  Anxiety  Continue home med Trazodone 50mg once at night for insomnia and Atarax 50mg TID for anxiety    VTE Pharmacologic Prophylaxis:  ordered lovenox and SCD or food pumps     Mobility:   JH-HLM Achieved: 1: Laying in bed        Patient Centered Rounds: I performed bedside rounds with nursing staff today.   Discussions with Specialists or Other Care Team Provider: care management     Current Length of Stay: 5 day(s)  Current Patient Status: Inpatient   Certification Statement: The patient will continue to require additional inpatient hospital stay due to alcohol withdrawal  Discharge Plan: Anticipate discharge in 24-48 hrs to home or inpatient D&A, pending.     Code Status: Level 1 - Full Code     Subjective   Patient examined bedside. He appeared agitated and asking for more Ativan. Per overnight team, patient is the same as yesterday. Constantly asking for more ativan. He reported mild  headache, nausea, tremors, and hearing auditory hallucination. Denied CP, sweating, abdominal pain.     Objective :  Temp:  [97.4 °F (36.3 °C)-97.7 °F (36.5 °C)] 97.4 °F (36.3 °C)  HR:  [80-95] 91  BP: (113-156)/() 113/70  Resp:  [18] 18  SpO2:  [97 %] 97 %  O2 Device: None (Room air)    Body mass index is 36.58 kg/m².     Input and Output Summary (last 24 hours):     Intake/Output Summary (Last 24 hours) at 1/20/2025 0953  Last data filed at 1/20/2025 0032  Gross per 24 hour   Intake 1800 ml   Output --   Net 1800 ml       Physical Exam  Vitals reviewed.   Constitutional:       Appearance: Normal appearance.      Comments: agitated   HENT:      Head: Normocephalic and atraumatic.      Right Ear: External ear normal.      Left Ear: External ear normal.      Nose: Nose normal. No congestion.      Mouth/Throat:      Mouth: Mucous membranes are moist.      Pharynx: Oropharynx is clear. No oropharyngeal exudate or posterior oropharyngeal erythema.   Eyes:      General:         Right eye: No discharge.         Left eye: No discharge.      Extraocular Movements: Extraocular movements intact.      Conjunctiva/sclera: Conjunctivae normal.   Cardiovascular:      Rate and Rhythm: Normal rate and regular rhythm.      Pulses: Normal pulses.      Heart sounds: Normal heart sounds. No murmur heard.  Pulmonary:      Effort: Pulmonary effort is normal. No respiratory distress.      Breath sounds: Rales present. No wheezing.   Abdominal:      General: Abdomen is flat. Bowel sounds are normal. There is no distension.      Palpations: Abdomen is soft.      Tenderness: There is no abdominal tenderness. There is no guarding or rebound.   Musculoskeletal:         General: Normal range of motion.      Cervical back: Normal range of motion and neck supple. No rigidity.      Right lower leg: No edema.      Left lower leg: No edema.   Skin:     General: Skin is warm.      Capillary Refill: Capillary refill takes less than 2 seconds.    Neurological:      General: No focal deficit present.      Mental Status: He is alert and oriented to person, place, and time.   Psychiatric:      Comments: Anxious and agitated             Lab Results: I have reviewed the following results:   Results from last 7 days   Lab Units 01/20/25  0416   WBC Thousand/uL 6.71   HEMOGLOBIN g/dL 13.7   HEMATOCRIT % 40.3   PLATELETS Thousands/uL 158   SEGS PCT % 59   LYMPHO PCT % 25   MONO PCT % 8   EOS PCT % 7*     Results from last 7 days   Lab Units 01/20/25  0416 01/19/25  0602 01/18/25  0501   SODIUM mmol/L 136   < > 140   POTASSIUM mmol/L 4.4   < > 4.0   CHLORIDE mmol/L 100   < > 104   CO2 mmol/L 28   < > 29   BUN mg/dL 19   < > 18   CREATININE mg/dL 0.87   < > 1.01   ANION GAP mmol/L 8   < > 7   CALCIUM mg/dL 9.1   < > 8.5   ALBUMIN g/dL  --   --  3.9   TOTAL BILIRUBIN mg/dL  --   --  0.48   ALK PHOS U/L  --   --  161*   ALT U/L  --   --  36   AST U/L  --   --  35   GLUCOSE RANDOM mg/dL 100   < > 75    < > = values in this interval not displayed.     Results from last 7 days   Lab Units 01/16/25  0610   INR  1.00                   Recent Cultures (last 7 days):         Imaging Results Review: No pertinent imaging studies reviewed.  Other Study Results Review: No additional pertinent studies reviewed.    Last 24 Hours Medication List:     Current Facility-Administered Medications:     LORazepam (ATIVAN) 2 mg/mL injection **ADS Override Pull**,     acetaminophen (TYLENOL) tablet 650 mg, Q6H PRN    clonazePAM (KlonoPIN) tablet 1 mg, TID    cloNIDine (CATAPRES) tablet 0.1 mg, Q12H MARIUM    enoxaparin (LOVENOX) subcutaneous injection 40 mg, Daily    folic acid 1 mg, thiamine (VITAMIN B1) 100 mg in sodium chloride 0.9 % 100 mL IV piggyback, Daily    gabapentin (NEURONTIN) capsule 300 mg, TID    hydrOXYzine HCL (ATARAX) tablet 50 mg, TID    loperamide (IMODIUM) capsule 4 mg, TID PRN    LORazepam (ATIVAN) injection 2 mg, Q4H PRN    nicotine (NICODERM CQ) 14 mg/24hr TD 24 hr  patch 14 mg, Daily    ondansetron (ZOFRAN) injection 4 mg, Q4H PRN    pantoprazole (PROTONIX) injection 40 mg, Q12H MARIUM    QUEtiapine (SEROquel) tablet 100 mg, HS    traZODone (DESYREL) tablet 50 mg, HS    Administrative Statements   Today, Patient Was Seen By: Chacho Garrett, DO  I have spent a total time of 20 minutes in caring for this patient on the day of the visit/encounter including Impressions, Counseling / Coordination of care, Documenting in the medical record, Reviewing / ordering tests, medicine, procedures  , Obtaining or reviewing history  , and Communicating with other healthcare professionals .    **Please Note: This note may have been constructed using a voice recognition system.**

## 2025-01-20 NOTE — PLAN OF CARE
Problem: Potential for Falls  Goal: Patient will remain free of falls  Description: INTERVENTIONS:  - Educate patient/family on patient safety including physical limitations  - Instruct patient to call for assistance with activity   - Consult OT/PT to assist with strengthening/mobility   - Keep Call bell within reach  - Keep bed low and locked with side rails adjusted as appropriate  - Keep care items and personal belongings within reach  - Initiate and maintain comfort rounds  - Make Fall Risk Sign visible to staff  - Offer Toileting every 2 Hours, in advance of need  - Initiate/Maintain bedalarm  - Obtain necessary fall risk management equipment:   - Apply yellow socks and bracelet for high fall risk patients  - Consider moving patient to room near nurses station  Outcome: Progressing     Problem: PAIN - ADULT  Goal: Verbalizes/displays adequate comfort level or baseline comfort level  Description: Interventions:  - Encourage patient to monitor pain and request assistance  - Assess pain using appropriate pain scale  - Administer analgesics based on type and severity of pain and evaluate response  - Implement non-pharmacological measures as appropriate and evaluate response  - Consider cultural and social influences on pain and pain management  - Notify physician/advanced practitioner if interventions unsuccessful or patient reports new pain  Outcome: Progressing     Problem: INFECTION - ADULT  Goal: Absence or prevention of progression during hospitalization  Description: INTERVENTIONS:  - Assess and monitor for signs and symptoms of infection  - Monitor lab/diagnostic results  - Monitor all insertion sites, i.e. indwelling lines, tubes, and drains  - Monitor endotracheal if appropriate and nasal secretions for changes in amount and color  - Martin appropriate cooling/warming therapies per order  - Administer medications as ordered  - Instruct and encourage patient and family to use good hand hygiene  technique  - Identify and instruct in appropriate isolation precautions for identified infection/condition  Outcome: Progressing  Goal: Absence of fever/infection during neutropenic period  Description: INTERVENTIONS:  - Monitor WBC    Outcome: Progressing     Problem: SAFETY ADULT  Goal: Patient will remain free of falls  Description: INTERVENTIONS:  - Educate patient/family on patient safety including physical limitations  - Instruct patient to call for assistance with activity   - Consult OT/PT to assist with strengthening/mobility   - Keep Call bell within reach  - Keep bed low and locked with side rails adjusted as appropriate  - Keep care items and personal belongings within reach  - Initiate and maintain comfort rounds  - Make Fall Risk Sign visible to staff  - Offer Toileting every 2 Hours, in advance of need  - Initiate/Maintain bedalarm  - Obtain necessary fall risk management equipment:   - Apply yellow socks and bracelet for high fall risk patients  - Consider moving patient to room near nurses station  Outcome: Progressing  Goal: Maintain or return to baseline ADL function  Description: INTERVENTIONS:  -  Assess patient's ability to carry out ADLs; assess patient's baseline for ADL function and identify physical deficits which impact ability to perform ADLs (bathing, care of mouth/teeth, toileting, grooming, dressing, etc.)  - Assess/evaluate cause of self-care deficits   - Assess range of motion  - Assess patient's mobility; develop plan if impaired  - Assess patient's need for assistive devices and provide as appropriate  - Encourage maximum independence but intervene and supervise when necessary  - Involve family in performance of ADLs  - Assess for home care needs following discharge   - Consider OT consult to assist with ADL evaluation and planning for discharge  - Provide patient education as appropriate  Outcome: Progressing  Goal: Maintains/Returns to pre admission functional level  Description:  INTERVENTIONS:  - Perform AM-PAC 6 Click Basic Mobility/ Daily Activity assessment daily.  - Set and communicate daily mobility goal to care team and patient/family/caregiver.   - Collaborate with rehabilitation services on mobility goals if consulted  - Perform Range of Motion 3 times a day.  - Reposition patient every 3 hours.  - Dangle patient 3 times a day  - Stand patient 3 times a day  - Ambulate patient 3 times a day  - Out of bed to chair 3 times a day   - Out of bed for meals 3 times a day  - Out of bed for toileting  - Record patient progress and toleration of activity level   Outcome: Progressing     Problem: DISCHARGE PLANNING  Goal: Discharge to home or other facility with appropriate resources  Description: INTERVENTIONS:  - Identify barriers to discharge w/patient and caregiver  - Arrange for needed discharge resources and transportation as appropriate  - Identify discharge learning needs (meds, wound care, etc.)  - Arrange for interpretive services to assist at discharge as needed  - Refer to Case Management Department for coordinating discharge planning if the patient needs post-hospital services based on physician/advanced practitioner order or complex needs related to functional status, cognitive ability, or social support system  Outcome: Progressing     Problem: Knowledge Deficit  Goal: Patient/family/caregiver demonstrates understanding of disease process, treatment plan, medications, and discharge instructions  Description: Complete learning assessment and assess knowledge base.  Interventions:  - Provide teaching at level of understanding  - Provide teaching via preferred learning methods  Outcome: Progressing     Problem: Prexisting or High Potential for Compromised Skin Integrity  Goal: Skin integrity is maintained or improved  Description: INTERVENTIONS:  - Identify patients at risk for skin breakdown  - Assess and monitor skin integrity  - Assess and monitor nutrition and hydration  status  - Monitor labs   - Assess for incontinence   - Turn and reposition patient  - Assist with mobility/ambulation  - Relieve pressure over bony prominences  - Avoid friction and shearing  - Provide appropriate hygiene as needed including keeping skin clean and dry  - Evaluate need for skin moisturizer/barrier cream  - Collaborate with interdisciplinary team   - Patient/family teaching  - Consider wound care consult   Outcome: Progressing

## 2025-01-20 NOTE — ASSESSMENT & PLAN NOTE
Presented with withdrawal seizures and delirium tremens in ED  Last consumption of alcohol on 01/15/2025 afternoon  Labs on admission with normal-mildly elevated LFTs, albumin 4.4, T bili 0.29  Negative serial troponins and UDS  Continue CIWA protocol, transition from phenobarb to scheduled clonazepam on 01/18/2025    - continue clonazepam 1mg TID  - on regular diet  - ativan prn for agitation  - imodium prn  - zofran prn for nausea

## 2025-01-21 VITALS
DIASTOLIC BLOOD PRESSURE: 79 MMHG | RESPIRATION RATE: 22 BRPM | HEART RATE: 67 BPM | WEIGHT: 225.53 LBS | BODY MASS INDEX: 36.25 KG/M2 | OXYGEN SATURATION: 98 % | SYSTOLIC BLOOD PRESSURE: 132 MMHG | TEMPERATURE: 98.2 F | HEIGHT: 66 IN

## 2025-01-21 LAB
ALBUMIN SERPL BCG-MCNC: 4.2 G/DL (ref 3.5–5)
ALP SERPL-CCNC: 177 U/L (ref 34–104)
ALT SERPL W P-5'-P-CCNC: 72 U/L (ref 7–52)
ANION GAP SERPL CALCULATED.3IONS-SCNC: 6 MMOL/L (ref 4–13)
AST SERPL W P-5'-P-CCNC: 67 U/L (ref 13–39)
BASOPHILS # BLD AUTO: 0.03 THOUSANDS/ΜL (ref 0–0.1)
BASOPHILS NFR BLD AUTO: 1 % (ref 0–1)
BILIRUB SERPL-MCNC: 0.26 MG/DL (ref 0.2–1)
BUN SERPL-MCNC: 22 MG/DL (ref 5–25)
CALCIUM SERPL-MCNC: 8.9 MG/DL (ref 8.4–10.2)
CHLORIDE SERPL-SCNC: 101 MMOL/L (ref 96–108)
CO2 SERPL-SCNC: 29 MMOL/L (ref 21–32)
CREAT SERPL-MCNC: 0.87 MG/DL (ref 0.6–1.3)
EOSINOPHIL # BLD AUTO: 0.29 THOUSAND/ΜL (ref 0–0.61)
EOSINOPHIL NFR BLD AUTO: 6 % (ref 0–6)
ERYTHROCYTE [DISTWIDTH] IN BLOOD BY AUTOMATED COUNT: 13 % (ref 11.6–15.1)
GFR SERPL CREATININE-BSD FRML MDRD: 110 ML/MIN/1.73SQ M
GLUCOSE SERPL-MCNC: 94 MG/DL (ref 65–140)
HCT VFR BLD AUTO: 38.7 % (ref 36.5–49.3)
HGB BLD-MCNC: 13.1 G/DL (ref 12–17)
IMM GRANULOCYTES # BLD AUTO: 0.04 THOUSAND/UL (ref 0–0.2)
IMM GRANULOCYTES NFR BLD AUTO: 1 % (ref 0–2)
LYMPHOCYTES # BLD AUTO: 1.43 THOUSANDS/ΜL (ref 0.6–4.47)
LYMPHOCYTES NFR BLD AUTO: 29 % (ref 14–44)
MAGNESIUM SERPL-MCNC: 2.1 MG/DL (ref 1.9–2.7)
MCH RBC QN AUTO: 30.3 PG (ref 26.8–34.3)
MCHC RBC AUTO-ENTMCNC: 33.9 G/DL (ref 31.4–37.4)
MCV RBC AUTO: 90 FL (ref 82–98)
MONOCYTES # BLD AUTO: 0.44 THOUSAND/ΜL (ref 0.17–1.22)
MONOCYTES NFR BLD AUTO: 9 % (ref 4–12)
NEUTROPHILS # BLD AUTO: 2.63 THOUSANDS/ΜL (ref 1.85–7.62)
NEUTS SEG NFR BLD AUTO: 54 % (ref 43–75)
NRBC BLD AUTO-RTO: 0 /100 WBCS
PLATELET # BLD AUTO: 146 THOUSANDS/UL (ref 149–390)
PMV BLD AUTO: 11.1 FL (ref 8.9–12.7)
POTASSIUM SERPL-SCNC: 4.2 MMOL/L (ref 3.5–5.3)
PROT SERPL-MCNC: 6.3 G/DL (ref 6.4–8.4)
RBC # BLD AUTO: 4.32 MILLION/UL (ref 3.88–5.62)
SODIUM SERPL-SCNC: 136 MMOL/L (ref 135–147)
WBC # BLD AUTO: 4.86 THOUSAND/UL (ref 4.31–10.16)

## 2025-01-21 PROCEDURE — 80053 COMPREHEN METABOLIC PANEL: CPT | Performed by: INTERNAL MEDICINE

## 2025-01-21 PROCEDURE — NC001 PR NO CHARGE: Performed by: FAMILY MEDICINE

## 2025-01-21 PROCEDURE — 99238 HOSP IP/OBS DSCHRG MGMT 30/<: CPT | Performed by: FAMILY MEDICINE

## 2025-01-21 PROCEDURE — 83735 ASSAY OF MAGNESIUM: CPT | Performed by: INTERNAL MEDICINE

## 2025-01-21 PROCEDURE — 85025 COMPLETE CBC W/AUTO DIFF WBC: CPT | Performed by: INTERNAL MEDICINE

## 2025-01-21 RX ORDER — LORAZEPAM 2 MG/ML
4 INJECTION INTRAMUSCULAR ONCE
Status: DISCONTINUED | OUTPATIENT
Start: 2025-01-21 | End: 2025-01-21

## 2025-01-21 RX ORDER — LORAZEPAM 2 MG/ML
2 INJECTION INTRAMUSCULAR EVERY 4 HOURS PRN
Status: DISCONTINUED | OUTPATIENT
Start: 2025-01-21 | End: 2025-01-21 | Stop reason: HOSPADM

## 2025-01-21 RX ORDER — CHLORDIAZEPOXIDE HYDROCHLORIDE 25 MG/1
25 CAPSULE, GELATIN COATED ORAL EVERY 8 HOURS SCHEDULED
Status: DISCONTINUED | OUTPATIENT
Start: 2025-01-21 | End: 2025-01-21 | Stop reason: HOSPADM

## 2025-01-21 RX ADMIN — THIAMINE HYDROCHLORIDE: 100 INJECTION, SOLUTION INTRAMUSCULAR; INTRAVENOUS at 10:40

## 2025-01-21 RX ADMIN — LORAZEPAM 2 MG: 2 INJECTION INTRAMUSCULAR; INTRAVENOUS at 10:36

## 2025-01-21 RX ADMIN — CHLORDIAZEPOXIDE HYDROCHLORIDE 25 MG: 25 CAPSULE ORAL at 14:37

## 2025-01-21 RX ADMIN — CHLORDIAZEPOXIDE HYDROCHLORIDE 25 MG: 25 CAPSULE ORAL at 06:07

## 2025-01-21 RX ADMIN — PANTOPRAZOLE SODIUM 40 MG: 40 TABLET, DELAYED RELEASE ORAL at 06:07

## 2025-01-21 RX ADMIN — IBUPROFEN 600 MG: 600 TABLET, FILM COATED ORAL at 06:50

## 2025-01-21 RX ADMIN — LORAZEPAM 2 MG: 2 INJECTION INTRAMUSCULAR; INTRAVENOUS at 02:38

## 2025-01-21 RX ADMIN — GABAPENTIN 300 MG: 300 CAPSULE ORAL at 09:02

## 2025-01-21 RX ADMIN — LORAZEPAM 2 MG: 2 INJECTION INTRAMUSCULAR; INTRAVENOUS at 06:36

## 2025-01-21 RX ADMIN — LORAZEPAM 2 MG: 2 INJECTION INTRAMUSCULAR; INTRAVENOUS at 14:37

## 2025-01-21 RX ADMIN — HYDROXYZINE HYDROCHLORIDE 50 MG: 25 TABLET ORAL at 09:02

## 2025-01-21 RX ADMIN — ENOXAPARIN SODIUM 40 MG: 40 INJECTION SUBCUTANEOUS at 09:01

## 2025-01-21 NOTE — CASE MANAGEMENT
Case Management Discharge Planning Note    Patient name Aniket Pace  Location /410-01 MRN [id number]  : 1985 Date 2025       Current Admission Date: 1/15/2025  Current Admission Diagnosis:Alcohol withdrawal syndrome (HCC)   Patient Active Problem List    Diagnosis Date Noted Date Diagnosed    Chronic pain 2025     Elevated LFTs 2024     Severe protein-calorie malnutrition (HCC) 2024     Wheezing 2024     Hyponatremia 2024     Alcohol withdrawal delirium, acute, hyperactive (HCC) 2024     Alcohol withdrawal syndrome (HCC) 2024     Alcohol withdrawal seizure ([facility]) 2024     HARRIET (acute kidney injury) ([facility]) 2024     Non-traumatic rhabdomyolysis 2024     Hypotension due to hypovolemia 2024     Primary insomnia 2024     Alcohol withdrawal seizure without complication ([facility]) 2023     Bipolar 1 disorder ([facility]) 2023     Hypertension 2023     Benzodiazepine dependence ([facility]) 2022     Unsteady gait 2022     Opioid use disorder, moderate, on maintenance therapy, dependence ([facility]) 2022     Continuous chronic alcoholism ([facility]) 2022     PTSD (post-traumatic stress disorder) 2022     Tobacco abuse 2022     Transaminitis 2022     Anxiety 2022     Encounter for tobacco use cessation counseling 2022     Opioid dependence with opioid-induced mood disorder ([facility]) 2022     Seizure ([facility]) 2022     Opioid use disorder 2021     Tachycardia 2015       LOS (days): 6  Geometric Mean LOS (GMLOS) (days): 2.6  Days to GMLOS:-2.8     OBJECTIVE:  Risk of Unplanned Readmission Score: 19.75         Current admission status: Inpatient   Preferred Pharmacy:   [id number] - [place] - [address]  [address] [facility]  [place] [address]  Phone: [phone] Fax: [phone]    [id number] - [place], [place] - [address]  [address]  "[facility] [address]  Phone: [phone] Fax: [phone]    [id number] [place] - [address]  [address]  [place] [place] [address]  Phone: [phone] Fax: [phone]    [id number] - [place] - [address]  [address] [place] [address]  Phone: [phone] Fax: [phone]    Primary Care Provider: Edenilson Collier MD    Primary Insurance: [employer]  Secondary Insurance:     DISCHARGE DETAILS:        CM spoke with patient concerning D&A rehab on discharge hospital.   At first patient said no then after discussion patient stated maybe he will go.   CM encouraged to return to D&A rehab that the more time sober will increase his chance of staying sober once discharge back home.   Patient stated it all started with a girl this time. Started they were drinking and He stated he couldn't stop drinking.    CM encouraged again more reason to return to D&A rehab to remain sober for extended time will increase change to remain sober going forward.  Patient stated,\"Maybe I will go\".    CM to follow.                                                                                                                 "

## 2025-01-21 NOTE — UTILIZATION REVIEW
NOTIFICATION OF ADMISSION DISCHARGE   This is a Notification of Discharge from [facility]. Please be advised that this patient has been discharge from our facility. Below you will find the admission and discharge date and time including the patient’s disposition.   UTILIZATION REVIEW CONTACT:  Sae Stubbs  Utilization   Network Utilization Review Department  Phone: [phone] x carefully listen to the prompts. All voicemails are confidential.  Email: [email or web address]     ADMISSION INFORMATION  PRESENTATION DATE: 1/15/2025 10:10 PM  OBERVATION ADMISSION DATE: N/A  INPATIENT ADMISSION DATE: 1/15/25 11:59 PM   DISCHARGE DATE: 1/21/2025  2:57 PM   DISPOSITION:Left against medical advice or discontinued care    Network Utilization Review Department  ATTENTION: Please call with any questions or concerns to [phone] and carefully listen to the prompts so that you are directed to the right person. All voicemails are confidential.   For Discharge needs, contact Care Management DC Support Team at [phone] opt. [phone]  Send all requests for admission clinical reviews, approved or denied determinations and any other requests to dedicated fax number below belonging to the campus where the patient is receiving treatment. List of dedicated fax numbers for the Facilities:  FACILITY NAME UR FAX NUMBER   ADMISSION DENIALS (Administrative/Medical Necessity) [phone]   DISCHARGE SUPPORT TEAM ([facility]) [phone]   PARENT CHILD HEALTH (Maternity/NICU/Pediatrics) [phone]   [facility] [phone]   [facility] [phone]   Critical access hospital [phone]   [facility] [phone]   [facility] [phone]   [facility] [phone]   [facility] [phone]   [facility]  [facility] [phone]   [facility] [phone]   [facility] [phone]   [facility] [phone]   [facility] [phone]

## 2025-01-21 NOTE — DISCHARGE SUMMARY
Discharge Summary - Hospitalist   Name: Aniket Pace 39 y.o. male I MRN: [id number]  Unit/Bed#: 410-01 I Date of Admission: 1/15/2025   Date of Service: 1/21/2025 I Hospital Day: 6     Assessment & Plan  Alcohol withdrawal syndrome (HCC)  Presented with withdrawal seizures and delirium tremens in ED  Last consumption of alcohol on 01/15/2025 afternoon  Labs on admission with normal-mildly elevated LFTs, albumin 4.4, T bili 0.29  Negative serial troponins and UDS  Continue CIWA protocol, transition from phenobarb to scheduled clonazepam on 01/18/2025      Transitioned to librium, qid and now ill taper to TID, anticipate dc tomorrow   - on regular diet  - ativan prn for agitation  - imodium prn  - zofran prn for nausea      Patient eloped and did not sign any paperwork, I did not speak with him as he left the floor prior to my arrival  Continuous chronic alcoholism (HCC)  Encourage alcohol abstinence   Tobacco abuse  Smoking cessation counseling  Nicotine patch ordered  Hypertension  BP stable today  - continue on clonidine 0.1mg q12hr  Chronic pain  Continue home med neurontin   Primary insomnia  Continue home med trazodone prn for sleep  Bipolar 1 disorder (HCC)  Continue home med seroquel 100mg QHS  Anxiety  Continue home med Trazodone 50mg once at night for insomnia and Atarax 50mg TID for anxiety     Medical Problems       Resolved Problems  Date Reviewed: 1/16/2025          Resolved    Hypokalemia 1/16/2025     Resolved by  Chacho Garrett DO    Hypomagnesemia 1/16/2025     Resolved by  Chacho Garrett DO        Discharging Physician / Practitioner: Mandeep Ford MD  PCP: Edenilson Collier MD  Admission Date:   Admission Orders (From admission, onward)       Ordered        01/15/25 8129  INPATIENT ADMISSION  Once                          Discharge Date: 01/21/25    Consultations During Hospital Stay:      Procedures Performed:       Significant Findings / Test Results:       Incidental Findings:          Test Results  Pending at Discharge (will require follow up):        Outpatient Tests Requested:      Complications:      Reason for Admission: ETOH withdrawl    Hospital Course:   Aniket Pace is a 39 y.o. male patient who originally presented to the hospital on 1/15/2025 due to alcohol withdrawal. He was treated with benzodiazpenes, did not tolerate phenobarb and was on CIWA protocol    Patient was verbally abusive to medical staff including physicians and nurses. He demanded medications at certain times and would follow staff into other patient room. I spoke with him about the treatment plan and how to properly conduct himself with the hospital staff (security and nursing were present) patient was beligerant and belittling so after I explained the plan I walked out of his room. Shortly after he eloped          Please see above list of diagnoses and related plan for additional information.     Condition at Discharge: fair    Discharge Day Visit / Exam:       Discussion with Family:     Discharge instructions/Information to patient and family:   See after visit summary for information provided to patient and family.      Provisions for Follow-Up Care:  See after visit summary for information related to follow-up care and any pertinent home health orders.      Mobility at time of Discharge:   Basic Mobility Inpatient Raw Score: 24  JH-HLM Goal: 8: Walk 250 feet or more  JH-HLM Achieved: 8: Walk 250 feet ot more  HLM Goal achieved. Continue to encourage appropriate mobility.     Disposition:   Other: Eloped AMA    Planned Readmission: No    Discharge Medications:  See after visit summary for reconciled discharge medications provided to patient and/or family.      Administrative Statements   Discharge Statement:  I have spent a total time of 15 minutes in caring for this patient on the day of the visit/encounter. .    **Please Note: This note may have been constructed using a voice recognition system**

## 2025-01-21 NOTE — PLAN OF CARE
Problem: Potential for Falls  Goal: Patient will remain free of falls  Description: INTERVENTIONS:  - Educate patient/family on patient safety including physical limitations  - Instruct patient to call for assistance with activity   - Consult OT/PT to assist with strengthening/mobility   - Keep Call bell within reach  - Keep bed low and locked with side rails adjusted as appropriate  - Keep care items and personal belongings within reach  - Initiate and maintain comfort rounds  - Make Fall Risk Sign visible to staff  - Offer Toileting every 2 Hours, in advance of need  - Initiate/Maintain bedalarm  - Obtain necessary fall risk management equipment:   - Apply yellow socks and bracelet for high fall risk patients  - Consider moving patient to room near nurses station  Outcome: Progressing     Problem: PAIN - ADULT  Goal: Verbalizes/displays adequate comfort level or baseline comfort level  Description: Interventions:  - Encourage patient to monitor pain and request assistance  - Assess pain using appropriate pain scale  - Administer analgesics based on type and severity of pain and evaluate response  - Implement non-pharmacological measures as appropriate and evaluate response  - Consider cultural and social influences on pain and pain management  - Notify physician/advanced practitioner if interventions unsuccessful or patient reports new pain  Outcome: Progressing     Problem: INFECTION - ADULT  Goal: Absence or prevention of progression during hospitalization  Description: INTERVENTIONS:  - Assess and monitor for signs and symptoms of infection  - Monitor lab/diagnostic results  - Monitor all insertion sites, i.e. indwelling lines, tubes, and drains  - Monitor endotracheal if appropriate and nasal secretions for changes in amount and color  - [place] appropriate cooling/warming therapies per order  - Administer medications as ordered  - Instruct and encourage patient and family to use good hand hygiene  technique  - Identify and instruct in appropriate isolation precautions for identified infection/condition  Outcome: Progressing  Goal: Absence of fever/infection during neutropenic period  Description: INTERVENTIONS:  - Monitor WBC    Outcome: Progressing     Problem: SAFETY ADULT  Goal: Patient will remain free of falls  Description: INTERVENTIONS:  - Educate patient/family on patient safety including physical limitations  - Instruct patient to call for assistance with activity   - Consult OT/PT to assist with strengthening/mobility   - Keep Call bell within reach  - Keep bed low and locked with side rails adjusted as appropriate  - Keep care items and personal belongings within reach  - Initiate and maintain comfort rounds  - Make Fall Risk Sign visible to staff  - Offer Toileting every 2 Hours, in advance of need  - Initiate/Maintain bedalarm  - Obtain necessary fall risk management equipment:   - Apply yellow socks and bracelet for high fall risk patients  - Consider moving patient to room near nurses station  Outcome: Progressing  Goal: Maintain or return to baseline ADL function  Description: INTERVENTIONS:  -  Assess patient's ability to carry out ADLs; assess patient's baseline for ADL function and identify physical deficits which impact ability to perform ADLs (bathing, care of mouth/teeth, toileting, grooming, dressing, etc.)  - Assess/evaluate cause of self-care deficits   - Assess range of motion  - Assess patient's mobility; develop plan if impaired  - Assess patient's need for assistive devices and provide as appropriate  - Encourage maximum independence but intervene and supervise when necessary  - Involve family in performance of ADLs  - Assess for home care needs following discharge   - Consider OT consult to assist with ADL evaluation and planning for discharge  - Provide patient education as appropriate  Outcome: Progressing  Goal: Maintains/Returns to pre admission functional level  Description:  INTERVENTIONS:  - Perform AM-PAC 6 Click Basic Mobility/ Daily Activity assessment daily.  - Set and communicate daily mobility goal to care team and patient/family/caregiver.   - Collaborate with rehabilitation services on mobility goals if consulted  - Perform Range of Motion 3 times a day.  - Reposition patient every 3 hours.  - Dangle patient 3 times a day  - Stand patient 3 times a day  - Ambulate patient 3 times a day  - Out of bed to chair 3 times a day   - Out of bed for meals 3 times a day  - Out of bed for toileting  - Record patient progress and toleration of activity level   Outcome: Progressing     Problem: DISCHARGE PLANNING  Goal: Discharge to home or other facility with appropriate resources  Description: INTERVENTIONS:  - Identify barriers to discharge w/patient and caregiver  - Arrange for needed discharge resources and transportation as appropriate  - Identify discharge learning needs (meds, wound care, etc.)  - Arrange for interpretive services to assist at discharge as needed  - Refer to Case Management Department for coordinating discharge planning if the patient needs post-hospital services based on physician/advanced practitioner order or complex needs related to functional status, cognitive ability, or social support system  Outcome: Progressing     Problem: Knowledge Deficit  Goal: Patient/family/caregiver demonstrates understanding of disease process, treatment plan, medications, and discharge instructions  Description: Complete learning assessment and assess knowledge base.  Interventions:  - Provide teaching at level of understanding  - Provide teaching via preferred learning methods  Outcome: Progressing     Problem: Prexisting or High Potential for Compromised Skin Integrity  Goal: Skin integrity is maintained or improved  Description: INTERVENTIONS:  - Identify patients at risk for skin breakdown  - Assess and monitor skin integrity  - Assess and monitor nutrition and hydration  status  - Monitor labs   - Assess for incontinence   - Turn and reposition patient  - Assist with mobility/ambulation  - Relieve pressure over bony prominences  - Avoid friction and shearing  - Provide appropriate hygiene as needed including keeping skin clean and dry  - Evaluate need for skin moisturizer/barrier cream  - Collaborate with interdisciplinary team   - Patient/family teaching  - Consider wound care consult   Outcome: Progressing

## 2025-01-21 NOTE — PROGRESS NOTES
Pt informed RN that he called an uber and is leaving.  Encouraged pt to stay and wait for arrangements to be made to go to [facility] for rehab.  Pt refused and left floor ambulatory.

## 2025-01-21 NOTE — PROGRESS NOTES
Progress Note - Hospitalist   Name: Aniket Pace 39 y.o. male I MRN: [id number]  Unit/Bed#: 410-01 I Date of Admission: 1/15/2025   Date of Service: 1/21/2025 I Hospital Day: 6    Assessment & Plan  Alcohol withdrawal syndrome (HCC)  Presented with withdrawal seizures and delirium tremens in ED  Last consumption of alcohol on 01/15/2025 afternoon  Labs on admission with normal-mildly elevated LFTs, albumin 4.4, T bili 0.29  Negative serial troponins and UDS  Continue CIWA protocol, transition from phenobarb to scheduled clonazepam on 01/18/2025      Transitioned to librium, qid and now ill taper to TID, anticipate dc tomorrow   - on regular diet  - ativan prn for agitation  - imodium prn  - zofran prn for nausea  Continuous chronic alcoholism (HCC)  Encourage alcohol abstinence   Tobacco abuse  Smoking cessation counseling  Nicotine patch ordered  Hypertension  BP stable today  - continue on clonidine 0.1mg q12hr  Chronic pain  Continue home med neurontin   Primary insomnia  Continue home med trazodone prn for sleep  Bipolar 1 disorder (HCC)  Continue home med seroquel 100mg QHS  Anxiety  Continue home med Trazodone 50mg once at night for insomnia and Atarax 50mg TID for anxiety    VTE Pharmacologic Prophylaxis:   Moderate Risk (Score 3-4) - Pharmacological DVT Prophylaxis Ordered: enoxaparin (Lovenox).    Mobility:   Basic Mobility Inpatient Raw Score: 24  JH-HLM Goal: 8: Walk 250 feet or more  JH-HLM Achieved: 8: Walk 250 feet ot more  JH-HLM Goal achieved. Continue to encourage appropriate mobility.    Patient Centered Rounds: I performed bedside rounds with nursing staff today.   Discussions with Specialists or Other Care Team Provider:     Education and Discussions with Family / Patient:     Current Length of Stay: 6 day(s)  Current Patient Status: Inpatient   Certification Statement:   Discharge Plan: Anticipate discharge in 24-48 hrs to home.    Code Status: Level 1 - Full Code    Subjective   Seen and  examined at bedside  He's been aggressive with staff and verbally abusive to myself and other medical providers  I explained firmly that it's not appropriate and abuse as well as hostility towards staff will not be tolerated      I asked case mgmt to assess if [facility] can accept him    Objective :  Temp:  [97.5 °F (36.4 °C)-98.2 °F (36.8 °C)] 98.2 °F (36.8 °C)  HR:  [67-99] 67  BP: (111-135)/() 132/79  Resp:  [16-22] 22  SpO2:  [95 %-98 %] 98 %  O2 Device: None (Room air)    Body mass index is 36.4 kg/m².     Input and Output Summary (last 24 hours):     Intake/Output Summary (Last 24 hours) at 1/21/2025 1133  Last data filed at 1/21/2025 0534  Gross per 24 hour   Intake 2720 ml   Output --   Net 2720 ml       Physical Exam  Constitutional:       General: He is not in acute distress.     Appearance: Normal appearance. He is not ill-appearing.   Cardiovascular:      Rate and Rhythm: Normal rate.      Pulses: Normal pulses.   Pulmonary:      Effort: Pulmonary effort is normal.   Musculoskeletal:      Right lower leg: No edema.      Left lower leg: No edema.   Skin:     General: Skin is warm.   Neurological:      General: No focal deficit present.      Mental Status: He is alert and oriented to person, place, and time.      Cranial Nerves: No cranial nerve deficit.      Motor: No weakness.   Psychiatric:         Mood and Affect: Mood normal.           Lines/Drains:              Lab Results: I have reviewed the following results:   Results from last 7 days   Lab Units 01/21/25  0534   WBC Thousand/uL 4.86   HEMOGLOBIN g/dL 13.1   HEMATOCRIT % 38.7   PLATELETS Thousands/uL 146*   SEGS PCT % 54   LYMPHO PCT % 29   MONO PCT % 9   EOS PCT % 6     Results from last 7 days   Lab Units 01/21/25  0534   SODIUM mmol/L 136   POTASSIUM mmol/L 4.2   CHLORIDE mmol/L 101   CO2 mmol/L 29   BUN mg/dL 22   CREATININE mg/dL 0.87   ANION GAP mmol/L 6   CALCIUM mg/dL 8.9   ALBUMIN g/dL 4.2   TOTAL BILIRUBIN mg/dL 0.26   ALK  PHOS U/L 177*   ALT U/L 72*   AST U/L 67*   GLUCOSE RANDOM mg/dL 94     Results from last 7 days   Lab Units 01/16/25  0610   INR  1.00                   Recent Cultures (last 7 days):         Imaging Results Review: No pertinent imaging studies reviewed.      Last 24 Hours Medication List:     Current Facility-Administered Medications:     acetaminophen (TYLENOL) tablet 650 mg, Q6H PRN    chlordiazePOXIDE (LIBRIUM) capsule 25 mg, Q8H MARIUM    cloNIDine (CATAPRES) tablet 0.1 mg, Q12H MARIUM    enoxaparin (LOVENOX) subcutaneous injection 40 mg, Daily    folic acid 1 mg, thiamine (VITAMIN B1) 100 mg in sodium chloride 0.9 % 100 mL IV piggyback, Daily    gabapentin (NEURONTIN) capsule 300 mg, TID    hydrOXYzine HCL (ATARAX) tablet 50 mg, TID    ibuprofen (MOTRIN) tablet 600 mg, Q6H PRN    loperamide (IMODIUM) capsule 4 mg, TID PRN    LORazepam (ATIVAN) injection 2 mg, Q4H PRN    melatonin tablet 6 mg, HS PRN    nicotine (NICODERM CQ) 14 mg/24hr TD 24 hr patch 14 mg, Daily    ondansetron (ZOFRAN) injection 4 mg, Q4H PRN    pantoprazole (PROTONIX) EC tablet 40 mg, Early Morning    QUEtiapine (SEROquel) tablet 100 mg, HS    traZODone (DESYREL) tablet 50 mg, HS    Administrative Statements   Today, Patient Was Seen By: Mandeep Ford MD      **Please Note: This note may have been constructed using a voice recognition system.**

## 2025-01-21 NOTE — ASSESSMENT & PLAN NOTE
Presented with withdrawal seizures and delirium tremens in ED  Last consumption of alcohol on 01/15/2025 afternoon  Labs on admission with normal-mildly elevated LFTs, albumin 4.4, T bili 0.29  Negative serial troponins and UDS  Continue CIWA protocol, transition from phenobarb to scheduled clonazepam on 01/18/2025      Transitioned to librium, qid and now ill taper to TID, anticipate dc tomorrow   - on regular diet  - ativan prn for agitation  - imodium prn  - zofran prn for nausea      Patient eloped and did not sign any paperwork, I did not speak with him as he left the floor prior to my arrival

## 2025-01-21 NOTE — NURSING NOTE
RN entered patient room to answer call light. Patient observed with large amount of $20 dollar bills that he was counting. RN offered to have security lock up money. Patient refused and placed money in his sock at this time. No further needs identified.

## 2025-01-21 NOTE — NURSING NOTE
"Delayed entry due to patient care    Patient was asking for 4mg IV Ativan from the start of my shift. Patient was initially on CIWA protocol but it was discontinued by provider overnight as patient was exaggerating his symptoms. Patient was clearly aware of what he needed to say to get the highest score possible. Patient was c/o nausea but continually eating throughout the shift. He had about 12 pudding cups, 2 jello cups, 2 sandwiches and multiple sodas. I asked patient if he needed zofran and advised him to slow down on eating since he was nauseas and he replied with \"don't make fun of my eating habits!\" He appeared  calm and at rest until he was due for his prn ativan or his CIWA assessment was due. Patient would ring call bell and ask to be assessed. During CIWA assessment, patient stated \" I have a severe headache, I have tremors,I'm nauseated, I'm feeling pins and needles, I'm anxious, agitated.\" Patient stated he was also seeing and hearing  family members. Upon visual assessment, patient appeared mildly anxious and agitated.  Patient seemed to know what to say and kept asking me to \"be nice and give him a high score.\" I told him multiple times that I had to follow protocol. Patient was angry all night and was blaming us for the way he was feeling. Patient stated that he was not withdrawing from alcohol but blamed it on all the Ativan he was getting when he first got here. Patient was also threatening to leave AMA, when offered paperwork patient refused. Provider came to bedside to evaluate patient. Patient became instantly agitated, aggressive and verbally abusive, accusing the provider of being \"racist\". Security was called which caused patient to became more aggressive and agitated towards . Patient was informed that he was no longer on CIWA but remains on prn ativan to help with agitation and anxiety.   "

## 2025-01-21 NOTE — PLAN OF CARE
Problem: Potential for Falls  Goal: Patient will remain free of falls  Description: INTERVENTIONS:  - Educate patient/family on patient safety including physical limitations  - Instruct patient to call for assistance with activity   - Consult OT/PT to assist with strengthening/mobility   - Keep Call bell within reach  - Keep bed low and locked with side rails adjusted as appropriate  - Keep care items and personal belongings within reach  - Initiate and maintain comfort rounds  - Make Fall Risk Sign visible to staff  - Offer Toileting every 2 Hours, in advance of need  - Initiate/Maintain bedalarm  - Obtain necessary fall risk management equipment:   - Apply yellow socks and bracelet for high fall risk patients  - Consider moving patient to room near nurses station  Outcome: Progressing     Problem: PAIN - ADULT  Goal: Verbalizes/displays adequate comfort level or baseline comfort level  Description: Interventions:  - Encourage patient to monitor pain and request assistance  - Assess pain using appropriate pain scale  - Administer analgesics based on type and severity of pain and evaluate response  - Implement non-pharmacological measures as appropriate and evaluate response  - Consider cultural and social influences on pain and pain management  - Notify physician/advanced practitioner if interventions unsuccessful or patient reports new pain  Outcome: Progressing     Problem: INFECTION - ADULT  Goal: Absence or prevention of progression during hospitalization  Description: INTERVENTIONS:  - Assess and monitor for signs and symptoms of infection  - Monitor lab/diagnostic results  - Monitor all insertion sites, i.e. indwelling lines, tubes, and drains  - Monitor endotracheal if appropriate and nasal secretions for changes in amount and color  - [place] appropriate cooling/warming therapies per order  - Administer medications as ordered  - Instruct and encourage patient and family to use good hand hygiene  technique  - Identify and instruct in appropriate isolation precautions for identified infection/condition  Outcome: Progressing     Problem: SAFETY ADULT  Goal: Patient will remain free of falls  Description: INTERVENTIONS:  - Educate patient/family on patient safety including physical limitations  - Instruct patient to call for assistance with activity   - Consult OT/PT to assist with strengthening/mobility   - Keep Call bell within reach  - Keep bed low and locked with side rails adjusted as appropriate  - Keep care items and personal belongings within reach  - Initiate and maintain comfort rounds  - Make Fall Risk Sign visible to staff  - Offer Toileting every 2 Hours, in advance of need  - Initiate/Maintain bedalarm  - Obtain necessary fall risk management equipment:   - Apply yellow socks and bracelet for high fall risk patients  - Consider moving patient to room near nurses station  Outcome: Progressing  Goal: Maintain or return to baseline ADL function  Description: INTERVENTIONS:  -  Assess patient's ability to carry out ADLs; assess patient's baseline for ADL function and identify physical deficits which impact ability to perform ADLs (bathing, care of mouth/teeth, toileting, grooming, dressing, etc.)  - Assess/evaluate cause of self-care deficits   - Assess range of motion  - Assess patient's mobility; develop plan if impaired  - Assess patient's need for assistive devices and provide as appropriate  - Encourage maximum independence but intervene and supervise when necessary  - Involve family in performance of ADLs  - Assess for home care needs following discharge   - Consider OT consult to assist with ADL evaluation and planning for discharge  - Provide patient education as appropriate  Outcome: Progressing  Goal: Maintains/Returns to pre admission functional level  Description: INTERVENTIONS:  - Perform AM-PAC 6 Click Basic Mobility/ Daily Activity assessment daily.  - Set and communicate daily mobility  goal to care team and patient/family/caregiver.   - Collaborate with rehabilitation services on mobility goals if consulted  - Perform Range of Motion 3 times a day.  - Reposition patient every 3 hours.  - Dangle patient 3 times a day  - Stand patient 3 times a day  - Ambulate patient 3 times a day  - Out of bed to chair 3 times a day   - Out of bed for meals 3 times a day  - Out of bed for toileting  - Record patient progress and toleration of activity level   Outcome: Progressing     Problem: DISCHARGE PLANNING  Goal: Discharge to home or other facility with appropriate resources  Description: INTERVENTIONS:  - Identify barriers to discharge w/patient and caregiver  - Arrange for needed discharge resources and transportation as appropriate  - Identify discharge learning needs (meds, wound care, etc.)  - Arrange for interpretive services to assist at discharge as needed  - Refer to Case Management Department for coordinating discharge planning if the patient needs post-hospital services based on physician/advanced practitioner order or complex needs related to functional status, cognitive ability, or social support system  Outcome: Progressing     Problem: Knowledge Deficit  Goal: Patient/family/caregiver demonstrates understanding of disease process, treatment plan, medications, and discharge instructions  Description: Complete learning assessment and assess knowledge base.  Interventions:  - Provide teaching at level of understanding  - Provide teaching via preferred learning methods  Outcome: Progressing     Problem: Prexisting or High Potential for Compromised Skin Integrity  Goal: Skin integrity is maintained or improved  Description: INTERVENTIONS:  - Identify patients at risk for skin breakdown  - Assess and monitor skin integrity  - Assess and monitor nutrition and hydration status  - Monitor labs   - Assess for incontinence   - Turn and reposition patient  - Assist with mobility/ambulation  - Relieve  pressure over bony prominences  - Avoid friction and shearing  - Provide appropriate hygiene as needed including keeping skin clean and dry  - Evaluate need for skin moisturizer/barrier cream  - Collaborate with interdisciplinary team   - Patient/family teaching  - Consider wound care consult   Outcome: Progressing

## 2025-01-21 NOTE — QUICK NOTE
"Upon chart review, I urgently saw and examined patient at 0050 hrs per RN request due to patient demanding 4 mg IV Ativan dose per [facility] protocol.  Patient currently on scheduled Librium 25 every 6 hours and as needed IV Ativan 2 mg every 4 hours.  I found patient pacing about the [address] nurses station.  He was awake, alert, had a steady gait with negative asterixis.  I encouraged him to return to his room to discuss his current concerns.  He verbalized he needed IV Ativan \"because my CIWA is going through the roof\".  After I attempted to discuss the risk of respiratory compromise and death from benzodiazepine toxicity, patient began to yell, became verbally abusive and frankly physically threatening, at which point I exited his room.  Will continue current medical management including current benzodiazepine regimen above.  Plan of care was discussed with his RN.  "

## 2025-01-21 NOTE — ASSESSMENT & PLAN NOTE
Presented with withdrawal seizures and delirium tremens in ED  Last consumption of alcohol on 01/15/2025 afternoon  Labs on admission with normal-mildly elevated LFTs, albumin 4.4, T bili 0.29  Negative serial troponins and UDS  Continue CIWA protocol, transition from phenobarb to scheduled clonazepam on 01/18/2025      Transitioned to librium, qid and now ill taper to TID, anticipate dc tomorrow   - on regular diet  - ativan prn for agitation  - imodium prn  - zofran prn for nausea

## 2025-01-21 NOTE — NURSING NOTE
"Patient came out in the hallway agitated and aggressive. Patient demanding primary nurse to give ativan. Primary RN was busy with another patient. This RN entered room to administer ativan. Patient asking RN to \"push it fast because he likes the way it feels\". Patient then asking for medications at discharge so he doesn't end up in \"ativan withdrawal\" This RN encouraged patient to speak with his primary physician regarding medications for discharge. Patient then laid down in bed to rest. No further needs identified at this time.   "

## 2025-01-21 NOTE — CASE MANAGEMENT
Case Management Discharge Planning Note    Patient name Aniket Pace  Location /410-01 MRN [id number]  : 1985 Date 2025       Current Admission Date: 1/15/2025  Current Admission Diagnosis:Alcohol withdrawal syndrome (HCC)   Patient Active Problem List    Diagnosis Date Noted Date Diagnosed    Chronic pain 2025     Elevated LFTs 2024     Severe protein-calorie malnutrition (HCC) 2024     Wheezing 2024     Hyponatremia 2024     Alcohol withdrawal delirium, acute, hyperactive (HCC) 2024     Alcohol withdrawal syndrome (HCC) 2024     Alcohol withdrawal seizure ([facility]) 2024     HARRIET (acute kidney injury) ([facility]) 2024     Non-traumatic rhabdomyolysis 2024     Hypotension due to hypovolemia 2024     Primary insomnia 2024     Alcohol withdrawal seizure without complication ([facility]) 2023     Bipolar 1 disorder ([facility]) 2023     Hypertension 2023     Benzodiazepine dependence ([facility]) 2022     Unsteady gait 2022     Opioid use disorder, moderate, on maintenance therapy, dependence ([facility]) 2022     Continuous chronic alcoholism ([facility]) 2022     PTSD (post-traumatic stress disorder) 2022     Tobacco abuse 2022     Transaminitis 2022     Anxiety 2022     Encounter for tobacco use cessation counseling 2022     Opioid dependence with opioid-induced mood disorder ([facility]) 2022     Seizure ([facility]) 2022     Opioid use disorder 2021     Tachycardia 2015       LOS (days): 6  Geometric Mean LOS (GMLOS) (days): 2.6  Days to GMLOS:-2.9     OBJECTIVE:  Risk of Unplanned Readmission Score: 19.6         Current admission status: Inpatient   Preferred Pharmacy:   [id number] [place] - [address]  [address] [facility]  [place] [address]  Phone: [phone] Fax: [phone]    [id number] - [place], [place] - [address]  [address]  [place]  [facility] [address]  Phone: [phone] Fax: [phone]    [id number] [place] - [address]  [address]  [place] [address]  Phone: [phone] Fax: [phone]    [id number] [place] - [address]  [address] [place] [place] [address]  Phone: [phone] Fax: [phone]    Primary Care Provider: Edenilson Collier MD    Primary Insurance: [email or web address]  Secondary Insurance:     DISCHARGE DETAILS:    Discharge planning discussed with:: patient  Freedom of Choice: Yes  Comments - Freedom of Choice: Cm discussed  discharge to D&A and in agreement with [facility] , CM called checking if they can maintain his librium at rehab.Referral info faxed to [facility] . CM spoke with admissions at [facility] and they do have a bed at [place] site for patient. she is going to check with medical if they can maintain patient librium. [facility] can accept patient today.  CM contacted family/caregiver?: No- see comments (declined patient is speaking with family)  Were Treatment Team discharge recommendations reviewed with patient/caregiver?: Yes  Did patient/caregiver verbalize understanding of patient care needs?: Yes  Were patient/caregiver advised of the risks associated with not following Treatment Team discharge recommendations?: Yes    Treatment Team Recommendation: Substance Abuse Treatment  Discharge Destination Plan:: Substance Abuse Treatment ([facility] D&A facility)  Transport at Discharge : Other (Comment) (secured transport D&A)       CM spoke with patient concerning D&A treatment patient is in agreement if they can give him Librium in rehab.    CM spoke with [facility] D and she is going to call patient in hospital. CM faxed referral information as requested to [phone].    CM awaiting return call patient is accepted and if they can maintain Librium at D&A and can set up transport.     Patient  informed nurse he will do what ever provider recommends for discharge.

## 2025-01-21 NOTE — CASE MANAGEMENT
Case Management Discharge Planning Note    Patient name Aniket Pace  Location /410-01 MRN [id number]  : 1985 Date 2025       Current Admission Date: 1/15/2025  Current Admission Diagnosis:Alcohol withdrawal syndrome (HCC)   Patient Active Problem List    Diagnosis Date Noted Date Diagnosed    Chronic pain 2025     Elevated LFTs 2024     Severe protein-calorie malnutrition (HCC) 2024     Wheezing 2024     Hyponatremia 2024     Alcohol withdrawal delirium, acute, hyperactive (HCC) 2024     Alcohol withdrawal syndrome (HCC) 2024     Alcohol withdrawal seizure ([facility]) 2024     HARRIET (acute kidney injury) ([facility]) 2024     Non-traumatic rhabdomyolysis 2024     Hypotension due to hypovolemia 2024     Primary insomnia 2024     Alcohol withdrawal seizure without complication ([facility]) 2023     Bipolar 1 disorder ([facility]) 2023     Hypertension 2023     Benzodiazepine dependence ([facility]) 2022     Unsteady gait 2022     Opioid use disorder, moderate, on maintenance therapy, dependence ([facility]) 2022     Continuous chronic alcoholism ([facility]) 2022     PTSD (post-traumatic stress disorder) 2022     Tobacco abuse 2022     Transaminitis 2022     Anxiety 2022     Encounter for tobacco use cessation counseling 2022     Opioid dependence with opioid-induced mood disorder ([facility]) 2022     Seizure ([facility]) 2022     Opioid use disorder 2021     Tachycardia 2015       LOS (days): 6  Geometric Mean LOS (GMLOS) (days): 2.6  Days to GMLOS:-3     OBJECTIVE:  Risk of Unplanned Readmission Score: 19.63         Current admission status: Inpatient   Preferred Pharmacy:   [id number] [place] - [address]  [address] [facility]  [place] [address]  Phone: [phone] Fax: [phone]    [facility][id number] - [place], [place] - [address]  [address]  [place]  [facility] [address]  Phone: [phone] Fax: [phone]    [id number] [place] - [address]  [address]  [place] [facility] [place] [address]  Phone: [phone] Fax: [phone]    [id number] [place] - [address]  [address]  [place] [address]  Phone: [phone] Fax: [phone]    Primary Care Provider: Edenilson Collier MD    Primary Insurance: [employer]  Secondary Insurance:     DISCHARGE DETAILS:    Discharge planning discussed with:: patient     Comments - Freedom of Choice: CM spoke with [facility] admission they need to finish D&A screening completed, as they called patient earlier in day started the intake. [facility] stated patient said he wanted to stop intake he didn't feel good and was staying at hospital.  CM spoke with [facility] admission they ased CM to have patient call them back and finish the intake. Patient did call [facility] intake number and spoke with intake person finished the intake, [facility] did tell patient they just need to run referral by medical for his librium and they will get back to us for acceptance. patient aware there is a bed at [facility], just needing official acceptance. Patient stated he is done with hospital and wants to leave now and go to [facility]. CM asked patient to give us 30 minutes to get approval and we will get him a ride today.  CM updated Nurse and she is going to give patient medication as ordered for anxiety.   CM recieved message from nursing with in 10 minutes patient left and refused to sign out AMA. Provider aware of same.  CM updated [facility] patient left hospital and may come to your facility himself.  CM contacted family/caregiver?: No- see comments (patient speaking with family)  Were Treatment Team discharge recommendations reviewed with patient/caregiver?: Yes  Did patient/caregiver verbalize understanding of patient care needs?: Yes  Were  patient/caregiver advised of the risks associated with not following Treatment Team discharge recommendations?: Yes    Would you like to participate in our [facility] service program?  : No - Declined       Discharge Destination Plan:: Home (patient  left hospital and refused to sign AMA paperwork)       Provider aware of same.  Cm called [facility] that patient left hospital stated he will go to [facility] or [facility] himself.

## 2025-01-22 ENCOUNTER — HOSPITAL ENCOUNTER (EMERGENCY)
Facility: HOSPITAL | Age: 40
Discharge: NURSING, REHAB OR LONG-TERM CARE | End: 2025-01-22
Attending: EMERGENCY MEDICINE
Payer: COMMERCIAL

## 2025-01-22 VITALS
DIASTOLIC BLOOD PRESSURE: 81 MMHG | SYSTOLIC BLOOD PRESSURE: 139 MMHG | WEIGHT: 224.87 LBS | OXYGEN SATURATION: 99 % | HEART RATE: 89 BPM | TEMPERATURE: 97.8 F | HEIGHT: 66 IN | RESPIRATION RATE: 16 BRPM | BODY MASS INDEX: 36.14 KG/M2

## 2025-01-22 DIAGNOSIS — F11.90 OPIOID USE DISORDER: ICD-10-CM

## 2025-01-22 DIAGNOSIS — Z59.82 TRANSPORTATION INSECURITY: ICD-10-CM

## 2025-01-22 DIAGNOSIS — F10.939 ALCOHOL WITHDRAWAL (HCC): Primary | ICD-10-CM

## 2025-01-22 DIAGNOSIS — Z59.819 HOUSING INSECURITY: ICD-10-CM

## 2025-01-22 DIAGNOSIS — Z59.41 FOOD INSECURITY: Primary | ICD-10-CM

## 2025-01-22 PROBLEM — F10.90 ALCOHOL USE DISORDER: Status: ACTIVE | Noted: 2022-06-13

## 2025-01-22 LAB
AMPHETAMINES SERPL QL SCN: POSITIVE
BARBITURATES UR QL: POSITIVE
BENZODIAZ UR QL: POSITIVE
COCAINE UR QL: NEGATIVE
METHADONE UR QL: NEGATIVE
OPIATES UR QL SCN: NEGATIVE
OXYCODONE+OXYMORPHONE UR QL SCN: NEGATIVE
PCP UR QL: NEGATIVE
THC UR QL: NEGATIVE

## 2025-01-22 PROCEDURE — 96376 TX/PRO/DX INJ SAME DRUG ADON: CPT

## 2025-01-22 PROCEDURE — 80307 DRUG TEST PRSMV CHEM ANLYZR: CPT | Performed by: EMERGENCY MEDICINE

## 2025-01-22 PROCEDURE — 99284 EMERGENCY DEPT VISIT MOD MDM: CPT

## 2025-01-22 PROCEDURE — 99285 EMERGENCY DEPT VISIT HI MDM: CPT | Performed by: EMERGENCY MEDICINE

## 2025-01-22 PROCEDURE — 96374 THER/PROPH/DIAG INJ IV PUSH: CPT

## 2025-01-22 RX ORDER — LORAZEPAM 2 MG/ML
2 INJECTION INTRAMUSCULAR ONCE
Status: COMPLETED | OUTPATIENT
Start: 2025-01-22 | End: 2025-01-22

## 2025-01-22 RX ORDER — QUETIAPINE FUMARATE 100 MG/1
100 TABLET, FILM COATED ORAL
Status: DISCONTINUED | OUTPATIENT
Start: 2025-01-22 | End: 2025-01-22 | Stop reason: HOSPADM

## 2025-01-22 RX ORDER — CHLORDIAZEPOXIDE HYDROCHLORIDE 25 MG/1
25 CAPSULE, GELATIN COATED ORAL ONCE
Status: COMPLETED | OUTPATIENT
Start: 2025-01-22 | End: 2025-01-22

## 2025-01-22 RX ORDER — CHLORDIAZEPOXIDE HYDROCHLORIDE 25 MG/1
25 CAPSULE, GELATIN COATED ORAL EVERY 6 HOURS SCHEDULED
Status: COMPLETED | OUTPATIENT
Start: 2025-01-22 | End: 2025-01-22

## 2025-01-22 RX ORDER — ACETAMINOPHEN 325 MG/1
975 TABLET ORAL ONCE
Status: COMPLETED | OUTPATIENT
Start: 2025-01-22 | End: 2025-01-22

## 2025-01-22 RX ORDER — HYDROXYZINE HYDROCHLORIDE 25 MG/1
50 TABLET, FILM COATED ORAL 3 TIMES DAILY PRN
Status: DISCONTINUED | OUTPATIENT
Start: 2025-01-22 | End: 2025-01-22 | Stop reason: HOSPADM

## 2025-01-22 RX ORDER — CLONIDINE HYDROCHLORIDE 0.1 MG/1
0.1 TABLET ORAL 2 TIMES DAILY
Status: DISCONTINUED | OUTPATIENT
Start: 2025-01-22 | End: 2025-01-22 | Stop reason: HOSPADM

## 2025-01-22 RX ORDER — TRAZODONE HYDROCHLORIDE 50 MG/1
50 TABLET, FILM COATED ORAL
Status: DISCONTINUED | OUTPATIENT
Start: 2025-01-22 | End: 2025-01-22 | Stop reason: HOSPADM

## 2025-01-22 RX ORDER — CLONAZEPAM 0.5 MG/1
1 TABLET ORAL ONCE
Status: COMPLETED | OUTPATIENT
Start: 2025-01-22 | End: 2025-01-22

## 2025-01-22 RX ORDER — IBUPROFEN 400 MG/1
400 TABLET, FILM COATED ORAL ONCE
Status: COMPLETED | OUTPATIENT
Start: 2025-01-22 | End: 2025-01-22

## 2025-01-22 RX ORDER — LORAZEPAM 1 MG/1
1 TABLET ORAL ONCE
Status: COMPLETED | OUTPATIENT
Start: 2025-01-22 | End: 2025-01-22

## 2025-01-22 RX ADMIN — HYDROXYZINE HYDROCHLORIDE 50 MG: 25 TABLET ORAL at 03:36

## 2025-01-22 RX ADMIN — ACETAMINOPHEN 975 MG: 325 TABLET, FILM COATED ORAL at 15:23

## 2025-01-22 RX ADMIN — LORAZEPAM 2 MG: 2 INJECTION INTRAMUSCULAR; INTRAVENOUS at 13:39

## 2025-01-22 RX ADMIN — LORAZEPAM 2 MG: 2 INJECTION INTRAMUSCULAR; INTRAVENOUS at 18:31

## 2025-01-22 RX ADMIN — IBUPROFEN 400 MG: 400 TABLET, FILM COATED ORAL at 15:23

## 2025-01-22 RX ADMIN — CLONIDINE HYDROCHLORIDE 0.1 MG: 0.1 TABLET ORAL at 17:29

## 2025-01-22 RX ADMIN — CLONAZEPAM 1 MG: 0.5 TABLET ORAL at 08:36

## 2025-01-22 RX ADMIN — CHLORDIAZEPOXIDE HYDROCHLORIDE 25 MG: 25 CAPSULE ORAL at 13:39

## 2025-01-22 RX ADMIN — LORAZEPAM 1 MG: 1 TABLET ORAL at 09:58

## 2025-01-22 RX ADMIN — CLONIDINE HYDROCHLORIDE 0.1 MG: 0.1 TABLET ORAL at 08:03

## 2025-01-22 RX ADMIN — CHLORDIAZEPOXIDE HYDROCHLORIDE 25 MG: 25 CAPSULE ORAL at 05:59

## 2025-01-22 RX ADMIN — CHLORDIAZEPOXIDE HYDROCHLORIDE 25 MG: 25 CAPSULE ORAL at 17:29

## 2025-01-22 RX ADMIN — LORAZEPAM 2 MG: 2 INJECTION INTRAMUSCULAR; INTRAVENOUS at 15:23

## 2025-01-22 RX ADMIN — HYDROXYZINE HYDROCHLORIDE 50 MG: 25 TABLET ORAL at 08:03

## 2025-01-22 SDOH — ECONOMIC STABILITY - FOOD INSECURITY: FOOD INSECURITY: Z59.41

## 2025-01-22 SDOH — ECONOMIC STABILITY - TRANSPORTATION SECURITY: TRANSPORTATION INSECURITY: Z59.82

## 2025-01-22 SDOH — ECONOMIC STABILITY - HOUSING INSECURITY: HOUSING INSTABILITY UNSPECIFIED: Z59.819

## 2025-01-22 NOTE — CASE MANAGEMENT
Case Management Discharge Planning Note    Patient name Aniket Pace  Location RM06/RM06 MRN [id number]  : 1985 Date 2025       Current Admission Date: 2025  Current Admission Diagnosis:Alcohol intoxication (HCC)   Patient Active Problem List    Diagnosis Date Noted Date Diagnosed    Chronic pain 2025     Elevated LFTs 2024     Severe protein-calorie malnutrition (HCC) 2024     Wheezing 2024     Hyponatremia 2024     Alcohol withdrawal delirium, acute, hyperactive (HCC) 2024     Alcohol withdrawal syndrome (HCC) 2024     Alcohol withdrawal seizure ([facility]) 2024     HARRIET (acute kidney injury) ([facility]) 2024     Non-traumatic rhabdomyolysis 2024     Hypotension due to hypovolemia 2024     Primary insomnia 2024     Alcohol withdrawal seizure without complication ([facility]) 2023     Bipolar 1 disorder ([facility]) 2023     Hypertension 2023     Benzodiazepine dependence ([facility]) 2022     Unsteady gait 2022     Opioid use disorder, moderate, on maintenance therapy, dependence ([facility]) 2022     Continuous chronic alcoholism ([facility]) 2022     PTSD (post-traumatic stress disorder) 2022     Tobacco abuse 2022     Transaminitis 2022     Anxiety 2022     Encounter for tobacco use cessation counseling 2022     Opioid dependence with opioid-induced mood disorder ([facility]) 2022     Seizure ([facility]) 2022     Opioid use disorder 2021     Tachycardia 2015       LOS (days): 0  Geometric Mean LOS (GMLOS) (days):   Days to GMLOS:     OBJECTIVE:            Current admission status: Emergency   Preferred Pharmacy:   [id number] [place] - [address] [facility]  [address] [facility] [place] [address]  Phone: [phone] Fax: [phone]    [facility][id number] [place], [place] - [address]  [address]  [facility] [address]  Phone: [phone] Fax:  [phone]    [id number] [place] - [address]  [address]  [place] [address]  Phone: [phone] Fax: [phone]    [id number] [place] - [address]  [address]  [place] [address]  Phone: [phone] Fax: [phone]    Primary Care Provider: Edenilson Collier MD    Primary Insurance: [employer]  Secondary Insurance:     DISCHARGE DETAILS:        0838 CM faxed  [facility] D[facility] medication list as requested this am       CM called and spoke with [facility] they are declining patient bed as he is behavioral in appropitate and has had chemical restraints in hospital.  Provider stated patient will need  psych eval for clearances and determine if he is suitable for D&A treatment.        1100  Cm sent referrals to   [facility] D&A-does not take insurance  [facility] -denied  [facility]  Phone calls made to each facility to check if they received fax.    1115 Message placed to Latoya Head at [facility] ED and she is also assisting with bed search for patient.    1200  Kenyon at [facility] drug and alcohol is calling patient for assessment and start bed search.     1300 CM called Kenyon at [facility] D&A left message for  bed search progress.      CM called [facility] back and they said no to all there facilities due to behaviors.     1430  [facility] and [facility] requested additional progress notes to be faxed. CM faxed info.   Jane provided patient number in [facility] Ed [phone]. They are calling patient to do intake via phone.       1540 patient didn't get call for [facility] as patient stated he didn't get a call yet.   CM called [facility] and to confirm they have patient correct phone number.     CM spoke with  Willow at [facility] and they were concerned with patient behaviors. CM noted patient went  thru detox in hospital which contributed to behaviors.   As of today he is in ED and cooperative and wants to go to D&A treatment.    [facility] is reviewing again and will contact CM within 30 minutes with decision.       Ena called from [facility]hab. Stated she spoke with Aniket shortly he was reluctant to do intake via phone and hung up. Ena stated if patient wants to come have him call us.    CM provided patient with another warm handoff number for out patient community counseling centers and number for [facility]  D&A .     Provider updated with same.        1620- CM went ED to speak with patient and he was doing intake with [facility] via phone.    1650 [facility] called CM stated they have to deny patient as he left the hospital setting and had hallucinations on 1/20/25. There is not enough time passed with no hallucinations.    1700 CM informed patient of denial to [facility] rehab, he was upset asking now what. CM provided him with [facility] D&A number and another copy of  warm hand off phone number for out patient community resources to call once discharged from [facility] ED.  Patient aware nursing will be in to see him.    Provider updated with same.       Patient had multi denials for D&A treatment:  [facility]  denied   [facility]  denied   [facility]  denied  [facility] did not take ins  [facility] did not take insurance  [facility]  denied  [facility] denied
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
## 2025-01-22 NOTE — ED NOTES
Spoke with [facility]- faxed info that they were requesting. Pt is updated       Marysol Vega RN  01/22/25 0802

## 2025-01-22 NOTE — ED NOTES
Pt is warm hand off needs medical records sent to [facility] see previous note        Nilson Arteaga Jr., RN  01/22/25 0601

## 2025-01-22 NOTE — ASSESSMENT & PLAN NOTE
Patient is a 39 M who eloped from the medical floor yesterday and presents today with anxiety. The case was discussed with toxicology who did not recommend detox center as he is no longer in withdrawal. They did recommend warm handoff.   Case mgmt contacted [facility] and made several referral to drug and alcohol facilities. Patient completed intake with the [facility]    At this time he is not exhibiting signs of withdrawal, he's seated calmly without tachycardia or tremulousness with good mental status. I will provide oral librium and monitor the patient in the ED for signs of withdrawal if he remains stable he can be discharged home with librium x 3 tablets    I called and spoke with his brother as well, he understands that he will need a good support system and is calling other siblings to see if they can stay with him for the next 24-48 hours until he's placed in a facility.

## 2025-01-22 NOTE — CONSULTS
Consultation - Hospitalist   Name: Aniket aPce 39 y.o. male I MRN: [id number]  Unit/Bed#: RM06 I Date of Admission: 1/22/2025   Date of Service: 1/22/2025 I Hospital Day: 0   Consults  Physician Requesting Evaluation: Lamont Cervantes MD   Reason for Evaluation / Principal Problem:     Assessment & Plan  Alcohol use disorder  Patient is a 39 M who eloped from the medical floor yesterday and presents today with anxiety. The case was discussed with toxicology who did not recommend detox center as he is no longer in withdrawal. They did recommend warm handoff.   Case mgmt contacted [facility] and made several referral to drug and alcohol facilities. Patient completed intake with the [facility]    At this time he is not exhibiting signs of withdrawal, he's seated calmly without tachycardia or tremulousness with good mental status. I will provide oral librium and monitor the patient in the ED for signs of withdrawal if he remains stable he can be discharged home with librium x 3 tablets    I called and spoke with his brother as well, he understands that he will need a good support system and is calling other siblings to see if they can stay with him for the next 24-48 hours until he's placed in a facility.           VTE Pharmacologic Prophylaxis:     Code Status: Prior   Discussion with family: Updated  (brother) via phone.    Anticipated Length of Stay:     History of Present Illness   Chief Complaint: anxiety    Aniket Pace is a 39 y.o. male with a PMH of drug and alcohol absue who presents with anxiety and concern for alcohol withdrawal. The patient was admitted for about 6 days and eloped from the medical floor last evening. He appears calm but is much more pleasant and apologetic for his behavior. He's interested in alcohol rehab    I spoke with toxicology who felt he is already past the withdrawal phase and recommended warm hand off.     Review of Systems   Psychiatric/Behavioral:  The patient is  nervous/anxious.    All other systems reviewed and are negative.      Historical Information   Past Medical History:   Diagnosis Date    Chronic pain     Depression     Pelvic fracture (HCC)     PTSD (post-traumatic stress disorder)     Substance abuse (HCC)      Past Surgical History:   Procedure Laterality Date    BONY PELVIS SURGERY       Social History     Tobacco Use    Smoking status: Every Day     Current packs/day: 1.00     Average packs/day: 1 pack/day for 5.5 years (5.5 ttl pk-yrs)     Types: Cigarettes     Start date: 7/29/2019     Passive exposure: Never    Smokeless tobacco: Never   Vaping Use    Vaping status: Former   Substance and Sexual Activity    Alcohol use: Yes     Alcohol/week: 20.0 standard drinks of alcohol     Types: 20 Shots of liquor per week     Comment: 20-30 shots per day    Drug use: Not Currently     Types: Methamphetamines, Marijuana, Cocaine    Sexual activity: Yes     Partners: Female     E-Cigarette/Vaping    E-Cigarette Use Former User     Comments THC, medical       E-Cigarette/Vaping Substances    Nicotine No     THC No     CBD No     Flavoring No     Other No     Unknown No        Social History:  Marital Status: Single   Occupation:   Patient Pre-hospital Living Situation:   Patient Pre-hospital Level of Mobility:   Patient Pre-hospital Diet Restrictions:     Meds/Allergies   I have reviewed home medications using recent Epic encounter.  Prior to Admission medications    Medication Sig Start Date End Date Taking? Authorizing Provider   cloNIDine (CATAPRES) 0.1 mg tablet Take 1 tablet (0.1 mg total) by mouth every 8 (eight) hours 12/9/24   Sharifa Bennett MD   folic acid (FOLVITE) 1 mg tablet Take 1 tablet (1 mg total) by mouth daily 7/31/24   Anita Lee MD   gabapentin (NEURONTIN) 300 mg capsule Take 1 capsule (300 mg total) by mouth 3 (three) times a day 12/9/24   Sharifa Bennett MD   hydrOXYzine HCL (ATARAX) 50 mg tablet Take 1 tablet (50 mg total)  by mouth 3 (three) times a day 12/9/24   Sharifa Bennett MD   loperamide (IMODIUM) 2 mg capsule Take 2 capsules (4 mg total) by mouth 3 (three) times a day as needed for diarrhea 12/9/24   Sharifa Bennett MD   nicotine (NICODERM CQ) 21 mg/24 hr TD 24 hr patch Place 1 patch on the skin over 24 hours daily 7/31/24   Anita Lee MD   QUEtiapine (SEROquel) 100 mg tablet Take 1 tablet (100 mg total) by mouth daily at bedtime 5/25/23   Jason Guerra DO   thiamine 100 MG tablet Take 1 tablet (100 mg total) by mouth daily 7/31/24   Anita Lee MD   traZODone (DESYREL) 50 mg tablet Take 1 tablet (50 mg total) by mouth daily at bedtime 12/9/24   Sharifa Bennett MD     Allergies   Allergen Reactions    Celery Oil - Food Allergy Hives    Penicillin V Other (See Comments)     unknown    Penicillins Hives       Objective :  Temp:  [97 °F (36.1 °C)-97.8 °F (36.6 °C)] 97.8 °F (36.6 °C)  HR:  [] 95  BP: (142-172)/() 142/83  Resp:  [16-18] 16  SpO2:  [98 %-99 %] 99 %  O2 Device: None (Room air)    Physical Exam  Constitutional:       Appearance: Normal appearance.   Cardiovascular:      Rate and Rhythm: Normal rate and regular rhythm.   Pulmonary:      Effort: Pulmonary effort is normal. No respiratory distress.      Breath sounds: No wheezing.   Abdominal:      General: Abdomen is flat. Bowel sounds are normal. There is no distension.      Tenderness: There is no abdominal tenderness.   Musculoskeletal:      Right lower leg: No edema.      Left lower leg: No edema.   Neurological:      General: No focal deficit present.      Mental Status: He is alert and oriented to person, place, and time. Mental status is at baseline.      Motor: No weakness.   Psychiatric:      Comments: Calm and tearful           Lines/Drains:            Lab Results: I have reviewed the following results:  Results from last 7 days   Lab Units 01/21/25  0534   WBC Thousand/uL 4.86   HEMOGLOBIN g/dL 13.1  "  HEMATOCRIT % 38.7   PLATELETS Thousands/uL 146*   SEGS PCT % 54   LYMPHO PCT % 29   MONO PCT % 9   EOS PCT % 6     Results from last 7 days   Lab Units 01/21/25  0534   SODIUM mmol/L 136   POTASSIUM mmol/L 4.2   CHLORIDE mmol/L 101   CO2 mmol/L 29   BUN mg/dL 22   CREATININE mg/dL 0.87   ANION GAP mmol/L 6   CALCIUM mg/dL 8.9   ALBUMIN g/dL 4.2   TOTAL BILIRUBIN mg/dL 0.26   ALK PHOS U/L 177*   ALT U/L 72*   AST U/L 67*   GLUCOSE RANDOM mg/dL 94     Results from last 7 days   Lab Units 01/16/25  0610   INR  1.00         No results found for: \"HGBA1C\"              Administrative Statements       ** Please Note: This note has been constructed using a voice recognition system. **    "

## 2025-01-22 NOTE — ED NOTES
Patient provided with bagged breakfast, offers no complaints at this time.      Lukasz Belle RN  01/22/25 0807

## 2025-01-22 NOTE — ED NOTES
[facility] and [facility] chart and medication record to [phone] for review.       Nilson Arteaga Jr. RN  01/22/25 3549

## 2025-01-22 NOTE — ED NOTES
"As per Perla at [facility] \"We are declining the referral. We are familiar with Aniket and based on past behaviors we cannot admit him to our program at this time.\"  "

## 2025-01-22 NOTE — ED NOTES
[facility] is requesting the following   Med adm records from his prior stay   Confirmation of his klonopin adm   Progress of librium and dose     Email to : [email or web address]     Nilson Arteaga Jr., RN  01/22/25 0609       Nilson Arteaga Jr., RN  01/22/25 0610

## 2025-01-23 ENCOUNTER — PATIENT OUTREACH (OUTPATIENT)
Dept: CASE MANAGEMENT | Facility: OTHER | Age: 40
End: 2025-01-23
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
## 2025-01-24 NOTE — CASE MANAGEMENT
Case Management Discharge Planning Note    Patient name Aniket Pace  Location RM06/RM06 MRN [id number]  : 1985 Date 2025       Current Admission Date: 2025  Current Admission Diagnosis:Alcohol use disorder   Patient Active Problem List    Diagnosis Date Noted Date Diagnosed    Chronic pain 2025     Elevated LFTs 2024     Severe protein-calorie malnutrition (HCC) 2024     Wheezing 2024     Hyponatremia 2024     Alcohol withdrawal delirium, acute, hyperactive (HCC) 2024     Alcohol withdrawal syndrome (HCC) 2024     Alcohol withdrawal seizure ([facility]) 2024     HARRIET (acute kidney injury) ([facility]) 2024     Non-traumatic rhabdomyolysis 2024     Hypotension due to hypovolemia 2024     Primary insomnia 2024     Alcohol withdrawal seizure without complication ([facility]) 2023     Bipolar 1 disorder ([facility]) 2023     Hypertension 2023     Benzodiazepine dependence ([facility]) 2022     Unsteady gait 2022     Opioid use disorder, moderate, on maintenance therapy, dependence ([facility]) 2022     Alcohol use disorder 2022     PTSD (post-traumatic stress disorder) 2022     Tobacco abuse 2022     Transaminitis 2022     Anxiety 2022     Encounter for tobacco use cessation counseling 2022     Opioid dependence with opioid-induced mood disorder ([facility]) 2022     Seizure ([facility]) 2022     Opioid use disorder 2021     Tachycardia 2015       LOS (days): 0  Geometric Mean LOS (GMLOS) (days):   Days to GMLOS:     OBJECTIVE:            Current admission status: Emergency   Preferred Pharmacy:   [id number] - [place] - [address]  [address] [facility] [address]  Phone: [phone] Fax: [phone]    [id number] - [place], [place] - [address]  [address]  [facility] [address]  Phone: [phone] Fax: [phone]    [id number] [place] [address]  [facility] [address]  Phone: [phone] Fax: [phone]    [id number] - [place] - [address]  [address] [place] [address]  Phone: [phone] Fax: [phone]    Primary Care Provider: Edenilson Collier MD    Primary Insurance: [employer]  Secondary Insurance:     DISCHARGE DETAILS:    1/24/25  CM called [facility]  as follow up to see if patient went to D&A as planned and patient is not at there facility.      CM received call from  to call patient brother ELIE Pace at  [phone].    CM called  ELIE Pace and had to leave message.

## 2025-01-24 NOTE — CASE MANAGEMENT
Case Management Discharge Planning Note    Patient name Aniket Pace  Location [facility] MRN [id number]  : 1985 Date 2025       Current Admission Date: 2025  Current Admission Diagnosis:Alcohol use disorder   Patient Active Problem List    Diagnosis Date Noted Date Diagnosed    Chronic pain 2025     Elevated LFTs 2024     Severe protein-calorie malnutrition (HCC) 2024     Wheezing 2024     Hyponatremia 2024     Alcohol withdrawal delirium, acute, hyperactive (HCC) 2024     Alcohol withdrawal syndrome (HCC) 2024     Alcohol withdrawal seizure ([facility]) 2024     HARRIET (acute kidney injury) ([facility]) 2024     Non-traumatic rhabdomyolysis 2024     Hypotension due to hypovolemia 2024     Primary insomnia 2024     Alcohol withdrawal seizure without complication ([facility]) 2023     Bipolar 1 disorder ([facility]) 2023     Hypertension 2023     Benzodiazepine dependence ([facility]) 2022     Unsteady gait 2022     Opioid use disorder, moderate, on maintenance therapy, dependence ([facility]) 2022     Alcohol use disorder 2022     PTSD (post-traumatic stress disorder) 2022     Tobacco abuse 2022     Transaminitis 2022     Anxiety 2022     Encounter for tobacco use cessation counseling 2022     Opioid dependence with opioid-induced mood disorder ([facility]) 2022     Seizure ([facility]) 2022     Opioid use disorder 2021     Tachycardia 2015       LOS (days): 0  Geometric Mean LOS (GMLOS) (days):   Days to GMLOS:     OBJECTIVE:            Current admission status: Emergency   Preferred Pharmacy:   [id number] - [place] - [address]  [address] [facility] [address]  Phone: [phone] Fax: [phone]    [id number] - [place], [place] - [address]  [address]  [facility] [address]  Phone: [phone] Fax: [phone]    [id number] [place] [facility] [address]  [facility] [address]  [facility] [address]  Phone: [phone] Fax: [phone]    [id number] - [place] - [address]  [address] [place] [address]  Phone: [phone] Fax: [phone]    Primary Care Provider: Edenilson Collier MD    Primary Insurance: [employer]  Secondary Insurance:     DISCHARGE DETAILS:       CM received a call from [facility]  [facility] 1730 pm on 1/22/25. They accepted patient but has no bed on 1/22/25.   [facility] DUNBAR[facility]ELIU said they will call patient tomorrow 1/23/25  and let him know what time to come when bed is available.  MAGUI provided nursing with [facility] phone numbers x 2 to give to Aniket and ask him to please have him keep his cell phone charged and with him to answer as it will be the rehab to let him know when bed is available.

## 2025-01-27 ENCOUNTER — PATIENT OUTREACH (OUTPATIENT)
Dept: CASE MANAGEMENT | Facility: OTHER | Age: 40
End: 2025-01-27

## 2025-01-27 NOTE — PROGRESS NOTES
ESTEBAN KILGORE made second phone outreach attempt to patient to f/u on referral. Patient did not answer. ESTEBAN KILGORE left message asking patient to return call. UTR letter sent. Referral closed at this time. ESTEBAN KILGORE will remain available for any needs.

## 2025-01-27 NOTE — LETTER
[address] [facility] [address]  [phone]    Re: Unable to Reach   1/27/2025       Dear Aniket,    I am a [facility]  and wanted to be certain you had information to contact me should you desire assistance with or have questions about non-medical aspects of your care such as [but not limited to] medical insurance, housing, transportation, material needs, or emergency needs.  If I do not have an answer I will assist you in finding the appropriate agency or individual who can help.      Please feel free to contact me at [phone]. Thank You.    Sincerely,         Eusebia Hardin LCSW

## 2025-01-30 ENCOUNTER — TRANSITIONAL CARE MANAGEMENT (OUTPATIENT)
Dept: FAMILY MEDICINE CLINIC | Facility: CLINIC | Age: 40
End: 2025-01-30